# Patient Record
Sex: MALE | Race: BLACK OR AFRICAN AMERICAN | Employment: OTHER | URBAN - METROPOLITAN AREA
[De-identification: names, ages, dates, MRNs, and addresses within clinical notes are randomized per-mention and may not be internally consistent; named-entity substitution may affect disease eponyms.]

---

## 2017-02-28 ENCOUNTER — APPOINTMENT (OUTPATIENT)
Dept: GENERAL RADIOLOGY | Age: 57
End: 2017-02-28
Payer: MEDICAID

## 2017-02-28 ENCOUNTER — HOSPITAL ENCOUNTER (EMERGENCY)
Age: 57
Discharge: HOME | End: 2017-02-28
Attending: EMERGENCY MEDICINE
Payer: MEDICAID

## 2017-02-28 VITALS
SYSTOLIC BLOOD PRESSURE: 174 MMHG | BODY MASS INDEX: 28.63 KG/M2 | RESPIRATION RATE: 18 BRPM | HEART RATE: 91 BPM | WEIGHT: 200 LBS | OXYGEN SATURATION: 98 % | HEIGHT: 70 IN | TEMPERATURE: 97.9 F | DIASTOLIC BLOOD PRESSURE: 81 MMHG

## 2017-02-28 DIAGNOSIS — R00.2 PALPITATIONS: Primary | ICD-10-CM

## 2017-02-28 DIAGNOSIS — I15.9 SECONDARY HYPERTENSION: ICD-10-CM

## 2017-02-28 LAB
ABSOLUTE EOS #: 0.1 K/UL (ref 0–0.4)
ABSOLUTE LYMPH #: 2.2 K/UL (ref 1–4.8)
ABSOLUTE MONO #: 0.5 K/UL (ref 0.1–1.2)
ANION GAP SERPL CALCULATED.3IONS-SCNC: 15 MMOL/L (ref 9–17)
BASOPHILS # BLD: 1 % (ref 0–2)
BASOPHILS ABSOLUTE: 0.1 K/UL (ref 0–0.2)
BUN BLDV-MCNC: 21 MG/DL (ref 6–20)
BUN/CREAT BLD: ABNORMAL (ref 9–20)
CALCIUM SERPL-MCNC: 9 MG/DL (ref 8.6–10.4)
CHLORIDE BLD-SCNC: 97 MMOL/L (ref 98–107)
CO2: 22 MMOL/L (ref 20–31)
CREAT SERPL-MCNC: 1.38 MG/DL (ref 0.7–1.2)
DIFFERENTIAL TYPE: ABNORMAL
EOSINOPHILS RELATIVE PERCENT: 2 % (ref 1–4)
GFR AFRICAN AMERICAN: >60 ML/MIN
GFR NON-AFRICAN AMERICAN: 53 ML/MIN
GFR SERPL CREATININE-BSD FRML MDRD: ABNORMAL ML/MIN/{1.73_M2}
GFR SERPL CREATININE-BSD FRML MDRD: ABNORMAL ML/MIN/{1.73_M2}
GLUCOSE BLD-MCNC: 112 MG/DL (ref 70–99)
HCT VFR BLD CALC: 38.9 % (ref 41–53)
HEMOGLOBIN: 13.1 G/DL (ref 13.5–17.5)
LYMPHOCYTES # BLD: 30 % (ref 24–44)
MCH RBC QN AUTO: 29.5 PG (ref 26–34)
MCHC RBC AUTO-ENTMCNC: 33.7 G/DL (ref 31–37)
MCV RBC AUTO: 87.3 FL (ref 80–100)
MONOCYTES # BLD: 7 % (ref 2–11)
PDW BLD-RTO: 14.6 % (ref 12.5–15.4)
PLATELET # BLD: 305 K/UL (ref 140–450)
PLATELET ESTIMATE: ABNORMAL
PMV BLD AUTO: 7.9 FL (ref 6–12)
POC TROPONIN I: 0 NG/ML (ref 0–0.1)
POC TROPONIN I: 0.01 NG/ML (ref 0–0.1)
POC TROPONIN INTERP: NORMAL
POC TROPONIN INTERP: NORMAL
POTASSIUM SERPL-SCNC: 4.5 MMOL/L (ref 3.7–5.3)
RBC # BLD: 4.46 M/UL (ref 4.5–5.9)
RBC # BLD: ABNORMAL 10*6/UL
SEG NEUTROPHILS: 60 % (ref 36–66)
SEGMENTED NEUTROPHILS ABSOLUTE COUNT: 4.4 K/UL (ref 1.8–7.7)
SODIUM BLD-SCNC: 134 MMOL/L (ref 135–144)
TSH SERPL DL<=0.05 MIU/L-ACNC: 1.72 MIU/L (ref 0.3–5)
WBC # BLD: 7.3 K/UL (ref 3.5–11)
WBC # BLD: ABNORMAL 10*3/UL

## 2017-02-28 PROCEDURE — 2580000003 HC RX 258: Performed by: EMERGENCY MEDICINE

## 2017-02-28 PROCEDURE — 80048 BASIC METABOLIC PNL TOTAL CA: CPT

## 2017-02-28 PROCEDURE — 85025 COMPLETE CBC W/AUTO DIFF WBC: CPT

## 2017-02-28 PROCEDURE — 71020 XR CHEST STANDARD TWO VW: CPT

## 2017-02-28 PROCEDURE — 84484 ASSAY OF TROPONIN QUANT: CPT

## 2017-02-28 PROCEDURE — 93005 ELECTROCARDIOGRAM TRACING: CPT

## 2017-02-28 PROCEDURE — 99285 EMERGENCY DEPT VISIT HI MDM: CPT

## 2017-02-28 PROCEDURE — 84443 ASSAY THYROID STIM HORMONE: CPT

## 2017-02-28 RX ORDER — 0.9 % SODIUM CHLORIDE 0.9 %
1000 INTRAVENOUS SOLUTION INTRAVENOUS ONCE
Status: COMPLETED | OUTPATIENT
Start: 2017-02-28 | End: 2017-02-28

## 2017-02-28 RX ORDER — DILTIAZEM HYDROCHLORIDE 120 MG/1
120 CAPSULE, COATED, EXTENDED RELEASE ORAL DAILY
Qty: 14 CAPSULE | Refills: 0 | Status: SHIPPED | OUTPATIENT
Start: 2017-02-28 | End: 2018-07-19

## 2017-02-28 RX ADMIN — SODIUM CHLORIDE 1000 ML: 9 INJECTION, SOLUTION INTRAVENOUS at 03:12

## 2017-02-28 ASSESSMENT — ENCOUNTER SYMPTOMS
NAUSEA: 0
COUGH: 0
DIARRHEA: 0
ABDOMINAL PAIN: 0
BLOOD IN STOOL: 0
CHEST TIGHTNESS: 0
APNEA: 0
BACK PAIN: 0
VOMITING: 0
SHORTNESS OF BREATH: 0

## 2017-03-02 LAB
EKG ATRIAL RATE: 110 BPM
EKG P AXIS: 50 DEGREES
EKG P-R INTERVAL: 136 MS
EKG Q-T INTERVAL: 338 MS
EKG QRS DURATION: 88 MS
EKG QTC CALCULATION (BAZETT): 457 MS
EKG R AXIS: 23 DEGREES
EKG T AXIS: 61 DEGREES
EKG VENTRICULAR RATE: 110 BPM

## 2017-03-18 ENCOUNTER — HOSPITAL ENCOUNTER (EMERGENCY)
Age: 57
Discharge: HOME | End: 2017-03-18
Attending: EMERGENCY MEDICINE
Payer: MEDICAID

## 2017-03-18 VITALS
OXYGEN SATURATION: 98 % | RESPIRATION RATE: 18 BRPM | SYSTOLIC BLOOD PRESSURE: 156 MMHG | DIASTOLIC BLOOD PRESSURE: 84 MMHG | TEMPERATURE: 98.1 F | HEART RATE: 86 BPM

## 2017-03-18 DIAGNOSIS — M25.461 BILATERAL KNEE SWELLING: Primary | ICD-10-CM

## 2017-03-18 DIAGNOSIS — M25.462 BILATERAL KNEE SWELLING: Primary | ICD-10-CM

## 2017-03-18 PROCEDURE — 6370000000 HC RX 637 (ALT 250 FOR IP): Performed by: EMERGENCY MEDICINE

## 2017-03-18 PROCEDURE — 99283 EMERGENCY DEPT VISIT LOW MDM: CPT

## 2017-03-18 RX ORDER — IBUPROFEN 800 MG/1
800 TABLET ORAL EVERY 8 HOURS PRN
Qty: 30 TABLET | Refills: 0 | Status: SHIPPED | OUTPATIENT
Start: 2017-03-18 | End: 2018-07-19

## 2017-03-18 RX ORDER — IBUPROFEN 800 MG/1
800 TABLET ORAL ONCE
Status: COMPLETED | OUTPATIENT
Start: 2017-03-18 | End: 2017-03-18

## 2017-03-18 RX ADMIN — IBUPROFEN 800 MG: 800 TABLET, FILM COATED ORAL at 11:18

## 2017-03-18 ASSESSMENT — PAIN SCALES - GENERAL
PAINLEVEL_OUTOF10: 8
PAINLEVEL_OUTOF10: 8

## 2017-03-18 ASSESSMENT — PAIN DESCRIPTION - LOCATION: LOCATION: KNEE

## 2017-03-18 ASSESSMENT — ENCOUNTER SYMPTOMS
CHEST TIGHTNESS: 0
SHORTNESS OF BREATH: 0
NAUSEA: 0
VOMITING: 0
DIARRHEA: 0
SORE THROAT: 0
CONSTIPATION: 0
ABDOMINAL PAIN: 0

## 2017-03-18 ASSESSMENT — PAIN DESCRIPTION - DESCRIPTORS: DESCRIPTORS: DISCOMFORT

## 2017-03-18 ASSESSMENT — PAIN DESCRIPTION - ORIENTATION: ORIENTATION: RIGHT;LEFT

## 2017-06-17 ENCOUNTER — HOSPITAL ENCOUNTER (EMERGENCY)
Age: 57
Discharge: HOME | End: 2017-06-17
Attending: EMERGENCY MEDICINE
Payer: MEDICAID

## 2017-06-17 VITALS
BODY MASS INDEX: 28.63 KG/M2 | RESPIRATION RATE: 15 BRPM | WEIGHT: 200 LBS | HEART RATE: 85 BPM | HEIGHT: 70 IN | DIASTOLIC BLOOD PRESSURE: 83 MMHG | TEMPERATURE: 97.7 F | SYSTOLIC BLOOD PRESSURE: 146 MMHG | OXYGEN SATURATION: 98 %

## 2017-06-17 DIAGNOSIS — G25.81 RESTLESS LEG: Primary | ICD-10-CM

## 2017-06-17 PROCEDURE — 99283 EMERGENCY DEPT VISIT LOW MDM: CPT

## 2017-06-17 RX ORDER — DIAZEPAM 5 MG/1
5 TABLET ORAL EVERY 8 HOURS PRN
Qty: 5 TABLET | Refills: 0 | Status: SHIPPED | OUTPATIENT
Start: 2017-06-17 | End: 2017-06-17

## 2017-06-17 RX ORDER — ROPINIROLE 0.25 MG/1
0.25 TABLET, FILM COATED ORAL NIGHTLY
Qty: 90 TABLET | Refills: 3 | Status: ON HOLD | OUTPATIENT
Start: 2017-06-17 | End: 2018-07-20 | Stop reason: ALTCHOICE

## 2017-06-17 ASSESSMENT — PAIN DESCRIPTION - PAIN TYPE: TYPE: ACUTE PAIN

## 2017-06-17 ASSESSMENT — ENCOUNTER SYMPTOMS
RHINORRHEA: 0
ABDOMINAL PAIN: 0
NAUSEA: 0
SHORTNESS OF BREATH: 0
VOMITING: 0
BACK PAIN: 0
TROUBLE SWALLOWING: 0
CONSTIPATION: 0
DIARRHEA: 0

## 2017-06-17 ASSESSMENT — PAIN SCALES - GENERAL: PAINLEVEL_OUTOF10: 10

## 2017-06-17 ASSESSMENT — PAIN DESCRIPTION - LOCATION: LOCATION: LEG

## 2017-06-17 ASSESSMENT — PAIN DESCRIPTION - ORIENTATION: ORIENTATION: LEFT

## 2017-06-17 ASSESSMENT — PAIN DESCRIPTION - FREQUENCY: FREQUENCY: CONTINUOUS

## 2017-07-14 ENCOUNTER — HOSPITAL ENCOUNTER (EMERGENCY)
Age: 57
Discharge: HOME | End: 2017-07-14
Attending: EMERGENCY MEDICINE
Payer: MEDICAID

## 2017-07-14 VITALS
OXYGEN SATURATION: 97 % | TEMPERATURE: 99.1 F | BODY MASS INDEX: 29.62 KG/M2 | DIASTOLIC BLOOD PRESSURE: 87 MMHG | HEIGHT: 69 IN | HEART RATE: 85 BPM | SYSTOLIC BLOOD PRESSURE: 149 MMHG | WEIGHT: 200 LBS | RESPIRATION RATE: 18 BRPM

## 2017-07-14 DIAGNOSIS — G89.29 CHRONIC HIP PAIN, LEFT: Primary | ICD-10-CM

## 2017-07-14 DIAGNOSIS — M25.562 CHRONIC PAIN OF LEFT KNEE: ICD-10-CM

## 2017-07-14 DIAGNOSIS — G89.29 CHRONIC PAIN OF LEFT KNEE: ICD-10-CM

## 2017-07-14 DIAGNOSIS — G25.81 RESTLESS LEG SYNDROME: ICD-10-CM

## 2017-07-14 DIAGNOSIS — M25.552 CHRONIC HIP PAIN, LEFT: Primary | ICD-10-CM

## 2017-07-14 LAB — GLUCOSE BLD-MCNC: 143 MG/DL (ref 75–110)

## 2017-07-14 PROCEDURE — 99284 EMERGENCY DEPT VISIT MOD MDM: CPT

## 2017-07-14 PROCEDURE — 6370000000 HC RX 637 (ALT 250 FOR IP): Performed by: STUDENT IN AN ORGANIZED HEALTH CARE EDUCATION/TRAINING PROGRAM

## 2017-07-14 PROCEDURE — 82947 ASSAY GLUCOSE BLOOD QUANT: CPT

## 2017-07-14 RX ORDER — HYDROCODONE BITARTRATE AND ACETAMINOPHEN 5; 325 MG/1; MG/1
1 TABLET ORAL ONCE
Status: COMPLETED | OUTPATIENT
Start: 2017-07-14 | End: 2017-07-14

## 2017-07-14 RX ADMIN — HYDROCODONE BITARTRATE AND ACETAMINOPHEN 2 TABLET: 5; 325 TABLET ORAL at 07:14

## 2017-07-14 ASSESSMENT — ENCOUNTER SYMPTOMS
VOMITING: 0
BACK PAIN: 0
SHORTNESS OF BREATH: 0
PHOTOPHOBIA: 0
NAUSEA: 0
ABDOMINAL PAIN: 0
COUGH: 0
ABDOMINAL DISTENTION: 0
SORE THROAT: 0
BLOOD IN STOOL: 0
DIARRHEA: 0
CONSTIPATION: 0
CHEST TIGHTNESS: 0

## 2017-07-14 ASSESSMENT — PAIN DESCRIPTION - DESCRIPTORS: DESCRIPTORS: THROBBING

## 2017-07-14 ASSESSMENT — PAIN DESCRIPTION - PAIN TYPE: TYPE: ACUTE PAIN

## 2017-07-14 ASSESSMENT — PAIN DESCRIPTION - ORIENTATION: ORIENTATION: LEFT

## 2017-07-14 ASSESSMENT — PAIN SCALES - GENERAL
PAINLEVEL_OUTOF10: 8
PAINLEVEL_OUTOF10: 8

## 2017-07-14 ASSESSMENT — PAIN DESCRIPTION - LOCATION: LOCATION: LEG

## 2017-09-13 ENCOUNTER — HOSPITAL ENCOUNTER (EMERGENCY)
Age: 57
Discharge: HOME | End: 2017-09-13
Attending: EMERGENCY MEDICINE
Payer: MEDICAID

## 2017-09-13 VITALS
HEART RATE: 90 BPM | WEIGHT: 210 LBS | RESPIRATION RATE: 14 BRPM | OXYGEN SATURATION: 97 % | DIASTOLIC BLOOD PRESSURE: 85 MMHG | TEMPERATURE: 98.1 F | BODY MASS INDEX: 31.01 KG/M2 | SYSTOLIC BLOOD PRESSURE: 153 MMHG

## 2017-09-13 DIAGNOSIS — K85.90 ACUTE PANCREATITIS, UNSPECIFIED COMPLICATION STATUS, UNSPECIFIED PANCREATITIS TYPE: Primary | ICD-10-CM

## 2017-09-13 DIAGNOSIS — M25.561 RIGHT KNEE PAIN, UNSPECIFIED CHRONICITY: ICD-10-CM

## 2017-09-13 LAB
ABSOLUTE EOS #: 0.1 K/UL (ref 0–0.4)
ABSOLUTE LYMPH #: 2 K/UL (ref 1–4.8)
ABSOLUTE MONO #: 0.5 K/UL (ref 0.1–1.2)
ALBUMIN SERPL-MCNC: 4.1 G/DL (ref 3.5–5.2)
ALBUMIN/GLOBULIN RATIO: 1.4 (ref 1–2.5)
ALP BLD-CCNC: 109 U/L (ref 40–129)
ALT SERPL-CCNC: 14 U/L (ref 5–41)
ANION GAP SERPL CALCULATED.3IONS-SCNC: 13 MMOL/L (ref 9–17)
AST SERPL-CCNC: 20 U/L
BASOPHILS # BLD: 1 %
BASOPHILS ABSOLUTE: 0 K/UL (ref 0–0.2)
BILIRUB SERPL-MCNC: 0.53 MG/DL (ref 0.3–1.2)
BUN BLDV-MCNC: 15 MG/DL (ref 6–20)
BUN/CREAT BLD: ABNORMAL (ref 9–20)
CALCIUM SERPL-MCNC: 9 MG/DL (ref 8.6–10.4)
CHLORIDE BLD-SCNC: 103 MMOL/L (ref 98–107)
CO2: 22 MMOL/L (ref 20–31)
CREAT SERPL-MCNC: 1.44 MG/DL (ref 0.7–1.2)
DIFFERENTIAL TYPE: ABNORMAL
EOSINOPHILS RELATIVE PERCENT: 2 %
GFR AFRICAN AMERICAN: >60 ML/MIN
GFR NON-AFRICAN AMERICAN: 51 ML/MIN
GFR SERPL CREATININE-BSD FRML MDRD: ABNORMAL ML/MIN/{1.73_M2}
GFR SERPL CREATININE-BSD FRML MDRD: ABNORMAL ML/MIN/{1.73_M2}
GLUCOSE BLD-MCNC: 151 MG/DL (ref 70–99)
HCT VFR BLD CALC: 35.5 % (ref 41–53)
HEMOGLOBIN: 11.7 G/DL (ref 13.5–17.5)
LIPASE: 207 U/L (ref 13–60)
LYMPHOCYTES # BLD: 31 %
MCH RBC QN AUTO: 29.5 PG (ref 26–34)
MCHC RBC AUTO-ENTMCNC: 32.9 G/DL (ref 31–37)
MCV RBC AUTO: 89.7 FL (ref 80–100)
MONOCYTES # BLD: 7 %
PDW BLD-RTO: 14.7 % (ref 12.5–15.4)
PLATELET # BLD: 281 K/UL (ref 140–450)
PLATELET ESTIMATE: ABNORMAL
PMV BLD AUTO: 7.9 FL (ref 6–12)
POTASSIUM SERPL-SCNC: 3.8 MMOL/L (ref 3.7–5.3)
RBC # BLD: 3.96 M/UL (ref 4.5–5.9)
RBC # BLD: ABNORMAL 10*6/UL
SEG NEUTROPHILS: 59 %
SEGMENTED NEUTROPHILS ABSOLUTE COUNT: 3.9 K/UL (ref 1.8–7.7)
SODIUM BLD-SCNC: 138 MMOL/L (ref 135–144)
TOTAL PROTEIN: 7 G/DL (ref 6.4–8.3)
WBC # BLD: 6.6 K/UL (ref 3.5–11)
WBC # BLD: ABNORMAL 10*3/UL

## 2017-09-13 PROCEDURE — 99284 EMERGENCY DEPT VISIT MOD MDM: CPT

## 2017-09-13 PROCEDURE — 80053 COMPREHEN METABOLIC PANEL: CPT

## 2017-09-13 PROCEDURE — 83690 ASSAY OF LIPASE: CPT

## 2017-09-13 PROCEDURE — 85025 COMPLETE CBC W/AUTO DIFF WBC: CPT

## 2017-09-13 RX ORDER — OXYCODONE HYDROCHLORIDE 5 MG/1
5 TABLET ORAL EVERY 6 HOURS PRN
Qty: 10 TABLET | Refills: 0 | Status: SHIPPED | OUTPATIENT
Start: 2017-09-13 | End: 2017-09-20

## 2017-09-13 RX ORDER — ACETAMINOPHEN 500 MG
1000 TABLET ORAL EVERY 6 HOURS PRN
Qty: 30 TABLET | Refills: 3 | Status: SHIPPED | OUTPATIENT
Start: 2017-09-13 | End: 2018-07-19

## 2017-09-13 RX ORDER — ONDANSETRON 4 MG/1
4 TABLET, ORALLY DISINTEGRATING ORAL EVERY 8 HOURS PRN
Qty: 8 TABLET | Refills: 0 | Status: SHIPPED | OUTPATIENT
Start: 2017-09-13 | End: 2018-07-19

## 2017-09-13 ASSESSMENT — PAIN DESCRIPTION - FREQUENCY: FREQUENCY: INTERMITTENT

## 2017-09-13 ASSESSMENT — PAIN DESCRIPTION - DESCRIPTORS: DESCRIPTORS: SORE

## 2017-09-13 ASSESSMENT — ENCOUNTER SYMPTOMS
DIARRHEA: 0
NAUSEA: 0
VOMITING: 0
CONSTIPATION: 0
SHORTNESS OF BREATH: 0
ABDOMINAL PAIN: 1

## 2017-09-13 ASSESSMENT — PAIN SCALES - GENERAL: PAINLEVEL_OUTOF10: 10

## 2017-09-13 ASSESSMENT — PAIN DESCRIPTION - ORIENTATION: ORIENTATION: RIGHT

## 2017-09-13 ASSESSMENT — PAIN DESCRIPTION - PAIN TYPE: TYPE: CHRONIC PAIN

## 2017-09-13 ASSESSMENT — PAIN DESCRIPTION - LOCATION: LOCATION: ABDOMEN;KNEE

## 2018-07-19 ENCOUNTER — HOSPITAL ENCOUNTER (INPATIENT)
Age: 58
LOS: 2 days | Discharge: HOME | DRG: 446 | End: 2018-07-22
Attending: EMERGENCY MEDICINE | Admitting: EMERGENCY MEDICINE
Payer: MEDICAID

## 2018-07-19 ENCOUNTER — APPOINTMENT (OUTPATIENT)
Dept: CT IMAGING | Age: 58
DRG: 446 | End: 2018-07-19
Payer: MEDICAID

## 2018-07-19 DIAGNOSIS — N20.1 URETEROLITHIASIS: Primary | ICD-10-CM

## 2018-07-19 DIAGNOSIS — N10 ACUTE PYELONEPHRITIS: ICD-10-CM

## 2018-07-19 DIAGNOSIS — R10.9 RIGHT FLANK PAIN: ICD-10-CM

## 2018-07-19 PROBLEM — N20.0 KIDNEY STONE: Status: ACTIVE | Noted: 2018-07-19

## 2018-07-19 LAB
-: ABNORMAL
ABSOLUTE EOS #: 0.13 K/UL (ref 0–0.44)
ABSOLUTE IMMATURE GRANULOCYTE: 0.04 K/UL (ref 0–0.3)
ABSOLUTE LYMPH #: 1.68 K/UL (ref 1.1–3.7)
ABSOLUTE MONO #: 0.87 K/UL (ref 0.1–1.2)
ALBUMIN SERPL-MCNC: 4 G/DL (ref 3.5–5.2)
ALBUMIN/GLOBULIN RATIO: 1.1 (ref 1–2.5)
ALP BLD-CCNC: 122 U/L (ref 40–129)
ALT SERPL-CCNC: 16 U/L (ref 5–41)
AMORPHOUS: ABNORMAL
ANION GAP SERPL CALCULATED.3IONS-SCNC: 13 MMOL/L (ref 9–17)
AST SERPL-CCNC: 16 U/L
BACTERIA: ABNORMAL
BASOPHILS # BLD: 1 % (ref 0–2)
BASOPHILS ABSOLUTE: 0.06 K/UL (ref 0–0.2)
BILIRUB SERPL-MCNC: 0.54 MG/DL (ref 0.3–1.2)
BILIRUBIN DIRECT: 0.12 MG/DL
BILIRUBIN URINE: NEGATIVE
BILIRUBIN, INDIRECT: 0.42 MG/DL (ref 0–1)
BUN BLDV-MCNC: 28 MG/DL (ref 6–20)
BUN/CREAT BLD: ABNORMAL (ref 9–20)
CALCIUM SERPL-MCNC: 9 MG/DL (ref 8.6–10.4)
CASTS UA: ABNORMAL /LPF (ref 0–8)
CHLORIDE BLD-SCNC: 100 MMOL/L (ref 98–107)
CO2: 20 MMOL/L (ref 20–31)
COLOR: YELLOW
CREAT SERPL-MCNC: 1.85 MG/DL (ref 0.7–1.2)
CRYSTALS, UA: ABNORMAL /HPF
DIFFERENTIAL TYPE: ABNORMAL
EOSINOPHILS RELATIVE PERCENT: 1 % (ref 1–4)
EPITHELIAL CELLS UA: ABNORMAL /HPF (ref 0–5)
GFR AFRICAN AMERICAN: 46 ML/MIN
GFR NON-AFRICAN AMERICAN: 38 ML/MIN
GFR SERPL CREATININE-BSD FRML MDRD: ABNORMAL ML/MIN/{1.73_M2}
GFR SERPL CREATININE-BSD FRML MDRD: ABNORMAL ML/MIN/{1.73_M2}
GLOBULIN: NORMAL G/DL (ref 1.5–3.8)
GLUCOSE BLD-MCNC: 199 MG/DL (ref 70–99)
GLUCOSE URINE: NEGATIVE
HCT VFR BLD CALC: 37.9 % (ref 40.7–50.3)
HEMOGLOBIN: 12.1 G/DL (ref 13–17)
IMMATURE GRANULOCYTES: 0 %
KETONES, URINE: NEGATIVE
LEUKOCYTE ESTERASE, URINE: ABNORMAL
LYMPHOCYTES # BLD: 14 % (ref 24–43)
MCH RBC QN AUTO: 29.1 PG (ref 25.2–33.5)
MCHC RBC AUTO-ENTMCNC: 31.9 G/DL (ref 28.4–34.8)
MCV RBC AUTO: 91.1 FL (ref 82.6–102.9)
MONOCYTES # BLD: 7 % (ref 3–12)
MUCUS: ABNORMAL
NITRITE, URINE: NEGATIVE
NRBC AUTOMATED: 0 PER 100 WBC
OTHER OBSERVATIONS UA: ABNORMAL
PDW BLD-RTO: 13.2 % (ref 11.8–14.4)
PH UA: 5 (ref 5–8)
PLATELET # BLD: 241 K/UL (ref 138–453)
PLATELET ESTIMATE: ABNORMAL
PMV BLD AUTO: 10.5 FL (ref 8.1–13.5)
POTASSIUM SERPL-SCNC: 3.6 MMOL/L (ref 3.7–5.3)
PROTEIN UA: ABNORMAL
RBC # BLD: 4.16 M/UL (ref 4.21–5.77)
RBC # BLD: ABNORMAL 10*6/UL
RBC UA: ABNORMAL /HPF (ref 0–4)
RENAL EPITHELIAL, UA: ABNORMAL /HPF
SEG NEUTROPHILS: 77 % (ref 36–65)
SEGMENTED NEUTROPHILS ABSOLUTE COUNT: 9.2 K/UL (ref 1.5–8.1)
SODIUM BLD-SCNC: 133 MMOL/L (ref 135–144)
SPECIFIC GRAVITY UA: 1.02 (ref 1–1.03)
TOTAL PROTEIN: 7.7 G/DL (ref 6.4–8.3)
TRICHOMONAS: ABNORMAL
TURBIDITY: ABNORMAL
URINE HGB: ABNORMAL
UROBILINOGEN, URINE: NORMAL
WBC # BLD: 12 K/UL (ref 3.5–11.3)
WBC # BLD: ABNORMAL 10*3/UL
WBC UA: ABNORMAL /HPF (ref 0–5)
YEAST: ABNORMAL

## 2018-07-19 PROCEDURE — 80076 HEPATIC FUNCTION PANEL: CPT

## 2018-07-19 PROCEDURE — 6370000000 HC RX 637 (ALT 250 FOR IP): Performed by: PHYSICIAN ASSISTANT

## 2018-07-19 PROCEDURE — 6360000002 HC RX W HCPCS: Performed by: PHYSICIAN ASSISTANT

## 2018-07-19 PROCEDURE — 2580000003 HC RX 258: Performed by: PHYSICIAN ASSISTANT

## 2018-07-19 PROCEDURE — 81001 URINALYSIS AUTO W/SCOPE: CPT

## 2018-07-19 PROCEDURE — 74176 CT ABD & PELVIS W/O CONTRAST: CPT

## 2018-07-19 PROCEDURE — 96374 THER/PROPH/DIAG INJ IV PUSH: CPT

## 2018-07-19 PROCEDURE — 2500000003 HC RX 250 WO HCPCS: Performed by: PHYSICIAN ASSISTANT

## 2018-07-19 PROCEDURE — 85025 COMPLETE CBC W/AUTO DIFF WBC: CPT

## 2018-07-19 PROCEDURE — 80048 BASIC METABOLIC PNL TOTAL CA: CPT

## 2018-07-19 PROCEDURE — 99285 EMERGENCY DEPT VISIT HI MDM: CPT

## 2018-07-19 PROCEDURE — G0378 HOSPITAL OBSERVATION PER HR: HCPCS

## 2018-07-19 PROCEDURE — 87086 URINE CULTURE/COLONY COUNT: CPT

## 2018-07-19 RX ORDER — KETOROLAC TROMETHAMINE 15 MG/ML
15 INJECTION, SOLUTION INTRAMUSCULAR; INTRAVENOUS ONCE
Status: COMPLETED | OUTPATIENT
Start: 2018-07-19 | End: 2018-07-19

## 2018-07-19 RX ORDER — TAMSULOSIN HYDROCHLORIDE 0.4 MG/1
0.4 CAPSULE ORAL DAILY
Status: DISCONTINUED | OUTPATIENT
Start: 2018-07-19 | End: 2018-07-20

## 2018-07-19 RX ORDER — 0.9 % SODIUM CHLORIDE 0.9 %
1000 INTRAVENOUS SOLUTION INTRAVENOUS ONCE
Status: COMPLETED | OUTPATIENT
Start: 2018-07-19 | End: 2018-07-19

## 2018-07-19 RX ADMIN — KETOROLAC TROMETHAMINE 15 MG: 15 INJECTION, SOLUTION INTRAMUSCULAR; INTRAVENOUS at 18:30

## 2018-07-19 RX ADMIN — SODIUM CHLORIDE 1000 ML: 9 INJECTION, SOLUTION INTRAVENOUS at 18:31

## 2018-07-19 RX ADMIN — CEFTRIAXONE SODIUM 1 G: 2 INJECTION, POWDER, FOR SOLUTION INTRAMUSCULAR; INTRAVENOUS at 20:39

## 2018-07-19 RX ADMIN — SODIUM CHLORIDE 1000 ML: 9 INJECTION, SOLUTION INTRAVENOUS at 20:39

## 2018-07-19 RX ADMIN — TAMSULOSIN HYDROCHLORIDE 0.4 MG: 0.4 CAPSULE ORAL at 20:39

## 2018-07-19 ASSESSMENT — PAIN SCALES - GENERAL
PAINLEVEL_OUTOF10: 10
PAINLEVEL_OUTOF10: 10

## 2018-07-19 ASSESSMENT — PAIN DESCRIPTION - PAIN TYPE: TYPE: ACUTE PAIN

## 2018-07-19 ASSESSMENT — ENCOUNTER SYMPTOMS
VOMITING: 0
BACK PAIN: 0
SHORTNESS OF BREATH: 0
SORE THROAT: 0
NAUSEA: 1
ABDOMINAL PAIN: 0
CHEST TIGHTNESS: 0
COUGH: 0
ABDOMINAL DISTENTION: 0
DIARRHEA: 0

## 2018-07-19 ASSESSMENT — PAIN DESCRIPTION - LOCATION: LOCATION: FLANK

## 2018-07-19 NOTE — ED PROVIDER NOTES
[facility] ED     Emergency Department     Faculty Attestation    I performed a history and physical examination of the patient and discussed management with the resident. I reviewed the residents note and agree with the documented findings and plan of care. Any areas of disagreement are noted on the chart. I was personally present for the key portions of any procedures. I have documented in the chart those procedures where I was not present during the key portions. I have reviewed the emergency nurses triage note. I agree with the chief complaint, past medical history, past surgical history, allergies, medications, social and family history as documented unless otherwise noted below. For Physician Assistant/ Nurse Practitioner cases/documentation I have personally evaluated this patient and have completed at least one if not all key elements of the E/M (history, physical exam, and MDM). Additional findings are as noted. She here with right-sided flank pain intermittent for the past several days getting worse more severe. Nausea but no vomiting. Concerned that he is had a blood in his urine. No history of stones. Also decreased bowel movements for the last week but did have a bowel movement yesterday. No surgical history still has his appendix and gallbladder. On exam appears comfortable watching nor. Abdomen soft focal right lower tenderness but more lateraland inferior to McBurney's point but no rebound or guarding no pulsatile abdominal mass.  exam negative per resident note testicular tenderness. Does have right CVA tenderness.   Clinically concerned for stone, we'll do a bedside ultrasound, plan for CT given no prior history of stone and age      Critical Care     none    Genie Medina MD, Kimani Llanos  Attending Emergency  Physician             Genie Medina MD  07/19/18 1108

## 2018-07-19 NOTE — ED PROVIDER NOTES
is active and cooperative. Non-toxic appearance. He does not have a sickly appearance. He does not appear ill. Patient appears uncomfortable but nontoxic. HENT:   Head: Normocephalic and atraumatic. Mouth/Throat: Oropharynx is clear and moist.   Eyes: Conjunctivae are normal. Pupils are equal, round, and reactive to light. Neck: Normal range of motion. Neck supple. Cardiovascular: Normal rate, regular rhythm, normal heart sounds and intact distal pulses. No murmur heard. Pulmonary/Chest: Effort normal and breath sounds normal. No respiratory distress. Abdominal: Soft. Bowel sounds are normal. He exhibits no distension. There is no tenderness. There is CVA tenderness. There is no guarding, no tenderness at McBurney's point and negative Beaver's sign. Hernia confirmed negative in the right inguinal area and confirmed negative in the left inguinal area. Right-sided CVA tenderness   Genitourinary: Testes normal and penis normal. Cremasteric reflex is present. Right testis shows no mass, no swelling and no tenderness. Right testis is descended. Cremasteric reflex is not absent on the right side. Left testis shows no mass, no swelling and no tenderness. Left testis is descended. Cremasteric reflex is not absent on the left side. Musculoskeletal: Normal range of motion. Lymphadenopathy:        Right: No inguinal adenopathy present. Left: No inguinal adenopathy present. Neurological: He is alert and oriented to person, place, and time. Coordination normal.   Skin: Skin is warm and dry. He is not diaphoretic. Psychiatric: He has a normal mood and affect. His behavior is normal. Judgment and thought content normal.   Nursing note and vitals reviewed.       DIFFERENTIAL  DIAGNOSIS     PLAN (LABS / IMAGING / EKG):  Orders Placed This Encounter   Procedures    Urine Culture    CT ABDOMEN PELVIS WO CONTRAST    CBC Auto Differential    Basic Metabolic Panel    HEPATIC FUNCTION PANEL    Urinalysis with Microscopic    BASIC METABOLIC PANEL    CBC Auto Differential    Diet NPO Effective Now    Vital signs per unit routine    Notify physician    Notify physician    Up as tolerated    Place intermittent pneumatic compression device    Strain all urine    Full Code    Inpatient consult to Urology    POC Glucose Fingerstick    Saline lock IV    PATIENT STATUS (FROM ED OR OR/PROCEDURAL) Inpatient       MEDICATIONS ORDERED:  Orders Placed This Encounter   Medications    0.9 % sodium chloride bolus    ketorolac (TORADOL) injection 15 mg    cefTRIAXone (ROCEPHIN) 1 g in sterile water 10 mL IV syringe    sodium chloride flush 0.9 % injection 10 mL    sodium chloride flush 0.9 % injection 10 mL    acetaminophen (TYLENOL) tablet 650 mg    0.9 % sodium chloride infusion    oxyCODONE-acetaminophen (PERCOCET) 5-325 MG per tablet 1 tablet    ondansetron (ZOFRAN-ODT) disintegrating tablet 4 mg    ketorolac (TORADOL) injection 15 mg    cefTRIAXone (ROCEPHIN) 1 g in sterile water 10 mL IV syringe    tamsulosin (FLOMAX) capsule 0.4 mg    gabapentin (NEURONTIN) capsule 100 mg    metFORMIN (GLUCOPHAGE) tablet 500 mg    DISCONTD: tamsulosin (FLOMAX) capsule 0.4 mg    0.9 % sodium chloride bolus       DDX: Kidney stone, UTI, pyelonephritis, hepatitis, gallbladder disease, musculoskeletal pain, neoplasm, appendicitis    Initial MDM/Plan: 62 y.o. male who presents with right-sided flank pain and hematuria. Patient discussed with Dr. Clarence Brothers. Patient is alert and oriented, appears uncomfortable but is nontoxic appearing. Patient has a normal testicular exam and no evidence of hernia. Will order CT scan to evaluate for possible kidney stone. Due to patient age and history of hypertension, will do a bedside ultrasound to evaluate for AAA. CBC, BMP, LFTs, UA, CT abdomen and pelvis ordered for evaluation. Toradol ordered for pain.     MDM    DIAGNOSTIC RESULTS / EMERGENCY DEPARTMENT COURSE / MDM admit patient for pain control and infected kidney stone.  [CR]   2009 Discussed patient with urology resident who states that they will consult on the patient for possible procedure tomorrow. [CR]   2125 Patient waiting admission. Patient discussed with Dr. Angela Rendon who will assume care at shift change.  [CR]      ED Course User Index  [CR] Alejandra Vargas DO          PROCEDURES:  Procedures    CONSULTS:  IP CONSULT TO UROLOGY    CRITICAL CARE:  Please see attending note    FINAL IMPRESSION      1. Ureterolithiasis    2. Acute pyelonephritis    3. Right flank pain          DISPOSITION / PLAN     DISPOSITION        PATIENT REFERRED TO:  No follow-up provider specified.     DISCHARGE MEDICATIONS:  Current Discharge Medication List          Alejandra Vargas DO  Emergency Medicine Resident    (Please note that portions of this note were completed with a voice recognition program.  Efforts were made to edit the dictations but occasionally words are mis-transcribed.)       Alejandra Vargas DO  Resident  07/20/18 1048

## 2018-07-20 ENCOUNTER — APPOINTMENT (OUTPATIENT)
Dept: GENERAL RADIOLOGY | Age: 58
DRG: 446 | End: 2018-07-20
Payer: MEDICAID

## 2018-07-20 ENCOUNTER — ANESTHESIA (OUTPATIENT)
Dept: OPERATING ROOM | Age: 58
DRG: 446 | End: 2018-07-20
Payer: MEDICAID

## 2018-07-20 ENCOUNTER — ANESTHESIA EVENT (OUTPATIENT)
Dept: OPERATING ROOM | Age: 58
DRG: 446 | End: 2018-07-20
Payer: MEDICAID

## 2018-07-20 VITALS — SYSTOLIC BLOOD PRESSURE: 121 MMHG | OXYGEN SATURATION: 96 % | DIASTOLIC BLOOD PRESSURE: 72 MMHG

## 2018-07-20 LAB
CULTURE: NORMAL
ESTIMATED AVERAGE GLUCOSE: 186 MG/DL
GLUCOSE BLD-MCNC: 146 MG/DL (ref 75–110)
GLUCOSE BLD-MCNC: 191 MG/DL (ref 75–110)
GLUCOSE BLD-MCNC: 380 MG/DL (ref 75–110)
HBA1C MFR BLD: 8.1 % (ref 4–6)
Lab: NORMAL
SPECIMEN DESCRIPTION: NORMAL
STATUS: NORMAL

## 2018-07-20 PROCEDURE — 6370000000 HC RX 637 (ALT 250 FOR IP): Performed by: EMERGENCY MEDICINE

## 2018-07-20 PROCEDURE — G0378 HOSPITAL OBSERVATION PER HR: HCPCS

## 2018-07-20 PROCEDURE — 74420 UROGRAPHY RTRGR +-KUB: CPT

## 2018-07-20 PROCEDURE — 2500000003 HC RX 250 WO HCPCS: Performed by: NURSE ANESTHETIST, CERTIFIED REGISTERED

## 2018-07-20 PROCEDURE — 2580000003 HC RX 258: Performed by: EMERGENCY MEDICINE

## 2018-07-20 PROCEDURE — 3700000001 HC ADD 15 MINUTES (ANESTHESIA): Performed by: UROLOGY

## 2018-07-20 PROCEDURE — 0TC68ZZ EXTIRPATION OF MATTER FROM RIGHT URETER, VIA NATURAL OR ARTIFICIAL OPENING ENDOSCOPIC: ICD-10-PCS | Performed by: UROLOGY

## 2018-07-20 PROCEDURE — 3600000002 HC SURGERY LEVEL 2 BASE: Performed by: UROLOGY

## 2018-07-20 PROCEDURE — 2580000003 HC RX 258: Performed by: UROLOGY

## 2018-07-20 PROCEDURE — 3700000000 HC ANESTHESIA ATTENDED CARE: Performed by: UROLOGY

## 2018-07-20 PROCEDURE — 7100000041 HC SPAR PHASE II RECOVERY - ADDTL 15 MIN: Performed by: UROLOGY

## 2018-07-20 PROCEDURE — C1769 GUIDE WIRE: HCPCS | Performed by: UROLOGY

## 2018-07-20 PROCEDURE — 2709999900 HC NON-CHARGEABLE SUPPLY: Performed by: UROLOGY

## 2018-07-20 PROCEDURE — 6360000002 HC RX W HCPCS: Performed by: EMERGENCY MEDICINE

## 2018-07-20 PROCEDURE — 96376 TX/PRO/DX INJ SAME DRUG ADON: CPT

## 2018-07-20 PROCEDURE — 6360000002 HC RX W HCPCS: Performed by: NURSE ANESTHETIST, CERTIFIED REGISTERED

## 2018-07-20 PROCEDURE — 7100000040 HC SPAR PHASE II RECOVERY - FIRST 15 MIN: Performed by: UROLOGY

## 2018-07-20 PROCEDURE — 6370000000 HC RX 637 (ALT 250 FOR IP): Performed by: UROLOGY

## 2018-07-20 PROCEDURE — 6370000000 HC RX 637 (ALT 250 FOR IP): Performed by: STUDENT IN AN ORGANIZED HEALTH CARE EDUCATION/TRAINING PROGRAM

## 2018-07-20 PROCEDURE — 6370000000 HC RX 637 (ALT 250 FOR IP): Performed by: PHYSICIAN ASSISTANT

## 2018-07-20 PROCEDURE — 0T788DZ DILATION OF BILATERAL URETERS WITH INTRALUMINAL DEVICE, VIA NATURAL OR ARTIFICIAL OPENING ENDOSCOPIC: ICD-10-PCS | Performed by: UROLOGY

## 2018-07-20 PROCEDURE — 36415 COLL VENOUS BLD VENIPUNCTURE: CPT

## 2018-07-20 PROCEDURE — 83036 HEMOGLOBIN GLYCOSYLATED A1C: CPT

## 2018-07-20 PROCEDURE — 1200000000 HC SEMI PRIVATE

## 2018-07-20 PROCEDURE — C2617 STENT, NON-COR, TEM W/O DEL: HCPCS | Performed by: UROLOGY

## 2018-07-20 PROCEDURE — 82947 ASSAY GLUCOSE BLOOD QUANT: CPT

## 2018-07-20 PROCEDURE — 3600000012 HC SURGERY LEVEL 2 ADDTL 15MIN: Performed by: UROLOGY

## 2018-07-20 PROCEDURE — 6360000004 HC RX CONTRAST MEDICATION: Performed by: UROLOGY

## 2018-07-20 PROCEDURE — BT141ZZ FLUOROSCOPY OF KIDNEYS, URETERS AND BLADDER USING LOW OSMOLAR CONTRAST: ICD-10-PCS | Performed by: UROLOGY

## 2018-07-20 PROCEDURE — 2580000003 HC RX 258: Performed by: NURSE ANESTHETIST, CERTIFIED REGISTERED

## 2018-07-20 DEVICE — UNIVERSA SOFT URETERAL STENT AND POSITIONER WITH HYDROPHILIC COATING AND MONOFILAMENT TETHER
Type: IMPLANTABLE DEVICE | Status: FUNCTIONAL
Brand: UNIVERSA

## 2018-07-20 RX ORDER — KETOROLAC TROMETHAMINE 15 MG/ML
15 INJECTION, SOLUTION INTRAMUSCULAR; INTRAVENOUS EVERY 6 HOURS PRN
Status: DISPENSED | OUTPATIENT
Start: 2018-07-20 | End: 2018-07-21

## 2018-07-20 RX ORDER — SODIUM CHLORIDE 9 MG/ML
INJECTION, SOLUTION INTRAVENOUS CONTINUOUS
Status: DISCONTINUED | OUTPATIENT
Start: 2018-07-20 | End: 2018-07-22 | Stop reason: HOSPADM

## 2018-07-20 RX ORDER — FENTANYL CITRATE 50 UG/ML
INJECTION, SOLUTION INTRAMUSCULAR; INTRAVENOUS PRN
Status: DISCONTINUED | OUTPATIENT
Start: 2018-07-20 | End: 2018-07-20 | Stop reason: SDUPTHER

## 2018-07-20 RX ORDER — SODIUM CHLORIDE 0.9 % (FLUSH) 0.9 %
10 SYRINGE (ML) INJECTION PRN
Status: DISCONTINUED | OUTPATIENT
Start: 2018-07-20 | End: 2018-07-22 | Stop reason: HOSPADM

## 2018-07-20 RX ORDER — DOCUSATE SODIUM 100 MG/1
100 CAPSULE, LIQUID FILLED ORAL 2 TIMES DAILY
Status: DISCONTINUED | OUTPATIENT
Start: 2018-07-20 | End: 2018-07-22 | Stop reason: HOSPADM

## 2018-07-20 RX ORDER — PROPOFOL 10 MG/ML
INJECTION, EMULSION INTRAVENOUS PRN
Status: DISCONTINUED | OUTPATIENT
Start: 2018-07-20 | End: 2018-07-20 | Stop reason: SDUPTHER

## 2018-07-20 RX ORDER — PROPOFOL 10 MG/ML
INJECTION, EMULSION INTRAVENOUS CONTINUOUS PRN
Status: DISCONTINUED | OUTPATIENT
Start: 2018-07-20 | End: 2018-07-20 | Stop reason: SDUPTHER

## 2018-07-20 RX ORDER — SODIUM CHLORIDE 0.9 % (FLUSH) 0.9 %
10 SYRINGE (ML) INJECTION EVERY 12 HOURS SCHEDULED
Status: DISCONTINUED | OUTPATIENT
Start: 2018-07-20 | End: 2018-07-22 | Stop reason: HOSPADM

## 2018-07-20 RX ORDER — LIDOCAINE HYDROCHLORIDE 10 MG/ML
INJECTION, SOLUTION INFILTRATION; PERINEURAL PRN
Status: DISCONTINUED | OUTPATIENT
Start: 2018-07-20 | End: 2018-07-20 | Stop reason: SDUPTHER

## 2018-07-20 RX ORDER — TAMSULOSIN HYDROCHLORIDE 0.4 MG/1
0.4 CAPSULE ORAL DAILY
Status: DISCONTINUED | OUTPATIENT
Start: 2018-07-20 | End: 2018-07-22 | Stop reason: HOSPADM

## 2018-07-20 RX ORDER — DEXTROSE MONOHYDRATE 25 G/50ML
12.5 INJECTION, SOLUTION INTRAVENOUS PRN
Status: DISCONTINUED | OUTPATIENT
Start: 2018-07-20 | End: 2018-07-22 | Stop reason: HOSPADM

## 2018-07-20 RX ORDER — SODIUM CHLORIDE, SODIUM LACTATE, POTASSIUM CHLORIDE, CALCIUM CHLORIDE 600; 310; 30; 20 MG/100ML; MG/100ML; MG/100ML; MG/100ML
INJECTION, SOLUTION INTRAVENOUS CONTINUOUS PRN
Status: DISCONTINUED | OUTPATIENT
Start: 2018-07-20 | End: 2018-07-20 | Stop reason: SDUPTHER

## 2018-07-20 RX ORDER — DEXAMETHASONE SODIUM PHOSPHATE 10 MG/ML
INJECTION INTRAMUSCULAR; INTRAVENOUS PRN
Status: DISCONTINUED | OUTPATIENT
Start: 2018-07-20 | End: 2018-07-20 | Stop reason: SDUPTHER

## 2018-07-20 RX ORDER — GABAPENTIN 100 MG/1
100 CAPSULE ORAL 3 TIMES DAILY
Status: DISCONTINUED | OUTPATIENT
Start: 2018-07-20 | End: 2018-07-22 | Stop reason: HOSPADM

## 2018-07-20 RX ORDER — MAGNESIUM HYDROXIDE 1200 MG/15ML
LIQUID ORAL CONTINUOUS PRN
Status: COMPLETED | OUTPATIENT
Start: 2018-07-20 | End: 2018-07-20

## 2018-07-20 RX ORDER — MIDAZOLAM HYDROCHLORIDE 1 MG/ML
INJECTION INTRAMUSCULAR; INTRAVENOUS PRN
Status: DISCONTINUED | OUTPATIENT
Start: 2018-07-20 | End: 2018-07-20 | Stop reason: SDUPTHER

## 2018-07-20 RX ORDER — DEXTROSE MONOHYDRATE 50 MG/ML
100 INJECTION, SOLUTION INTRAVENOUS PRN
Status: DISCONTINUED | OUTPATIENT
Start: 2018-07-20 | End: 2018-07-22 | Stop reason: HOSPADM

## 2018-07-20 RX ORDER — OXYCODONE HYDROCHLORIDE AND ACETAMINOPHEN 5; 325 MG/1; MG/1
1 TABLET ORAL EVERY 4 HOURS PRN
Status: DISCONTINUED | OUTPATIENT
Start: 2018-07-20 | End: 2018-07-22 | Stop reason: HOSPADM

## 2018-07-20 RX ORDER — CEFAZOLIN SODIUM 1 G/3ML
INJECTION, POWDER, FOR SOLUTION INTRAMUSCULAR; INTRAVENOUS PRN
Status: DISCONTINUED | OUTPATIENT
Start: 2018-07-20 | End: 2018-07-20 | Stop reason: SDUPTHER

## 2018-07-20 RX ORDER — NICOTINE POLACRILEX 4 MG
15 LOZENGE BUCCAL PRN
Status: DISCONTINUED | OUTPATIENT
Start: 2018-07-20 | End: 2018-07-22 | Stop reason: HOSPADM

## 2018-07-20 RX ORDER — ACETAMINOPHEN 325 MG/1
650 TABLET ORAL EVERY 4 HOURS PRN
Status: DISCONTINUED | OUTPATIENT
Start: 2018-07-20 | End: 2018-07-22 | Stop reason: HOSPADM

## 2018-07-20 RX ORDER — ONDANSETRON 4 MG/1
4 TABLET, ORALLY DISINTEGRATING ORAL EVERY 8 HOURS PRN
Status: DISCONTINUED | OUTPATIENT
Start: 2018-07-20 | End: 2018-07-22 | Stop reason: HOSPADM

## 2018-07-20 RX ORDER — ONDANSETRON 2 MG/ML
INJECTION INTRAMUSCULAR; INTRAVENOUS PRN
Status: DISCONTINUED | OUTPATIENT
Start: 2018-07-20 | End: 2018-07-20 | Stop reason: SDUPTHER

## 2018-07-20 RX ADMIN — PROPOFOL 40 MG: 10 INJECTION, EMULSION INTRAVENOUS at 15:19

## 2018-07-20 RX ADMIN — PROPOFOL 80 MCG/KG/MIN: 10 INJECTION, EMULSION INTRAVENOUS at 15:19

## 2018-07-20 RX ADMIN — LIDOCAINE HYDROCHLORIDE 50 MG: 10 INJECTION, SOLUTION INFILTRATION; PERINEURAL at 15:19

## 2018-07-20 RX ADMIN — MIDAZOLAM HYDROCHLORIDE 2 MG: 1 INJECTION, SOLUTION INTRAMUSCULAR; INTRAVENOUS at 15:14

## 2018-07-20 RX ADMIN — SODIUM CHLORIDE, POTASSIUM CHLORIDE, SODIUM LACTATE AND CALCIUM CHLORIDE: 600; 310; 30; 20 INJECTION, SOLUTION INTRAVENOUS at 14:40

## 2018-07-20 RX ADMIN — SODIUM CHLORIDE: 9 INJECTION, SOLUTION INTRAVENOUS at 02:50

## 2018-07-20 RX ADMIN — FENTANYL CITRATE 50 MCG: 50 INJECTION INTRAMUSCULAR; INTRAVENOUS at 15:16

## 2018-07-20 RX ADMIN — OXYCODONE HYDROCHLORIDE AND ACETAMINOPHEN 1 TABLET: 5; 325 TABLET ORAL at 21:10

## 2018-07-20 RX ADMIN — CEFAZOLIN 2000 MG: 1 INJECTION, POWDER, FOR SOLUTION INTRAMUSCULAR; INTRAVENOUS at 15:24

## 2018-07-20 RX ADMIN — ONDANSETRON 4 MG: 2 INJECTION, SOLUTION INTRAMUSCULAR; INTRAVENOUS at 15:37

## 2018-07-20 RX ADMIN — SODIUM CHLORIDE: 9 INJECTION, SOLUTION INTRAVENOUS at 17:54

## 2018-07-20 RX ADMIN — PROPOFOL 40 MG: 10 INJECTION, EMULSION INTRAVENOUS at 15:24

## 2018-07-20 RX ADMIN — SODIUM CHLORIDE, POTASSIUM CHLORIDE, SODIUM LACTATE AND CALCIUM CHLORIDE: 600; 310; 30; 20 INJECTION, SOLUTION INTRAVENOUS at 15:25

## 2018-07-20 RX ADMIN — DEXAMETHASONE SODIUM PHOSPHATE 10 MG: 10 INJECTION INTRAMUSCULAR; INTRAVENOUS at 15:33

## 2018-07-20 RX ADMIN — GABAPENTIN 100 MG: 100 CAPSULE ORAL at 21:10

## 2018-07-20 RX ADMIN — CEFTRIAXONE SODIUM 1 G: 1 INJECTION, POWDER, FOR SOLUTION INTRAMUSCULAR; INTRAVENOUS at 21:10

## 2018-07-20 RX ADMIN — KETOROLAC TROMETHAMINE 15 MG: 15 INJECTION, SOLUTION INTRAMUSCULAR; INTRAVENOUS at 18:44

## 2018-07-20 RX ADMIN — CEFTRIAXONE SODIUM 1 G: 1 INJECTION, POWDER, FOR SOLUTION INTRAMUSCULAR; INTRAVENOUS at 08:48

## 2018-07-20 RX ADMIN — INSULIN LISPRO 2 UNITS: 100 INJECTION, SOLUTION INTRAVENOUS; SUBCUTANEOUS at 22:52

## 2018-07-20 ASSESSMENT — PAIN DESCRIPTION - PAIN TYPE
TYPE: ACUTE PAIN

## 2018-07-20 ASSESSMENT — PULMONARY FUNCTION TESTS
PIF_VALUE: 1
PIF_VALUE: 0
PIF_VALUE: 1
PIF_VALUE: 1
PIF_VALUE: 0
PIF_VALUE: 1
PIF_VALUE: 0
PIF_VALUE: 1
PIF_VALUE: 0
PIF_VALUE: 1
PIF_VALUE: 0
PIF_VALUE: 1

## 2018-07-20 ASSESSMENT — PAIN SCALES - GENERAL
PAINLEVEL_OUTOF10: 7
PAINLEVEL_OUTOF10: 7
PAINLEVEL_OUTOF10: 3
PAINLEVEL_OUTOF10: 3
PAINLEVEL_OUTOF10: 4
PAINLEVEL_OUTOF10: 6
PAINLEVEL_OUTOF10: 3

## 2018-07-20 ASSESSMENT — PAIN - FUNCTIONAL ASSESSMENT: PAIN_FUNCTIONAL_ASSESSMENT: 0-10

## 2018-07-20 ASSESSMENT — PAIN DESCRIPTION - ONSET: ONSET: ON-GOING

## 2018-07-20 ASSESSMENT — PAIN DESCRIPTION - ORIENTATION
ORIENTATION: RIGHT
ORIENTATION: RIGHT
ORIENTATION: RIGHT;LOWER

## 2018-07-20 ASSESSMENT — PAIN DESCRIPTION - DESCRIPTORS
DESCRIPTORS: ACHING
DESCRIPTORS: ACHING

## 2018-07-20 ASSESSMENT — PAIN DESCRIPTION - LOCATION
LOCATION: FLANK
LOCATION: ABDOMEN
LOCATION: FLANK

## 2018-07-20 ASSESSMENT — PAIN DESCRIPTION - FREQUENCY: FREQUENCY: CONTINUOUS

## 2018-07-20 NOTE — H&P
[facility]  CDU / OBSERVATION eNCOUnter  Resident Note     Pt Name: Neisha Cowan  MRN: [id number]  [place] 1960  Date of evaluation: 7/20/18  Patient's PCP is : No primary care provider on file. CHIEF COMPLAINT     No chief complaint on file. HISTORY OF PRESENT ILLNESS    Neisha Cowan is a 62 y.o. male who presents With gradually worsening sharp right-sided flank pain that radiates into his groin for the past 3 days that has been accompanied by nausea and blood in his urine. Patient states that he has never had an episode like this in the past.  He does have a past medical history of chronic kidney disease but states that he does not follow with a nephrologist.  Patient states that he has never had kidney stones before. He denies fever, chills, sweats, abdominal pain, painful urination, defecation complaints, penile discharge, concern for any STDs, testicular pain or swelling. Location/Symptom: Right flank  Timing/Onset: Sudden onset 3 days ago  Provocation: No known provoking factors  Quality: Sharp  Radiation: Into his right groin  Severity: 10 on a 10  Timing/Duration: Waxing and waning    REVIEW OF SYSTEMS       General ROS - No fevers, No malaise   Ophthalmic ROS - No discharge, No changes in vision  ENT ROS -  No sore throat, No rhinorrhea,   Respiratory ROS - no shortness of breath, no cough, no  wheezing  Cardiovascular ROS - No chest pain, no dyspnea on exertion  Gastrointestinal ROS - No abdominal pain, Positive for nausea, negative for vomiting no change in bowel habits, no black or bloody stools, Positive for right flank pain  Genito-Urinary ROS - No dysuria, trouble voiding,Positive for hematuria  Musculoskeletal ROS - No myalgias, No arthalgias  Neurological ROS - No headache, no dizziness/lightheadedness, No focal weakness, no loss of sensation  Dermatological ROS - No lesions, No rash     (PQRS) Advance directives on face sheet per hospital policy.  No hernia is seen. Organs: Liver, spleen, pancreas, gallbladder, and adrenals are unremarkable. Left kidney demonstrates small 3-4 mm nonobstructing parenchymal nephroliths. Right kidney is slightly enlarged with contour haziness and nonobstructing papillary nephroliths inferior pole. Mild to moderate right hydronephrosis is noted secondary to an obstructing calculus at the mid right UPJ junction of 7.9 by 4.8 mm with periureteral edema. GI/Bowel: No free fluid, free air, bowel obstruction or bowel wall thickening is noted. Pelvis: Appendix is visualized and is unremarkable. No abnormal fluid collections, masses, pelvic or inguinal adenopathy is noted. Peritoneum/Retroperitoneum: Aorta is normal in caliber without evidence of aneurysm. No retroperitoneal mass retroperitoneal adenopathy or mesenteric adenopathy is noted. Bones/Soft Tissues: Spondylosis is present. No worrisome lytic or blastic lesions. Estimated biologic radiation dose for this procedure:1092.48 mGy/cm2.     1.  Mild to moderate right hydronephrosis is present secondary to an obstructing calculus at the right UPJ junction of 7.9 x 4.8 mm with. Ureteral edema. No extraluminal fluid collections. 2.  Nonobstructing parenchymal nephroliths. 3.  Other incidental findings as above including pulmonary granulomas. LABS:  I have reviewed and interpreted all available lab results.   Labs Reviewed   CBC WITH AUTO DIFFERENTIAL - Abnormal; Notable for the following:        Result Value    WBC 12.0 (*)     RBC 4.16 (*)     Hemoglobin 12.1 (*)     Hematocrit 37.9 (*)     Seg Neutrophils 77 (*)     Lymphocytes 14 (*)     Segs Absolute 9.20 (*)     All other components within normal limits   BASIC METABOLIC PANEL - Abnormal; Notable for the following:     Glucose 199 (*)     BUN 28 (*)     CREATININE 1.85 (*)     Sodium 133 (*)     Potassium 3.6 (*)     GFR Non- 38 (*)     GFR  46 (*)     All other components within normal

## 2018-07-20 NOTE — ANESTHESIA POSTPROCEDURE EVALUATION
Department of Anesthesiology  Postprocedure Note    Patient: Logan Hawley  MRN: [id number]  YOB: 1960  Date of evaluation: 7/20/2018  Time:  4:32 PM     Procedure Summary     Date:  07/20/18 Room / Location:  [facility] OR [address] [place] [address] OR    Anesthesia Start:  1510 Anesthesia Stop:  5573    Procedure:  CYSTOSCOPY URETERAL STENT INSERTION BILATERAL (Bilateral ) Diagnosis:  (NEPHROLITHIASIS )    Surgeon:  Jyoti Montero MD Responsible Provider:      Anesthesia Type:  MAC ASA Status:  3          Anesthesia Type: MAC    June Phase I:      June Phase II: June Score: 10    Last vitals: Reviewed and per EMR flowsheets.        Anesthesia Post Evaluation    Patient location during evaluation: PACU  Patient participation: complete - patient participated  Level of consciousness: awake and alert  Airway patency: patent  Nausea & Vomiting: no nausea and no vomiting  Complications: no  Cardiovascular status: hemodynamically stable  Respiratory status: room air  Hydration status: euvolemic

## 2018-07-21 LAB
ABSOLUTE EOS #: <0.03 K/UL (ref 0–0.44)
ABSOLUTE IMMATURE GRANULOCYTE: 0.04 K/UL (ref 0–0.3)
ABSOLUTE LYMPH #: 0.86 K/UL (ref 1.1–3.7)
ABSOLUTE MONO #: 0.67 K/UL (ref 0.1–1.2)
ANION GAP SERPL CALCULATED.3IONS-SCNC: 17 MMOL/L (ref 9–17)
BASOPHILS # BLD: 0 % (ref 0–2)
BASOPHILS ABSOLUTE: <0.03 K/UL (ref 0–0.2)
BUN BLDV-MCNC: 29 MG/DL (ref 6–20)
BUN/CREAT BLD: ABNORMAL (ref 9–20)
CALCIUM SERPL-MCNC: 8.7 MG/DL (ref 8.6–10.4)
CHLORIDE BLD-SCNC: 105 MMOL/L (ref 98–107)
CO2: 15 MMOL/L (ref 20–31)
CREAT SERPL-MCNC: 2.02 MG/DL (ref 0.7–1.2)
DIFFERENTIAL TYPE: ABNORMAL
EOSINOPHILS RELATIVE PERCENT: 0 % (ref 1–4)
GFR AFRICAN AMERICAN: 41 ML/MIN
GFR NON-AFRICAN AMERICAN: 34 ML/MIN
GFR SERPL CREATININE-BSD FRML MDRD: ABNORMAL ML/MIN/{1.73_M2}
GFR SERPL CREATININE-BSD FRML MDRD: ABNORMAL ML/MIN/{1.73_M2}
GLUCOSE BLD-MCNC: 209 MG/DL (ref 75–110)
GLUCOSE BLD-MCNC: 243 MG/DL (ref 75–110)
GLUCOSE BLD-MCNC: 261 MG/DL (ref 75–110)
GLUCOSE BLD-MCNC: 368 MG/DL (ref 75–110)
GLUCOSE BLD-MCNC: 378 MG/DL (ref 70–99)
HCT VFR BLD CALC: 35.4 % (ref 40.7–50.3)
HEMOGLOBIN: 11.2 G/DL (ref 13–17)
IMMATURE GRANULOCYTES: 0 %
LYMPHOCYTES # BLD: 9 % (ref 24–43)
MCH RBC QN AUTO: 29.2 PG (ref 25.2–33.5)
MCHC RBC AUTO-ENTMCNC: 31.6 G/DL (ref 28.4–34.8)
MCV RBC AUTO: 92.4 FL (ref 82.6–102.9)
MONOCYTES # BLD: 7 % (ref 3–12)
NRBC AUTOMATED: 0 PER 100 WBC
PDW BLD-RTO: 12.9 % (ref 11.8–14.4)
PLATELET # BLD: 222 K/UL (ref 138–453)
PLATELET ESTIMATE: ABNORMAL
PMV BLD AUTO: 10.6 FL (ref 8.1–13.5)
POTASSIUM SERPL-SCNC: 4.4 MMOL/L (ref 3.7–5.3)
RBC # BLD: 3.83 M/UL (ref 4.21–5.77)
RBC # BLD: ABNORMAL 10*6/UL
SEG NEUTROPHILS: 84 % (ref 36–65)
SEGMENTED NEUTROPHILS ABSOLUTE COUNT: 8.19 K/UL (ref 1.5–8.1)
SODIUM BLD-SCNC: 137 MMOL/L (ref 135–144)
WBC # BLD: 9.8 K/UL (ref 3.5–11.3)
WBC # BLD: ABNORMAL 10*3/UL

## 2018-07-21 PROCEDURE — 2500000003 HC RX 250 WO HCPCS: Performed by: EMERGENCY MEDICINE

## 2018-07-21 PROCEDURE — 6370000000 HC RX 637 (ALT 250 FOR IP): Performed by: EMERGENCY MEDICINE

## 2018-07-21 PROCEDURE — G0378 HOSPITAL OBSERVATION PER HR: HCPCS

## 2018-07-21 PROCEDURE — 82947 ASSAY GLUCOSE BLOOD QUANT: CPT

## 2018-07-21 PROCEDURE — 85025 COMPLETE CBC W/AUTO DIFF WBC: CPT

## 2018-07-21 PROCEDURE — 96376 TX/PRO/DX INJ SAME DRUG ADON: CPT

## 2018-07-21 PROCEDURE — 2500000003 HC RX 250 WO HCPCS: Performed by: STUDENT IN AN ORGANIZED HEALTH CARE EDUCATION/TRAINING PROGRAM

## 2018-07-21 PROCEDURE — 6370000000 HC RX 637 (ALT 250 FOR IP): Performed by: STUDENT IN AN ORGANIZED HEALTH CARE EDUCATION/TRAINING PROGRAM

## 2018-07-21 PROCEDURE — 96375 TX/PRO/DX INJ NEW DRUG ADDON: CPT

## 2018-07-21 PROCEDURE — 80048 BASIC METABOLIC PNL TOTAL CA: CPT

## 2018-07-21 PROCEDURE — 1200000000 HC SEMI PRIVATE

## 2018-07-21 PROCEDURE — 6370000000 HC RX 637 (ALT 250 FOR IP): Performed by: PHYSICIAN ASSISTANT

## 2018-07-21 PROCEDURE — 36415 COLL VENOUS BLD VENIPUNCTURE: CPT

## 2018-07-21 PROCEDURE — 6360000002 HC RX W HCPCS: Performed by: EMERGENCY MEDICINE

## 2018-07-21 RX ORDER — LOSARTAN POTASSIUM 25 MG/1
25 TABLET ORAL DAILY
Status: DISCONTINUED | OUTPATIENT
Start: 2018-07-21 | End: 2018-07-22 | Stop reason: HOSPADM

## 2018-07-21 RX ORDER — METOPROLOL TARTRATE 5 MG/5ML
5 INJECTION INTRAVENOUS EVERY 6 HOURS PRN
Status: DISCONTINUED | OUTPATIENT
Start: 2018-07-21 | End: 2018-07-22 | Stop reason: HOSPADM

## 2018-07-21 RX ORDER — LOSARTAN POTASSIUM 25 MG/1
25 TABLET ORAL DAILY
COMMUNITY
End: 2019-02-21

## 2018-07-21 RX ORDER — POLYETHYLENE GLYCOL 3350 17 G/17G
17 POWDER, FOR SOLUTION ORAL DAILY
Status: DISCONTINUED | OUTPATIENT
Start: 2018-07-21 | End: 2018-07-22 | Stop reason: HOSPADM

## 2018-07-21 RX ADMIN — CEFTRIAXONE SODIUM 1 G: 1 INJECTION, POWDER, FOR SOLUTION INTRAMUSCULAR; INTRAVENOUS at 09:41

## 2018-07-21 RX ADMIN — INSULIN LISPRO 6 UNITS: 100 INJECTION, SOLUTION INTRAVENOUS; SUBCUTANEOUS at 17:15

## 2018-07-21 RX ADMIN — TAMSULOSIN HYDROCHLORIDE 0.4 MG: 0.4 CAPSULE ORAL at 08:46

## 2018-07-21 RX ADMIN — METOPROLOL TARTRATE 5 MG: 5 INJECTION, SOLUTION INTRAVENOUS at 02:14

## 2018-07-21 RX ADMIN — MAGNESIUM HYDROXIDE 30 ML: 400 SUSPENSION ORAL at 14:03

## 2018-07-21 RX ADMIN — METFORMIN HYDROCHLORIDE 500 MG: 500 TABLET ORAL at 08:46

## 2018-07-21 RX ADMIN — OXYCODONE HYDROCHLORIDE AND ACETAMINOPHEN 1 TABLET: 5; 325 TABLET ORAL at 12:16

## 2018-07-21 RX ADMIN — METFORMIN HYDROCHLORIDE 500 MG: 500 TABLET ORAL at 17:15

## 2018-07-21 RX ADMIN — OXYCODONE HYDROCHLORIDE AND ACETAMINOPHEN 1 TABLET: 5; 325 TABLET ORAL at 07:46

## 2018-07-21 RX ADMIN — METOPROLOL TARTRATE 5 MG: 5 INJECTION, SOLUTION INTRAVENOUS at 08:46

## 2018-07-21 RX ADMIN — POLYETHYLENE GLYCOL 3350 17 G: 17 POWDER, FOR SOLUTION ORAL at 14:00

## 2018-07-21 RX ADMIN — METOPROLOL TARTRATE 5 MG: 5 INJECTION, SOLUTION INTRAVENOUS at 17:15

## 2018-07-21 RX ADMIN — INSULIN LISPRO 2 UNITS: 100 INJECTION, SOLUTION INTRAVENOUS; SUBCUTANEOUS at 22:00

## 2018-07-21 RX ADMIN — CEFTRIAXONE SODIUM 1 G: 1 INJECTION, POWDER, FOR SOLUTION INTRAMUSCULAR; INTRAVENOUS at 22:08

## 2018-07-21 RX ADMIN — LOSARTAN POTASSIUM 25 MG: 25 TABLET, FILM COATED ORAL at 14:00

## 2018-07-21 RX ADMIN — DOCUSATE SODIUM 100 MG: 100 CAPSULE ORAL at 02:14

## 2018-07-21 RX ADMIN — INSULIN LISPRO 4 UNITS: 100 INJECTION, SOLUTION INTRAVENOUS; SUBCUTANEOUS at 12:16

## 2018-07-21 RX ADMIN — GABAPENTIN 100 MG: 100 CAPSULE ORAL at 08:46

## 2018-07-21 RX ADMIN — OXYCODONE HYDROCHLORIDE AND ACETAMINOPHEN 1 TABLET: 5; 325 TABLET ORAL at 02:15

## 2018-07-21 RX ADMIN — GABAPENTIN 100 MG: 100 CAPSULE ORAL at 22:09

## 2018-07-21 RX ADMIN — GABAPENTIN 100 MG: 100 CAPSULE ORAL at 15:13

## 2018-07-21 RX ADMIN — OXYCODONE HYDROCHLORIDE AND ACETAMINOPHEN 1 TABLET: 5; 325 TABLET ORAL at 17:15

## 2018-07-21 RX ADMIN — DOCUSATE SODIUM 100 MG: 100 CAPSULE ORAL at 22:09

## 2018-07-21 RX ADMIN — KETOROLAC TROMETHAMINE 15 MG: 15 INJECTION, SOLUTION INTRAMUSCULAR; INTRAVENOUS at 00:50

## 2018-07-21 RX ADMIN — DOCUSATE SODIUM 100 MG: 100 CAPSULE ORAL at 08:46

## 2018-07-21 RX ADMIN — OXYCODONE HYDROCHLORIDE AND ACETAMINOPHEN 1 TABLET: 5; 325 TABLET ORAL at 22:09

## 2018-07-21 RX ADMIN — INSULIN LISPRO 10 UNITS: 100 INJECTION, SOLUTION INTRAVENOUS; SUBCUTANEOUS at 08:52

## 2018-07-21 ASSESSMENT — PAIN SCALES - GENERAL
PAINLEVEL_OUTOF10: 6
PAINLEVEL_OUTOF10: 9
PAINLEVEL_OUTOF10: 5
PAINLEVEL_OUTOF10: 7
PAINLEVEL_OUTOF10: 7
PAINLEVEL_OUTOF10: 10
PAINLEVEL_OUTOF10: 8
PAINLEVEL_OUTOF10: 6
PAINLEVEL_OUTOF10: 8
PAINLEVEL_OUTOF10: 2
PAINLEVEL_OUTOF10: 5
PAINLEVEL_OUTOF10: 0
PAINLEVEL_OUTOF10: 9

## 2018-07-21 ASSESSMENT — PAIN DESCRIPTION - ORIENTATION
ORIENTATION: LEFT
ORIENTATION: LOWER

## 2018-07-21 ASSESSMENT — PAIN DESCRIPTION - DESCRIPTORS
DESCRIPTORS: ACHING;NAGGING
DESCRIPTORS: ACHING;BURNING;CONSTANT;RADIATING
DESCRIPTORS: ACHING

## 2018-07-21 ASSESSMENT — PAIN DESCRIPTION - PAIN TYPE
TYPE: ACUTE PAIN
TYPE: ACUTE PAIN
TYPE: SURGICAL PAIN

## 2018-07-21 ASSESSMENT — PAIN DESCRIPTION - ONSET
ONSET: ON-GOING
ONSET: ON-GOING

## 2018-07-21 ASSESSMENT — PAIN DESCRIPTION - LOCATION
LOCATION: ABDOMEN;FLANK
LOCATION: FLANK;ABDOMEN
LOCATION: ABDOMEN;SCROTUM
LOCATION: GROIN
LOCATION: ABDOMEN;FLANK

## 2018-07-21 ASSESSMENT — PAIN DESCRIPTION - FREQUENCY
FREQUENCY: CONTINUOUS
FREQUENCY: CONTINUOUS

## 2018-07-22 VITALS
BODY MASS INDEX: 32.86 KG/M2 | OXYGEN SATURATION: 100 % | SYSTOLIC BLOOD PRESSURE: 177 MMHG | TEMPERATURE: 97.9 F | RESPIRATION RATE: 18 BRPM | DIASTOLIC BLOOD PRESSURE: 85 MMHG | HEIGHT: 70 IN | WEIGHT: 229.5 LBS | HEART RATE: 77 BPM

## 2018-07-22 LAB
ANION GAP SERPL CALCULATED.3IONS-SCNC: 12 MMOL/L (ref 9–17)
ANION GAP SERPL CALCULATED.3IONS-SCNC: 12 MMOL/L (ref 9–17)
BUN BLDV-MCNC: 20 MG/DL (ref 6–20)
BUN BLDV-MCNC: 24 MG/DL (ref 6–20)
BUN/CREAT BLD: ABNORMAL (ref 9–20)
BUN/CREAT BLD: ABNORMAL (ref 9–20)
CALCIUM SERPL-MCNC: 8.6 MG/DL (ref 8.6–10.4)
CALCIUM SERPL-MCNC: 8.7 MG/DL (ref 8.6–10.4)
CHLORIDE BLD-SCNC: 106 MMOL/L (ref 98–107)
CHLORIDE BLD-SCNC: 108 MMOL/L (ref 98–107)
CO2: 19 MMOL/L (ref 20–31)
CO2: 20 MMOL/L (ref 20–31)
CREAT SERPL-MCNC: 1.38 MG/DL (ref 0.7–1.2)
CREAT SERPL-MCNC: 1.5 MG/DL (ref 0.7–1.2)
GFR AFRICAN AMERICAN: 58 ML/MIN
GFR AFRICAN AMERICAN: >60 ML/MIN
GFR NON-AFRICAN AMERICAN: 48 ML/MIN
GFR NON-AFRICAN AMERICAN: 53 ML/MIN
GFR SERPL CREATININE-BSD FRML MDRD: ABNORMAL ML/MIN/{1.73_M2}
GLUCOSE BLD-MCNC: 138 MG/DL (ref 70–99)
GLUCOSE BLD-MCNC: 204 MG/DL (ref 70–99)
GLUCOSE BLD-MCNC: 207 MG/DL (ref 75–110)
GLUCOSE BLD-MCNC: 213 MG/DL (ref 75–110)
POTASSIUM SERPL-SCNC: 4.1 MMOL/L (ref 3.7–5.3)
POTASSIUM SERPL-SCNC: 4.4 MMOL/L (ref 3.7–5.3)
SODIUM BLD-SCNC: 137 MMOL/L (ref 135–144)
SODIUM BLD-SCNC: 140 MMOL/L (ref 135–144)

## 2018-07-22 PROCEDURE — 6360000002 HC RX W HCPCS: Performed by: EMERGENCY MEDICINE

## 2018-07-22 PROCEDURE — 36415 COLL VENOUS BLD VENIPUNCTURE: CPT

## 2018-07-22 PROCEDURE — 6370000000 HC RX 637 (ALT 250 FOR IP): Performed by: PHYSICIAN ASSISTANT

## 2018-07-22 PROCEDURE — 82947 ASSAY GLUCOSE BLOOD QUANT: CPT

## 2018-07-22 PROCEDURE — 2500000003 HC RX 250 WO HCPCS: Performed by: EMERGENCY MEDICINE

## 2018-07-22 PROCEDURE — 2580000003 HC RX 258: Performed by: EMERGENCY MEDICINE

## 2018-07-22 PROCEDURE — 96376 TX/PRO/DX INJ SAME DRUG ADON: CPT

## 2018-07-22 PROCEDURE — G0378 HOSPITAL OBSERVATION PER HR: HCPCS

## 2018-07-22 PROCEDURE — 80048 BASIC METABOLIC PNL TOTAL CA: CPT

## 2018-07-22 PROCEDURE — 6370000000 HC RX 637 (ALT 250 FOR IP): Performed by: STUDENT IN AN ORGANIZED HEALTH CARE EDUCATION/TRAINING PROGRAM

## 2018-07-22 PROCEDURE — 2500000003 HC RX 250 WO HCPCS: Performed by: STUDENT IN AN ORGANIZED HEALTH CARE EDUCATION/TRAINING PROGRAM

## 2018-07-22 PROCEDURE — 6370000000 HC RX 637 (ALT 250 FOR IP): Performed by: EMERGENCY MEDICINE

## 2018-07-22 RX ORDER — TAMSULOSIN HYDROCHLORIDE 0.4 MG/1
0.4 CAPSULE ORAL DAILY
Qty: 30 CAPSULE | Refills: 0 | Status: SHIPPED | OUTPATIENT
Start: 2018-07-22 | End: 2019-02-21

## 2018-07-22 RX ORDER — OXYCODONE HYDROCHLORIDE AND ACETAMINOPHEN 5; 325 MG/1; MG/1
1-2 TABLET ORAL EVERY 4 HOURS PRN
Qty: 20 TABLET | Refills: 0 | Status: SHIPPED | OUTPATIENT
Start: 2018-07-22 | End: 2018-07-29

## 2018-07-22 RX ORDER — OXYBUTYNIN CHLORIDE 10 MG/1
10 TABLET, EXTENDED RELEASE ORAL DAILY
Qty: 30 TABLET | Refills: 0 | Status: SHIPPED | OUTPATIENT
Start: 2018-07-22 | End: 2019-09-25 | Stop reason: SDUPTHER

## 2018-07-22 RX ADMIN — DOCUSATE SODIUM 100 MG: 100 CAPSULE ORAL at 08:43

## 2018-07-22 RX ADMIN — CEFTRIAXONE SODIUM 1 G: 1 INJECTION, POWDER, FOR SOLUTION INTRAMUSCULAR; INTRAVENOUS at 08:44

## 2018-07-22 RX ADMIN — INSULIN LISPRO 6 UNITS: 100 INJECTION, SOLUTION INTRAVENOUS; SUBCUTANEOUS at 08:44

## 2018-07-22 RX ADMIN — TAMSULOSIN HYDROCHLORIDE 0.4 MG: 0.4 CAPSULE ORAL at 08:43

## 2018-07-22 RX ADMIN — METOPROLOL TARTRATE 5 MG: 5 INJECTION, SOLUTION INTRAVENOUS at 12:32

## 2018-07-22 RX ADMIN — GABAPENTIN 100 MG: 100 CAPSULE ORAL at 15:52

## 2018-07-22 RX ADMIN — METOPROLOL TARTRATE 5 MG: 5 INJECTION, SOLUTION INTRAVENOUS at 06:10

## 2018-07-22 RX ADMIN — METFORMIN HYDROCHLORIDE 500 MG: 500 TABLET ORAL at 08:43

## 2018-07-22 RX ADMIN — POLYETHYLENE GLYCOL 3350 17 G: 17 POWDER, FOR SOLUTION ORAL at 08:44

## 2018-07-22 RX ADMIN — LOSARTAN POTASSIUM 25 MG: 25 TABLET, FILM COATED ORAL at 08:43

## 2018-07-22 RX ADMIN — INSULIN LISPRO 6 UNITS: 100 INJECTION, SOLUTION INTRAVENOUS; SUBCUTANEOUS at 12:32

## 2018-07-22 RX ADMIN — OXYCODONE HYDROCHLORIDE AND ACETAMINOPHEN 1 TABLET: 5; 325 TABLET ORAL at 06:10

## 2018-07-22 RX ADMIN — GABAPENTIN 100 MG: 100 CAPSULE ORAL at 08:43

## 2018-07-22 RX ADMIN — SODIUM CHLORIDE: 9 INJECTION, SOLUTION INTRAVENOUS at 06:02

## 2018-07-22 ASSESSMENT — PAIN SCALES - GENERAL
PAINLEVEL_OUTOF10: 7
PAINLEVEL_OUTOF10: 7
PAINLEVEL_OUTOF10: 0
PAINLEVEL_OUTOF10: 3

## 2018-07-22 ASSESSMENT — PAIN DESCRIPTION - PAIN TYPE: TYPE: ACUTE PAIN

## 2018-07-22 ASSESSMENT — PAIN DESCRIPTION - ORIENTATION: ORIENTATION: RIGHT

## 2018-07-22 ASSESSMENT — PAIN DESCRIPTION - LOCATION: LOCATION: GROIN

## 2018-07-22 NOTE — PROGRESS NOTES
[facility]no pre op ekg needed per dr Sherman Forman
Pt's creatinine 1.38 at 1400, Dr Kristal Webb notified, ok to discharge pt, have pt follow-up with urology outpatient.  Electronically signed by Nguyễn Castillo RN on 7/22/2018 at 3:17 PM
Emergency  Physician
have been performed by me. I agree with the assessment, plan, and orders as documented by the resident. (GC Modifier). To OR later today for cystoscopy and right ureteral stent placement. Consent obtained. Patient marked. Patient agreeable to plan.     Dr. Shannan Rodrigez MD
presents with sudden onset of right-sided flank pain accompanied by nausea and hematuria 3 days ago.     Acute onset of right-sided flank pain, likely secondary to nephrolithiasis, urology is involved with care of patient, pain being controlled with by mouth Tylenol, IV Toradol, by mouth Percocet, nausea being controlled with ODT Zofran  -UA revealed 3+ protein, small leukocytes, large hemoglobin  -Patient has mild leukocytosis with a white blood cell count of 12  -Abdominal CT revealed moderate right hydronephrosis with obstructing calculus at the UPJ measuring 7.9 x 4.8 mm with ureter edema  -Patient currently on ceftriaxone IV  -Urology consulted who took patient to the OR yesterday and removed the obstructing stone in place since bilaterally. Urology states that they are okay with patient being discharged home upon improvement of his creatinine     JEFFREY on CKD likely secondary to nephrolithiasis, we'll continue to trend creatinine and continue IV fluids  -Patient's creatinine did increase today. Will continue IV fluids and encourage patient to drink by mouth fluids. Will recheck tomorrow     Chronic normocytic anemia, likely secondary to chronic kidney disease, patient currently at baseline and there is no concern for acute hemorrhage    Chronic type 2 diabetes, non-insulin dependent, well-controlled  -Patient was switched to medium dose sliding scale for better glucose control    · Continue home medications and pain control  · Monitor vitals, labs, and imaging  · DISPO: pending consults and clinical improvement    --  [facility]  Emergency Medicine Resident Physician     This dictation was generated by voice recognition computer software. Although all attempts are made to edit the dictation for accuracy, there may be errors in the transcription that are not intended.
tenderness bilaterally  No CVA tenderness bilaterally          Impression:      62year old male with bilateral ureteral stones POD 1 s/p bilateral ureteral stent placement and left ureteral stone extraction. Plan:     - Flomax   - Follow up in 1-2 weeks with Dr. Iglesia Marroquin for bilateral ureteroscopy  - UCx no growth  - Encourage ambulation      Lisandro Borja  Urology Resident, PGY3  8:25 AM 7/21/2018      I have discussed the care of this patient including pertinent history and exam findings, with the resident. I have seen and examined the patient and the key elements of all parts of the encounter have been performed by me. I agree with the assessment, plan and orders as documented by the resident.     Elder Terrell M.D

## 2018-07-22 NOTE — DISCHARGE SUMMARY
CDU Discharge Summary        Patient:  Sol Tyson  YOB: 1960    MRN: [id number]   Acct: [de-identified]    Primary Care Physician: No primary care provider on file. Admit date:  7/19/2018  5:51 PM  Discharge date: 7/22/2018  4:05 PM    Discharge Diagnoses:     Acute onset of right-sided flank pain, likely secondary to nephrolithiasis, urology is involved with care of patient, pain controlled with by mouth Tylenol, IV Toradol, by mouth Percocet, nausea controlled with ODT Zofran    JEFFREY on CKD likely secondary to nephrolithiasis, Improved    Chronic normocytic anemia, likely secondary to chronic kidney disease, patient currently at baseline and there is no concern for acute hemorrhage    Chronic type 2 diabetes, non-insulin dependent, well-controlled    Acute onset of constipation, likely secondary to narcotic pain control, patient given by mouth Colace, by mouth milk of magnesia, and by mouth MiraLAX    Follow-up:  Call today/tomorrow for a follow up appointment with No primary care provider on file. , urologist or return to the Emergency Room with worsening symptoms    Stressed to patient the importance of following up with primary care doctor for further workup/management of symptoms. Pt verbalizes understanding and agrees with plan.     Discharge Medications:  Changes to medications: Patient prescribed Flomax, oxybutynin, Percocet by urology        [facility]   Home Medication Instructions [facility]:[id number]    Printed on:07/22/18 1741   Medication Information                      gabapentin (NEURONTIN) 100 MG capsule  Take 1 capsule by mouth 3 times daily             losartan (COZAAR) 25 MG tablet  Take 25 mg by mouth daily             metFORMIN (GLUCOPHAGE) 500 MG tablet  Take 1 tablet by mouth 2 times daily (with meals)             oxybutynin (DITROPAN-XL) 10 MG extended release tablet  Take 1 tablet by mouth daily             oxyCODONE-acetaminophen (PERCOCET) 5-325 MG per tablet  Take 144 mmol/L    Potassium 3.6 (L) 3.7 - 5.3 mmol/L    Chloride 100 98 - 107 mmol/L    CO2 20 20 - 31 mmol/L    Anion Gap 13 9 - 17 mmol/L    GFR Non-African American 38 (L) >60 mL/min    GFR  46 (L) >60 mL/min    GFR Comment          GFR Staging NOT REPORTED    HEPATIC FUNCTION PANEL   Result Value Ref Range    Alb 4.0 3.5 - 5.2 g/dL    Alkaline Phosphatase 122 40 - 129 U/L    ALT 16 5 - 41 U/L    AST 16 <40 U/L    Total Bilirubin 0.54 0.3 - 1.2 mg/dL    Bilirubin, Direct 0.12 <0.31 mg/dL    Bilirubin, Indirect 0.42 0.00 - 1.00 mg/dL    Total Protein 7.7 6.4 - 8.3 g/dL    Globulin NOT REPORTED 1.5 - 3.8 g/dL    Albumin/Globulin Ratio 1.1 1.0 - 2.5   Urinalysis with Microscopic   Result Value Ref Range    Color, UA YELLOW YEL    Turbidity UA CLOUDY (A) CLEAR    Glucose, Ur NEGATIVE NEG    Bilirubin Urine NEGATIVE NEG    Ketones, Urine NEGATIVE NEG    Specific Gravity, UA 1.019 1.005 - 1.030    Urine Hgb LARGE (A) NEG    pH, UA 5.0 5.0 - 8.0    Protein, UA 3+ (A) NEG    Urobilinogen, Urine Normal NORM    Nitrite, Urine NEGATIVE NEG    Leukocyte Esterase, Urine SMALL (A) NEG    -          WBC, UA 10 TO 20 0 - 5 /HPF    RBC, UA 10 TO 20 0 - 4 /HPF    Casts UA 2 TO 5 HYALINE 0 - 8 /LPF    Crystals UA NOT REPORTED NONE /HPF    Epithelial Cells UA 0 TO 2 0 - 5 /HPF    Renal Epithelial, Urine NOT REPORTED 0 /HPF    Bacteria, UA NOT REPORTED NONE    Mucus, UA NOT REPORTED NONE    Trichomonas, UA NOT REPORTED NONE    Amorphous, UA NOT REPORTED NONE    Other Observations UA NOT REPORTED NREQ    Yeast, UA NOT REPORTED NONE   HEMOGLOBIN A1C   Result Value Ref Range    Hemoglobin A1C 8.1 (H) 4.0 - 6.0 %    Estimated Avg Glucose 186 mg/dL   BASIC METABOLIC PANEL   Result Value Ref Range    Glucose 378 (H) 70 - 99 mg/dL    BUN 29 (H) 6 - 20 mg/dL    CREATININE 2.02 (H) 0.70 - 1.20 mg/dL    Bun/Cre Ratio NOT REPORTED 9 - 20    Calcium 8.7 8.6 - 10.4 mg/dL    Sodium 137 135 - 144 mmol/L    Potassium 4.4 3.7 - 5.3 mmol/L Chloride 105 98 - 107 mmol/L    CO2 15 (L) 20 - 31 mmol/L    Anion Gap 17 9 - 17 mmol/L    GFR Non-African American 34 (L) >60 mL/min    GFR  41 (L) >60 mL/min    GFR Comment          GFR Staging NOT REPORTED    CBC WITH AUTO DIFFERENTIAL   Result Value Ref Range    WBC 9.8 3.5 - 11.3 k/uL    RBC 3.83 (L) 4.21 - 5.77 m/uL    Hemoglobin 11.2 (L) 13.0 - 17.0 g/dL    Hematocrit 35.4 (L) 40.7 - 50.3 %    MCV 92.4 82.6 - 102.9 fL    MCH 29.2 25.2 - 33.5 pg    MCHC 31.6 28.4 - 34.8 g/dL    RDW 12.9 11.8 - 14.4 %    Platelets 477 982 - 386 k/uL    MPV 10.6 8.1 - 13.5 fL    NRBC Automated 0.0 0.0 per 100 WBC    Differential Type NOT REPORTED     Seg Neutrophils 84 (H) 36 - 65 %    Lymphocytes 9 (L) 24 - 43 %    Monocytes 7 3 - 12 %    Eosinophils % 0 (L) 1 - 4 %    Basophils 0 0 - 2 %    Immature Granulocytes 0 0 %    Segs Absolute 8.19 (H) 1.50 - 8.10 k/uL    Absolute Lymph # 0.86 (L) 1.10 - 3.70 k/uL    Absolute Mono # 0.67 0.10 - 1.20 k/uL    Absolute Eos # <0.03 0.00 - 0.44 k/uL    Basophils # <0.03 0.00 - 0.20 k/uL    Absolute Immature Granulocyte 0.04 0.00 - 0.30 k/uL    WBC Morphology NOT REPORTED     RBC Morphology NOT REPORTED     Platelet Estimate NOT REPORTED    BASIC METABOLIC PANEL   Result Value Ref Range    Glucose 204 (H) 70 - 99 mg/dL    BUN 24 (H) 6 - 20 mg/dL    CREATININE 1.50 (H) 0.70 - 1.20 mg/dL    Bun/Cre Ratio NOT REPORTED 9 - 20    Calcium 8.6 8.6 - 10.4 mg/dL    Sodium 140 135 - 144 mmol/L    Potassium 4.4 3.7 - 5.3 mmol/L    Chloride 108 (H) 98 - 107 mmol/L    CO2 20 20 - 31 mmol/L    Anion Gap 12 9 - 17 mmol/L    GFR Non-African American 48 (L) >60 mL/min    GFR  58 (L) >60 mL/min    GFR Comment          GFR Staging NOT REPORTED    BASIC METABOLIC PANEL   Result Value Ref Range    Glucose 138 (H) 70 - 99 mg/dL    BUN 20 6 - 20 mg/dL    CREATININE 1.38 (H) 0.70 - 1.20 mg/dL    Bun/Cre Ratio NOT REPORTED 9 - 20    Calcium 8.7 8.6 - 10.4 mg/dL    Sodium 137 135 -

## 2018-07-27 ENCOUNTER — TELEPHONE (OUTPATIENT)
Dept: UROLOGY | Age: 58
End: 2018-07-27

## 2018-07-30 ENCOUNTER — OFFICE VISIT (OUTPATIENT)
Dept: FAMILY MEDICINE CLINIC | Age: 58
End: 2018-07-30
Payer: MEDICAID

## 2018-07-30 VITALS
BODY MASS INDEX: 31.64 KG/M2 | HEIGHT: 70 IN | DIASTOLIC BLOOD PRESSURE: 81 MMHG | WEIGHT: 221 LBS | HEART RATE: 79 BPM | SYSTOLIC BLOOD PRESSURE: 152 MMHG

## 2018-07-30 DIAGNOSIS — E11.43 TYPE 2 DIABETES MELLITUS WITH DIABETIC AUTONOMIC NEUROPATHY, WITHOUT LONG-TERM CURRENT USE OF INSULIN (HCC): Primary | ICD-10-CM

## 2018-07-30 DIAGNOSIS — N20.0 KIDNEY STONE: ICD-10-CM

## 2018-07-30 DIAGNOSIS — Z11.59 ENCOUNTER FOR HEPATITIS C SCREENING TEST FOR LOW RISK PATIENT: ICD-10-CM

## 2018-07-30 DIAGNOSIS — Z11.4 ENCOUNTER FOR SCREENING FOR HIV: ICD-10-CM

## 2018-07-30 PROCEDURE — 99203 OFFICE O/P NEW LOW 30 MIN: CPT | Performed by: HOSPITALIST

## 2018-07-30 PROCEDURE — 3017F COLORECTAL CA SCREEN DOC REV: CPT | Performed by: HOSPITALIST

## 2018-07-30 PROCEDURE — 2022F DILAT RTA XM EVC RTNOPTHY: CPT | Performed by: HOSPITALIST

## 2018-07-30 PROCEDURE — 1036F TOBACCO NON-USER: CPT | Performed by: HOSPITALIST

## 2018-07-30 PROCEDURE — G8428 CUR MEDS NOT DOCUMENT: HCPCS | Performed by: HOSPITALIST

## 2018-07-30 PROCEDURE — 1111F DSCHRG MED/CURRENT MED MERGE: CPT | Performed by: HOSPITALIST

## 2018-07-30 PROCEDURE — G8417 CALC BMI ABV UP PARAM F/U: HCPCS | Performed by: HOSPITALIST

## 2018-07-30 PROCEDURE — 3045F PR MOST RECENT HEMOGLOBIN A1C LEVEL 7.0-9.0%: CPT | Performed by: HOSPITALIST

## 2018-07-30 PROCEDURE — 99213 OFFICE O/P EST LOW 20 MIN: CPT | Performed by: HOSPITALIST

## 2018-07-30 RX ORDER — ACETAMINOPHEN 500 MG
1000 TABLET ORAL EVERY 6 HOURS PRN
Qty: 30 TABLET | Refills: 0 | Status: SHIPPED | OUTPATIENT
Start: 2018-07-30 | End: 2019-09-25

## 2018-07-30 ASSESSMENT — ENCOUNTER SYMPTOMS
RHINORRHEA: 0
BACK PAIN: 0
WHEEZING: 0
SHORTNESS OF BREATH: 0
COUGH: 0
SORE THROAT: 0

## 2018-07-30 NOTE — PROGRESS NOTES
Attending Physician Statement  I have discussed the care of Brennan Meneses, including pertinent history and exam findings,  with the resident. I have reviewed the key elements of all parts of the encounter with the resident. I agree with the assessment, plan and orders as documented by the resident.   (GE Modifier)

## 2018-07-30 NOTE — PATIENT INSTRUCTIONS
Thank you for letting us take care of you today. We hope all your questions were addressed. If a question was overlooked or something else comes to mind after you return home, please contact a member of your Care Team listed below. Please make sure you have a routine office visit set up to follow-up on [address]. Your Care Team at [place] [address] is Team #2  Shaggy Sanchez DO (Faculty)  Nguyễn Parham MD (Resident)  Carolina Elizabeth MD (Resident)  Stanford Calvert MD (Resident)  Onesimo Luciano MD (Resident)  Mili Smith MD (Resident)  Mando Clubs, LPN  [facility] Sue Hubertlivan, [address] ([address] [facility])  Harmeet Rivas RN, ([address] [facility])  Hernesto Villalobos, Ph.D., (Behavioral Services)  Mckenna Hanley, [address] [place] [address] (Clinical Pharmacist)     Office phone number: [phone]    If you need to get in right away due to illness, please be advised we have \"Same Day\" appointments available Monday-Friday. Please call us at [phone] option #3 to schedule your \"Same Day\" appointment. Patient Education        Kidney Stone: Care Instructions  Your Care Instructions    Kidney stones are formed when salts, minerals, and other substances normally found in the urine clump together. They can be as small as grains of sand or, rarely, as large as golf balls. While the stone is traveling through the ureter, which is the tube that carries urine from the kidney to the bladder, you will probably feel pain. The pain may be mild or very severe. You may also have some blood in your urine. As soon as the stone reaches the bladder, any intense pain should go away. If a stone is too large to pass on its own, you may need a medical procedure to help you pass the stone. The doctor has checked you carefully, but problems can develop later. If you notice any problems or new symptoms, get medical treatment right away. Follow-up care is a key part of your treatment and safety.  Be sure to make and go to all appointments, and call your doctor if you are having problems. It's also a good idea to know your test results and keep a list of the medicines you take. How can you care for yourself at home? · Drink plenty of fluids, enough so that your urine is light yellow or clear like water. If you have kidney, heart, or liver disease and have to limit fluids, talk with your doctor before you increase the amount of fluids you drink. · Take pain medicines exactly as directed. Call your doctor if you think you are having a problem with your medicine. ¨ If the doctor gave you a prescription medicine for pain, take it as prescribed. ¨ If you are not taking a prescription pain medicine, ask your doctor if you can take an over-the-counter medicine. Read and follow all instructions on the label. · Your doctor may ask you to strain your urine so that you can collect your kidney stone when it passes. You can use a kitchen strainer or a tea strainer to catch the stone. Store it in a plastic bag until you see your doctor again. Preventing future kidney stones  Some changes in your diet may help prevent kidney stones. Depending on the cause of your stones, your doctor may recommend that you:  · Drink plenty of fluids, enough so that your urine is light yellow or clear like water. If you have kidney, heart, or liver disease and have to limit fluids, talk with your doctor before you increase the amount of fluids you drink. · Limit coffee, tea, and alcohol. Also avoid grapefruit juice. · Do not take more than the recommended daily dose of vitamins C and D.  · Avoid antacids such as Gaviscon, Maalox, Mylanta, or Tums. · Limit the amount of salt (sodium) in your diet. · Eat a balanced diet that is not too high in protein. · Limit foods that are high in a substance called oxalate, which can cause kidney stones. These foods include dark green vegetables, rhubarb, chocolate, wheat bran, nuts, cranberries, and beans.   When should you call for help? Call your doctor now or seek immediate medical care if:    · You cannot keep down fluids.     · Your pain gets worse.     · You have a fever or chills.     · You have new or worse pain in your back just below your rib cage (the flank area).     · You have new or more blood in your urine.    Watch closely for changes in your health, and be sure to contact your doctor if:    · You do not get better as expected. Where can you learn more? Go to https://iSTAR.Happy Days. org and sign in to your Healint account. Enter E402 in the Petenko box to learn more about \"Kidney Stone: Care Instructions. \"     If you do not have an account, please click on the \"Sign Up Now\" link. Current as of: May 12, 2017  Content Version: 11.6  © 5199-7208 [employer]. Care instructions adapted under license by [facility] ([facility]). If you have questions about a medical condition or this instruction, always ask your healthcare professional. [address] any warranty or liability for your use of this information.

## 2018-07-30 NOTE — PROGRESS NOTES
WBC 9.8 07/21/2018    HGB 11.2 (L) 07/21/2018    HCT 35.4 (L) 07/21/2018     07/21/2018    CHOL 231 (H) 09/25/2013    TRIG 70 09/25/2013    HDL 83 09/25/2013    ALT 16 07/19/2018    AST 16 07/19/2018     07/22/2018    K 4.1 07/22/2018     07/22/2018    CREATININE 1.38 (H) 07/22/2018    BUN 20 07/22/2018    CO2 19 (L) 07/22/2018    TSH 1.72 02/28/2017    INR 0.9 05/12/2016    LABA1C 8.1 (H) 07/20/2018     Lab Results   Component Value Date    CALCIUM 8.7 07/22/2018     Lab Results   Component Value Date    LDLCHOLESTEROL 134 (H) 09/25/2013       Assessment:     1. Type 2 diabetes mellitus with diabetic autonomic neuropathy, without long-term current use of insulin ([facility] [address]    2. Kidney stone    3. Encounter for screening for HIV    4. Encounter for hepatitis C screening test for low risk patient        Plan:    1. Type 2 diabetes mellitus with diabetic autonomic neuropathy, without long-term current use of insulin (HCC)  Her latest A1c of 8.1  Continue current management  Diabetic workup ordered including lipid panel and urine microalbumin  Continue to monitor  - Lipid Panel; Future  -  DIABETES FOOT EXAM  - Microalbumin, Ur    2. Kidney stone  Patient is supposed to follow up with urology  Instructed to drink plenty of fluids  Continue to monitor  - Basic Metabolic Panel; Future  - acetaminophen (APAP EXTRA STRENGTH) 500 MG tablet; Take 2 tablets by mouth every 6 hours as needed for Pain  Dispense: 30 tablet; Refill: 0    3. Encounter for screening for HIV  - HIV Screen; Future    4. Encounter for hepatitis C screening test for low risk patient  - Hepatitis C Antibody;  Future    Marques received counseling on the following healthy behaviors: nutrition, exercise and medication adherence    Patient given educational materials : see patient instruction   Juan Hassan received counseling on the following healthy behaviors: nutrition, exercise and medication adherence  Reviewed prior labs and health maintenance  Continue current medications, diet and exercise. Discussed use, benefit, and side effects of prescribed medications. Barriers to medication compliance addressed. Patient given educational materials - see patient instructions  Was a self-tracking handout given in paper form or via Octmamit? No    Requested Prescriptions     Signed Prescriptions Disp Refills    acetaminophen (APAP EXTRA STRENGTH) 500 MG tablet 30 tablet 0     Sig: Take 2 tablets by mouth every 6 hours as needed for Pain       All patient questions answered. Patient voiced understanding. Quality Measures    Body mass index is 31.71 kg/m². Elevated. Weight control planned discussed Healthy diet and regular exercise. BP: (!) 152/81 Blood pressure is normal. Treatment plan consists of No treatment change needed. Lab Results   Component Value Date    LDLCHOLESTEROL 134 (H) 09/25/2013    (goal LDL reduction with dx if diabetes is 50% LDL reduction)      No flowsheet data found. Interpretation of Total Score Depression Severity: 1-4 = Minimal depression, 5-9 = Mild depression, 10-14 = Moderate depression, 15-19 = Moderately severe depression, 20-27 = Severe depression  Discussed use, benefit, and side effects of prescribed medications. Barriers to medication compliance addressed. All patient questions answered. Pt voiced understanding. There are no discontinued medications. Return in about 4 weeks (around 8/27/2018) for T2DM.

## 2018-08-02 ENCOUNTER — ANESTHESIA EVENT (OUTPATIENT)
Dept: OPERATING ROOM | Age: 58
End: 2018-08-02
Payer: MEDICAID

## 2018-08-03 ENCOUNTER — APPOINTMENT (OUTPATIENT)
Dept: GENERAL RADIOLOGY | Age: 58
End: 2018-08-03
Attending: UROLOGY
Payer: MEDICAID

## 2018-08-03 ENCOUNTER — ANESTHESIA (OUTPATIENT)
Dept: OPERATING ROOM | Age: 58
End: 2018-08-03
Payer: MEDICAID

## 2018-08-03 ENCOUNTER — HOSPITAL ENCOUNTER (OUTPATIENT)
Age: 58
Setting detail: OUTPATIENT SURGERY
Discharge: HOME | End: 2018-08-03
Attending: UROLOGY | Admitting: UROLOGY
Payer: MEDICAID

## 2018-08-03 VITALS
TEMPERATURE: 97.2 F | HEIGHT: 70 IN | BODY MASS INDEX: 31.35 KG/M2 | WEIGHT: 219 LBS | SYSTOLIC BLOOD PRESSURE: 142 MMHG | DIASTOLIC BLOOD PRESSURE: 89 MMHG | RESPIRATION RATE: 20 BRPM | OXYGEN SATURATION: 98 % | HEART RATE: 89 BPM

## 2018-08-03 VITALS — OXYGEN SATURATION: 99 % | TEMPERATURE: 94.7 F | DIASTOLIC BLOOD PRESSURE: 46 MMHG | SYSTOLIC BLOOD PRESSURE: 86 MMHG

## 2018-08-03 DIAGNOSIS — N20.0 KIDNEY STONE: Primary | ICD-10-CM

## 2018-08-03 LAB
GLUCOSE BLD-MCNC: 140 MG/DL (ref 75–110)
GLUCOSE BLD-MCNC: 165 MG/DL (ref 75–110)

## 2018-08-03 PROCEDURE — 2580000003 HC RX 258: Performed by: ANESTHESIOLOGY

## 2018-08-03 PROCEDURE — 7100000011 HC PHASE II RECOVERY - ADDTL 15 MIN: Performed by: UROLOGY

## 2018-08-03 PROCEDURE — 87086 URINE CULTURE/COLONY COUNT: CPT

## 2018-08-03 PROCEDURE — 7100000000 HC PACU RECOVERY - FIRST 15 MIN: Performed by: UROLOGY

## 2018-08-03 PROCEDURE — 6360000002 HC RX W HCPCS: Performed by: NURSE ANESTHETIST, CERTIFIED REGISTERED

## 2018-08-03 PROCEDURE — 3700000001 HC ADD 15 MINUTES (ANESTHESIA): Performed by: UROLOGY

## 2018-08-03 PROCEDURE — 6360000002 HC RX W HCPCS: Performed by: ANESTHESIOLOGY

## 2018-08-03 PROCEDURE — 2500000003 HC RX 250 WO HCPCS: Performed by: NURSE ANESTHETIST, CERTIFIED REGISTERED

## 2018-08-03 PROCEDURE — C2617 STENT, NON-COR, TEM W/O DEL: HCPCS | Performed by: UROLOGY

## 2018-08-03 PROCEDURE — C1758 CATHETER, URETERAL: HCPCS | Performed by: UROLOGY

## 2018-08-03 PROCEDURE — 3600000014 HC SURGERY LEVEL 4 ADDTL 15MIN: Performed by: UROLOGY

## 2018-08-03 PROCEDURE — 7100000001 HC PACU RECOVERY - ADDTL 15 MIN: Performed by: UROLOGY

## 2018-08-03 PROCEDURE — 3600000004 HC SURGERY LEVEL 4 BASE: Performed by: UROLOGY

## 2018-08-03 PROCEDURE — 7100000010 HC PHASE II RECOVERY - FIRST 15 MIN: Performed by: UROLOGY

## 2018-08-03 PROCEDURE — 82947 ASSAY GLUCOSE BLOOD QUANT: CPT

## 2018-08-03 PROCEDURE — 74018 RADEX ABDOMEN 1 VIEW: CPT

## 2018-08-03 PROCEDURE — 6370000000 HC RX 637 (ALT 250 FOR IP): Performed by: ANESTHESIOLOGY

## 2018-08-03 PROCEDURE — 6360000002 HC RX W HCPCS: Performed by: UROLOGY

## 2018-08-03 PROCEDURE — 6360000002 HC RX W HCPCS

## 2018-08-03 PROCEDURE — 3700000000 HC ANESTHESIA ATTENDED CARE: Performed by: UROLOGY

## 2018-08-03 PROCEDURE — C1769 GUIDE WIRE: HCPCS | Performed by: UROLOGY

## 2018-08-03 PROCEDURE — 2709999900 HC NON-CHARGEABLE SUPPLY: Performed by: UROLOGY

## 2018-08-03 DEVICE — UNIVERSA SOFT URETERAL STENT AND POSITIONER WITH HYDROPHILIC COATING AND MONOFILAMENT TETHER
Type: IMPLANTABLE DEVICE | Site: URETER | Status: FUNCTIONAL
Brand: UNIVERSA

## 2018-08-03 RX ORDER — MIDAZOLAM HYDROCHLORIDE 1 MG/ML
INJECTION INTRAMUSCULAR; INTRAVENOUS PRN
Status: DISCONTINUED | OUTPATIENT
Start: 2018-08-03 | End: 2018-08-03 | Stop reason: SDUPTHER

## 2018-08-03 RX ORDER — OXYCODONE HYDROCHLORIDE AND ACETAMINOPHEN 5; 325 MG/1; MG/1
1 TABLET ORAL EVERY 6 HOURS PRN
Qty: 30 TABLET | Refills: 0 | Status: SHIPPED | OUTPATIENT
Start: 2018-08-03 | End: 2018-08-08

## 2018-08-03 RX ORDER — OXYBUTYNIN CHLORIDE 5 MG/1
5 TABLET ORAL 3 TIMES DAILY PRN
Qty: 30 TABLET | Refills: 1 | Status: ON HOLD | OUTPATIENT
Start: 2018-08-03 | End: 2019-12-21 | Stop reason: HOSPADM

## 2018-08-03 RX ORDER — OXYCODONE HYDROCHLORIDE AND ACETAMINOPHEN 5; 325 MG/1; MG/1
1 TABLET ORAL
Status: COMPLETED | OUTPATIENT
Start: 2018-08-03 | End: 2018-08-03

## 2018-08-03 RX ORDER — FENTANYL CITRATE 50 UG/ML
50 INJECTION, SOLUTION INTRAMUSCULAR; INTRAVENOUS EVERY 5 MIN PRN
Status: DISCONTINUED | OUTPATIENT
Start: 2018-08-03 | End: 2018-08-03 | Stop reason: HOSPADM

## 2018-08-03 RX ORDER — ALBUTEROL SULFATE 2.5 MG/3ML
2.5 SOLUTION RESPIRATORY (INHALATION)
Status: COMPLETED | OUTPATIENT
Start: 2018-08-03 | End: 2018-08-03

## 2018-08-03 RX ORDER — DOCUSATE SODIUM 100 MG/1
100 CAPSULE, LIQUID FILLED ORAL 2 TIMES DAILY PRN
Qty: 30 CAPSULE | Refills: 1 | Status: ON HOLD | OUTPATIENT
Start: 2018-08-03 | End: 2020-01-14 | Stop reason: SDUPTHER

## 2018-08-03 RX ORDER — SODIUM CHLORIDE 0.9 % (FLUSH) 0.9 %
10 SYRINGE (ML) INJECTION EVERY 12 HOURS SCHEDULED
Status: CANCELLED | OUTPATIENT
Start: 2018-08-03

## 2018-08-03 RX ORDER — ONDANSETRON 2 MG/ML
4 INJECTION INTRAMUSCULAR; INTRAVENOUS ONCE
Status: CANCELLED | OUTPATIENT
Start: 2018-08-03 | End: 2018-08-03

## 2018-08-03 RX ORDER — FENTANYL CITRATE 50 UG/ML
25 INJECTION, SOLUTION INTRAMUSCULAR; INTRAVENOUS ONCE
Status: CANCELLED | OUTPATIENT
Start: 2018-08-03 | End: 2018-08-03

## 2018-08-03 RX ORDER — SODIUM CHLORIDE 0.9 % (FLUSH) 0.9 %
10 SYRINGE (ML) INJECTION EVERY 12 HOURS SCHEDULED
Status: DISCONTINUED | OUTPATIENT
Start: 2018-08-03 | End: 2018-08-03 | Stop reason: HOSPADM

## 2018-08-03 RX ORDER — ALBUTEROL SULFATE 2.5 MG/3ML
SOLUTION RESPIRATORY (INHALATION)
Status: COMPLETED
Start: 2018-08-03 | End: 2018-08-03

## 2018-08-03 RX ORDER — OXYCODONE HYDROCHLORIDE AND ACETAMINOPHEN 5; 325 MG/1; MG/1
1 TABLET ORAL EVERY 4 HOURS PRN
Status: ON HOLD | COMMUNITY
End: 2020-01-14 | Stop reason: ALTCHOICE

## 2018-08-03 RX ORDER — FENTANYL CITRATE 50 UG/ML
INJECTION, SOLUTION INTRAMUSCULAR; INTRAVENOUS PRN
Status: DISCONTINUED | OUTPATIENT
Start: 2018-08-03 | End: 2018-08-03 | Stop reason: SDUPTHER

## 2018-08-03 RX ORDER — LIDOCAINE HYDROCHLORIDE 10 MG/ML
INJECTION, SOLUTION EPIDURAL; INFILTRATION; INTRACAUDAL; PERINEURAL PRN
Status: DISCONTINUED | OUTPATIENT
Start: 2018-08-03 | End: 2018-08-03 | Stop reason: SDUPTHER

## 2018-08-03 RX ORDER — SODIUM CHLORIDE 0.9 % (FLUSH) 0.9 %
10 SYRINGE (ML) INJECTION PRN
Status: CANCELLED | OUTPATIENT
Start: 2018-08-03

## 2018-08-03 RX ORDER — LIDOCAINE HYDROCHLORIDE 10 MG/ML
1 INJECTION, SOLUTION EPIDURAL; INFILTRATION; INTRACAUDAL; PERINEURAL
Status: DISCONTINUED | OUTPATIENT
Start: 2018-08-03 | End: 2018-08-03 | Stop reason: HOSPADM

## 2018-08-03 RX ORDER — POTASSIUM CITRATE 10 MEQ/1
20 TABLET, EXTENDED RELEASE ORAL
Qty: 180 TABLET | Refills: 11 | Status: ON HOLD | OUTPATIENT
Start: 2018-08-03 | End: 2019-12-21 | Stop reason: HOSPADM

## 2018-08-03 RX ORDER — SODIUM CHLORIDE 0.9 % (FLUSH) 0.9 %
10 SYRINGE (ML) INJECTION PRN
Status: DISCONTINUED | OUTPATIENT
Start: 2018-08-03 | End: 2018-08-03 | Stop reason: HOSPADM

## 2018-08-03 RX ORDER — FENTANYL CITRATE 50 UG/ML
25 INJECTION, SOLUTION INTRAMUSCULAR; INTRAVENOUS EVERY 5 MIN PRN
Status: DISCONTINUED | OUTPATIENT
Start: 2018-08-03 | End: 2018-08-03 | Stop reason: HOSPADM

## 2018-08-03 RX ORDER — TAMSULOSIN HYDROCHLORIDE 0.4 MG/1
0.4 CAPSULE ORAL DAILY
Qty: 7 CAPSULE | Refills: 1 | Status: SHIPPED | OUTPATIENT
Start: 2018-08-03 | End: 2019-02-21

## 2018-08-03 RX ORDER — DEXAMETHASONE SODIUM PHOSPHATE 10 MG/ML
INJECTION INTRAMUSCULAR; INTRAVENOUS PRN
Status: DISCONTINUED | OUTPATIENT
Start: 2018-08-03 | End: 2018-08-03 | Stop reason: SDUPTHER

## 2018-08-03 RX ORDER — PROPOFOL 10 MG/ML
INJECTION, EMULSION INTRAVENOUS PRN
Status: DISCONTINUED | OUTPATIENT
Start: 2018-08-03 | End: 2018-08-03 | Stop reason: SDUPTHER

## 2018-08-03 RX ORDER — MIDAZOLAM HYDROCHLORIDE 1 MG/ML
2 INJECTION INTRAMUSCULAR; INTRAVENOUS
Status: CANCELLED | OUTPATIENT
Start: 2018-08-03 | End: 2018-08-03

## 2018-08-03 RX ORDER — SODIUM CHLORIDE, SODIUM LACTATE, POTASSIUM CHLORIDE, CALCIUM CHLORIDE 600; 310; 30; 20 MG/100ML; MG/100ML; MG/100ML; MG/100ML
INJECTION, SOLUTION INTRAVENOUS CONTINUOUS
Status: DISCONTINUED | OUTPATIENT
Start: 2018-08-03 | End: 2018-08-03 | Stop reason: HOSPADM

## 2018-08-03 RX ORDER — CIPROFLOXACIN 500 MG/1
500 TABLET, FILM COATED ORAL 2 TIMES DAILY
Qty: 6 TABLET | Refills: 0 | Status: SHIPPED | OUTPATIENT
Start: 2018-08-03 | End: 2018-08-06

## 2018-08-03 RX ADMIN — SODIUM CHLORIDE, POTASSIUM CHLORIDE, SODIUM LACTATE AND CALCIUM CHLORIDE: 600; 310; 30; 20 INJECTION, SOLUTION INTRAVENOUS at 14:39

## 2018-08-03 RX ADMIN — FENTANYL CITRATE 50 MCG: 50 INJECTION, SOLUTION INTRAMUSCULAR; INTRAVENOUS at 15:08

## 2018-08-03 RX ADMIN — LIDOCAINE HYDROCHLORIDE 50 MG: 10 INJECTION, SOLUTION EPIDURAL; INFILTRATION; INTRACAUDAL at 13:46

## 2018-08-03 RX ADMIN — DEXAMETHASONE SODIUM PHOSPHATE 10 MG: 10 INJECTION INTRAMUSCULAR; INTRAVENOUS at 13:53

## 2018-08-03 RX ADMIN — PHENYLEPHRINE HYDROCHLORIDE 100 MCG: 10 INJECTION INTRAVENOUS at 14:24

## 2018-08-03 RX ADMIN — FENTANYL CITRATE 50 MCG: 50 INJECTION INTRAMUSCULAR; INTRAVENOUS at 14:04

## 2018-08-03 RX ADMIN — ALBUTEROL SULFATE 2.5 MG: 2.5 SOLUTION RESPIRATORY (INHALATION) at 14:50

## 2018-08-03 RX ADMIN — PHENYLEPHRINE HYDROCHLORIDE 100 MCG: 10 INJECTION INTRAVENOUS at 14:00

## 2018-08-03 RX ADMIN — MIDAZOLAM HYDROCHLORIDE 2 MG: 1 INJECTION, SOLUTION INTRAMUSCULAR; INTRAVENOUS at 13:42

## 2018-08-03 RX ADMIN — Medication 2 G: at 13:59

## 2018-08-03 RX ADMIN — OXYCODONE HYDROCHLORIDE AND ACETAMINOPHEN 1 TABLET: 5; 325 TABLET ORAL at 15:44

## 2018-08-03 RX ADMIN — PROPOFOL 50 MG: 10 INJECTION, EMULSION INTRAVENOUS at 14:04

## 2018-08-03 RX ADMIN — PHENYLEPHRINE HYDROCHLORIDE 100 MCG: 10 INJECTION INTRAVENOUS at 14:38

## 2018-08-03 RX ADMIN — PHENYLEPHRINE HYDROCHLORIDE 100 MCG: 10 INJECTION INTRAVENOUS at 14:27

## 2018-08-03 RX ADMIN — FENTANYL CITRATE 50 MCG: 50 INJECTION INTRAMUSCULAR; INTRAVENOUS at 13:42

## 2018-08-03 RX ADMIN — PHENYLEPHRINE HYDROCHLORIDE 100 MCG: 10 INJECTION INTRAVENOUS at 14:18

## 2018-08-03 RX ADMIN — SODIUM CHLORIDE, POTASSIUM CHLORIDE, SODIUM LACTATE AND CALCIUM CHLORIDE: 600; 310; 30; 20 INJECTION, SOLUTION INTRAVENOUS at 12:41

## 2018-08-03 RX ADMIN — PROPOFOL 150 MG: 10 INJECTION, EMULSION INTRAVENOUS at 13:46

## 2018-08-03 RX ADMIN — PHENYLEPHRINE HYDROCHLORIDE 100 MCG: 10 INJECTION INTRAVENOUS at 13:59

## 2018-08-03 RX ADMIN — PHENYLEPHRINE HYDROCHLORIDE 100 MCG: 10 INJECTION INTRAVENOUS at 13:56

## 2018-08-03 RX ADMIN — SODIUM CHLORIDE, POTASSIUM CHLORIDE, SODIUM LACTATE AND CALCIUM CHLORIDE: 600; 310; 30; 20 INJECTION, SOLUTION INTRAVENOUS at 13:39

## 2018-08-03 ASSESSMENT — PULMONARY FUNCTION TESTS
PIF_VALUE: 0
PIF_VALUE: 20
PIF_VALUE: 11
PIF_VALUE: 3
PIF_VALUE: 9
PIF_VALUE: 11
PIF_VALUE: 12
PIF_VALUE: 10
PIF_VALUE: 13
PIF_VALUE: 1
PIF_VALUE: 1
PIF_VALUE: 2
PIF_VALUE: 3
PIF_VALUE: 14
PIF_VALUE: 2
PIF_VALUE: 7
PIF_VALUE: 4
PIF_VALUE: 2
PIF_VALUE: 1
PIF_VALUE: 2
PIF_VALUE: 11
PIF_VALUE: 11
PIF_VALUE: 2
PIF_VALUE: 5
PIF_VALUE: 1
PIF_VALUE: 2
PIF_VALUE: 4
PIF_VALUE: 2
PIF_VALUE: 2
PIF_VALUE: 14
PIF_VALUE: 11
PIF_VALUE: 1
PIF_VALUE: 2
PIF_VALUE: 10
PIF_VALUE: 11
PIF_VALUE: 2
PIF_VALUE: 2
PIF_VALUE: 17
PIF_VALUE: 3
PIF_VALUE: 2
PIF_VALUE: 2
PIF_VALUE: 3
PIF_VALUE: 10
PIF_VALUE: 5
PIF_VALUE: 2
PIF_VALUE: 0
PIF_VALUE: 3
PIF_VALUE: 2
PIF_VALUE: 3
PIF_VALUE: 4
PIF_VALUE: 2
PIF_VALUE: 3
PIF_VALUE: 14
PIF_VALUE: 2
PIF_VALUE: 2
PIF_VALUE: 4
PIF_VALUE: 2
PIF_VALUE: 1
PIF_VALUE: 2
PIF_VALUE: 11
PIF_VALUE: 2
PIF_VALUE: 1
PIF_VALUE: 2

## 2018-08-03 ASSESSMENT — PAIN SCALES - GENERAL
PAINLEVEL_OUTOF10: 5
PAINLEVEL_OUTOF10: 5
PAINLEVEL_OUTOF10: 0
PAINLEVEL_OUTOF10: 0
PAINLEVEL_OUTOF10: 7
PAINLEVEL_OUTOF10: 5

## 2018-08-03 ASSESSMENT — PAIN - FUNCTIONAL ASSESSMENT: PAIN_FUNCTIONAL_ASSESSMENT: 0-10

## 2018-08-03 ASSESSMENT — PAIN SCALES - WONG BAKER: WONGBAKER_NUMERICALRESPONSE: 0

## 2018-08-03 NOTE — OP NOTE
Dr. Elvira Knapp MD      [address] [facility]. [place]  18    Patient:  Rashad Basurto  MRN: [id number]  YOB: 1960    Surgeon: Dr. Elvira Knapp MD  Assistant: None    Pre-op Diagnosis: Bilateral ureteral calculi  Post-op Diagnosis: Right ureteral calculus. Procedure:   Cystoscopy with Bilateral Ureteroscopy, Holmium Laser Lithotripsy, and Right Ureteral Stent Exchange, Left Stent Removal.    Anesthesia: General  Complications: None  OR Blood Loss: Minimal  Fluids: Cystalloids  Implants: Right [id number]  Specimens: Urine for culture    Indication:  The patient is a 68-year-old male who presented to the emergency department with flank pain. CT of the abdomen and pelvis at that time demonstrated a proximal 8 mm right ureteral calculus with hydronephrosis. Patient was scheduled for cystoscopy of right ureteral stent placement. Upon entering the bladder a left distal ureteral calculus could be seen  from the ureteral orifice. It was grasped and removed due to the stone and a stent was placed in left side at that time also. The stone was not visible on CT scan. Of course, as he had an obstructing right proximal ureteral he underwent a right stent placement also at that time. He presents today for definitive stone treatment. Narrative of the Procedure:    After informed consent was obtained in the preoperative area, the patient was taken back to the operating room and transferred to the operating table in supine position. EPC cuffs were placed. The machine was turned on. Anesthesia was induced and antibiotics were given. The patient was placed in modified dorsal lithotomy position and sterilely prepped and draped in a standard fashion. A timeout occurred. Two patient identifiers were used. We entered the urethra with a 22 Western Nydia scope. We started on the patient's left side. The stent was seen emanating from the ureteral orifice.  It was grasped

## 2018-08-03 NOTE — H&P
Godfrey Cobian, [address] [place] [address], Beatriz Keen, & Artem  Urology H&P      Patient:  Ruthy Sutton  MRN: [id number]  YOB: 1960    CHIEF COMPLAINT:  Bilateral ureteral stones    HISTORY OF PRESENT ILLNESS:   The patient is a 62 y.o. male who presents For bilateral ureteroscopy with laser lithotripsy. Negin Butler denies nausea/vomiting, fevers/chills, chest pain/shortness of breath. Patient's old records, notes and chart reviewed and summarized above.     Past Medical History:    Past Medical History:   Diagnosis Date    ADHD (attention deficit hyperactivity disorder)     Depression     DM2 (diabetes mellitus, type 2) ([facility] [address] 10/15/2013    Erectile dysfunction     Hyperlipidemia     Hypertension     Kidney stone 7/19/2018    Low back pain of thoracolumbar region with sciatica     Neuropathy ([facility] [address]        Past Surgical History:    Past Surgical History:   Procedure Laterality Date    CYSTOSCOPY  07/20/2018    bilat stent replacement    [place] CYSTOSCOPY,INSERT URETERAL STENT Bilateral 7/20/2018    CYSTOSCOPY URETERAL STENT INSERTION BILATERAL performed by Hira Diaz MD at [facility]      as a child       Medications:      Current Facility-Administered Medications:     lactated ringers infusion, , Intravenous, Continuous, Yamila Vásquez MD, Last Rate: 125 mL/hr at 08/03/18 1241    sodium chloride flush 0.9 % injection 10 mL, 10 mL, Intravenous, 2 times per day, Yamila Vásquez MD    sodium chloride flush 0.9 % injection 10 mL, 10 mL, Intravenous, PRN, Yamila Vásquez MD    lidocaine PF 1 % injection 1 mL, 1 mL, Intradermal, Once PRN, Yamila Vásquez MD    fentaNYL (SUBLIMAZE) injection 25 mcg, 25 mcg, Intravenous, Q5 Min PRN, Jaylin Mak MD    fentaNYL (SUBLIMAZE) injection 50 mcg, 50 mcg, Intravenous, Q5 Min PRN, Jaylin Mak MD    Allergies:  No Known Allergies    Social History:   Social History     Social History    Marital status: Single     Spouse name: N/A   

## 2018-08-04 LAB
CULTURE: NO GROWTH
Lab: NORMAL
SPECIMEN DESCRIPTION: NORMAL
STATUS: NORMAL

## 2018-08-15 ENCOUNTER — HOSPITAL ENCOUNTER (EMERGENCY)
Age: 58
Discharge: HOME | End: 2018-08-15
Attending: EMERGENCY MEDICINE
Payer: MEDICAID

## 2018-08-15 ENCOUNTER — APPOINTMENT (OUTPATIENT)
Dept: GENERAL RADIOLOGY | Age: 58
End: 2018-08-15
Payer: MEDICAID

## 2018-08-15 VITALS
DIASTOLIC BLOOD PRESSURE: 78 MMHG | HEART RATE: 98 BPM | TEMPERATURE: 98.3 F | OXYGEN SATURATION: 98 % | SYSTOLIC BLOOD PRESSURE: 147 MMHG | RESPIRATION RATE: 18 BRPM

## 2018-08-15 DIAGNOSIS — G89.29 CHRONIC PAIN OF RIGHT KNEE: Primary | ICD-10-CM

## 2018-08-15 DIAGNOSIS — J40 BRONCHITIS: ICD-10-CM

## 2018-08-15 DIAGNOSIS — M25.561 CHRONIC PAIN OF RIGHT KNEE: Primary | ICD-10-CM

## 2018-08-15 LAB
-: NORMAL
AMORPHOUS: NORMAL
BACTERIA: NORMAL
BILIRUBIN URINE: NEGATIVE
CASTS UA: NORMAL /LPF (ref 0–2)
COLOR: YELLOW
COMMENT UA: ABNORMAL
CRYSTALS, UA: NORMAL /HPF
EPITHELIAL CELLS UA: NORMAL /HPF (ref 0–5)
GLUCOSE URINE: NEGATIVE
KETONES, URINE: NEGATIVE
LEUKOCYTE ESTERASE, URINE: NEGATIVE
MUCUS: NORMAL
NITRITE, URINE: NEGATIVE
OTHER OBSERVATIONS UA: NORMAL
PH UA: 5 (ref 5–8)
PROTEIN UA: ABNORMAL
RBC UA: NORMAL /HPF (ref 0–2)
RENAL EPITHELIAL, UA: NORMAL /HPF
SPECIFIC GRAVITY UA: 1.02 (ref 1–1.03)
TRICHOMONAS: NORMAL
TURBIDITY: ABNORMAL
URINE HGB: ABNORMAL
UROBILINOGEN, URINE: NORMAL
WBC UA: NORMAL /HPF (ref 0–5)
YEAST: NORMAL

## 2018-08-15 PROCEDURE — 74022 RADEX COMPL AQT ABD SERIES: CPT

## 2018-08-15 PROCEDURE — 99284 EMERGENCY DEPT VISIT MOD MDM: CPT

## 2018-08-15 PROCEDURE — 73562 X-RAY EXAM OF KNEE 3: CPT

## 2018-08-15 PROCEDURE — 6370000000 HC RX 637 (ALT 250 FOR IP): Performed by: EMERGENCY MEDICINE

## 2018-08-15 PROCEDURE — 81001 URINALYSIS AUTO W/SCOPE: CPT

## 2018-08-15 RX ORDER — AZITHROMYCIN 250 MG/1
500 TABLET, FILM COATED ORAL ONCE
Status: COMPLETED | OUTPATIENT
Start: 2018-08-15 | End: 2018-08-15

## 2018-08-15 RX ORDER — PREDNISONE 50 MG/1
50 TABLET ORAL DAILY
Qty: 4 TABLET | Refills: 0 | Status: ON HOLD | OUTPATIENT
Start: 2018-08-15 | End: 2019-12-21 | Stop reason: HOSPADM

## 2018-08-15 RX ORDER — BENZONATATE 100 MG/1
100 CAPSULE ORAL 3 TIMES DAILY PRN
Qty: 30 CAPSULE | Refills: 0 | Status: SHIPPED | OUTPATIENT
Start: 2018-08-15 | End: 2018-08-22

## 2018-08-15 RX ORDER — ALBUTEROL SULFATE 90 UG/1
2 AEROSOL, METERED RESPIRATORY (INHALATION) EVERY 6 HOURS PRN
Status: DISCONTINUED | OUTPATIENT
Start: 2018-08-15 | End: 2018-08-15 | Stop reason: HOSPADM

## 2018-08-15 RX ORDER — AZITHROMYCIN 250 MG/1
250 TABLET, FILM COATED ORAL DAILY
Qty: 4 TABLET | Refills: 0 | Status: SHIPPED | OUTPATIENT
Start: 2018-08-15 | End: 2018-08-19

## 2018-08-15 RX ORDER — ACETAMINOPHEN 325 MG/1
650 TABLET ORAL ONCE
Status: COMPLETED | OUTPATIENT
Start: 2018-08-15 | End: 2018-08-15

## 2018-08-15 RX ORDER — PREDNISONE 20 MG/1
60 TABLET ORAL ONCE
Status: COMPLETED | OUTPATIENT
Start: 2018-08-15 | End: 2018-08-15

## 2018-08-15 RX ORDER — BENZONATATE 100 MG/1
100 CAPSULE ORAL ONCE
Status: COMPLETED | OUTPATIENT
Start: 2018-08-15 | End: 2018-08-15

## 2018-08-15 RX ORDER — ACETAMINOPHEN 325 MG/1
650 TABLET ORAL EVERY 6 HOURS PRN
Qty: 120 TABLET | Refills: 0 | Status: SHIPPED | OUTPATIENT
Start: 2018-08-15 | End: 2019-09-25 | Stop reason: SDUPTHER

## 2018-08-15 RX ADMIN — AZITHROMYCIN 500 MG: 250 TABLET, FILM COATED ORAL at 18:11

## 2018-08-15 RX ADMIN — PREDNISONE 60 MG: 20 TABLET ORAL at 16:06

## 2018-08-15 RX ADMIN — BENZONATATE 100 MG: 100 CAPSULE ORAL at 16:06

## 2018-08-15 RX ADMIN — ACETAMINOPHEN 650 MG: 325 TABLET ORAL at 16:06

## 2018-08-15 ASSESSMENT — ENCOUNTER SYMPTOMS
BACK PAIN: 1
VOMITING: 0
ABDOMINAL PAIN: 0
NAUSEA: 0
SHORTNESS OF BREATH: 0
BLOOD IN STOOL: 0
DIARRHEA: 0
SORE THROAT: 0
COUGH: 1

## 2018-08-15 ASSESSMENT — PAIN SCALES - GENERAL
PAINLEVEL_OUTOF10: 6
PAINLEVEL_OUTOF10: 6

## 2018-08-15 NOTE — ED NOTES
Pt resting on cot respirations even and unlabored, pt denies needs at this time, will continue to monitor     Koby Smith RN  08/15/18 1086

## 2018-08-15 NOTE — ED PROVIDER NOTES
are equal, round, and reactive to light. Conjunctivae and EOM are normal.   Neck: Normal range of motion. Neck supple. No JVD present. Cardiovascular: Normal rate, regular rhythm, normal heart sounds and intact distal pulses. Pulmonary/Chest: Effort normal. He has no wheezes. He has no rales. Rhonchorous breath sounds   Abdominal: Soft. Bowel sounds are normal. He exhibits no distension. There is tenderness (mild right-sided upper abdominal pain, negative Beaver's, no pain at McBurney's point). There is no rebound and no guarding. Lymphadenopathy:     He has no cervical adenopathy. Neurological: He is alert and oriented to person, place, and time. No cranial nerve deficit. Skin: Skin is warm and dry. No rash noted. He is not diaphoretic. Psychiatric: Thought content normal.   Nursing note and vitals reviewed.       DIFFERENTIAL  DIAGNOSIS     PLAN (LABS / IMAGING / EKG):  Orders Placed This Encounter   Procedures    XR Acute Abd Series Chest 1 VW    XR KNEE RIGHT (3 VIEWS)    Urinalysis Reflex to Culture    Microscopic Urinalysis    Christi Gusman MD, Orthoped[facility]       MEDICATIONS ORDERED:  Orders Placed This Encounter   Medications    albuterol sulfate  (90 Base) MCG/ACT inhaler 2 puff    predniSONE (DELTASONE) tablet 60 mg    benzonatate (TESSALON) capsule 100 mg    acetaminophen (TYLENOL) tablet 650 mg    azithromycin (ZITHROMAX) tablet 500 mg    benzonatate (TESSALON PERLES) 100 MG capsule     Sig: Take 1 capsule by mouth 3 times daily as needed for Cough     Dispense:  30 capsule     Refill:  0    acetaminophen (TYLENOL) 325 MG tablet     Sig: Take 2 tablets by mouth every 6 hours as needed for Pain     Dispense:  120 tablet     Refill:  0    predniSONE (DELTASONE) 50 MG tablet     Sig: Take 1 tablet by mouth daily     Dispense:  4 tablet     Refill:  0    azithromycin (ZITHROMAX) 250 MG tablet     Sig: Take 1 tablet by mouth daily for 4 days mouth daily       Nakia Bueno MD  Emergency Medicine Resident    (Please note that portions of this note were completed with a voice recognition program.  Efforts were made to edit the dictations but occasionally words are mis-transcribed. )        Nakia Bueno MD  Resident  08/15/18 7001

## 2018-08-28 DIAGNOSIS — M25.561 ACUTE PAIN OF RIGHT KNEE: Primary | ICD-10-CM

## 2018-10-19 ENCOUNTER — TELEPHONE (OUTPATIENT)
Dept: FAMILY MEDICINE CLINIC | Age: 58
End: 2018-10-19

## 2018-10-19 NOTE — TELEPHONE ENCOUNTER
Reach out to pt for no show appointment on 10-18-18 with Dr Darius Zelaya vm left to call the office to reschedule appt.

## 2019-02-21 ENCOUNTER — HOSPITAL ENCOUNTER (EMERGENCY)
Age: 59
Discharge: HOME | End: 2019-02-21
Attending: EMERGENCY MEDICINE
Payer: MEDICAID

## 2019-02-21 ENCOUNTER — APPOINTMENT (OUTPATIENT)
Dept: GENERAL RADIOLOGY | Age: 59
End: 2019-02-21
Payer: MEDICAID

## 2019-02-21 ENCOUNTER — APPOINTMENT (OUTPATIENT)
Dept: CT IMAGING | Age: 59
End: 2019-02-21
Payer: MEDICAID

## 2019-02-21 VITALS
BODY MASS INDEX: 32.21 KG/M2 | DIASTOLIC BLOOD PRESSURE: 94 MMHG | HEIGHT: 70 IN | RESPIRATION RATE: 18 BRPM | TEMPERATURE: 97.9 F | WEIGHT: 225 LBS | SYSTOLIC BLOOD PRESSURE: 186 MMHG | OXYGEN SATURATION: 97 % | HEART RATE: 98 BPM

## 2019-02-21 DIAGNOSIS — N20.0 KIDNEY STONE: ICD-10-CM

## 2019-02-21 DIAGNOSIS — N20.0 NEPHROLITHIASIS: ICD-10-CM
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
## 2019-02-21 DIAGNOSIS — N20.1 URETEROLITHIASIS: ICD-10-CM

## 2019-02-21 LAB
ABSOLUTE EOS #: 0.13 K/UL (ref 0–0.44)
ABSOLUTE IMMATURE GRANULOCYTE: 0.03 K/UL (ref 0–0.3)
ABSOLUTE LYMPH #: 3.22 K/UL (ref 1.1–3.7)
ABSOLUTE MONO #: 0.93 K/UL (ref 0.1–1.2)
ALBUMIN SERPL-MCNC: 3.9 G/DL (ref 3.5–5.2)
ALBUMIN/GLOBULIN RATIO: 1.1 (ref 1–2.5)
ALP BLD-CCNC: 131 U/L (ref 40–129)
ALT SERPL-CCNC: 21 U/L (ref 5–41)
ANION GAP SERPL CALCULATED.3IONS-SCNC: 15 MMOL/L (ref 9–17)
AST SERPL-CCNC: 19 U/L
BASOPHILS # BLD: 1 % (ref 0–2)
BASOPHILS ABSOLUTE: 0.06 K/UL (ref 0–0.2)
BILIRUB SERPL-MCNC: 0.44 MG/DL (ref 0.3–1.2)
BUN BLDV-MCNC: 17 MG/DL (ref 6–20)
BUN/CREAT BLD: ABNORMAL (ref 9–20)
CALCIUM SERPL-MCNC: 9.3 MG/DL (ref 8.6–10.4)
CHLORIDE BLD-SCNC: 97 MMOL/L (ref 98–107)
CHP ED QC CHECK: YES
CO2: 21 MMOL/L (ref 20–31)
CREAT SERPL-MCNC: 1.61 MG/DL (ref 0.7–1.2)
DIFFERENTIAL TYPE: ABNORMAL
EOSINOPHILS RELATIVE PERCENT: 1 % (ref 1–4)
GFR AFRICAN AMERICAN: 54 ML/MIN
GFR NON-AFRICAN AMERICAN: 44 ML/MIN
GFR SERPL CREATININE-BSD FRML MDRD: ABNORMAL ML/MIN/{1.73_M2}
GFR SERPL CREATININE-BSD FRML MDRD: ABNORMAL ML/MIN/{1.73_M2}
GLUCOSE BLD-MCNC: 235 MG/DL
GLUCOSE BLD-MCNC: 235 MG/DL (ref 75–110)
GLUCOSE BLD-MCNC: 281 MG/DL (ref 70–99)
HCT VFR BLD CALC: 39.1 % (ref 40.7–50.3)
HEMOGLOBIN: 13.2 G/DL (ref 13–17)
IMMATURE GRANULOCYTES: 0 %
LYMPHOCYTES # BLD: 33 % (ref 24–43)
MCH RBC QN AUTO: 29.4 PG (ref 25.2–33.5)
MCHC RBC AUTO-ENTMCNC: 33.8 G/DL (ref 28.4–34.8)
MCV RBC AUTO: 87.1 FL (ref 82.6–102.9)
MONOCYTES # BLD: 9 % (ref 3–12)
NRBC AUTOMATED: 0 PER 100 WBC
PDW BLD-RTO: 12.4 % (ref 11.8–14.4)
PLATELET # BLD: 257 K/UL (ref 138–453)
PLATELET ESTIMATE: ABNORMAL
PMV BLD AUTO: 10.4 FL (ref 8.1–13.5)
POTASSIUM SERPL-SCNC: 3.7 MMOL/L (ref 3.7–5.3)
RBC # BLD: 4.49 M/UL (ref 4.21–5.77)
RBC # BLD: ABNORMAL 10*6/UL
SEG NEUTROPHILS: 56 % (ref 36–65)
SEGMENTED NEUTROPHILS ABSOLUTE COUNT: 5.51 K/UL (ref 1.5–8.1)
SODIUM BLD-SCNC: 133 MMOL/L (ref 135–144)
TOTAL PROTEIN: 7.3 G/DL (ref 6.4–8.3)
WBC # BLD: 9.9 K/UL (ref 3.5–11.3)
WBC # BLD: ABNORMAL 10*3/UL

## 2019-02-21 PROCEDURE — 85025 COMPLETE CBC W/AUTO DIFF WBC: CPT

## 2019-02-21 PROCEDURE — 93005 ELECTROCARDIOGRAM TRACING: CPT

## 2019-02-21 PROCEDURE — 71045 X-RAY EXAM CHEST 1 VIEW: CPT

## 2019-02-21 PROCEDURE — 74176 CT ABD & PELVIS W/O CONTRAST: CPT

## 2019-02-21 PROCEDURE — 73502 X-RAY EXAM HIP UNI 2-3 VIEWS: CPT

## 2019-02-21 PROCEDURE — 82947 ASSAY GLUCOSE BLOOD QUANT: CPT

## 2019-02-21 PROCEDURE — 99284 EMERGENCY DEPT VISIT MOD MDM: CPT

## 2019-02-21 PROCEDURE — 80053 COMPREHEN METABOLIC PANEL: CPT

## 2019-02-21 PROCEDURE — 6370000000 HC RX 637 (ALT 250 FOR IP): Performed by: EMERGENCY MEDICINE

## 2019-02-21 RX ORDER — LOSARTAN POTASSIUM 25 MG/1
25 TABLET ORAL DAILY
Qty: 10 TABLET | Refills: 0 | Status: SHIPPED | OUTPATIENT
Start: 2019-02-21 | End: 2019-08-23

## 2019-02-21 RX ORDER — GABAPENTIN 100 MG/1
100 CAPSULE ORAL 3 TIMES DAILY
Qty: 21 CAPSULE | Refills: 0 | Status: SHIPPED | OUTPATIENT
Start: 2019-02-21 | End: 2019-08-23 | Stop reason: SDUPTHER

## 2019-02-21 RX ORDER — LOSARTAN POTASSIUM 25 MG/1
25 TABLET ORAL ONCE
Status: COMPLETED | OUTPATIENT
Start: 2019-02-21 | End: 2019-02-21

## 2019-02-21 RX ORDER — TAMSULOSIN HYDROCHLORIDE 0.4 MG/1
0.4 CAPSULE ORAL DAILY
Qty: 7 CAPSULE | Refills: 1 | Status: ON HOLD | OUTPATIENT
Start: 2019-02-21 | End: 2020-01-14 | Stop reason: HOSPADM

## 2019-02-21 RX ORDER — GABAPENTIN 100 MG/1
100 CAPSULE ORAL ONCE
Status: COMPLETED | OUTPATIENT
Start: 2019-02-21 | End: 2019-02-21

## 2019-02-21 RX ADMIN — GABAPENTIN 100 MG: 100 CAPSULE ORAL at 06:02

## 2019-02-21 RX ADMIN — LOSARTAN POTASSIUM 25 MG: 25 TABLET, FILM COATED ORAL at 06:02

## 2019-02-21 ASSESSMENT — ENCOUNTER SYMPTOMS
NAUSEA: 0
BACK PAIN: 1
DIARRHEA: 0
VOMITING: 0
SHORTNESS OF BREATH: 0
CHEST TIGHTNESS: 0
COUGH: 0
ABDOMINAL PAIN: 0

## 2019-02-21 ASSESSMENT — PAIN SCALES - GENERAL: PAINLEVEL_OUTOF10: 8

## 2019-02-22 LAB
EKG ATRIAL RATE: 101 BPM
EKG P AXIS: 47 DEGREES
EKG P-R INTERVAL: 160 MS
EKG Q-T INTERVAL: 358 MS
EKG QRS DURATION: 92 MS
EKG QTC CALCULATION (BAZETT): 464 MS
EKG R AXIS: 33 DEGREES
EKG T AXIS: 36 DEGREES
EKG VENTRICULAR RATE: 101 BPM

## 2019-02-26 ENCOUNTER — HOSPITAL ENCOUNTER (EMERGENCY)
Age: 59
Discharge: HOME | End: 2019-02-26
Attending: EMERGENCY MEDICINE
Payer: MEDICAID

## 2019-02-26 ENCOUNTER — APPOINTMENT (OUTPATIENT)
Dept: ULTRASOUND IMAGING | Age: 59
End: 2019-02-26
Payer: MEDICAID

## 2019-02-26 VITALS
RESPIRATION RATE: 17 BRPM | SYSTOLIC BLOOD PRESSURE: 181 MMHG | HEIGHT: 70 IN | TEMPERATURE: 97.3 F | DIASTOLIC BLOOD PRESSURE: 104 MMHG | BODY MASS INDEX: 32.21 KG/M2 | HEART RATE: 98 BPM | OXYGEN SATURATION: 99 % | WEIGHT: 225 LBS

## 2019-02-26 DIAGNOSIS — N45.2 ORCHITIS: Primary | ICD-10-CM

## 2019-02-26 LAB
-: NORMAL
AMORPHOUS: NORMAL
ANION GAP SERPL CALCULATED.3IONS-SCNC: 12 MMOL/L (ref 9–17)
BACTERIA: NORMAL
BILIRUBIN URINE: NEGATIVE
BUN BLDV-MCNC: 29 MG/DL (ref 6–20)
BUN/CREAT BLD: ABNORMAL (ref 9–20)
CALCIUM SERPL-MCNC: 9.1 MG/DL (ref 8.6–10.4)
CASTS UA: NORMAL /LPF (ref 0–2)
CHLORIDE BLD-SCNC: 105 MMOL/L (ref 98–107)
CO2: 21 MMOL/L (ref 20–31)
COLOR: YELLOW
COMMENT UA: ABNORMAL
CREAT SERPL-MCNC: 1.86 MG/DL (ref 0.7–1.2)
CRYSTALS, UA: NORMAL /HPF
EPITHELIAL CELLS UA: NORMAL /HPF (ref 0–5)
GFR AFRICAN AMERICAN: 45 ML/MIN
GFR NON-AFRICAN AMERICAN: 38 ML/MIN
GFR SERPL CREATININE-BSD FRML MDRD: ABNORMAL ML/MIN/{1.73_M2}
GFR SERPL CREATININE-BSD FRML MDRD: ABNORMAL ML/MIN/{1.73_M2}
GLUCOSE BLD-MCNC: 281 MG/DL (ref 75–110)
GLUCOSE BLD-MCNC: 328 MG/DL (ref 70–99)
GLUCOSE URINE: ABNORMAL
HCT VFR BLD CALC: 37.4 % (ref 40.7–50.3)
HEMOGLOBIN: 12.4 G/DL (ref 13–17)
KETONES, URINE: NEGATIVE
LEUKOCYTE ESTERASE, URINE: NEGATIVE
MCH RBC QN AUTO: 29.5 PG (ref 25.2–33.5)
MCHC RBC AUTO-ENTMCNC: 33.2 G/DL (ref 28.4–34.8)
MCV RBC AUTO: 89 FL (ref 82.6–102.9)
MUCUS: NORMAL
NITRITE, URINE: NEGATIVE
NRBC AUTOMATED: 0 PER 100 WBC
OTHER OBSERVATIONS UA: NORMAL
PDW BLD-RTO: 12.5 % (ref 11.8–14.4)
PH UA: 5.5 (ref 5–8)
PLATELET # BLD: 249 K/UL (ref 138–453)
PMV BLD AUTO: 10.3 FL (ref 8.1–13.5)
POTASSIUM SERPL-SCNC: 4.5 MMOL/L (ref 3.7–5.3)
PROTEIN UA: ABNORMAL
RBC # BLD: 4.2 M/UL (ref 4.21–5.77)
RBC UA: NORMAL /HPF (ref 0–2)
RENAL EPITHELIAL, UA: NORMAL /HPF
SODIUM BLD-SCNC: 138 MMOL/L (ref 135–144)
SPECIFIC GRAVITY UA: 1.01 (ref 1–1.03)
TRICHOMONAS: NORMAL
TURBIDITY: CLEAR
URINE HGB: ABNORMAL
UROBILINOGEN, URINE: NORMAL
WBC # BLD: 8 K/UL (ref 3.5–11.3)
WBC UA: NORMAL /HPF (ref 0–5)
YEAST: NORMAL

## 2019-02-26 PROCEDURE — 81001 URINALYSIS AUTO W/SCOPE: CPT

## 2019-02-26 PROCEDURE — 2580000003 HC RX 258: Performed by: STUDENT IN AN ORGANIZED HEALTH CARE EDUCATION/TRAINING PROGRAM

## 2019-02-26 PROCEDURE — 76870 US EXAM SCROTUM: CPT

## 2019-02-26 PROCEDURE — 99284 EMERGENCY DEPT VISIT MOD MDM: CPT

## 2019-02-26 PROCEDURE — 80048 BASIC METABOLIC PNL TOTAL CA: CPT

## 2019-02-26 PROCEDURE — 85027 COMPLETE CBC AUTOMATED: CPT

## 2019-02-26 PROCEDURE — 82947 ASSAY GLUCOSE BLOOD QUANT: CPT

## 2019-02-26 RX ORDER — LEVOFLOXACIN 500 MG/1
500 TABLET, FILM COATED ORAL DAILY
Qty: 10 TABLET | Refills: 0 | Status: SHIPPED | OUTPATIENT
Start: 2019-02-26 | End: 2019-03-08

## 2019-02-26 RX ORDER — 0.9 % SODIUM CHLORIDE 0.9 %
500 INTRAVENOUS SOLUTION INTRAVENOUS ONCE
Status: COMPLETED | OUTPATIENT
Start: 2019-02-26 | End: 2019-02-26

## 2019-02-26 RX ADMIN — SODIUM CHLORIDE 500 ML: 0.9 INJECTION, SOLUTION INTRAVENOUS at 14:05

## 2019-02-26 ASSESSMENT — PAIN DESCRIPTION - DESCRIPTORS: DESCRIPTORS: ACHING;SORE

## 2019-02-26 ASSESSMENT — PAIN DESCRIPTION - ONSET: ONSET: SUDDEN

## 2019-02-26 ASSESSMENT — PAIN DESCRIPTION - FREQUENCY: FREQUENCY: CONTINUOUS

## 2019-02-26 ASSESSMENT — PAIN SCALES - GENERAL: PAINLEVEL_OUTOF10: 10

## 2019-02-26 ASSESSMENT — PAIN DESCRIPTION - LOCATION: LOCATION: GROIN

## 2019-02-26 ASSESSMENT — PAIN DESCRIPTION - ORIENTATION: ORIENTATION: LEFT

## 2019-02-26 ASSESSMENT — PAIN DESCRIPTION - PAIN TYPE: TYPE: ACUTE PAIN

## 2019-08-06 ENCOUNTER — HOSPITAL ENCOUNTER (OUTPATIENT)
Age: 59
Setting detail: OBSERVATION
Discharge: HOME | End: 2019-08-07
Attending: EMERGENCY MEDICINE | Admitting: EMERGENCY MEDICINE
Payer: MEDICAID

## 2019-08-06 ENCOUNTER — APPOINTMENT (OUTPATIENT)
Dept: GENERAL RADIOLOGY | Age: 59
End: 2019-08-06
Payer: MEDICAID

## 2019-08-06 DIAGNOSIS — R10.13 EPIGASTRIC PAIN: ICD-10-CM

## 2019-08-06 DIAGNOSIS — R07.9 CHEST PAIN, UNSPECIFIED TYPE: Primary | ICD-10-CM

## 2019-08-06 DIAGNOSIS — R19.5 OCCULT BLOOD POSITIVE STOOL: ICD-10-CM

## 2019-08-06 LAB
ABSOLUTE EOS #: 0.17 K/UL (ref 0–0.44)
ABSOLUTE IMMATURE GRANULOCYTE: <0.03 K/UL (ref 0–0.3)
ABSOLUTE LYMPH #: 2.33 K/UL (ref 1.1–3.7)
ABSOLUTE MONO #: 0.66 K/UL (ref 0.1–1.2)
ANION GAP SERPL CALCULATED.3IONS-SCNC: 13 MMOL/L (ref 9–17)
BASOPHILS # BLD: 1 % (ref 0–2)
BASOPHILS ABSOLUTE: 0.05 K/UL (ref 0–0.2)
BUN BLDV-MCNC: 28 MG/DL (ref 6–20)
BUN/CREAT BLD: ABNORMAL (ref 9–20)
CALCIUM SERPL-MCNC: 9.8 MG/DL (ref 8.6–10.4)
CHLORIDE BLD-SCNC: 100 MMOL/L (ref 98–107)
CO2: 22 MMOL/L (ref 20–31)
CREAT SERPL-MCNC: 1.67 MG/DL (ref 0.7–1.2)
DIFFERENTIAL TYPE: ABNORMAL
EOSINOPHILS RELATIVE PERCENT: 2 % (ref 1–4)
ETHANOL PERCENT: 0.01 %
ETHANOL: 11 MG/DL
GFR AFRICAN AMERICAN: 51 ML/MIN
GFR NON-AFRICAN AMERICAN: 42 ML/MIN
GFR SERPL CREATININE-BSD FRML MDRD: ABNORMAL ML/MIN/{1.73_M2}
GFR SERPL CREATININE-BSD FRML MDRD: ABNORMAL ML/MIN/{1.73_M2}
GLUCOSE BLD-MCNC: 266 MG/DL (ref 70–99)
HCT VFR BLD CALC: 34.2 % (ref 40.7–50.3)
HEMOGLOBIN: 11.4 G/DL (ref 13–17)
IMMATURE GRANULOCYTES: 0 %
LYMPHOCYTES # BLD: 31 % (ref 24–43)
MCH RBC QN AUTO: 30.8 PG (ref 25.2–33.5)
MCHC RBC AUTO-ENTMCNC: 33.3 G/DL (ref 28.4–34.8)
MCV RBC AUTO: 92.4 FL (ref 82.6–102.9)
MONOCYTES # BLD: 9 % (ref 3–12)
NRBC AUTOMATED: 0 PER 100 WBC
PDW BLD-RTO: 12.9 % (ref 11.8–14.4)
PLATELET # BLD: 253 K/UL (ref 138–453)
PLATELET ESTIMATE: ABNORMAL
PMV BLD AUTO: 10.2 FL (ref 8.1–13.5)
POTASSIUM SERPL-SCNC: 3.6 MMOL/L (ref 3.7–5.3)
RBC # BLD: 3.7 M/UL (ref 4.21–5.77)
RBC # BLD: ABNORMAL 10*6/UL
SEG NEUTROPHILS: 57 % (ref 36–65)
SEGMENTED NEUTROPHILS ABSOLUTE COUNT: 4.2 K/UL (ref 1.5–8.1)
SODIUM BLD-SCNC: 135 MMOL/L (ref 135–144)
TROPONIN INTERP: NORMAL
TROPONIN T: NORMAL NG/ML
TROPONIN, HIGH SENSITIVITY: 18 NG/L (ref 0–22)
WBC # BLD: 7.4 K/UL (ref 3.5–11.3)
WBC # BLD: ABNORMAL 10*3/UL

## 2019-08-06 PROCEDURE — 6360000002 HC RX W HCPCS: Performed by: STUDENT IN AN ORGANIZED HEALTH CARE EDUCATION/TRAINING PROGRAM

## 2019-08-06 PROCEDURE — 2580000003 HC RX 258: Performed by: STUDENT IN AN ORGANIZED HEALTH CARE EDUCATION/TRAINING PROGRAM

## 2019-08-06 PROCEDURE — 93005 ELECTROCARDIOGRAM TRACING: CPT | Performed by: STUDENT IN AN ORGANIZED HEALTH CARE EDUCATION/TRAINING PROGRAM

## 2019-08-06 PROCEDURE — 99285 EMERGENCY DEPT VISIT HI MDM: CPT

## 2019-08-06 PROCEDURE — 71046 X-RAY EXAM CHEST 2 VIEWS: CPT

## 2019-08-06 PROCEDURE — G0480 DRUG TEST DEF 1-7 CLASSES: HCPCS

## 2019-08-06 PROCEDURE — 6370000000 HC RX 637 (ALT 250 FOR IP): Performed by: STUDENT IN AN ORGANIZED HEALTH CARE EDUCATION/TRAINING PROGRAM

## 2019-08-06 PROCEDURE — 85025 COMPLETE CBC W/AUTO DIFF WBC: CPT

## 2019-08-06 PROCEDURE — 96365 THER/PROPH/DIAG IV INF INIT: CPT

## 2019-08-06 PROCEDURE — 83690 ASSAY OF LIPASE: CPT

## 2019-08-06 PROCEDURE — 80076 HEPATIC FUNCTION PANEL: CPT

## 2019-08-06 PROCEDURE — 2500000003 HC RX 250 WO HCPCS: Performed by: STUDENT IN AN ORGANIZED HEALTH CARE EDUCATION/TRAINING PROGRAM

## 2019-08-06 PROCEDURE — 84484 ASSAY OF TROPONIN QUANT: CPT

## 2019-08-06 PROCEDURE — 80048 BASIC METABOLIC PNL TOTAL CA: CPT

## 2019-08-06 RX ORDER — ACETAMINOPHEN 325 MG/1
650 TABLET ORAL ONCE
Status: COMPLETED | OUTPATIENT
Start: 2019-08-06 | End: 2019-08-06

## 2019-08-06 RX ORDER — FAMOTIDINE 20 MG/1
20 TABLET, FILM COATED ORAL ONCE
Status: COMPLETED | OUTPATIENT
Start: 2019-08-06 | End: 2019-08-06

## 2019-08-06 RX ORDER — DICYCLOMINE HYDROCHLORIDE 10 MG/1
10 CAPSULE ORAL ONCE
Status: COMPLETED | OUTPATIENT
Start: 2019-08-06 | End: 2019-08-06

## 2019-08-06 RX ADMIN — THIAMINE HYDROCHLORIDE: 100 INJECTION, SOLUTION INTRAMUSCULAR; INTRAVENOUS at 23:52

## 2019-08-06 RX ADMIN — DICYCLOMINE HYDROCHLORIDE 10 MG: 10 CAPSULE ORAL at 22:34

## 2019-08-06 RX ADMIN — ACETAMINOPHEN 650 MG: 325 TABLET ORAL at 22:34

## 2019-08-06 RX ADMIN — FAMOTIDINE 20 MG: 20 TABLET, FILM COATED ORAL at 22:34

## 2019-08-06 ASSESSMENT — PAIN SCALES - GENERAL: PAINLEVEL_OUTOF10: 10

## 2019-08-07 ENCOUNTER — APPOINTMENT (OUTPATIENT)
Dept: CT IMAGING | Age: 59
End: 2019-08-07
Payer: MEDICAID

## 2019-08-07 VITALS
SYSTOLIC BLOOD PRESSURE: 142 MMHG | DIASTOLIC BLOOD PRESSURE: 88 MMHG | WEIGHT: 200 LBS | OXYGEN SATURATION: 99 % | BODY MASS INDEX: 28.63 KG/M2 | RESPIRATION RATE: 18 BRPM | HEART RATE: 93 BPM | HEIGHT: 70 IN | TEMPERATURE: 98.2 F

## 2019-08-07 PROBLEM — R07.9 CHEST PAIN: Status: ACTIVE | Noted: 2019-08-07

## 2019-08-07 LAB
ALBUMIN SERPL-MCNC: 3.6 G/DL (ref 3.5–5.2)
ALBUMIN/GLOBULIN RATIO: 1.1 (ref 1–2.5)
ALP BLD-CCNC: 107 U/L (ref 40–129)
ALT SERPL-CCNC: 17 U/L (ref 5–41)
AST SERPL-CCNC: 17 U/L
BILIRUB SERPL-MCNC: 0.34 MG/DL (ref 0.3–1.2)
BILIRUBIN DIRECT: 0.1 MG/DL
BILIRUBIN, INDIRECT: 0.24 MG/DL (ref 0–1)
D-DIMER QUANTITATIVE: 0.64 MG/L FEU
EKG ATRIAL RATE: 116 BPM
EKG P AXIS: 50 DEGREES
EKG P-R INTERVAL: 134 MS
EKG Q-T INTERVAL: 338 MS
EKG QRS DURATION: 88 MS
EKG QTC CALCULATION (BAZETT): 469 MS
EKG R AXIS: 34 DEGREES
EKG T AXIS: 67 DEGREES
EKG VENTRICULAR RATE: 116 BPM
GLOBULIN: NORMAL G/DL (ref 1.5–3.8)
LIPASE: 42 U/L (ref 13–60)
TOTAL PROTEIN: 6.9 G/DL (ref 6.4–8.3)
TROPONIN INTERP: NORMAL
TROPONIN T: NORMAL NG/ML
TROPONIN, HIGH SENSITIVITY: 18 NG/L (ref 0–22)

## 2019-08-07 PROCEDURE — 71260 CT THORAX DX C+: CPT

## 2019-08-07 PROCEDURE — 93005 ELECTROCARDIOGRAM TRACING: CPT | Performed by: EMERGENCY MEDICINE

## 2019-08-07 PROCEDURE — 84484 ASSAY OF TROPONIN QUANT: CPT

## 2019-08-07 PROCEDURE — 6360000004 HC RX CONTRAST MEDICATION: Performed by: STUDENT IN AN ORGANIZED HEALTH CARE EDUCATION/TRAINING PROGRAM

## 2019-08-07 PROCEDURE — G0378 HOSPITAL OBSERVATION PER HR: HCPCS

## 2019-08-07 PROCEDURE — 85379 FIBRIN DEGRADATION QUANT: CPT

## 2019-08-07 PROCEDURE — 74177 CT ABD & PELVIS W/CONTRAST: CPT

## 2019-08-07 PROCEDURE — 6370000000 HC RX 637 (ALT 250 FOR IP): Performed by: STUDENT IN AN ORGANIZED HEALTH CARE EDUCATION/TRAINING PROGRAM

## 2019-08-07 PROCEDURE — 93010 ELECTROCARDIOGRAM REPORT: CPT | Performed by: INTERNAL MEDICINE

## 2019-08-07 RX ORDER — DOCUSATE SODIUM 100 MG/1
100 CAPSULE, LIQUID FILLED ORAL 2 TIMES DAILY PRN
Status: DISCONTINUED | OUTPATIENT
Start: 2019-08-07 | End: 2019-08-07 | Stop reason: HOSPADM

## 2019-08-07 RX ORDER — OXYBUTYNIN CHLORIDE 10 MG/1
10 TABLET, EXTENDED RELEASE ORAL DAILY
Status: DISCONTINUED | OUTPATIENT
Start: 2019-08-07 | End: 2019-08-07 | Stop reason: HOSPADM

## 2019-08-07 RX ORDER — POTASSIUM CITRATE 10 MEQ/1
20 TABLET, EXTENDED RELEASE ORAL
Status: DISCONTINUED | OUTPATIENT
Start: 2019-08-07 | End: 2019-08-07 | Stop reason: HOSPADM

## 2019-08-07 RX ORDER — TAMSULOSIN HYDROCHLORIDE 0.4 MG/1
0.4 CAPSULE ORAL DAILY
Status: DISCONTINUED | OUTPATIENT
Start: 2019-08-07 | End: 2019-08-07 | Stop reason: HOSPADM

## 2019-08-07 RX ORDER — GABAPENTIN 100 MG/1
100 CAPSULE ORAL 3 TIMES DAILY
Status: DISCONTINUED | OUTPATIENT
Start: 2019-08-07 | End: 2019-08-07 | Stop reason: HOSPADM

## 2019-08-07 RX ORDER — LOSARTAN POTASSIUM 25 MG/1
25 TABLET ORAL DAILY
Status: DISCONTINUED | OUTPATIENT
Start: 2019-08-07 | End: 2019-08-07 | Stop reason: HOSPADM

## 2019-08-07 RX ORDER — ACETAMINOPHEN 325 MG/1
650 TABLET ORAL EVERY 6 HOURS PRN
Status: DISCONTINUED | OUTPATIENT
Start: 2019-08-07 | End: 2019-08-07 | Stop reason: HOSPADM

## 2019-08-07 RX ORDER — OXYBUTYNIN CHLORIDE 5 MG/1
5 TABLET ORAL 3 TIMES DAILY PRN
Status: DISCONTINUED | OUTPATIENT
Start: 2019-08-07 | End: 2019-08-07 | Stop reason: HOSPADM

## 2019-08-07 RX ORDER — OXYCODONE HYDROCHLORIDE AND ACETAMINOPHEN 5; 325 MG/1; MG/1
1 TABLET ORAL EVERY 4 HOURS PRN
Status: DISCONTINUED | OUTPATIENT
Start: 2019-08-07 | End: 2019-08-07 | Stop reason: HOSPADM

## 2019-08-07 RX ADMIN — OXYBUTYNIN CHLORIDE 10 MG: 10 TABLET, EXTENDED RELEASE ORAL at 13:10

## 2019-08-07 RX ADMIN — POTASSIUM CITRATE 20 MEQ: 10 TABLET ORAL at 13:11

## 2019-08-07 RX ADMIN — POTASSIUM CITRATE 20 MEQ: 10 TABLET ORAL at 18:08

## 2019-08-07 RX ADMIN — TAMSULOSIN HYDROCHLORIDE 0.4 MG: 0.4 CAPSULE ORAL at 13:10

## 2019-08-07 RX ADMIN — ACETAMINOPHEN 650 MG: 325 TABLET ORAL at 18:18

## 2019-08-07 RX ADMIN — GABAPENTIN 100 MG: 100 CAPSULE ORAL at 13:11

## 2019-08-07 RX ADMIN — IOHEXOL 75 ML: 350 INJECTION, SOLUTION INTRAVENOUS at 03:47

## 2019-08-07 RX ADMIN — OXYCODONE HYDROCHLORIDE AND ACETAMINOPHEN 1 TABLET: 5; 325 TABLET ORAL at 15:15

## 2019-08-07 RX ADMIN — LOSARTAN POTASSIUM 25 MG: 25 TABLET, FILM COATED ORAL at 13:11

## 2019-08-07 ASSESSMENT — ENCOUNTER SYMPTOMS
FACIAL SWELLING: 0
BACK PAIN: 0
ABDOMINAL PAIN: 1
COUGH: 0
ABDOMINAL DISTENTION: 0
CHEST TIGHTNESS: 1
WHEEZING: 0
SHORTNESS OF BREATH: 0
ANAL BLEEDING: 1

## 2019-08-07 ASSESSMENT — PAIN DESCRIPTION - LOCATION
LOCATION: ABDOMEN
LOCATION: CHEST
LOCATION: CHEST

## 2019-08-07 ASSESSMENT — PAIN DESCRIPTION - PAIN TYPE
TYPE: ACUTE PAIN

## 2019-08-07 ASSESSMENT — PAIN DESCRIPTION - ORIENTATION
ORIENTATION: LEFT
ORIENTATION: LEFT
ORIENTATION: MID

## 2019-08-07 ASSESSMENT — PAIN DESCRIPTION - DESCRIPTORS: DESCRIPTORS: ACHING

## 2019-08-07 ASSESSMENT — PAIN DESCRIPTION - FREQUENCY
FREQUENCY: CONTINUOUS

## 2019-08-07 ASSESSMENT — PAIN DESCRIPTION - ONSET: ONSET: OTHER (COMMENT)

## 2019-08-07 ASSESSMENT — PAIN SCALES - GENERAL
PAINLEVEL_OUTOF10: 3
PAINLEVEL_OUTOF10: 7
PAINLEVEL_OUTOF10: 7
PAINLEVEL_OUTOF10: 10
PAINLEVEL_OUTOF10: 7
PAINLEVEL_OUTOF10: 10
PAINLEVEL_OUTOF10: 4

## 2019-08-07 NOTE — ED PROVIDER NOTES
[facility]  Emergency Department Encounter  EmergencyMedicine Resident     Pt Name:Marques Disla  MRN: [id number]  [place] 1960  Date of evaluation: 8/7/19  PCP:  Lesley Spann MD    CHIEF COMPLAINT       Chief Complaint   Patient presents with    Rectal Pain     pt states \" I feel like my butthole can't suck in any air\"     Heartburn    Abdominal Pain       HISTORY OF PRESENT ILLNESS  (Location/Symptom, Timing/Onset, Context/Setting, Quality, Duration, Modifying Factors, Severity.)      Selena Curry is a 61 y.o. male who presents with epigastric abdominal pain, nausea without vomiting. Patient also complains of a strange sensation in his rectum. Denies bloody bowel movements. Patient has had few episodes of diarrhea. Patient notes no pain with bowel movements. Distal right also complains of substernal chest burning over a couple days. No cardiac history but does have a history of diabetes type 2 as well as hyperlipidemia and hypertension. Drinks alcohol regularly. PAST MEDICAL / SURGICAL / SOCIAL / FAMILY HISTORY      has a past medical history of ADHD (attention deficit hyperactivity disorder), Depression, DM2 (diabetes mellitus, type 2) ([facility] [address] Erectile dysfunction, Hyperlipidemia, Hypertension, Kidney stone, Low back pain of thoracolumbar region with sciatica, and Neuropathy. has a past surgical history that includes Tonsillectomy; Cystoscopy (07/20/2018); pr cystoscopy,insert ureteral stent (Bilateral, 7/20/2018); Cystocopy (08/03/2018); and pr cysto/uretero/pyeloscopy, calculus tx (Bilateral, 8/3/2018).     Social History     Socioeconomic History    Marital status: Single     Spouse name: Not on file    Number of children: Not on file    Years of education: Not on file    Highest education level: Not on file   Occupational History    Not on file   Social Needs    Financial resource strain: Not on file    Food insecurity:     Worry: Not on file     Inability: Not oxyCODONE-acetaminophen (PERCOCET) 5-325 MG per tablet 1 tablet    DISCONTD: oxybutynin (DITROPAN) tablet 5 mg    DISCONTD: docusate sodium (COLACE) capsule 100 mg    DISCONTD: potassium citrate (UROCIT-K) extended release tablet 20 mEq    DISCONTD: acetaminophen (TYLENOL) tablet 650 mg    DISCONTD: gabapentin (NEURONTIN) capsule 100 mg    DISCONTD: losartan (COZAAR) tablet 25 mg    DISCONTD: metFORMIN (GLUCOPHAGE) tablet 500 mg    DISCONTD: tamsulosin (FLOMAX) capsule 0.4 mg       DIAGNOSTIC RESULTS / EMERGENCY DEPARTMENT COURSE / MDM     LABS:  Results for orders placed or performed during the hospital encounter of 08/06/19   CBC Auto Differential   Result Value Ref Range    WBC 7.4 3.5 - 11.3 k/uL    RBC 3.70 (L) 4.21 - 5.77 m/uL    Hemoglobin 11.4 (L) 13.0 - 17.0 g/dL    Hematocrit 34.2 (L) 40.7 - 50.3 %    MCV 92.4 82.6 - 102.9 fL    MCH 30.8 25.2 - 33.5 pg    MCHC 33.3 28.4 - 34.8 g/dL    RDW 12.9 11.8 - 14.4 %    Platelets 317 745 - 324 k/uL    MPV 10.2 8.1 - 13.5 fL    NRBC Automated 0.0 0.0 per 100 WBC    Differential Type NOT REPORTED     Seg Neutrophils 57 36 - 65 %    Lymphocytes 31 24 - 43 %    Monocytes 9 3 - 12 %    Eosinophils % 2 1 - 4 %    Basophils 1 0 - 2 %    Immature Granulocytes 0 0 %    Segs Absolute 4.20 1.50 - 8.10 k/uL    Absolute Lymph # 2.33 1.10 - 3.70 k/uL    Absolute Mono # 0.66 0.10 - 1.20 k/uL    Absolute Eos # 0.17 0.00 - 0.44 k/uL    Basophils # 0.05 0.00 - 0.20 k/uL    Absolute Immature Granulocyte <0.03 0.00 - 0.30 k/uL    WBC Morphology NOT REPORTED     RBC Morphology NOT REPORTED     Platelet Estimate NOT REPORTED    Basic Metabolic Panel   Result Value Ref Range    Glucose 266 (H) 70 - 99 mg/dL    BUN 28 (H) 6 - 20 mg/dL    CREATININE 1.67 (H) 0.70 - 1.20 mg/dL    Bun/Cre Ratio NOT REPORTED 9 - 20    Calcium 9.8 8.6 - 10.4 mg/dL    Sodium 135 135 - 144 mmol/L    Potassium 3.6 (L) 3.7 - 5.3 mmol/L    Chloride 100 98 - 107 mmol/L    CO2 22 20 - 31 mmol/L    Anion Gap 13 disease. Troponins and EKG. Suspect pancreatitis versus gastritis. Chest pain is atypical, heart score is 4. Patient does have internal hemorrhoid on rectal exam.    RADIOLOGY:  CT CHEST PULMONARY EMBOLISM W CONTRAST   Final Result   No pulmonary emboli. No acute findings. Nonobstructing renal calculi. CT ABDOMEN PELVIS W IV CONTRAST Additional Contrast? None   Final Result   No pulmonary emboli. No acute findings. Nonobstructing renal calculi. XR CHEST STANDARD (2 VW)   Final Result   No acute process. EKG  None    All EKG's are interpreted by the Emergency Department Physician who either signs or Co-signs this chart in the absence of a cardiologist.    EMERGENCY DEPARTMENT COURSE:  D-dimer positive, continues to complain of abdominal pain will obtain CT scan of the chest for pulmonary embolism and abdomen    CT scans negative for acute abnormalities. No pulmonary embolism. Patient ended to observation for cardiology evaluation. PROCEDURES:  None    CONSULTS:  IP CONSULT TO CARDIOLOGY    CRITICAL CARE:  None    FINAL IMPRESSION      1. Chest pain, unspecified type    2. Epigastric pain    3. Occult blood positive stool          DISPOSITION / PLAN     DISPOSITION Admitted 08/07/2019 06:04:04 AM      PATIENT REFERRED TO:  Adele Oquendo MD  [address]  [phone]    Schedule an appointment as soon as possible for a visit      Jessica Henson MD  [address] [facility] [address] [place] [address]  [phone]    Schedule an appointment as soon as possible for a visit  Need outpatient appt.  -  positive occult blood       DISCHARGE MEDICATIONS:  Discharge Medication List as of 8/7/2019  5:20 PM          Casey Laureano MD  Emergency Medicine Resident    (Please note that portions of thisnote were completed with a voice recognition program.  Efforts were made to edit the dictations but occasionally words are mis-transcribed.)

## 2019-08-07 NOTE — PROGRESS NOTES
Patient instructed on importance of follow up with GI physician. Given copy of outpatient follow order for follow up with [facility].

## 2019-08-07 NOTE — ED PROVIDER NOTES
or soft tissue abnormality. Abdomen/Pelvis: Organs: The visualized portions of the liver, gallbladder, spleen, pancreas and adrenal glands demonstrate no acute abnormality. No biliary ductal dilatation. The kidneys appear normal in size and demonstrate symmetric enhancement. No focal renal mass. No hydronephrosis. No perinephric stranding. Nonobstructing renal calculi again noted. GI/Bowel: No bowel dilatation to suggest obstruction. No evidence of acute appendicitis. Pelvis: The urinary bladder appears unremarkable. The pelvic organs demonstrate no acute abnormality. Peritoneum/Retroperitoneum: The abdominal aorta is normal in caliber. No gross lymphadenopathy. No fluid collection. No free air. Bones/Soft Tissues: No acute findings. No pulmonary emboli. No acute findings. Nonobstructing renal calculi. Ct Chest Pulmonary Embolism W Contrast    Result Date: 8/7/2019  EXAMINATION: CT OF THE ABDOMEN AND PELVIS WITH CONTRAST; CTA OF THE CHEST 8/7/2019 3:45 am TECHNIQUE: CT of the abdomen and pelvis was performed with the administration of intravenous contrast. Multiplanar reformatted images are provided for review. Dose modulation, iterative reconstruction, and/or weight based adjustment of the mA/kV was utilized to reduce the radiation dose to as low as reasonably achievable.; CTA of the chest was performed after the administration of intravenous contrast.  Multiplanar reformatted images are provided for review. MIP images are provided for review. Dose modulation, iterative reconstruction, and/or weight based adjustment of the mA/kV was utilized to reduce the radiation dose to as low as reasonably achievable.  COMPARISON: 02/21/2019, 05/12/2016 HISTORY: ORDERING SYSTEM PROVIDED HISTORY: abdominal pain TECHNOLOGIST PROVIDED HISTORY: Reason for Exam: d dimer 0.64 Acuity: Unknown Type of Exam: Unknown; ORDERING SYSTEM PROVIDED HISTORY: CP, tachycardia TECHNOLOGIST PROVIDED HISTORY: Reason for Exam: chest

## 2019-08-07 NOTE — CONSULTS
Medication Sig Start Date End Date Taking? Authorizing Provider   acetaminophen (APAP EXTRA STRENGTH) 500 MG tablet Take 2 tablets by mouth every 6 hours as needed for Pain 7/30/18  Yes Renetta Solano MD   gabapentin (NEURONTIN) 100 MG capsule Take 1 capsule by mouth 3 times daily for 7 days. . 2/21/19 8/7/19  eKvin Jara,    losartan (COZAAR) 25 MG tablet Take 1 tablet by mouth daily 2/21/19   Kevin Jara, DO   metFORMIN (GLUCOPHAGE) 500 MG tablet Take 1 tablet by mouth 2 times daily (with meals) 2/21/19   Kevin Jara, DO   tamsulosin (FLOMAX) 0.4 MG capsule Take 1 capsule by mouth daily 2/21/19   Kevin Jara, DO   acetaminophen (TYLENOL) 325 MG tablet Take 2 tablets by mouth every 6 hours as needed for Pain 8/15/18   Autumn Clark MD   predniSONE (DELTASONE) 50 MG tablet Take 1 tablet by mouth daily 8/15/18   Autumn Clark MD   oxyCODONE-acetaminophen (PERCOCET) 5-325 MG per tablet Take 1 tablet by mouth every 4 hours as needed for Pain. Nicloe Rebollar Historical Provider, MD   oxybutynin (DITROPAN) 5 MG tablet Take 1 tablet by mouth 3 times daily as needed (bladder spasms) 8/3/18   Elisa Moreno MD   docusate sodium (COLACE) 100 MG capsule Take 1 capsule by mouth 2 times daily as needed for Constipation 8/3/18   Elisa Moreno MD   potassium citrate (UROCIT-K) 10 MEQ (1080 MG) extended release tablet Take 2 tablets by mouth 3 times daily (with meals) 8/3/18   Elisa Moreno MD   oxybutynin (DITROPAN-XL) 10 MG extended release tablet Take 1 tablet by mouth daily 7/22/18 8/7/19  Radha Niño MD       Allergies:  Patient has no known allergies. Social History:   reports that he has quit smoking. His smoking use included cigarettes. He smoked 0.50 packs per day. He has never used smokeless tobacco. He reports that he drinks about 12.0 standard drinks of alcohol per week. He reports that he does not use drugs.      Family History: family history includes Diabetes in his mother; High Blood dry  Neurological:  · Not done       DATA:    Diagnostics:      EKG: Sinus rhythm with no acute ischemic changes. ECHO: 6/19/2018  Mild concentric hypertrophy. The estimated left ventricular ejection   fraction is 55%. · Grade I (impaired relaxation) left ventricular diastolic dysfunction  · The right ventricle is mildly dilated  · Left atrium is mild to moderately dilated  · Moderate mitral annular calcification  · Trace mitral regurgitation  · Trace tricuspid valve regurgitation    CARDIAC CATH; 6/19/18    · 1. Nonobstructive coronary artery disease. · 2. Left ventriculography not performed due to elevated creatinine. · 3. Reduction in left ventricular systolic function as noted on   radionuclear perfusion study    Labs:     CBC:   Recent Labs     08/06/19 2256   WBC 7.4   HGB 11.4*   HCT 34.2*        BMP:   Recent Labs     08/06/19 2256      K 3.6*   CO2 22   BUN 28*   CREATININE 1.67*   LABGLOM 42*   GLUCOSE 266*     BNP: No results for input(s): BNP in the last 72 hours. PT/INR: No results for input(s): PROTIME, INR in the last 72 hours. APTT:No results for input(s): APTT in the last 72 hours. CARDIAC ENZYMES:No results for input(s): CKTOTAL, CKMB, CKMBINDEX, TROPONINI in the last 72 hours. FASTING LIPID PANEL:  Lab Results   Component Value Date    HDL 83 09/25/2013    TRIG 70 09/25/2013     LIVER PROFILE:  Recent Labs     08/06/19 2256   AST 17   ALT 17   LABALBU 3.6         IMPRESSION:    · Hypertensive emergency. Resolved upon starting of home medications. · Type 2 diabetes mellitus. · History of arthritis. RECOMMENDATIONS:  1. Mild Chest pain reproducible upon palpation. Likely musculoskeletal with history of arthritis. No need for further cardiac work-up at this time. Enzymes negative. 2. Follow-up with outpatient primary. Discussed with patient and nursing.     Rowan Luke MD  [id number]         8/7/2019, 10:29 AM    Attending Physician Statement  I have discussed the care of Ping Jacinto, including pertinent history and exam findings,  with the resident. I have seen and examined the patient and the key elements of all parts of the encounter have been performed by me. I agree with the assessment, plan and orders as documented by the resident. ATYPICAL CHEST PAIN, NO ACUTE ECG CHANGES. HTN IS ELEVATED DUE TO PAIN AND NONCOMPLIANCE WITH MEDICATIONS. WILL RESUME ANTIHTN. NO INDICATION FOR ISCHEMIA W/U.   Zain Joshua MD

## 2019-08-07 NOTE — ED PROVIDER NOTES
none  Conduction: normal  ST Segments: normal  T Waves: normal  Q Waves: none    Clinical Impression: non-specific EKG    Moises Alfred      Plan: CBC, BMP, EKG, chest x-ray, CT abdomen pelvis      Valdemar Finch M.D,  Attending Emergency  Physician           Moises Alfred MD  08/07/19 2518

## 2019-08-07 NOTE — PROGRESS NOTES
Pt reports having been off of most of his medications for at least a month due to missing multiple Dr appts.   Also reports that he may need assistance getting his prescriptions due to cost.     Electronically signed by Soumya Kim RN on 8/7/2019 at 8:27 AM

## 2019-08-07 NOTE — H&P
[facility]  CDU / OBSERVATION ENCOUNTER  RESIDENT NOTE     Pt Name: Tammie Manzanares  MRN: [id number]  [place] 1960  Date of evaluation: 8/7/19  Patient's PCP is :  Kimberly Bejarano MD    [address] [place] [address]       Chief Complaint   Patient presents with    Rectal Pain     pt states \" I feel like my butthole can't suck in any air\"     Heartburn    Abdominal Pain         HISTORY OF PRESENT ILLNESS    Tammie Manzanares is a 61 y.o. male who presents 2-month history of generalized abdominal discomfort that is described as cramping sensation in the lower abdominal quadrants associated with nausea. Patient states the pain got worse yesterday while he was sitting down. Along with left-sided chest pressure. No shortness of breath,  Denies recent diarrhea but states that he feels like something is \"going in and out of his rectum\" and that he had 2 dark tarry stools earlier today. Of note recent cardiac catheterization on June 2018 showed nonobstructive coronary artery disease and echo revealing an ejection fraction of 55% with grade 1 diastolic dysfunction. Upon exam in the observation unit, belly is soft, nonrigid. Nausea has resolved. Patient is eating and drinking without difficulty or vomiting. CT abdomen and pelvis and CTA chest done in the emergency department was negative. Location/Symptom: Generalized abd pain with nausea, and substernal chest pain  Timing/Onset: That he is onset over the last 2 months  Provocation: None  Quality: Belly ache, and chest pressure  Radiation: None  Severity: Moderate  Timing/Duration: Continuous  Modifying Factors: None    REVIEW OF SYSTEMS     Review of Systems   Constitutional: Negative for activity change, appetite change, chills, fatigue and fever. HENT: Negative for congestion, dental problem and facial swelling. Respiratory: Positive for chest tightness. Negative for cough, shortness of breath and wheezing.     Cardiovascular: Negative for reasonably achievable.; CTA of the chest was performed after the administration of intravenous contrast.  Multiplanar reformatted images are provided for review. MIP images are provided for review. Dose modulation, iterative reconstruction, and/or weight based adjustment of the mA/kV was utilized to reduce the radiation dose to as low as reasonably achievable. COMPARISON: 02/21/2019, 05/12/2016 HISTORY: ORDERING SYSTEM PROVIDED HISTORY: abdominal pain TECHNOLOGIST PROVIDED HISTORY: Reason for Exam: d dimer 0.64 Acuity: Unknown Type of Exam: Unknown; ORDERING SYSTEM PROVIDED HISTORY: CP, tachycardia TECHNOLOGIST PROVIDED HISTORY: Reason for Exam: chest pain Acuity: Unknown Type of Exam: Unknown Additional signs and symptoms: tachycardia FINDINGS: Chest: Pulmonary Arteries: Pulmonary arteries are adequately opacified for evaluation. No evidence of intraluminal filling defect to suggest pulmonary embolism. Main pulmonary artery is normal in caliber. Mediastinum: No evidence of mediastinal lymphadenopathy. The heart and pericardium demonstrate no acute abnormality. There is no acute abnormality of the thoracic aorta. Lungs/pleura: The lungs are without acute process apart from mild dependent atelectatic changes. .  No focal consolidation or pulmonary edema. No evidence of pleural effusion or pneumothorax. Soft Tissues/Bones: No acute bone or soft tissue abnormality. Abdomen/Pelvis: Organs: The visualized portions of the liver, gallbladder, spleen, pancreas and adrenal glands demonstrate no acute abnormality. No biliary ductal dilatation. The kidneys appear normal in size and demonstrate symmetric enhancement. No focal renal mass. No hydronephrosis. No perinephric stranding. Nonobstructing renal calculi again noted. GI/Bowel: No bowel dilatation to suggest obstruction. No evidence of acute appendicitis. Pelvis: The urinary bladder appears unremarkable. The pelvic organs demonstrate no acute abnormality. Peritoneum/Retroperitoneum: The abdominal aorta is normal in caliber. No gross lymphadenopathy. No fluid collection. No free air. Bones/Soft Tissues: No acute findings. No pulmonary emboli. No acute findings. Nonobstructing renal calculi. LABS:  I have reviewed and interpreted all available lab results. Labs Reviewed   CBC WITH AUTO DIFFERENTIAL - Abnormal; Notable for the following components:       Result Value    RBC 3.70 (*)     Hemoglobin 11.4 (*)     Hematocrit 34.2 (*)     All other components within normal limits   BASIC METABOLIC PANEL - Abnormal; Notable for the following components:    Glucose 266 (*)     BUN 28 (*)     CREATININE 1.67 (*)     Potassium 3.6 (*)     GFR Non- 42 (*)     GFR  51 (*)     All other components within normal limits   ETHANOL - Abnormal; Notable for the following components:    Ethanol 11 (*)     Ethanol percent 0.011 (*)     All other components within normal limits   TROPONIN   TROPONIN   LIPASE   HEPATIC FUNCTION PANEL   D-DIMER, QUANTITATIVE       SCREENING TOOLS:    CDU CHOCO / Krys Burk is a 61 y.o. male who presents with    1) acute generalized abdominal pain due to unknown etiology  -CTA abdomen and pelvis, and CTA chest negative for acute pathology  -Hemoccult-positive. No acute drop in hemoglobin  Po bentyl po tylenol po pepcid in ed  -Follow-up outpatient with gastroenterology. Appointment to be arranged prior to departure    2) acute substernal chest pain due to unknown etiology  -Cardiology has signed off. Patient safe to discharge home.       · Continue home medications  · Monitor vitals, labs, and imaging  · DISPO: pending consults and clinical improvement    CONSULTS:    IP CONSULT TO CARDIOLOGY    PROCEDURES:  Not indicated       PATIENT REFERRED TO:    Hira Luo MD  [address] [place] [address]  [address]   Observation Resident     This dictation was

## 2019-08-07 NOTE — H&P
tablet  Commonly known as:  DELTASONE  Take 1 tablet by mouth daily     tamsulosin 0.4 MG capsule  Commonly known as:  FLOMAX  Take 1 capsule by mouth daily         * This list has 4 medication(s) that are the same as other medications prescribed for you. Read the directions carefully, and ask your doctor or other care provider to review them with you.                 Diet:  DIET GENERAL;, advance as tolerated     Activity:  As tolerated    Consultants: IP CONSULT TO CARDIOLOGY    Procedures:  Not indicated     Diagnostic Test:   Results for orders placed or performed during the hospital encounter of 08/06/19   CBC Auto Differential   Result Value Ref Range    WBC 7.4 3.5 - 11.3 k/uL    RBC 3.70 (L) 4.21 - 5.77 m/uL    Hemoglobin 11.4 (L) 13.0 - 17.0 g/dL    Hematocrit 34.2 (L) 40.7 - 50.3 %    MCV 92.4 82.6 - 102.9 fL    MCH 30.8 25.2 - 33.5 pg    MCHC 33.3 28.4 - 34.8 g/dL    RDW 12.9 11.8 - 14.4 %    Platelets 090 020 - 847 k/uL    MPV 10.2 8.1 - 13.5 fL    NRBC Automated 0.0 0.0 per 100 WBC    Differential Type NOT REPORTED     Seg Neutrophils 57 36 - 65 %    Lymphocytes 31 24 - 43 %    Monocytes 9 3 - 12 %    Eosinophils % 2 1 - 4 %    Basophils 1 0 - 2 %    Immature Granulocytes 0 0 %    Segs Absolute 4.20 1.50 - 8.10 k/uL    Absolute Lymph # 2.33 1.10 - 3.70 k/uL    Absolute Mono # 0.66 0.10 - 1.20 k/uL    Absolute Eos # 0.17 0.00 - 0.44 k/uL    Basophils # 0.05 0.00 - 0.20 k/uL    Absolute Immature Granulocyte <0.03 0.00 - 0.30 k/uL    WBC Morphology NOT REPORTED     RBC Morphology NOT REPORTED     Platelet Estimate NOT REPORTED    Basic Metabolic Panel   Result Value Ref Range    Glucose 266 (H) 70 - 99 mg/dL    BUN 28 (H) 6 - 20 mg/dL    CREATININE 1.67 (H) 0.70 - 1.20 mg/dL    Bun/Cre Ratio NOT REPORTED 9 - 20    Calcium 9.8 8.6 - 10.4 mg/dL    Sodium 135 135 - 144 mmol/L    Potassium 3.6 (L) 3.7 - 5.3 mmol/L    Chloride 100 98 - 107 mmol/L    CO2 22 20 - 31 mmol/L    Anion Gap 13 9 - 17 mmol/L    GFR Non- 42 (L) >60 mL/min    GFR  51 (L) >60 mL/min    GFR Comment          GFR Staging NOT REPORTED    Troponin   Result Value Ref Range    Troponin, High Sensitivity 18 0 - 22 ng/L    Troponin T NOT REPORTED <0.03 ng/mL    Troponin Interp NOT REPORTED    Troponin   Result Value Ref Range    Troponin, High Sensitivity 18 0 - 22 ng/L    Troponin T NOT REPORTED <0.03 ng/mL    Troponin Interp NOT REPORTED    LIPASE   Result Value Ref Range    Lipase 42 13 - 60 U/L   Hepatic Function Panel   Result Value Ref Range    Alb 3.6 3.5 - 5.2 g/dL    Alkaline Phosphatase 107 40 - 129 U/L    ALT 17 5 - 41 U/L    AST 17 <40 U/L    Total Bilirubin 0.34 0.3 - 1.2 mg/dL    Bilirubin, Direct 0.10 <0.31 mg/dL    Bilirubin, Indirect 0.24 0.00 - 1.00 mg/dL    Total Protein 6.9 6.4 - 8.3 g/dL    Globulin NOT REPORTED 1.5 - 3.8 g/dL    Albumin/Globulin Ratio 1.1 1.0 - 2.5   ETHANOL   Result Value Ref Range    Ethanol 11 (H) <10 mg/dL    Ethanol percent 0.011 (H) <0.010 %   D-Dimer, Quantitative   Result Value Ref Range    D-Dimer, Quant 0.64 mg/L FEU   EKG 12 Lead   Result Value Ref Range    Ventricular Rate 116 BPM    Atrial Rate 116 BPM    P-R Interval 134 ms    QRS Duration 88 ms    Q-T Interval 338 ms    QTc Calculation (Bazett) 469 ms    P Axis 50 degrees    R Axis 34 degrees    T Axis 67 degrees   EKG 12 Lead   Result Value Ref Range    Ventricular Rate 94 BPM    Atrial Rate 94 BPM    P-R Interval 160 ms    QRS Duration 88 ms    Q-T Interval 374 ms    QTc Calculation (Bazett) 467 ms    P Axis 51 degrees    R Axis 15 degrees    T Axis 71 degrees     Xr Chest Standard (2 Vw)    Result Date: 8/6/2019  EXAMINATION: TWO XRAY VIEWS OF THE CHEST 8/6/2019 11:00 pm COMPARISON: February 21, 2019 chest x-ray HISTORY: ORDERING SYSTEM PROVIDED HISTORY: CP TECHNOLOGIST PROVIDED HISTORY: CP Reason for Exam: chest-epigastric pain FINDINGS: The lungs are without acute focal process.   There is no effusion or TECHNOLOGIST PROVIDED HISTORY: Reason for Exam: chest pain Acuity: Unknown Type of Exam: Unknown Additional signs and symptoms: tachycardia FINDINGS: Chest: Pulmonary Arteries: Pulmonary arteries are adequately opacified for evaluation. No evidence of intraluminal filling defect to suggest pulmonary embolism. Main pulmonary artery is normal in caliber. Mediastinum: No evidence of mediastinal lymphadenopathy. The heart and pericardium demonstrate no acute abnormality. There is no acute abnormality of the thoracic aorta. Lungs/pleura: The lungs are without acute process apart from mild dependent atelectatic changes. .  No focal consolidation or pulmonary edema. No evidence of pleural effusion or pneumothorax. Soft Tissues/Bones: No acute bone or soft tissue abnormality. Abdomen/Pelvis: Organs: The visualized portions of the liver, gallbladder, spleen, pancreas and adrenal glands demonstrate no acute abnormality. No biliary ductal dilatation. The kidneys appear normal in size and demonstrate symmetric enhancement. No focal renal mass. No hydronephrosis. No perinephric stranding. Nonobstructing renal calculi again noted. GI/Bowel: No bowel dilatation to suggest obstruction. No evidence of acute appendicitis. Pelvis: The urinary bladder appears unremarkable. The pelvic organs demonstrate no acute abnormality. Peritoneum/Retroperitoneum: The abdominal aorta is normal in caliber. No gross lymphadenopathy. No fluid collection. No free air. Bones/Soft Tissues: No acute findings. No pulmonary emboli. No acute findings. Nonobstructing renal calculi. Physical Exam:    General appearance - NAD, AOx 3   Lungs -CTAB, no R/R/R  Heart - RRR, no M/R/G  Abdomen - Soft, NT/ND  Neurological:  MAEx4, No focal motor deficit, sensory loss  Extremities - Cap refil <2 sec in all ext., no edema  Skin -warm, dry      Hospital Course:  Clinical course has improved, labs and imaging reviewed.      Jamin Reynoso

## 2019-08-08 ENCOUNTER — TELEPHONE (OUTPATIENT)
Dept: FAMILY MEDICINE CLINIC | Age: 59
End: 2019-08-08

## 2019-08-08 LAB
EKG ATRIAL RATE: 94 BPM
EKG P AXIS: 51 DEGREES
EKG P-R INTERVAL: 160 MS
EKG Q-T INTERVAL: 374 MS
EKG QRS DURATION: 88 MS
EKG QTC CALCULATION (BAZETT): 467 MS
EKG R AXIS: 15 DEGREES
EKG T AXIS: 71 DEGREES
EKG VENTRICULAR RATE: 94 BPM

## 2019-08-08 PROCEDURE — 93010 ELECTROCARDIOGRAM REPORT: CPT | Performed by: INTERNAL MEDICINE

## 2019-08-08 ASSESSMENT — ENCOUNTER SYMPTOMS
CONSTIPATION: 0
RHINORRHEA: 0
NAUSEA: 1
EYE ITCHING: 0
SHORTNESS OF BREATH: 0
ABDOMINAL PAIN: 1
EYE REDNESS: 0
BACK PAIN: 0
DIARRHEA: 1
VOMITING: 0
COUGH: 0

## 2019-08-08 NOTE — DISCHARGE SUMMARY
CDU Discharge Summary          Patient:  Derek Munoz  YOB: 1960     MRN: [id number]              Acct: [de-identified]                 Primary Care Physician: Hira Luo MD     Admit date:  8/6/2019  9:53 PM  Discharge date: 8/7/19     Discharge Diagnoses:      1) acute generalized abdominal pain due to unknown etiology  Resolved. Ct abd and pelvis w/ contrast and cta chest neg. tx inpt w/ tylenol po, bentyl po, pepcid po. Heme positive stool. Outpatient follow up with GI in one week     2) acute substernal chest pain due to unknown etiology  -resolved. tx as above. Cardiology consulted, recommend no acute intervention or testing. Cleared for DC        Follow-up:  Call today/tomorrow for a follow up appointment with Hira Luo MD , or return to the Emergency Room with worsening symptoms     Stressed to patient the importance of following up with primary care doctor for further workup/management of symptoms. Pt verbalizes understanding and agrees with plan.     Discharge Medication Changes:         Medication List       CONTINUE taking these medications    * acetaminophen 500 MG tablet  Commonly known as:  TYLENOL  Take 2 tablets by mouth every 6 hours as needed for Pain      * acetaminophen 325 MG tablet  Commonly known as:  TYLENOL  Take 2 tablets by mouth every 6 hours as needed for Pain      docusate sodium 100 MG capsule  Commonly known as:  COLACE  Take 1 capsule by mouth 2 times daily as needed for Constipation      gabapentin 100 MG capsule  Commonly known as:  NEURONTIN  Take 1 capsule by mouth 3 times daily for 7 days. .      losartan 25 MG tablet  Commonly known as:  COZAAR  Take 1 tablet by mouth daily      metFORMIN 500 MG tablet  Commonly known as:  GLUCOPHAGE  Take 1 tablet by mouth 2 times daily (with meals)      * oxybutynin 10 MG extended release tablet  Commonly known as:  DITROPAN-XL  Take 1 tablet by mouth daily      * oxybutynin 5 MG tablet  Commonly known as: embolism. Main pulmonary artery is normal in caliber. Mediastinum: No evidence of mediastinal lymphadenopathy. The heart and pericardium demonstrate no acute abnormality. There is no acute abnormality of the thoracic aorta. Lungs/pleura: The lungs are without acute process apart from mild dependent atelectatic changes. .  No focal consolidation or pulmonary edema. No evidence of pleural effusion or pneumothorax. Soft Tissues/Bones: No acute bone or soft tissue abnormality. Abdomen/Pelvis: Organs: The visualized portions of the liver, gallbladder, spleen, pancreas and adrenal glands demonstrate no acute abnormality. No biliary ductal dilatation. The kidneys appear normal in size and demonstrate symmetric enhancement. No focal renal mass. No hydronephrosis. No perinephric stranding. Nonobstructing renal calculi again noted. GI/Bowel: No bowel dilatation to suggest obstruction. No evidence of acute appendicitis. Pelvis: The urinary bladder appears unremarkable. The pelvic organs demonstrate no acute abnormality. Peritoneum/Retroperitoneum: The abdominal aorta is normal in caliber. No gross lymphadenopathy. No fluid collection. No free air. Bones/Soft Tissues: No acute findings.      No pulmonary emboli. No acute findings. Nonobstructing renal calculi.      Ct Chest Pulmonary Embolism W Contrast     Result Date: 8/7/2019  EXAMINATION: CT OF THE ABDOMEN AND PELVIS WITH CONTRAST; CTA OF THE CHEST 8/7/2019 3:45 am TECHNIQUE: CT of the abdomen and pelvis was performed with the administration of intravenous contrast. Multiplanar reformatted images are provided for review. Dose modulation, iterative reconstruction, and/or weight based adjustment of the mA/kV was utilized to reduce the radiation dose to as low as reasonably achievable.; CTA of the chest was performed after the administration of intravenous contrast.  Multiplanar reformatted images are provided for review. MIP images are provided for review.  Dose

## 2019-08-09 ENCOUNTER — TELEPHONE (OUTPATIENT)
Dept: GASTROENTEROLOGY | Age: 59
End: 2019-08-09

## 2019-08-23 ENCOUNTER — HOSPITAL ENCOUNTER (OUTPATIENT)
Age: 59
Discharge: HOME | End: 2019-08-23
Payer: MEDICAID

## 2019-08-23 ENCOUNTER — OFFICE VISIT (OUTPATIENT)
Dept: FAMILY MEDICINE CLINIC | Age: 59
End: 2019-08-23
Payer: MEDICAID

## 2019-08-23 VITALS
HEART RATE: 90 BPM | DIASTOLIC BLOOD PRESSURE: 74 MMHG | SYSTOLIC BLOOD PRESSURE: 136 MMHG | TEMPERATURE: 98.3 F | BODY MASS INDEX: 28.58 KG/M2 | WEIGHT: 199.2 LBS

## 2019-08-23 DIAGNOSIS — Z00.00 HEALTHCARE MAINTENANCE: ICD-10-CM

## 2019-08-23 DIAGNOSIS — Z12.11 COLON CANCER SCREENING: ICD-10-CM

## 2019-08-23 DIAGNOSIS — Z13.220 SCREENING FOR HYPERLIPIDEMIA: ICD-10-CM

## 2019-08-23 DIAGNOSIS — K21.9 GASTROESOPHAGEAL REFLUX DISEASE, ESOPHAGITIS PRESENCE NOT SPECIFIED: Primary | ICD-10-CM

## 2019-08-23 DIAGNOSIS — Z23 NEED FOR PROPHYLACTIC VACCINATION AGAINST DIPHTHERIA-TETANUS-PERTUSSIS (DTP): ICD-10-CM

## 2019-08-23 DIAGNOSIS — E11.43 TYPE 2 DIABETES MELLITUS WITH DIABETIC AUTONOMIC NEUROPATHY, WITHOUT LONG-TERM CURRENT USE OF INSULIN (HCC): ICD-10-CM

## 2019-08-23 LAB
CHOLESTEROL/HDL RATIO: 1.9
CHOLESTEROL: 158 MG/DL
CREATININE URINE: 110 MG/DL (ref 39–259)
HBA1C MFR BLD: 8.5 %
HDLC SERPL-MCNC: 83 MG/DL
HEPATITIS C ANTIBODY: NONREACTIVE
HIV AG/AB: NONREACTIVE
LDL CHOLESTEROL: 46 MG/DL (ref 0–130)
MICROALBUMIN/CREAT 24H UR: 1004 MG/L
MICROALBUMIN/CREAT UR-RTO: 913 MCG/MG CREAT
TRIGL SERPL-MCNC: 143 MG/DL
VLDLC SERPL CALC-MCNC: NORMAL MG/DL (ref 1–30)

## 2019-08-23 PROCEDURE — 1111F DSCHRG MED/CURRENT MED MERGE: CPT | Performed by: STUDENT IN AN ORGANIZED HEALTH CARE EDUCATION/TRAINING PROGRAM

## 2019-08-23 PROCEDURE — 83036 HEMOGLOBIN GLYCOSYLATED A1C: CPT | Performed by: STUDENT IN AN ORGANIZED HEALTH CARE EDUCATION/TRAINING PROGRAM

## 2019-08-23 PROCEDURE — 99215 OFFICE O/P EST HI 40 MIN: CPT | Performed by: STUDENT IN AN ORGANIZED HEALTH CARE EDUCATION/TRAINING PROGRAM

## 2019-08-23 PROCEDURE — 82043 UR ALBUMIN QUANTITATIVE: CPT

## 2019-08-23 PROCEDURE — 90715 TDAP VACCINE 7 YRS/> IM: CPT | Performed by: FAMILY MEDICINE

## 2019-08-23 PROCEDURE — 99211 OFF/OP EST MAY X REQ PHY/QHP: CPT | Performed by: STUDENT IN AN ORGANIZED HEALTH CARE EDUCATION/TRAINING PROGRAM

## 2019-08-23 PROCEDURE — 82570 ASSAY OF URINE CREATININE: CPT

## 2019-08-23 PROCEDURE — 36415 COLL VENOUS BLD VENIPUNCTURE: CPT

## 2019-08-23 PROCEDURE — 80061 LIPID PANEL: CPT

## 2019-08-23 PROCEDURE — 87389 HIV-1 AG W/HIV-1&-2 AB AG IA: CPT

## 2019-08-23 PROCEDURE — 86803 HEPATITIS C AB TEST: CPT

## 2019-08-23 PROCEDURE — G0009 ADMIN PNEUMOCOCCAL VACCINE: HCPCS | Performed by: FAMILY MEDICINE

## 2019-08-23 RX ORDER — DOCUSATE SODIUM, SENNOSIDES 50; 8.6 MG/1; MG/1
TABLET ORAL
COMMUNITY
Start: 2019-08-12 | End: 2019-09-25 | Stop reason: SDUPTHER

## 2019-08-23 RX ORDER — GABAPENTIN 100 MG/1
100 CAPSULE ORAL 3 TIMES DAILY
Qty: 90 CAPSULE | Refills: 1 | Status: SHIPPED | OUTPATIENT
Start: 2019-08-23 | End: 2019-11-07 | Stop reason: SDUPTHER

## 2019-08-23 RX ORDER — OMEPRAZOLE 20 MG/1
20 CAPSULE, DELAYED RELEASE ORAL
Qty: 180 CAPSULE | Refills: 1 | Status: ON HOLD | OUTPATIENT
Start: 2019-08-23 | End: 2020-01-14 | Stop reason: SDUPTHER

## 2019-08-23 RX ORDER — ASPIRIN 81 MG/1
81 TABLET, CHEWABLE ORAL
COMMUNITY
Start: 2019-08-16 | End: 2020-01-14 | Stop reason: HOSPADM

## 2019-08-23 RX ORDER — HYDROCHLOROTHIAZIDE 25 MG/1
25 TABLET ORAL
Status: ON HOLD | COMMUNITY
Start: 2019-08-16 | End: 2020-01-14 | Stop reason: SDUPTHER

## 2019-08-23 RX ORDER — BISACODYL 10 MG
10 SUPPOSITORY, RECTAL RECTAL
COMMUNITY
Start: 2019-08-12 | End: 2019-09-11

## 2019-08-23 RX ORDER — ATORVASTATIN CALCIUM 20 MG/1
20 TABLET, FILM COATED ORAL
Status: ON HOLD | COMMUNITY
Start: 2019-08-16 | End: 2019-12-21 | Stop reason: HOSPADM

## 2019-08-23 RX ORDER — AMLODIPINE BESYLATE 10 MG/1
10 TABLET ORAL
Status: ON HOLD | COMMUNITY
Start: 2019-08-17 | End: 2019-12-21 | Stop reason: HOSPADM

## 2019-08-23 RX ORDER — TIZANIDINE 4 MG/1
4 TABLET ORAL
COMMUNITY
Start: 2019-08-20 | End: 2019-09-19

## 2019-08-23 ASSESSMENT — PATIENT HEALTH QUESTIONNAIRE - PHQ9
SUM OF ALL RESPONSES TO PHQ9 QUESTIONS 1 & 2: 0
1. LITTLE INTEREST OR PLEASURE IN DOING THINGS: 0
2. FEELING DOWN, DEPRESSED OR HOPELESS: 0
SUM OF ALL RESPONSES TO PHQ QUESTIONS 1-9: 0
SUM OF ALL RESPONSES TO PHQ QUESTIONS 1-9: 0

## 2019-08-23 ASSESSMENT — ENCOUNTER SYMPTOMS
SHORTNESS OF BREATH: 0
RHINORRHEA: 0
ABDOMINAL PAIN: 1
COUGH: 0
SINUS PRESSURE: 0
VOMITING: 0
WHEEZING: 0
CONSTIPATION: 0
DIARRHEA: 0
COLOR CHANGE: 0
NAUSEA: 0
SINUS PAIN: 0
CHEST TIGHTNESS: 0

## 2019-08-23 NOTE — PATIENT INSTRUCTIONS
Visit Information    Have you changed or started any medications since your last visit including any over-the-counter medicines, vitamins, or herbal medicines? no   Have you stopped taking any of your medications? Is so, why? -  no  Are you having any side effects from any of your medications? - no    Have you seen any other physician or provider since your last visit?  no   Have you had any other diagnostic tests since your last visit?  no   Have you been seen in the emergency room and/or had an admission in a hospital since we last saw you?  no   Have you had your routine dental cleaning in the past 6 months?  no     Do you have an active MyChart account? If no, what is the barrier? No:     Patient Care Team:  Singh Ambrocio MD as PCP - General (Family Medicine)    Medical History Review  Past Medical, Family, and Social History reviewed and does not contribute to the patient presenting condition    Health Maintenance   Topic Date Due    Hepatitis C screen  1960    Pneumococcal 0-64 years Vaccine (1 of 1 - PPSV23) 07/18/1966    Diabetic retinal exam  07/18/1970    HIV screen  07/18/1975    Diabetic microalbuminuria test  07/18/1978    Hepatitis B Vaccine (1 of 3 - Risk 3-dose series) 07/18/1979    DTaP/Tdap/Td vaccine (1 - Tdap) 07/18/1979    Shingles Vaccine (1 of 2) 07/18/2010    Colon cancer screen colonoscopy  07/18/2010    Lipid screen  09/25/2014    A1C test (Diabetic or Prediabetic)  07/20/2019    Diabetic foot exam  07/30/2019    Flu vaccine (1) 09/01/2019    Potassium monitoring  08/06/2020    Creatinine monitoring  08/06/2020         Thank you for letting us take care of you today. We hope all your questions were addressed. If a question was overlooked or something else comes to mind after you return home, please contact a member of your Care Team listed below. Please make sure you have a routine office visit set up to follow-up on [address].      Your Care Team at

## 2019-08-23 NOTE — PROGRESS NOTES
Post-Discharge Transitional Care Management Services or Hospital Follow Up      Elena Gorman   YOB: 1960    Date of Office Visit:  8/23/2019  Date of Hospital Admission: 8/6/19  Date of Hospital Discharge: 8/7/19  Readmission Risk Score(high >=14%.  Medium >=10%):Readmission Risk Score: 0      Care management risk score Rising risk (score 2-5) and Complex Care (Scores >=6): 9     Non face to face  following discharge, date last encounter closed (first attempt may have been earlier): *No documented post hospital discharge outreach found in the last 14 days *No documented post hospital discharge outreach found in the last 14 days    Call initiated 2 business days of discharge: *No response recorded in the last 14 days     Patient Active Problem List   Diagnosis    DM2 (diabetes mellitus, type 2) ([facility] [address]    Sciatica of right side    Atypical chest pain    Low back pain of thoracolumbar region with sciatica    Kidney stone    Chest pain    Healthcare maintenance    Need for prophylactic vaccination against diphtheria-tetanus-pertussis (DTP)    Gastroesophageal reflux disease       No Known Allergies    Medications listed as ordered at the time of discharge from hospital   [facility] Medication Instructions [id number]    Printed on:08/23/19 1108   Medication Information                      acetaminophen (APAP EXTRA STRENGTH) 500 MG tablet  Take 2 tablets by mouth every 6 hours as needed for Pain             acetaminophen (TYLENOL) 325 MG tablet  Take 2 tablets by mouth every 6 hours as needed for Pain             amLODIPine (NORVASC) 10 MG tablet  Take 10 mg by mouth             aspirin 81 MG chewable tablet  Take 81 mg by mouth             atorvastatin (LIPITOR) 20 MG tablet  Take 20 mg by mouth             bisacodyl (DULCOLAX) 10 MG suppository  Place 10 mg rectally             docusate sodium (COLACE) 100 MG capsule  Take 1 capsule by mouth 2 times daily as needed for meals) 60 tablet 3    omeprazole (PRILOSEC) 20 MG delayed release capsule Take 1 capsule by mouth 2 times daily (before meals) 180 capsule 1    tamsulosin (FLOMAX) 0.4 MG capsule Take 1 capsule by mouth daily 7 capsule 1    acetaminophen (TYLENOL) 325 MG tablet Take 2 tablets by mouth every 6 hours as needed for Pain 120 tablet 0    oxybutynin (DITROPAN) 5 MG tablet Take 1 tablet by mouth 3 times daily as needed (bladder spasms) 30 tablet 1    potassium citrate (UROCIT-K) 10 MEQ (1080 MG) extended release tablet Take 2 tablets by mouth 3 times daily (with meals) 180 tablet 11    acetaminophen (APAP EXTRA STRENGTH) 500 MG tablet Take 2 tablets by mouth every 6 hours as needed for Pain 30 tablet 0        Medications patient taking as of now reconciled against medications ordered at time of hospital discharge: Yes    Chief Complaint   Patient presents with    Follow-Up from Hospital     patient states he is feeling better        HPI    Inpatient course: Discharge summary reviewed- see chart. Interval history/Current status: Patient admitted for generalized abdominal pain. CT abdomen is negative, patient was discharged with outpatient GI follow-up. Patient continues to complain of heartburn and bloating. Denies any dark stool, nausea/vomiting/diarrhea. Patient has past medical history of diabetes mellitus type 2, taking metformin. Hemoglobin A1c today is 8.5. Denies any polyuria, polyphagia, fatigue. Review of Systems   Constitutional: Negative for appetite change, chills, fatigue and fever. HENT: Negative for ear discharge, ear pain, rhinorrhea, sinus pressure and sinus pain. Respiratory: Negative for cough, chest tightness, shortness of breath and wheezing. Cardiovascular: Negative for chest pain and palpitations. Gastrointestinal: Positive for abdominal pain. Negative for constipation, diarrhea, nausea and vomiting. Genitourinary: Negative for dysuria and flank pain.

## 2019-09-10 ENCOUNTER — TELEPHONE (OUTPATIENT)
Dept: FAMILY MEDICINE CLINIC | Age: 59
End: 2019-09-10

## 2019-09-22 PROBLEM — Z00.00 HEALTHCARE MAINTENANCE: Status: RESOLVED | Noted: 2019-08-23 | Resolved: 2019-09-22

## 2019-09-25 ENCOUNTER — OFFICE VISIT (OUTPATIENT)
Dept: FAMILY MEDICINE CLINIC | Age: 59
End: 2019-09-25
Payer: MEDICAID

## 2019-09-25 VITALS
DIASTOLIC BLOOD PRESSURE: 68 MMHG | WEIGHT: 205.8 LBS | HEIGHT: 70 IN | HEART RATE: 97 BPM | BODY MASS INDEX: 29.46 KG/M2 | TEMPERATURE: 98.7 F | SYSTOLIC BLOOD PRESSURE: 121 MMHG

## 2019-09-25 DIAGNOSIS — K21.9 GASTROESOPHAGEAL REFLUX DISEASE, ESOPHAGITIS PRESENCE NOT SPECIFIED: Primary | ICD-10-CM

## 2019-09-25 DIAGNOSIS — G89.29 CHRONIC BILATERAL LOW BACK PAIN, WITH SCIATICA PRESENCE UNSPECIFIED: ICD-10-CM

## 2019-09-25 DIAGNOSIS — R19.5 POSITIVE COLORECTAL CANCER SCREENING USING COLOGUARD TEST: ICD-10-CM

## 2019-09-25 DIAGNOSIS — Z23 NEED FOR PROPHYLACTIC VACCINATION AND INOCULATION AGAINST VARICELLA: ICD-10-CM

## 2019-09-25 DIAGNOSIS — N20.1 URETEROLITHIASIS: ICD-10-CM

## 2019-09-25 DIAGNOSIS — M54.5 CHRONIC BILATERAL LOW BACK PAIN, WITH SCIATICA PRESENCE UNSPECIFIED: ICD-10-CM

## 2019-09-25 PROCEDURE — G8419 CALC BMI OUT NRM PARAM NOF/U: HCPCS | Performed by: STUDENT IN AN ORGANIZED HEALTH CARE EDUCATION/TRAINING PROGRAM

## 2019-09-25 PROCEDURE — 99213 OFFICE O/P EST LOW 20 MIN: CPT | Performed by: STUDENT IN AN ORGANIZED HEALTH CARE EDUCATION/TRAINING PROGRAM

## 2019-09-25 PROCEDURE — G8427 DOCREV CUR MEDS BY ELIG CLIN: HCPCS | Performed by: STUDENT IN AN ORGANIZED HEALTH CARE EDUCATION/TRAINING PROGRAM

## 2019-09-25 PROCEDURE — 3017F COLORECTAL CA SCREEN DOC REV: CPT | Performed by: STUDENT IN AN ORGANIZED HEALTH CARE EDUCATION/TRAINING PROGRAM

## 2019-09-25 PROCEDURE — 99211 OFF/OP EST MAY X REQ PHY/QHP: CPT | Performed by: FAMILY MEDICINE

## 2019-09-25 PROCEDURE — 1036F TOBACCO NON-USER: CPT | Performed by: STUDENT IN AN ORGANIZED HEALTH CARE EDUCATION/TRAINING PROGRAM

## 2019-09-25 RX ORDER — DOCUSATE SODIUM, SENNOSIDES 50; 8.6 MG/1; MG/1
1 TABLET ORAL DAILY
Qty: 30 TABLET | Refills: 0 | Status: ON HOLD | OUTPATIENT
Start: 2019-09-25 | End: 2020-01-14 | Stop reason: SDUPTHER

## 2019-09-25 RX ORDER — ASPIRIN 81 MG/1
81 TABLET, CHEWABLE ORAL DAILY
Status: ON HOLD | COMMUNITY
End: 2020-01-14 | Stop reason: SDUPTHER

## 2019-09-25 RX ORDER — ACETAMINOPHEN 325 MG/1
650 TABLET ORAL EVERY 6 HOURS PRN
Qty: 120 TABLET | Refills: 0 | Status: ON HOLD | OUTPATIENT
Start: 2019-09-25 | End: 2020-01-14 | Stop reason: SDUPTHER

## 2019-09-25 RX ORDER — OXYBUTYNIN CHLORIDE 10 MG/1
10 TABLET, EXTENDED RELEASE ORAL DAILY
Qty: 30 TABLET | Refills: 0 | Status: ON HOLD | OUTPATIENT
Start: 2019-09-25 | End: 2019-12-21 | Stop reason: SDUPTHER

## 2019-09-25 RX ORDER — TIZANIDINE 4 MG/1
4 TABLET ORAL EVERY 6 HOURS PRN
COMMUNITY
End: 2019-09-25 | Stop reason: SDUPTHER

## 2019-09-25 RX ORDER — TIZANIDINE 4 MG/1
4 TABLET ORAL EVERY 6 HOURS PRN
Qty: 30 TABLET | Refills: 0 | Status: ON HOLD | OUTPATIENT
Start: 2019-09-25 | End: 2019-12-21 | Stop reason: HOSPADM

## 2019-09-25 ASSESSMENT — ENCOUNTER SYMPTOMS
CHEST TIGHTNESS: 0
SHORTNESS OF BREATH: 0
BLOOD IN STOOL: 1
DIARRHEA: 0
CONSTIPATION: 0
VOMITING: 0
SINUS PAIN: 0
SINUS PRESSURE: 0
ABDOMINAL PAIN: 1
COUGH: 0
COLOR CHANGE: 0
WHEEZING: 0
RHINORRHEA: 0
NAUSEA: 0

## 2019-09-25 NOTE — PROGRESS NOTES
Subjective:   Elena Gorman is a 61 y.o. male with  has a past medical history of ADHD (attention deficit hyperactivity disorder), Depression, DM2 (diabetes mellitus, type 2) ([address] Erectile dysfunction, Gastroesophageal reflux disease, Hyperlipidemia, Hypertension, Kidney stone, Low back pain of thoracolumbar region with sciatica, and Neuropathy. HPI    49-year-old male presenting after having a positive Cologuard test  No family history of colon cancer  Patient reports noticing dark stool in the past  Denies any unintentional weight loss, fatigue, night sweats    Review of Systems   Constitutional: Negative for appetite change, chills, fatigue and fever. HENT: Negative for ear discharge, ear pain, rhinorrhea, sinus pressure and sinus pain. Respiratory: Negative for cough, chest tightness, shortness of breath and wheezing. Cardiovascular: Negative for chest pain and palpitations. Gastrointestinal: Positive for abdominal pain and blood in stool. Negative for constipation, diarrhea, nausea and vomiting. Genitourinary: Negative for dysuria and flank pain. Musculoskeletal: Negative for joint swelling, neck pain and neck stiffness. Skin: Negative for color change and wound. Neurological: Negative for dizziness, light-headedness, numbness and headaches. Objective:    /68 (Site: Left Upper Arm, Position: Sitting, Cuff Size: Medium Adult) Comment: machine  Pulse 97   Temp 98.7 °F (37.1 °C) (Temporal)   Ht 5' 10\" (1.778 m)   Wt 205 lb 12.8 oz (93.4 kg)   BMI 29.53 kg/m²    BP Readings from Last 3 Encounters:   09/25/19 121/68   08/23/19 136/74   08/07/19 (!) 142/88     Physical Exam   Constitutional: He is oriented to person, place, and time. He appears well-developed and well-nourished. HENT:   Head: Normocephalic and atraumatic. Eyes: Pupils are equal, round, and reactive to light. EOM are normal.   Neck: Normal range of motion. Neck supple.    Cardiovascular: Normal rate, tiZANidine (ZANAFLEX) 4 MG tablet; Take 1 tablet by mouth every 6 hours as needed (back pain)  Dispense: 30 tablet; Refill: 0    4. Need for prophylactic vaccination and inoculation against varicella  - zoster recombinant adjuvanted vaccine [facility]) 50 MCG/0.5ML SUSR injection; Inject 0.5 mLs into the muscle once for 1 dose 50 MCG IM then repeat 2-6 months. Dispense: 1 each; Refill: 1    5. Ureterolithiasis    - oxybutynin (DITROPAN-XL) 10 MG extended release tablet; Take 1 tablet by mouth daily  Dispense: 30 tablet; Refill: 0      Marques received counseling on the following healthy behaviors: nutrition, exercise and medication adherence  Reviewed prior labs and health maintenance  Continue current medications, diet and exercise. Discussed use, benefit, and side effects of prescribed medications. Barriers to medication compliance addressed. Patient given educational materials - see patient instructions  Was a self-tracking handout given in paper form or via [employer]? Yes    Requested Prescriptions     Signed Prescriptions Disp Refills    zoster recombinant adjuvanted vaccine (SHINGRIX) 50 MCG/0.5ML SUSR injection 1 each 1     Sig: Inject 0.5 mLs into the muscle once for 1 dose 50 MCG IM then repeat 2-6 months.  acetaminophen (TYLENOL) 325 MG tablet 120 tablet 0     Sig: Take 2 tablets by mouth every 6 hours as needed for Pain    oxybutynin (DITROPAN-XL) 10 MG extended release tablet 30 tablet 0     Sig: Take 1 tablet by mouth daily    SENNA-PLUS 8.6-50 MG per tablet 30 tablet 0     Sig: Take 1 tablet by mouth daily    tiZANidine (ZANAFLEX) 4 MG tablet 30 tablet 0     Sig: Take 1 tablet by mouth every 6 hours as needed (back pain)       All patient questions answered. Patient voiced understanding. Quality Measures    Body mass index is 29.53 kg/m². Elevated. Weight control planned discussed Healthy diet and regular exercise.     BP: 121/68(machine) Blood pressure is normal. Treatment plan consists of

## 2019-09-25 NOTE — PROGRESS NOTES
Visit Information    Have you changed or started any medications since your last visit including any over-the-counter medicines, vitamins, or herbal medicines? no   Have you stopped taking any of your medications? Is so, why? -  no  Are you having any side effects from any of your medications? - no    Have you seen any other physician or provider since your last visit?  no   Have you had any other diagnostic tests since your last visit?  no   Have you been seen in the emergency room and/or had an admission in a hospital since we last saw you?  yes - [facility]   Have you had your routine dental cleaning in the past 6 months?  no     Do you have an active MyChart account? If no, what is the barrier?   No:     Patient Care Team:  Austin Ovalle MD as PCP - General (Family Medicine)    Medical History Review  Past Medical, Family, and Social History reviewed and does not contribute to the patient presenting condition    Health Maintenance   Topic Date Due    Diabetic retinal exam  1970    Hepatitis B Vaccine (1 of 3 - Risk 3-dose series) 1979    Shingles Vaccine (1 of 2) 2010    Flu vaccine (1) 2019    Potassium monitoring  2020    Creatinine monitoring  2020    Diabetic foot exam  2020    A1C test (Diabetic or Prediabetic)  2020    Diabetic microalbuminuria test  2020    Lipid screen  2020    Colon cancer screen fecal DNA test (Cologuard)  2022    DTaP/Tdap/Td vaccine (2 - Td) 2029    Pneumococcal 0-64 years Vaccine  Completed    Hepatitis C screen  Completed    HIV screen  Completed

## 2019-11-06 ENCOUNTER — OFFICE VISIT (OUTPATIENT)
Dept: SURGERY | Age: 59
End: 2019-11-06
Payer: MEDICAID

## 2019-11-06 VITALS
WEIGHT: 214 LBS | HEART RATE: 72 BPM | SYSTOLIC BLOOD PRESSURE: 138 MMHG | DIASTOLIC BLOOD PRESSURE: 66 MMHG | BODY MASS INDEX: 30.71 KG/M2

## 2019-11-06 DIAGNOSIS — R19.5 DARK STOOLS: Primary | ICD-10-CM

## 2019-11-06 DIAGNOSIS — E11.43 TYPE 2 DIABETES MELLITUS WITH DIABETIC AUTONOMIC NEUROPATHY, WITHOUT LONG-TERM CURRENT USE OF INSULIN (HCC): ICD-10-CM

## 2019-11-06 PROCEDURE — G8427 DOCREV CUR MEDS BY ELIG CLIN: HCPCS | Performed by: SURGERY

## 2019-11-06 PROCEDURE — 99202 OFFICE O/P NEW SF 15 MIN: CPT | Performed by: SURGERY

## 2019-11-06 PROCEDURE — G8419 CALC BMI OUT NRM PARAM NOF/U: HCPCS | Performed by: SURGERY

## 2019-11-06 PROCEDURE — 1036F TOBACCO NON-USER: CPT | Performed by: SURGERY

## 2019-11-06 PROCEDURE — G8484 FLU IMMUNIZE NO ADMIN: HCPCS | Performed by: SURGERY

## 2019-11-06 PROCEDURE — 3017F COLORECTAL CA SCREEN DOC REV: CPT | Performed by: SURGERY

## 2019-11-06 RX ORDER — POLYETHYLENE GLYCOL 3350 17 G/17G
258 POWDER, FOR SOLUTION ORAL ONCE
Qty: 1 EACH | Refills: 0 | Status: SHIPPED | OUTPATIENT
Start: 2019-11-06 | End: 2019-11-06

## 2019-11-07 RX ORDER — GABAPENTIN 100 MG/1
100 CAPSULE ORAL 3 TIMES DAILY
Qty: 90 CAPSULE | Refills: 1 | Status: ON HOLD | OUTPATIENT
Start: 2019-11-07 | End: 2019-12-21 | Stop reason: HOSPADM

## 2019-12-19 ENCOUNTER — APPOINTMENT (OUTPATIENT)
Dept: GENERAL RADIOLOGY | Age: 59
End: 2019-12-19
Payer: MEDICAID

## 2019-12-19 ENCOUNTER — HOSPITAL ENCOUNTER (OUTPATIENT)
Age: 59
Setting detail: OBSERVATION
Discharge: HOME | End: 2019-12-21
Attending: EMERGENCY MEDICINE | Admitting: EMERGENCY MEDICINE
Payer: MEDICAID

## 2019-12-19 DIAGNOSIS — N17.9 AKI (ACUTE KIDNEY INJURY) (HCC): ICD-10-CM

## 2019-12-19 DIAGNOSIS — R73.9 HYPERGLYCEMIA: ICD-10-CM

## 2019-12-19 DIAGNOSIS — R07.9 CHEST PAIN, UNSPECIFIED TYPE: Primary | ICD-10-CM

## 2019-12-19 DIAGNOSIS — N20.1 URETEROLITHIASIS: ICD-10-CM

## 2019-12-19 LAB
ABSOLUTE EOS #: 0.25 K/UL (ref 0–0.44)
ABSOLUTE IMMATURE GRANULOCYTE: <0.03 K/UL (ref 0–0.3)
ABSOLUTE LYMPH #: 3.12 K/UL (ref 1.1–3.7)
ABSOLUTE MONO #: 0.7 K/UL (ref 0.1–1.2)
ALBUMIN SERPL-MCNC: 3.6 G/DL (ref 3.5–5.2)
ALBUMIN/GLOBULIN RATIO: 1.2 (ref 1–2.5)
ALP BLD-CCNC: 91 U/L (ref 40–129)
ALT SERPL-CCNC: 23 U/L (ref 5–41)
ANION GAP SERPL CALCULATED.3IONS-SCNC: 14 MMOL/L (ref 9–17)
AST SERPL-CCNC: 26 U/L
BASOPHILS # BLD: 1 % (ref 0–2)
BASOPHILS ABSOLUTE: 0.06 K/UL (ref 0–0.2)
BILIRUB SERPL-MCNC: 0.29 MG/DL (ref 0.3–1.2)
BNP INTERPRETATION: NORMAL
BUN BLDV-MCNC: 29 MG/DL (ref 6–20)
BUN/CREAT BLD: ABNORMAL (ref 9–20)
CALCIUM SERPL-MCNC: 8.2 MG/DL (ref 8.6–10.4)
CHLORIDE BLD-SCNC: 104 MMOL/L (ref 98–107)
CO2: 21 MMOL/L (ref 20–31)
CREAT SERPL-MCNC: 2.17 MG/DL (ref 0.7–1.2)
DIFFERENTIAL TYPE: ABNORMAL
EOSINOPHILS RELATIVE PERCENT: 3 % (ref 1–4)
GFR AFRICAN AMERICAN: 38 ML/MIN
GFR NON-AFRICAN AMERICAN: 31 ML/MIN
GFR SERPL CREATININE-BSD FRML MDRD: ABNORMAL ML/MIN/{1.73_M2}
GFR SERPL CREATININE-BSD FRML MDRD: ABNORMAL ML/MIN/{1.73_M2}
GLUCOSE BLD-MCNC: 229 MG/DL (ref 70–99)
HCT VFR BLD CALC: 35.1 % (ref 40.7–50.3)
HEMOGLOBIN: 11.2 G/DL (ref 13–17)
IMMATURE GRANULOCYTES: 0 %
LYMPHOCYTES # BLD: 42 % (ref 24–43)
MCH RBC QN AUTO: 28.9 PG (ref 25.2–33.5)
MCHC RBC AUTO-ENTMCNC: 31.9 G/DL (ref 28.4–34.8)
MCV RBC AUTO: 90.7 FL (ref 82.6–102.9)
MONOCYTES # BLD: 10 % (ref 3–12)
NRBC AUTOMATED: 0 PER 100 WBC
PDW BLD-RTO: 13.6 % (ref 11.8–14.4)
PLATELET # BLD: 228 K/UL (ref 138–453)
PLATELET ESTIMATE: ABNORMAL
PMV BLD AUTO: 10.6 FL (ref 8.1–13.5)
POTASSIUM SERPL-SCNC: 5 MMOL/L (ref 3.7–5.3)
PRO-BNP: 213 PG/ML
RBC # BLD: 3.87 M/UL (ref 4.21–5.77)
RBC # BLD: ABNORMAL 10*6/UL
SEG NEUTROPHILS: 44 % (ref 36–65)
SEGMENTED NEUTROPHILS ABSOLUTE COUNT: 3.23 K/UL (ref 1.5–8.1)
SODIUM BLD-SCNC: 139 MMOL/L (ref 135–144)
TOTAL PROTEIN: 6.7 G/DL (ref 6.4–8.3)
TROPONIN INTERP: NORMAL
TROPONIN INTERP: NORMAL
TROPONIN T: NORMAL NG/ML
TROPONIN T: NORMAL NG/ML
TROPONIN, HIGH SENSITIVITY: 18 NG/L (ref 0–22)
TROPONIN, HIGH SENSITIVITY: 18 NG/L (ref 0–22)
WBC # BLD: 7.4 K/UL (ref 3.5–11.3)
WBC # BLD: ABNORMAL 10*3/UL

## 2019-12-19 PROCEDURE — 93005 ELECTROCARDIOGRAM TRACING: CPT | Performed by: STUDENT IN AN ORGANIZED HEALTH CARE EDUCATION/TRAINING PROGRAM

## 2019-12-19 PROCEDURE — 83880 ASSAY OF NATRIURETIC PEPTIDE: CPT

## 2019-12-19 PROCEDURE — 99285 EMERGENCY DEPT VISIT HI MDM: CPT

## 2019-12-19 PROCEDURE — 80053 COMPREHEN METABOLIC PANEL: CPT

## 2019-12-19 PROCEDURE — 2580000003 HC RX 258: Performed by: STUDENT IN AN ORGANIZED HEALTH CARE EDUCATION/TRAINING PROGRAM

## 2019-12-19 PROCEDURE — 85025 COMPLETE CBC W/AUTO DIFF WBC: CPT

## 2019-12-19 PROCEDURE — G0378 HOSPITAL OBSERVATION PER HR: HCPCS

## 2019-12-19 PROCEDURE — 6370000000 HC RX 637 (ALT 250 FOR IP): Performed by: STUDENT IN AN ORGANIZED HEALTH CARE EDUCATION/TRAINING PROGRAM

## 2019-12-19 PROCEDURE — 71045 X-RAY EXAM CHEST 1 VIEW: CPT

## 2019-12-19 PROCEDURE — 84484 ASSAY OF TROPONIN QUANT: CPT

## 2019-12-19 RX ORDER — SODIUM CHLORIDE 0.9 % (FLUSH) 0.9 %
10 SYRINGE (ML) INJECTION EVERY 12 HOURS SCHEDULED
Status: DISCONTINUED | OUTPATIENT
Start: 2019-12-20 | End: 2019-12-21 | Stop reason: HOSPADM

## 2019-12-19 RX ORDER — SENNA AND DOCUSATE SODIUM 50; 8.6 MG/1; MG/1
1 TABLET, FILM COATED ORAL DAILY
Status: DISCONTINUED | OUTPATIENT
Start: 2019-12-20 | End: 2019-12-21 | Stop reason: HOSPADM

## 2019-12-19 RX ORDER — AMLODIPINE BESYLATE 10 MG/1
10 TABLET ORAL DAILY
Status: DISCONTINUED | OUTPATIENT
Start: 2019-12-20 | End: 2019-12-21 | Stop reason: HOSPADM

## 2019-12-19 RX ORDER — POTASSIUM CITRATE 10 MEQ/1
20 TABLET, EXTENDED RELEASE ORAL
Status: DISCONTINUED | OUTPATIENT
Start: 2019-12-20 | End: 2019-12-20

## 2019-12-19 RX ORDER — GABAPENTIN 100 MG/1
100 CAPSULE ORAL 3 TIMES DAILY
Status: DISCONTINUED | OUTPATIENT
Start: 2019-12-20 | End: 2019-12-21 | Stop reason: HOSPADM

## 2019-12-19 RX ORDER — HYDROCHLOROTHIAZIDE 25 MG/1
25 TABLET ORAL DAILY
Status: DISCONTINUED | OUTPATIENT
Start: 2019-12-20 | End: 2019-12-20

## 2019-12-19 RX ORDER — 0.9 % SODIUM CHLORIDE 0.9 %
1000 INTRAVENOUS SOLUTION INTRAVENOUS ONCE
Status: COMPLETED | OUTPATIENT
Start: 2019-12-19 | End: 2019-12-19

## 2019-12-19 RX ORDER — TIZANIDINE 4 MG/1
4 TABLET ORAL EVERY 6 HOURS PRN
Status: DISCONTINUED | OUTPATIENT
Start: 2019-12-19 | End: 2019-12-21 | Stop reason: HOSPADM

## 2019-12-19 RX ORDER — OXYBUTYNIN CHLORIDE 5 MG/1
5 TABLET ORAL 3 TIMES DAILY PRN
Status: DISCONTINUED | OUTPATIENT
Start: 2019-12-19 | End: 2019-12-21 | Stop reason: HOSPADM

## 2019-12-19 RX ORDER — ATORVASTATIN CALCIUM 20 MG/1
20 TABLET, FILM COATED ORAL DAILY
Status: DISCONTINUED | OUTPATIENT
Start: 2019-12-20 | End: 2019-12-21 | Stop reason: HOSPADM

## 2019-12-19 RX ORDER — ASPIRIN 81 MG/1
81 TABLET, CHEWABLE ORAL DAILY
Status: DISCONTINUED | OUTPATIENT
Start: 2019-12-20 | End: 2019-12-21 | Stop reason: HOSPADM

## 2019-12-19 RX ORDER — TAMSULOSIN HYDROCHLORIDE 0.4 MG/1
0.4 CAPSULE ORAL DAILY
Status: DISCONTINUED | OUTPATIENT
Start: 2019-12-20 | End: 2019-12-21 | Stop reason: HOSPADM

## 2019-12-19 RX ORDER — PANTOPRAZOLE SODIUM 40 MG/1
40 TABLET, DELAYED RELEASE ORAL
Status: DISCONTINUED | OUTPATIENT
Start: 2019-12-20 | End: 2019-12-21 | Stop reason: HOSPADM

## 2019-12-19 RX ORDER — DOCUSATE SODIUM 100 MG/1
100 CAPSULE, LIQUID FILLED ORAL 2 TIMES DAILY PRN
Status: DISCONTINUED | OUTPATIENT
Start: 2019-12-19 | End: 2019-12-21 | Stop reason: HOSPADM

## 2019-12-19 RX ORDER — ACETAMINOPHEN 500 MG
1000 TABLET ORAL ONCE
Status: COMPLETED | OUTPATIENT
Start: 2019-12-19 | End: 2019-12-19

## 2019-12-19 RX ORDER — OXYBUTYNIN CHLORIDE 10 MG/1
10 TABLET, EXTENDED RELEASE ORAL DAILY
Status: DISCONTINUED | OUTPATIENT
Start: 2019-12-20 | End: 2019-12-21 | Stop reason: HOSPADM

## 2019-12-19 RX ORDER — SODIUM CHLORIDE 0.9 % (FLUSH) 0.9 %
10 SYRINGE (ML) INJECTION PRN
Status: DISCONTINUED | OUTPATIENT
Start: 2019-12-19 | End: 2019-12-21 | Stop reason: HOSPADM

## 2019-12-19 RX ORDER — ACETAMINOPHEN 325 MG/1
650 TABLET ORAL EVERY 4 HOURS PRN
Status: DISCONTINUED | OUTPATIENT
Start: 2019-12-19 | End: 2019-12-21 | Stop reason: HOSPADM

## 2019-12-19 RX ADMIN — ACETAMINOPHEN 1000 MG: 500 TABLET ORAL at 17:59

## 2019-12-19 RX ADMIN — SODIUM CHLORIDE 1000 ML: 9 INJECTION, SOLUTION INTRAVENOUS at 17:53

## 2019-12-19 ASSESSMENT — PAIN - FUNCTIONAL ASSESSMENT: PAIN_FUNCTIONAL_ASSESSMENT: PREVENTS OR INTERFERES SOME ACTIVE ACTIVITIES AND ADLS

## 2019-12-19 ASSESSMENT — ENCOUNTER SYMPTOMS
VOMITING: 0
ABDOMINAL PAIN: 0
SHORTNESS OF BREATH: 0
COUGH: 1
EYE PAIN: 0
NAUSEA: 0
BACK PAIN: 0
SORE THROAT: 0

## 2019-12-19 ASSESSMENT — PAIN DESCRIPTION - DESCRIPTORS: DESCRIPTORS: SHARP

## 2019-12-19 ASSESSMENT — PAIN SCALES - GENERAL
PAINLEVEL_OUTOF10: 5
PAINLEVEL_OUTOF10: 5

## 2019-12-19 ASSESSMENT — PAIN DESCRIPTION - ONSET: ONSET: ON-GOING

## 2019-12-19 ASSESSMENT — PAIN DESCRIPTION - LOCATION: LOCATION: CHEST

## 2019-12-19 ASSESSMENT — PAIN DESCRIPTION - FREQUENCY: FREQUENCY: INTERMITTENT

## 2019-12-19 ASSESSMENT — PAIN DESCRIPTION - PROGRESSION: CLINICAL_PROGRESSION: GRADUALLY WORSENING

## 2019-12-20 LAB
ANION GAP SERPL CALCULATED.3IONS-SCNC: 12 MMOL/L (ref 9–17)
BUN BLDV-MCNC: 22 MG/DL (ref 6–20)
BUN/CREAT BLD: ABNORMAL (ref 9–20)
CALCIUM IONIZED: 1.18 MMOL/L (ref 1.13–1.33)
CALCIUM SERPL-MCNC: 8.4 MG/DL (ref 8.6–10.4)
CHLORIDE BLD-SCNC: 107 MMOL/L (ref 98–107)
CO2: 21 MMOL/L (ref 20–31)
CREAT SERPL-MCNC: 1.59 MG/DL (ref 0.7–1.2)
GFR AFRICAN AMERICAN: 54 ML/MIN
GFR NON-AFRICAN AMERICAN: 45 ML/MIN
GFR SERPL CREATININE-BSD FRML MDRD: ABNORMAL ML/MIN/{1.73_M2}
GFR SERPL CREATININE-BSD FRML MDRD: ABNORMAL ML/MIN/{1.73_M2}
GLUCOSE BLD-MCNC: 184 MG/DL (ref 75–110)
GLUCOSE BLD-MCNC: 186 MG/DL (ref 75–110)
GLUCOSE BLD-MCNC: 258 MG/DL (ref 75–110)
GLUCOSE BLD-MCNC: 303 MG/DL (ref 75–110)
GLUCOSE BLD-MCNC: 319 MG/DL (ref 70–99)
HCT VFR BLD CALC: 36.1 % (ref 40.7–50.3)
HEMOGLOBIN: 11.2 G/DL (ref 13–17)
LV EF: 54 %
LVEF MODALITY: NORMAL
MCH RBC QN AUTO: 28.4 PG (ref 25.2–33.5)
MCHC RBC AUTO-ENTMCNC: 31 G/DL (ref 28.4–34.8)
MCV RBC AUTO: 91.6 FL (ref 82.6–102.9)
NRBC AUTOMATED: 0 PER 100 WBC
PDW BLD-RTO: 13.5 % (ref 11.8–14.4)
PLATELET # BLD: 205 K/UL (ref 138–453)
PMV BLD AUTO: 10.3 FL (ref 8.1–13.5)
POTASSIUM SERPL-SCNC: 4.5 MMOL/L (ref 3.7–5.3)
RBC # BLD: 3.94 M/UL (ref 4.21–5.77)
SODIUM BLD-SCNC: 140 MMOL/L (ref 135–144)
TROPONIN INTERP: NORMAL
TROPONIN T: NORMAL NG/ML
TROPONIN, HIGH SENSITIVITY: 15 NG/L (ref 0–22)
WBC # BLD: 4.8 K/UL (ref 3.5–11.3)

## 2019-12-20 PROCEDURE — G0378 HOSPITAL OBSERVATION PER HR: HCPCS

## 2019-12-20 PROCEDURE — G0008 ADMIN INFLUENZA VIRUS VAC: HCPCS | Performed by: EMERGENCY MEDICINE

## 2019-12-20 PROCEDURE — 6370000000 HC RX 637 (ALT 250 FOR IP): Performed by: STUDENT IN AN ORGANIZED HEALTH CARE EDUCATION/TRAINING PROGRAM

## 2019-12-20 PROCEDURE — 83036 HEMOGLOBIN GLYCOSYLATED A1C: CPT

## 2019-12-20 PROCEDURE — 84484 ASSAY OF TROPONIN QUANT: CPT

## 2019-12-20 PROCEDURE — 93306 TTE W/DOPPLER COMPLETE: CPT

## 2019-12-20 PROCEDURE — 90686 IIV4 VACC NO PRSV 0.5 ML IM: CPT | Performed by: EMERGENCY MEDICINE

## 2019-12-20 PROCEDURE — 2580000003 HC RX 258: Performed by: STUDENT IN AN ORGANIZED HEALTH CARE EDUCATION/TRAINING PROGRAM

## 2019-12-20 PROCEDURE — 85027 COMPLETE CBC AUTOMATED: CPT

## 2019-12-20 PROCEDURE — 6360000002 HC RX W HCPCS: Performed by: EMERGENCY MEDICINE

## 2019-12-20 PROCEDURE — 82947 ASSAY GLUCOSE BLOOD QUANT: CPT

## 2019-12-20 PROCEDURE — 80048 BASIC METABOLIC PNL TOTAL CA: CPT

## 2019-12-20 PROCEDURE — 82330 ASSAY OF CALCIUM: CPT

## 2019-12-20 PROCEDURE — 36415 COLL VENOUS BLD VENIPUNCTURE: CPT

## 2019-12-20 PROCEDURE — 93005 ELECTROCARDIOGRAM TRACING: CPT | Performed by: STUDENT IN AN ORGANIZED HEALTH CARE EDUCATION/TRAINING PROGRAM

## 2019-12-20 PROCEDURE — 6370000000 HC RX 637 (ALT 250 FOR IP): Performed by: INTERNAL MEDICINE

## 2019-12-20 RX ORDER — CARVEDILOL 6.25 MG/1
6.25 TABLET ORAL 2 TIMES DAILY WITH MEALS
Status: DISCONTINUED | OUTPATIENT
Start: 2019-12-20 | End: 2019-12-21 | Stop reason: HOSPADM

## 2019-12-20 RX ORDER — NICOTINE POLACRILEX 4 MG
15 LOZENGE BUCCAL PRN
Status: DISCONTINUED | OUTPATIENT
Start: 2019-12-20 | End: 2019-12-21 | Stop reason: HOSPADM

## 2019-12-20 RX ORDER — ISOSORBIDE MONONITRATE 30 MG/1
30 TABLET, EXTENDED RELEASE ORAL DAILY
Status: DISCONTINUED | OUTPATIENT
Start: 2019-12-20 | End: 2019-12-21 | Stop reason: HOSPADM

## 2019-12-20 RX ORDER — DEXTROSE MONOHYDRATE 50 MG/ML
100 INJECTION, SOLUTION INTRAVENOUS PRN
Status: DISCONTINUED | OUTPATIENT
Start: 2019-12-20 | End: 2019-12-21 | Stop reason: HOSPADM

## 2019-12-20 RX ORDER — DEXTROSE MONOHYDRATE 25 G/50ML
12.5 INJECTION, SOLUTION INTRAVENOUS PRN
Status: DISCONTINUED | OUTPATIENT
Start: 2019-12-20 | End: 2019-12-21 | Stop reason: HOSPADM

## 2019-12-20 RX ADMIN — ISOSORBIDE MONONITRATE 30 MG: 30 TABLET ORAL at 14:58

## 2019-12-20 RX ADMIN — INSULIN LISPRO 3 UNITS: 100 INJECTION, SOLUTION INTRAVENOUS; SUBCUTANEOUS at 13:20

## 2019-12-20 RX ADMIN — ACETAMINOPHEN 650 MG: 325 TABLET ORAL at 17:19

## 2019-12-20 RX ADMIN — PANTOPRAZOLE SODIUM 40 MG: 40 TABLET, DELAYED RELEASE ORAL at 09:17

## 2019-12-20 RX ADMIN — SENNOSIDES AND DOCUSATE SODIUM 1 TABLET: 8.6; 5 TABLET ORAL at 09:16

## 2019-12-20 RX ADMIN — TIZANIDINE 4 MG: 4 TABLET ORAL at 20:09

## 2019-12-20 RX ADMIN — Medication 10 ML: at 09:17

## 2019-12-20 RX ADMIN — ASPIRIN 81 MG: 81 TABLET, CHEWABLE ORAL at 09:17

## 2019-12-20 RX ADMIN — GABAPENTIN 100 MG: 100 CAPSULE ORAL at 09:16

## 2019-12-20 RX ADMIN — GABAPENTIN 100 MG: 100 CAPSULE ORAL at 14:58

## 2019-12-20 RX ADMIN — TAMSULOSIN HYDROCHLORIDE 0.4 MG: 0.4 CAPSULE ORAL at 09:17

## 2019-12-20 RX ADMIN — CARVEDILOL 6.25 MG: 6.25 TABLET, FILM COATED ORAL at 17:19

## 2019-12-20 RX ADMIN — INSULIN LISPRO 3 UNITS: 100 INJECTION, SOLUTION INTRAVENOUS; SUBCUTANEOUS at 20:11

## 2019-12-20 RX ADMIN — OXYBUTYNIN CHLORIDE 5 MG: 5 TABLET ORAL at 09:16

## 2019-12-20 RX ADMIN — ATORVASTATIN CALCIUM 20 MG: 20 TABLET, FILM COATED ORAL at 09:17

## 2019-12-20 RX ADMIN — Medication 10 ML: at 20:07

## 2019-12-20 RX ADMIN — GABAPENTIN 100 MG: 100 CAPSULE ORAL at 00:23

## 2019-12-20 RX ADMIN — INFLUENZA A VIRUS A/BRISBANE/02/2018 IVR-190 (H1N1) ANTIGEN (PROPIOLACTONE INACTIVATED), INFLUENZA A VIRUS A/KANSAS/14/2017 X-327 (H3N2) ANTIGEN (PROPIOLACTONE INACTIVATED), INFLUENZA B VIRUS B/MARYLAND/15/2016 ANTIGEN (PROPIOLACTONE INACTIVATED), INFLUENZA B VIRUS B/PHUKET/3073/2013 BVR-1B ANTIGEN (PROPIOLACTONE INACTIVATED) 0.5 ML: 15; 15; 15; 15 INJECTION, SUSPENSION INTRAMUSCULAR at 09:17

## 2019-12-20 RX ADMIN — AMLODIPINE BESYLATE 10 MG: 10 TABLET ORAL at 09:17

## 2019-12-20 RX ADMIN — GABAPENTIN 100 MG: 100 CAPSULE ORAL at 20:09

## 2019-12-20 ASSESSMENT — PAIN SCALES - GENERAL
PAINLEVEL_OUTOF10: 0
PAINLEVEL_OUTOF10: 3
PAINLEVEL_OUTOF10: 0

## 2019-12-21 VITALS
WEIGHT: 220 LBS | OXYGEN SATURATION: 95 % | HEART RATE: 80 BPM | RESPIRATION RATE: 18 BRPM | DIASTOLIC BLOOD PRESSURE: 77 MMHG | HEIGHT: 70 IN | TEMPERATURE: 97.7 F | BODY MASS INDEX: 31.5 KG/M2 | SYSTOLIC BLOOD PRESSURE: 139 MMHG

## 2019-12-21 LAB
EKG ATRIAL RATE: 65 BPM
EKG ATRIAL RATE: 92 BPM
EKG P AXIS: 49 DEGREES
EKG P AXIS: 52 DEGREES
EKG P-R INTERVAL: 136 MS
EKG P-R INTERVAL: 142 MS
EKG Q-T INTERVAL: 386 MS
EKG Q-T INTERVAL: 392 MS
EKG QRS DURATION: 84 MS
EKG QRS DURATION: 90 MS
EKG QTC CALCULATION (BAZETT): 407 MS
EKG QTC CALCULATION (BAZETT): 477 MS
EKG R AXIS: 22 DEGREES
EKG R AXIS: 28 DEGREES
EKG T AXIS: 51 DEGREES
EKG T AXIS: 61 DEGREES
EKG VENTRICULAR RATE: 65 BPM
EKG VENTRICULAR RATE: 92 BPM
GLUCOSE BLD-MCNC: 136 MG/DL (ref 75–110)
GLUCOSE BLD-MCNC: 238 MG/DL (ref 75–110)

## 2019-12-21 PROCEDURE — 82947 ASSAY GLUCOSE BLOOD QUANT: CPT

## 2019-12-21 PROCEDURE — 6370000000 HC RX 637 (ALT 250 FOR IP): Performed by: NURSE PRACTITIONER

## 2019-12-21 PROCEDURE — G0378 HOSPITAL OBSERVATION PER HR: HCPCS

## 2019-12-21 PROCEDURE — 6370000000 HC RX 637 (ALT 250 FOR IP): Performed by: INTERNAL MEDICINE

## 2019-12-21 PROCEDURE — 6370000000 HC RX 637 (ALT 250 FOR IP): Performed by: STUDENT IN AN ORGANIZED HEALTH CARE EDUCATION/TRAINING PROGRAM

## 2019-12-21 PROCEDURE — 93010 ELECTROCARDIOGRAM REPORT: CPT | Performed by: INTERNAL MEDICINE

## 2019-12-21 RX ORDER — RANOLAZINE 500 MG/1
500 TABLET, EXTENDED RELEASE ORAL 2 TIMES DAILY
Qty: 60 TABLET | Refills: 3 | Status: ON HOLD | OUTPATIENT
Start: 2019-12-21 | End: 2020-01-14 | Stop reason: SDUPTHER

## 2019-12-21 RX ORDER — OXYBUTYNIN CHLORIDE 10 MG/1
10 TABLET, EXTENDED RELEASE ORAL DAILY
Qty: 30 TABLET | Refills: 0 | Status: ON HOLD | OUTPATIENT
Start: 2019-12-21 | End: 2020-01-14 | Stop reason: SDUPTHER

## 2019-12-21 RX ORDER — RANOLAZINE 500 MG/1
500 TABLET, EXTENDED RELEASE ORAL 2 TIMES DAILY
Status: DISCONTINUED | OUTPATIENT
Start: 2019-12-21 | End: 2019-12-21 | Stop reason: HOSPADM

## 2019-12-21 RX ORDER — AMLODIPINE BESYLATE 10 MG/1
10 TABLET ORAL DAILY
Qty: 30 TABLET | Refills: 3 | Status: ON HOLD | OUTPATIENT
Start: 2019-12-22 | End: 2020-01-14 | Stop reason: SDUPTHER

## 2019-12-21 RX ORDER — CARVEDILOL 6.25 MG/1
6.25 TABLET ORAL 2 TIMES DAILY WITH MEALS
Qty: 60 TABLET | Refills: 3 | Status: ON HOLD | OUTPATIENT
Start: 2019-12-21 | End: 2020-01-14 | Stop reason: SDUPTHER

## 2019-12-21 RX ORDER — ISOSORBIDE MONONITRATE 30 MG/1
30 TABLET, EXTENDED RELEASE ORAL DAILY
Qty: 30 TABLET | Refills: 3 | Status: ON HOLD | OUTPATIENT
Start: 2019-12-22 | End: 2020-01-14 | Stop reason: SDUPTHER

## 2019-12-21 RX ORDER — IBUPROFEN 800 MG/1
800 TABLET ORAL ONCE
Status: DISCONTINUED | OUTPATIENT
Start: 2019-12-21 | End: 2019-12-21 | Stop reason: HOSPADM

## 2019-12-21 RX ORDER — ATORVASTATIN CALCIUM 20 MG/1
20 TABLET, FILM COATED ORAL DAILY
Qty: 30 TABLET | Refills: 3 | Status: ON HOLD | OUTPATIENT
Start: 2019-12-22 | End: 2020-01-14 | Stop reason: SDUPTHER

## 2019-12-21 RX ADMIN — INSULIN LISPRO 2 UNITS: 100 INJECTION, SOLUTION INTRAVENOUS; SUBCUTANEOUS at 08:42

## 2019-12-21 RX ADMIN — PANTOPRAZOLE SODIUM 40 MG: 40 TABLET, DELAYED RELEASE ORAL at 05:22

## 2019-12-21 RX ADMIN — ASPIRIN 81 MG: 81 TABLET, CHEWABLE ORAL at 08:42

## 2019-12-21 RX ADMIN — ATORVASTATIN CALCIUM 20 MG: 20 TABLET, FILM COATED ORAL at 08:42

## 2019-12-21 RX ADMIN — ISOSORBIDE MONONITRATE 30 MG: 30 TABLET ORAL at 08:41

## 2019-12-21 RX ADMIN — RANOLAZINE 500 MG: 500 TABLET, FILM COATED, EXTENDED RELEASE ORAL at 10:57

## 2019-12-21 RX ADMIN — CARVEDILOL 6.25 MG: 6.25 TABLET, FILM COATED ORAL at 10:57

## 2019-12-21 RX ADMIN — SENNOSIDES AND DOCUSATE SODIUM 1 TABLET: 8.6; 5 TABLET ORAL at 08:41

## 2019-12-21 RX ADMIN — AMLODIPINE BESYLATE 10 MG: 10 TABLET ORAL at 08:41

## 2019-12-21 RX ADMIN — ACETAMINOPHEN 650 MG: 325 TABLET ORAL at 05:16

## 2019-12-21 RX ADMIN — OXYBUTYNIN CHLORIDE 10 MG: 10 TABLET, FILM COATED, EXTENDED RELEASE ORAL at 08:45

## 2019-12-21 RX ADMIN — TAMSULOSIN HYDROCHLORIDE 0.4 MG: 0.4 CAPSULE ORAL at 08:41

## 2019-12-21 RX ADMIN — GABAPENTIN 100 MG: 100 CAPSULE ORAL at 08:42

## 2019-12-21 ASSESSMENT — PAIN SCALES - GENERAL
PAINLEVEL_OUTOF10: 10
PAINLEVEL_OUTOF10: 0

## 2019-12-22 LAB
ESTIMATED AVERAGE GLUCOSE: 209 MG/DL
HBA1C MFR BLD: 8.9 % (ref 4–6)

## 2019-12-23 ENCOUNTER — TELEPHONE (OUTPATIENT)
Dept: FAMILY MEDICINE CLINIC | Age: 59
End: 2019-12-23

## 2019-12-24 ENCOUNTER — TELEPHONE (OUTPATIENT)
Dept: FAMILY MEDICINE CLINIC | Age: 59
End: 2019-12-24

## 2020-01-14 ENCOUNTER — APPOINTMENT (OUTPATIENT)
Dept: CT IMAGING | Age: 60
End: 2020-01-14
Payer: MEDICAID

## 2020-01-14 ENCOUNTER — HOSPITAL ENCOUNTER (OUTPATIENT)
Age: 60
Setting detail: OBSERVATION
Discharge: HOME | End: 2020-01-14
Attending: EMERGENCY MEDICINE | Admitting: FAMILY MEDICINE
Payer: MEDICAID

## 2020-01-14 VITALS
WEIGHT: 215 LBS | RESPIRATION RATE: 18 BRPM | SYSTOLIC BLOOD PRESSURE: 148 MMHG | TEMPERATURE: 98.6 F | DIASTOLIC BLOOD PRESSURE: 93 MMHG | OXYGEN SATURATION: 98 % | BODY MASS INDEX: 30.78 KG/M2 | HEIGHT: 70 IN | HEART RATE: 86 BPM

## 2020-01-14 PROBLEM — R00.0 TACHYCARDIA: Status: ACTIVE | Noted: 2020-01-14

## 2020-01-14 LAB
ABSOLUTE EOS #: 0.18 K/UL (ref 0–0.44)
ABSOLUTE IMMATURE GRANULOCYTE: <0.03 K/UL (ref 0–0.3)
ABSOLUTE LYMPH #: 2.75 K/UL (ref 1.1–3.7)
ABSOLUTE MONO #: 0.57 K/UL (ref 0.1–1.2)
ACETAMINOPHEN LEVEL: <5 UG/ML (ref 10–30)
AMPHETAMINE SCREEN URINE: NEGATIVE
ANION GAP SERPL CALCULATED.3IONS-SCNC: 16 MMOL/L (ref 9–17)
BARBITURATE SCREEN URINE: NEGATIVE
BASOPHILS # BLD: 1 % (ref 0–2)
BASOPHILS ABSOLUTE: 0.06 K/UL (ref 0–0.2)
BENZODIAZEPINE SCREEN, URINE: NEGATIVE
BNP INTERPRETATION: ABNORMAL
BUN BLDV-MCNC: 28 MG/DL (ref 6–20)
BUN/CREAT BLD: ABNORMAL (ref 9–20)
BUPRENORPHINE URINE: ABNORMAL
CALCIUM IONIZED: 1.12 MMOL/L (ref 1.13–1.33)
CALCIUM SERPL-MCNC: 9.3 MG/DL (ref 8.6–10.4)
CANNABINOID SCREEN URINE: NEGATIVE
CHLORIDE BLD-SCNC: 102 MMOL/L (ref 98–107)
CHP ED QC CHECK: NORMAL
CO2: 18 MMOL/L (ref 20–31)
COCAINE METABOLITE, URINE: POSITIVE
CREAT SERPL-MCNC: 1.97 MG/DL (ref 0.7–1.2)
DIFFERENTIAL TYPE: ABNORMAL
EKG ATRIAL RATE: 138 BPM
EKG P AXIS: -25 DEGREES
EKG P-R INTERVAL: 152 MS
EKG Q-T INTERVAL: 312 MS
EKG QRS DURATION: 84 MS
EKG QTC CALCULATION (BAZETT): 472 MS
EKG R AXIS: 20 DEGREES
EKG T AXIS: 64 DEGREES
EKG VENTRICULAR RATE: 138 BPM
EOSINOPHILS RELATIVE PERCENT: 3 % (ref 1–4)
ETHANOL PERCENT: <0.01 %
ETHANOL: <10 MG/DL
GFR AFRICAN AMERICAN: 42 ML/MIN
GFR NON-AFRICAN AMERICAN: 35 ML/MIN
GFR SERPL CREATININE-BSD FRML MDRD: ABNORMAL ML/MIN/{1.73_M2}
GFR SERPL CREATININE-BSD FRML MDRD: ABNORMAL ML/MIN/{1.73_M2}
GLUCOSE BLD-MCNC: 156 MG/DL
GLUCOSE BLD-MCNC: 156 MG/DL (ref 75–110)
GLUCOSE BLD-MCNC: 175 MG/DL (ref 70–99)
HCT VFR BLD CALC: 37.2 % (ref 40.7–50.3)
HEMOGLOBIN: 12.3 G/DL (ref 13–17)
IMMATURE GRANULOCYTES: 0 %
LYMPHOCYTES # BLD: 38 % (ref 24–43)
MAGNESIUM: 1.9 MG/DL (ref 1.6–2.6)
MCH RBC QN AUTO: 29.2 PG (ref 25.2–33.5)
MCHC RBC AUTO-ENTMCNC: 33.1 G/DL (ref 28.4–34.8)
MCV RBC AUTO: 88.4 FL (ref 82.6–102.9)
MDMA URINE: ABNORMAL
METHADONE SCREEN, URINE: NEGATIVE
METHAMPHETAMINE, URINE: ABNORMAL
MONOCYTES # BLD: 8 % (ref 3–12)
NRBC AUTOMATED: 0 PER 100 WBC
OPIATES, URINE: NEGATIVE
OXYCODONE SCREEN URINE: NEGATIVE
PDW BLD-RTO: 13.6 % (ref 11.8–14.4)
PHENCYCLIDINE, URINE: NEGATIVE
PLATELET # BLD: 334 K/UL (ref 138–453)
PLATELET ESTIMATE: ABNORMAL
PMV BLD AUTO: 10.6 FL (ref 8.1–13.5)
POTASSIUM SERPL-SCNC: 4.3 MMOL/L (ref 3.7–5.3)
PRO-BNP: 417 PG/ML
PROPOXYPHENE, URINE: ABNORMAL
RBC # BLD: 4.21 M/UL (ref 4.21–5.77)
RBC # BLD: ABNORMAL 10*6/UL
SALICYLATE LEVEL: <1 MG/DL (ref 3–10)
SEG NEUTROPHILS: 50 % (ref 36–65)
SEGMENTED NEUTROPHILS ABSOLUTE COUNT: 3.68 K/UL (ref 1.5–8.1)
SODIUM BLD-SCNC: 136 MMOL/L (ref 135–144)
TEST INFORMATION: ABNORMAL
TOXIC TRICYCLIC SC,BLOOD: NEGATIVE
TRICYCLIC ANTIDEPRESSANTS, UR: ABNORMAL
TROPONIN INTERP: NORMAL
TROPONIN INTERP: NORMAL
TROPONIN T: NORMAL NG/ML
TROPONIN T: NORMAL NG/ML
TROPONIN, HIGH SENSITIVITY: 19 NG/L (ref 0–22)
TROPONIN, HIGH SENSITIVITY: 20 NG/L (ref 0–22)
TSH SERPL DL<=0.05 MIU/L-ACNC: 1.08 MIU/L (ref 0.3–5)
WBC # BLD: 7.3 K/UL (ref 3.5–11.3)
WBC # BLD: ABNORMAL 10*3/UL

## 2020-01-14 PROCEDURE — 80307 DRUG TEST PRSMV CHEM ANLYZR: CPT

## 2020-01-14 PROCEDURE — 83735 ASSAY OF MAGNESIUM: CPT

## 2020-01-14 PROCEDURE — 6370000000 HC RX 637 (ALT 250 FOR IP): Performed by: INTERNAL MEDICINE

## 2020-01-14 PROCEDURE — G0378 HOSPITAL OBSERVATION PER HR: HCPCS

## 2020-01-14 PROCEDURE — 99285 EMERGENCY DEPT VISIT HI MDM: CPT

## 2020-01-14 PROCEDURE — 6370000000 HC RX 637 (ALT 250 FOR IP): Performed by: STUDENT IN AN ORGANIZED HEALTH CARE EDUCATION/TRAINING PROGRAM

## 2020-01-14 PROCEDURE — 93005 ELECTROCARDIOGRAM TRACING: CPT | Performed by: STUDENT IN AN ORGANIZED HEALTH CARE EDUCATION/TRAINING PROGRAM

## 2020-01-14 PROCEDURE — 84484 ASSAY OF TROPONIN QUANT: CPT

## 2020-01-14 PROCEDURE — 83880 ASSAY OF NATRIURETIC PEPTIDE: CPT

## 2020-01-14 PROCEDURE — 93010 ELECTROCARDIOGRAM REPORT: CPT | Performed by: INTERNAL MEDICINE

## 2020-01-14 PROCEDURE — 82330 ASSAY OF CALCIUM: CPT

## 2020-01-14 PROCEDURE — 96374 THER/PROPH/DIAG INJ IV PUSH: CPT

## 2020-01-14 PROCEDURE — 2580000003 HC RX 258: Performed by: STUDENT IN AN ORGANIZED HEALTH CARE EDUCATION/TRAINING PROGRAM

## 2020-01-14 PROCEDURE — 85025 COMPLETE CBC W/AUTO DIFF WBC: CPT

## 2020-01-14 PROCEDURE — G0480 DRUG TEST DEF 1-7 CLASSES: HCPCS

## 2020-01-14 PROCEDURE — 82947 ASSAY GLUCOSE BLOOD QUANT: CPT

## 2020-01-14 PROCEDURE — 6360000004 HC RX CONTRAST MEDICATION: Performed by: STUDENT IN AN ORGANIZED HEALTH CARE EDUCATION/TRAINING PROGRAM

## 2020-01-14 PROCEDURE — 2500000003 HC RX 250 WO HCPCS: Performed by: STUDENT IN AN ORGANIZED HEALTH CARE EDUCATION/TRAINING PROGRAM

## 2020-01-14 PROCEDURE — 84443 ASSAY THYROID STIM HORMONE: CPT

## 2020-01-14 PROCEDURE — 71260 CT THORAX DX C+: CPT

## 2020-01-14 PROCEDURE — 99220 PR INITIAL OBSERVATION CARE/DAY 70 MINUTES: CPT | Performed by: FAMILY MEDICINE

## 2020-01-14 PROCEDURE — 80048 BASIC METABOLIC PNL TOTAL CA: CPT

## 2020-01-14 RX ORDER — RANOLAZINE 500 MG/1
500 TABLET, EXTENDED RELEASE ORAL 2 TIMES DAILY
Status: DISCONTINUED | OUTPATIENT
Start: 2020-01-14 | End: 2020-01-14 | Stop reason: HOSPADM

## 2020-01-14 RX ORDER — ISOSORBIDE MONONITRATE 30 MG/1
30 TABLET, EXTENDED RELEASE ORAL DAILY
Status: DISCONTINUED | OUTPATIENT
Start: 2020-01-14 | End: 2020-01-14 | Stop reason: HOSPADM

## 2020-01-14 RX ORDER — 0.9 % SODIUM CHLORIDE 0.9 %
1000 INTRAVENOUS SOLUTION INTRAVENOUS ONCE
Status: COMPLETED | OUTPATIENT
Start: 2020-01-14 | End: 2020-01-14

## 2020-01-14 RX ORDER — TAMSULOSIN HYDROCHLORIDE 0.4 MG/1
0.4 CAPSULE ORAL DAILY
Status: DISCONTINUED | OUTPATIENT
Start: 2020-01-14 | End: 2020-01-14 | Stop reason: HOSPADM

## 2020-01-14 RX ORDER — ONDANSETRON 2 MG/ML
4 INJECTION INTRAMUSCULAR; INTRAVENOUS EVERY 6 HOURS PRN
Status: DISCONTINUED | OUTPATIENT
Start: 2020-01-14 | End: 2020-01-14 | Stop reason: HOSPADM

## 2020-01-14 RX ORDER — DOCUSATE SODIUM, SENNOSIDES 50; 8.6 MG/1; MG/1
1 TABLET ORAL DAILY
Qty: 30 TABLET | Refills: 0 | Status: SHIPPED | OUTPATIENT
Start: 2020-01-14 | End: 2022-03-24

## 2020-01-14 RX ORDER — RANOLAZINE 500 MG/1
500 TABLET, EXTENDED RELEASE ORAL 2 TIMES DAILY
Qty: 60 TABLET | Refills: 0 | Status: SHIPPED | OUTPATIENT
Start: 2020-01-14 | End: 2020-12-30 | Stop reason: SDUPTHER

## 2020-01-14 RX ORDER — TAMSULOSIN HYDROCHLORIDE 0.4 MG/1
0.4 CAPSULE ORAL DAILY
Qty: 7 CAPSULE | Refills: 0 | Status: CANCELLED | OUTPATIENT
Start: 2020-01-14 | End: 2020-02-13

## 2020-01-14 RX ORDER — OXYBUTYNIN CHLORIDE 10 MG/1
10 TABLET, EXTENDED RELEASE ORAL DAILY
Qty: 30 TABLET | Refills: 0 | Status: ON HOLD | OUTPATIENT
Start: 2020-01-14 | End: 2021-06-29 | Stop reason: HOSPADM

## 2020-01-14 RX ORDER — SODIUM CHLORIDE 0.9 % (FLUSH) 0.9 %
10 SYRINGE (ML) INJECTION EVERY 12 HOURS SCHEDULED
Status: DISCONTINUED | OUTPATIENT
Start: 2020-01-14 | End: 2020-01-14 | Stop reason: HOSPADM

## 2020-01-14 RX ORDER — ACETAMINOPHEN 325 MG/1
650 TABLET ORAL EVERY 6 HOURS PRN
Status: DISCONTINUED | OUTPATIENT
Start: 2020-01-14 | End: 2020-01-14 | Stop reason: HOSPADM

## 2020-01-14 RX ORDER — ASPIRIN 81 MG/1
81 TABLET, CHEWABLE ORAL DAILY
Qty: 30 TABLET | Refills: 0 | Status: SHIPPED | OUTPATIENT
Start: 2020-01-14 | End: 2022-03-24 | Stop reason: SDUPTHER

## 2020-01-14 RX ORDER — DOCUSATE SODIUM 100 MG/1
100 CAPSULE, LIQUID FILLED ORAL 2 TIMES DAILY PRN
Status: DISCONTINUED | OUTPATIENT
Start: 2020-01-14 | End: 2020-01-14 | Stop reason: HOSPADM

## 2020-01-14 RX ORDER — AMLODIPINE BESYLATE 10 MG/1
10 TABLET ORAL DAILY
Status: DISCONTINUED | OUTPATIENT
Start: 2020-01-14 | End: 2020-01-14 | Stop reason: HOSPADM

## 2020-01-14 RX ORDER — CARVEDILOL 6.25 MG/1
6.25 TABLET ORAL 2 TIMES DAILY WITH MEALS
Status: DISCONTINUED | OUTPATIENT
Start: 2020-01-14 | End: 2020-01-14 | Stop reason: HOSPADM

## 2020-01-14 RX ORDER — DOCUSATE SODIUM 100 MG/1
100 CAPSULE, LIQUID FILLED ORAL 2 TIMES DAILY PRN
Qty: 30 CAPSULE | Refills: 0 | Status: SHIPPED | OUTPATIENT
Start: 2020-01-14 | End: 2022-03-24

## 2020-01-14 RX ORDER — HYDROCHLOROTHIAZIDE 25 MG/1
25 TABLET ORAL DAILY
Qty: 30 TABLET | Refills: 0 | Status: ON HOLD | OUTPATIENT
Start: 2020-01-14 | End: 2021-06-29 | Stop reason: HOSPADM

## 2020-01-14 RX ORDER — GLUCOSAMINE HCL/CHONDROITIN SU 500-400 MG
CAPSULE ORAL
Qty: 100 STRIP | Refills: 3 | Status: SHIPPED | OUTPATIENT
Start: 2020-01-14 | End: 2021-08-10 | Stop reason: SDUPTHER

## 2020-01-14 RX ORDER — SODIUM CHLORIDE 0.9 % (FLUSH) 0.9 %
10 SYRINGE (ML) INJECTION PRN
Status: DISCONTINUED | OUTPATIENT
Start: 2020-01-14 | End: 2020-01-14 | Stop reason: HOSPADM

## 2020-01-14 RX ORDER — METOPROLOL TARTRATE 5 MG/5ML
5 INJECTION INTRAVENOUS ONCE
Status: COMPLETED | OUTPATIENT
Start: 2020-01-14 | End: 2020-01-14

## 2020-01-14 RX ORDER — AMLODIPINE BESYLATE 10 MG/1
10 TABLET ORAL ONCE
Status: COMPLETED | OUTPATIENT
Start: 2020-01-14 | End: 2020-01-14

## 2020-01-14 RX ORDER — AMLODIPINE BESYLATE 10 MG/1
10 TABLET ORAL DAILY
Qty: 30 TABLET | Refills: 0 | Status: SHIPPED | OUTPATIENT
Start: 2020-01-14 | End: 2021-05-25

## 2020-01-14 RX ORDER — LANCETS 30 GAUGE
1 EACH MISCELLANEOUS 2 TIMES DAILY
Qty: 100 EACH | Refills: 5 | Status: SHIPPED | OUTPATIENT
Start: 2020-01-14 | End: 2021-08-10 | Stop reason: SDUPTHER

## 2020-01-14 RX ORDER — CARVEDILOL 6.25 MG/1
6.25 TABLET ORAL 2 TIMES DAILY WITH MEALS
Qty: 60 TABLET | Refills: 0 | Status: ON HOLD | OUTPATIENT
Start: 2020-01-14 | End: 2020-08-28 | Stop reason: HOSPADM

## 2020-01-14 RX ORDER — ACETAMINOPHEN 325 MG/1
650 TABLET ORAL EVERY 6 HOURS PRN
Qty: 120 TABLET | Refills: 0 | Status: SHIPPED | OUTPATIENT
Start: 2020-01-14 | End: 2020-11-27 | Stop reason: SDUPTHER

## 2020-01-14 RX ORDER — PANTOPRAZOLE SODIUM 40 MG/1
40 TABLET, DELAYED RELEASE ORAL
Status: DISCONTINUED | OUTPATIENT
Start: 2020-01-14 | End: 2020-01-14 | Stop reason: HOSPADM

## 2020-01-14 RX ORDER — ATORVASTATIN CALCIUM 20 MG/1
20 TABLET, FILM COATED ORAL DAILY
Qty: 30 TABLET | Refills: 0 | Status: SHIPPED | OUTPATIENT
Start: 2020-01-14 | End: 2021-11-18 | Stop reason: SDUPTHER

## 2020-01-14 RX ORDER — METOPROLOL TARTRATE 5 MG/5ML
5 INJECTION INTRAVENOUS EVERY 4 HOURS PRN
Status: DISCONTINUED | OUTPATIENT
Start: 2020-01-14 | End: 2020-01-14

## 2020-01-14 RX ORDER — ASPIRIN 81 MG/1
81 TABLET, CHEWABLE ORAL DAILY
Status: DISCONTINUED | OUTPATIENT
Start: 2020-01-14 | End: 2020-01-14 | Stop reason: HOSPADM

## 2020-01-14 RX ORDER — ISOSORBIDE MONONITRATE 30 MG/1
30 TABLET, EXTENDED RELEASE ORAL DAILY
Qty: 30 TABLET | Refills: 0 | Status: SHIPPED | OUTPATIENT
Start: 2020-01-14 | End: 2020-12-30 | Stop reason: SDUPTHER

## 2020-01-14 RX ORDER — ATORVASTATIN CALCIUM 20 MG/1
20 TABLET, FILM COATED ORAL DAILY
Status: DISCONTINUED | OUTPATIENT
Start: 2020-01-14 | End: 2020-01-14 | Stop reason: HOSPADM

## 2020-01-14 RX ORDER — GLYBURIDE 5 MG/1
5 TABLET ORAL
Qty: 30 TABLET | Refills: 0 | Status: SHIPPED | OUTPATIENT
Start: 2020-01-14 | End: 2020-02-26 | Stop reason: SDUPTHER

## 2020-01-14 RX ORDER — OXYBUTYNIN CHLORIDE 10 MG/1
10 TABLET, EXTENDED RELEASE ORAL DAILY
Status: DISCONTINUED | OUTPATIENT
Start: 2020-01-14 | End: 2020-01-14 | Stop reason: HOSPADM

## 2020-01-14 RX ORDER — HYDROCHLOROTHIAZIDE 25 MG/1
25 TABLET ORAL DAILY
Status: DISCONTINUED | OUTPATIENT
Start: 2020-01-14 | End: 2020-01-14 | Stop reason: HOSPADM

## 2020-01-14 RX ORDER — OMEPRAZOLE 20 MG/1
20 CAPSULE, DELAYED RELEASE ORAL
Qty: 60 CAPSULE | Refills: 0 | Status: ON HOLD | OUTPATIENT
Start: 2020-01-14 | End: 2020-12-07 | Stop reason: HOSPADM

## 2020-01-14 RX ADMIN — TAMSULOSIN HYDROCHLORIDE 0.4 MG: 0.4 CAPSULE ORAL at 11:27

## 2020-01-14 RX ADMIN — METOPROLOL TARTRATE 5 MG: 5 INJECTION, SOLUTION INTRAVENOUS at 05:37

## 2020-01-14 RX ADMIN — SODIUM CHLORIDE 1000 ML: 9 INJECTION, SOLUTION INTRAVENOUS at 04:25

## 2020-01-14 RX ADMIN — OXYBUTYNIN CHLORIDE 10 MG: 10 TABLET, EXTENDED RELEASE ORAL at 11:27

## 2020-01-14 RX ADMIN — IOHEXOL 75 ML: 350 INJECTION, SOLUTION INTRAVENOUS at 05:15

## 2020-01-14 RX ADMIN — AMLODIPINE BESYLATE 10 MG: 10 TABLET ORAL at 04:55

## 2020-01-14 RX ADMIN — RANOLAZINE 500 MG: 500 TABLET, FILM COATED, EXTENDED RELEASE ORAL at 11:27

## 2020-01-14 RX ADMIN — PANTOPRAZOLE SODIUM 40 MG: 40 TABLET, DELAYED RELEASE ORAL at 11:27

## 2020-01-14 RX ADMIN — CARVEDILOL 6.25 MG: 6.25 TABLET, FILM COATED ORAL at 11:28

## 2020-01-14 RX ADMIN — HYDROCHLOROTHIAZIDE 25 MG: 25 TABLET ORAL at 11:27

## 2020-01-14 RX ADMIN — AMLODIPINE BESYLATE 10 MG: 10 TABLET ORAL at 11:27

## 2020-01-14 RX ADMIN — SODIUM CHLORIDE 1000 ML: 9 INJECTION, SOLUTION INTRAVENOUS at 05:44

## 2020-01-14 RX ADMIN — ASPIRIN 81 MG: 81 TABLET, CHEWABLE ORAL at 11:27

## 2020-01-14 RX ADMIN — ISOSORBIDE MONONITRATE 30 MG: 30 TABLET ORAL at 11:28

## 2020-01-14 ASSESSMENT — ENCOUNTER SYMPTOMS
DIARRHEA: 0
ABDOMINAL PAIN: 0
NAUSEA: 0
VOMITING: 0
SHORTNESS OF BREATH: 0
CONSTIPATION: 0
SORE THROAT: 0
PHOTOPHOBIA: 0
COUGH: 0
RHINORRHEA: 0
BACK PAIN: 0

## 2020-01-14 ASSESSMENT — PAIN SCALES - GENERAL: PAINLEVEL_OUTOF10: 0

## 2020-01-14 NOTE — ED PROVIDER NOTES
[facility]  ED  Emergency Department Encounter  EmergencyMedicine Resident     Pt Name:Marques Coy  MRN: [id number]  [place] 1960  Date of evaluation: 1/14/20  PCP:  Cesia Kam MD    [address] [place] [address]       Chief Complaint   Patient presents with    Shaking       HISTORY OF PRESENT ILLNESS  (Location/Symptom, Timing/Onset, Context/Setting, Quality, Duration, Modifying Factors, Severity.)      Boomerchrista Ro is a 61 y.o. male who presents with \"shaking \". Patient believes is may be related to his apathy. Denies chest pain shortness of breath abdominal pain nausea, vomiting, recent URIs, fevers or chills. Denies significant cardiac history. Patient denies history of cocaine use. Does state that he took Tylenol fives prior to coming in. No history of thyroid issues. No history of DVT or PE no recent travel no lower extremity pain  Or swelling. PAST MEDICAL / SURGICAL / SOCIAL / FAMILY HISTORY      has a past medical history of ADHD (attention deficit hyperactivity disorder), Depression, DM2 (diabetes mellitus, type 2) ([facility] [address] Erectile dysfunction, Gastroesophageal reflux disease, Hyperlipidemia, Hypertension, Kidney stone, Low back pain of thoracolumbar region with sciatica, and Neuropathy. has a past surgical history that includes Tonsillectomy; Cystoscopy (07/20/2018); pr cystoscopy,insert ureteral stent (Bilateral, 7/20/2018); Cystocopy (08/03/2018); and pr cysto/uretero/pyeloscopy, calculus tx (Bilateral, 8/3/2018).     Social History     Socioeconomic History    Marital status: Single     Spouse name: Not on file    Number of children: Not on file    Years of education: Not on file    Highest education level: Not on file   Occupational History    Not on file   Social Needs    Financial resource strain: Not on file    Food insecurity:     Worry: Not on file     Inability: Not on file    Transportation needs:     Medical: Not on file     Non-medical: Not on file Tobacco Use    Smoking status: Former Smoker     Packs/day: 0.50     Years: 30.00     Pack years: 15.00     Types: Cigarettes     Last attempt to quit: 2018     Years since quittin.1    Smokeless tobacco: Never Used   Substance and Sexual Activity    Alcohol use: Yes     Alcohol/week: 12.0 standard drinks     Types: 12 Cans of beer per week     Comment: drinks \"a few\" beers a day    Drug use: No     Types: Marijuana     Comment:  last use one month    Sexual activity: Never     Partners: Female   Lifestyle    Physical activity:     Days per week: Not on file     Minutes per session: Not on file    Stress: Not on file   Relationships    Social connections:     Talks on phone: Not on file     Gets together: Not on file     Attends [employer] service: Not on file     Active member of club or organization: Not on file     Attends meetings of clubs or organizations: Not on file     Relationship status: Not on file    Intimate partner violence:     Fear of current or ex partner: Not on file     Emotionally abused: Not on file     Physically abused: Not on file     Forced sexual activity: Not on file   Other Topics Concern    Not on file   Social History Narrative    Not on file       Family History   Problem Relation Age of Onset    Diabetes Mother     High Blood Pressure Father        Allergies:  Patient has no known allergies. Home Medications:  Prior to Admission medications    Medication Sig Start Date End Date Taking?  Authorizing Provider   isosorbide mononitrate (IMDUR) 30 MG extended release tablet Take 1 tablet by mouth daily 19   Doll , DO   atorvastatin (LIPITOR) 20 MG tablet Take 1 tablet by mouth daily 19   Doll , DO   carvedilol (COREG) 6.25 MG tablet Take 1 tablet by mouth 2 times daily (with meals) 19   Doll , DO   amLODIPine (NORVASC) 10 MG tablet Take 1 tablet by mouth daily 19   Doll , DO   oxybutynin (DITROPAN-XL) 10 MG extended release tablet Take 1 tablet by mouth daily 12/21/19 1/20/20  Chana Ying DO   ranolazine (RANEXA) 500 MG extended release tablet Take 1 tablet by mouth 2 times daily 12/21/19   Chana Ying DO   aspirin 81 MG chewable tablet Take 81 mg by mouth daily    Historical Provider, MD   acetaminophen (TYLENOL) 325 MG tablet Take 2 tablets by mouth every 6 hours as needed for Pain 9/25/19   Joy Alcala MD   SENNA-PLUS 8.6-50 MG per tablet Take 1 tablet by mouth daily 9/25/19   Joy Alcala MD   hydrochlorothiazide (HYDRODIURIL) 25 MG tablet Take 25 mg by mouth 8/16/19 8/10/20  Historical Provider, MD   metFORMIN (GLUCOPHAGE) 500 MG tablet Take 1 tablet by mouth 2 times daily (with meals) 8/23/19   Joy Alcala MD   omeprazole (PRILOSEC) 20 MG delayed release capsule Take 1 capsule by mouth 2 times daily (before meals) 8/23/19   Joy Alcala MD   tamsulosin [facility]) 0.4 MG capsule Take 1 capsule by mouth daily 2/21/19   [facility],    oxyCODONE-acetaminophen (PERCOCET) 5-325 MG per tablet Take 1 tablet by mouth every 4 hours as needed for Pain. Joseph Phillips Historical Provider, MD   docusate sodium (COLACE) 100 MG capsule Take 1 capsule by mouth 2 times daily as needed for Constipation 8/3/18   Dasia Crawford MD       REVIEW OF SYSTEMS    (2-9 systems for level 4, 10 or more for level 5)      Review of Systems   Constitutional: Negative for chills, fatigue and fever. HENT: Negative for congestion, rhinorrhea and sore throat. Eyes: Negative for photophobia and visual disturbance. Respiratory: Negative for cough and shortness of breath. Cardiovascular: Positive for palpitations. Negative for chest pain. Gastrointestinal: Negative for abdominal pain, constipation, diarrhea, nausea and vomiting. Genitourinary: Negative for decreased urine volume. Musculoskeletal: Negative for back pain, neck pain and neck stiffness. Skin: Negative for rash.    Neurological: Negative for weakness, numbness and RESULTS / EMERGENCY DEPARTMENT COURSE / MDM     LABS:  Results for orders placed or performed during the hospital encounter of 01/14/20   CBC WITH AUTO DIFFERENTIAL   Result Value Ref Range    WBC 7.3 3.5 - 11.3 k/uL    RBC 4.21 4.21 - 5.77 m/uL    Hemoglobin 12.3 (L) 13.0 - 17.0 g/dL    Hematocrit 37.2 (L) 40.7 - 50.3 %    MCV 88.4 82.6 - 102.9 fL    MCH 29.2 25.2 - 33.5 pg    MCHC 33.1 28.4 - 34.8 g/dL    RDW 13.6 11.8 - 14.4 %    Platelets 533 430 - 075 k/uL    MPV 10.6 8.1 - 13.5 fL    NRBC Automated 0.0 0.0 per 100 WBC    Differential Type NOT REPORTED     Seg Neutrophils 50 36 - 65 %    Lymphocytes 38 24 - 43 %    Monocytes 8 3 - 12 %    Eosinophils % 3 1 - 4 %    Basophils 1 0 - 2 %    Immature Granulocytes 0 0 %    Segs Absolute 3.68 1.50 - 8.10 k/uL    Absolute Lymph # 2.75 1.10 - 3.70 k/uL    Absolute Mono # 0.57 0.10 - 1.20 k/uL    Absolute Eos # 0.18 0.00 - 0.44 k/uL    Basophils Absolute 0.06 0.00 - 0.20 k/uL    Absolute Immature Granulocyte <0.03 0.00 - 0.30 k/uL    WBC Morphology NOT REPORTED     RBC Morphology NOT REPORTED     Platelet Estimate NOT REPORTED    BASIC METABOLIC PANEL   Result Value Ref Range    Glucose 175 (H) 70 - 99 mg/dL    BUN 28 (H) 6 - 20 mg/dL    CREATININE 1.97 (H) 0.70 - 1.20 mg/dL    Bun/Cre Ratio NOT REPORTED 9 - 20    Calcium 9.3 8.6 - 10.4 mg/dL    Sodium 136 135 - 144 mmol/L    Potassium 4.3 3.7 - 5.3 mmol/L    Chloride 102 98 - 107 mmol/L    CO2 18 (L) 20 - 31 mmol/L    Anion Gap 16 9 - 17 mmol/L    GFR Non-African American 35 (L) >60 mL/min    GFR  42 (L) >60 mL/min    GFR Comment          GFR Staging NOT REPORTED    Brain Natriuretic Peptide   Result Value Ref Range    Pro- (H) <300 pg/mL    BNP Interpretation Pro-BNP Reference Range:    MAGNESIUM   Result Value Ref Range    Magnesium 1.9 1.6 - 2.6 mg/dL   Calcium, Ionized   Result Value Ref Range    Calcium, Ion 1.12 (L) 1.13 - 1.33 mmol/L   TSH with Reflex   Result Value Ref Range    TSH 1.08 admit due to persistent tachycardia. PROCEDURES:  None    CONSULTS:  None    CRITICAL CARE:  None    FINAL IMPRESSION      1. Sinus tachycardia          DISPOSITION / PLAN     DISPOSITION  admit      PATIENT REFERRED TO:  No follow-up provider specified.     DISCHARGE MEDICATIONS:  New Prescriptions    No medications on file       Alec Fulton DO  Emergency Medicine Resident    (Please note that portions of thisnote were completed with a voice recognition program.  Efforts were made to edit the dictations but occasionally words are mis-transcribed.)        Alec Fulton DO  01/14/20 6933

## 2020-01-14 NOTE — ED TRIAGE NOTES
Pt presents to ED with c/o uncontrollable shaking that started at 1700. Pt states he is a diabetic and has not checked his sugar in days, upon assessment BS was tested and was 153. Pt states he has been having shakes and generalized body aches so he has been taking Jesus Alberto aspirin and motrin. Pt also states he has had 3 bud ice and dinks on a daily basis. Upon arrival pt is hypertensive and states he is suppose to take meds for it but has not taken it in two days. Pt denies N/V/D and denies any type of illicit drugs. Pt placed on monitor and EKG performed.

## 2020-01-14 NOTE — CONSULTS
[facility]    Consult                        Today's Date: 1/14/2020  Patient Name: Marlene Hassan  Date of admission: 1/14/2020  3:24 AM  Patient's age: 61 y.o., 1960  Admission Dx: Tachycardia [R00.0]    Reason for Consult:  Severe hypertension/Palpitations    Requesting Physician: Donnie Simental MD    CHIEF COMPLAINT:  Severe hypertension, palpitations. History Obtained From:  patient    HISTORY OF PRESENT ILLNESS:      The patient is a 61 y.o.  male who is admitted to the hospital for Severe hypertension. The patient presented with SOB, palpitations. Has not been taking his medications and was using cocaine. No chest pain, no dizziness or syncope. Feels better now and symptoms resolved. Past Medical History:   has a past medical history of ADHD (attention deficit hyperactivity disorder), Depression, DM2 (diabetes mellitus, type 2) ([address] Erectile dysfunction, Gastroesophageal reflux disease, Hyperlipidemia, Hypertension, Kidney stone, Low back pain of thoracolumbar region with sciatica, and Neuropathy. Past Surgical History:   has a past surgical history that includes Tonsillectomy; Cystoscopy (07/20/2018); pr cystoscopy,insert ureteral stent (Bilateral, 7/20/2018); Cystocopy (08/03/2018); and pr cysto/uretero/pyeloscopy, calculus tx (Bilateral, 8/3/2018). Home Medications:    Prior to Admission medications    Medication Sig Start Date End Date Taking?  Authorizing Provider   isosorbide mononitrate (IMDUR) 30 MG extended release tablet Take 1 tablet by mouth daily 12/22/19  Yes Eldon Sexton, DO   atorvastatin (LIPITOR) 20 MG tablet Take 1 tablet by mouth daily 12/22/19  Yes Eldon Sexton, DO   carvedilol (COREG) 6.25 MG tablet Take 1 tablet by mouth 2 times daily (with meals) 12/21/19  Yes Eldon Sexton, DO   amLODIPine (NORVASC) 10 MG tablet Take 1 tablet by mouth daily 12/22/19  Yes Eldon Arreola, DO   oxybutynin (DITROPAN-XL) 10 MG extended throat. · Cardiovascular: No chest pain, Yes dyspnea on exertion, No palpitations or No loss of consciousness. No cough, hemoptysis, No pleuritic pain, or phlebitis. · Respiratory: No cough or wheezing, no sputum production. No hematemesis. · Gastrointestinal: No abdominal pain, appetite loss, blood in stools. No change in bowel or bladder habits. · Genitourinary: No dysuria, trouble voiding, or hematuria. · Musculoskeletal:  No gait disturbance, No weakness or joint complaints. · Integumentary: No rash or pruritis. · Neurological: No headache, diplopia, change in muscle strength, numbness or tingling. No change in gait, balance, coordination, mood, affect, memory, mentation, behavior. · Psychiatric: No anxiety, or depression. · Endocrine: No temperature intolerance. No excessive thirst, fluid intake, or urination. No tremor. · Hematologic/Lymphatic: No abnormal bruising or bleeding, blood clots or swollen lymph nodes. · Allergic/Immunologic: No nasal congestion or hives. PHYSICAL EXAM:      BP (!) 148/93   Pulse 86   Temp 98.6 °F (37 °C) (Oral)   Resp 18   Ht 5' 10\" (1.778 m)   Wt 215 lb (97.5 kg)   SpO2 98%   BMI 30.85 kg/m²    Constitutional and General Appearance: alert, cooperative, no distress and appears stated age  HEENT: PERRL, no cervical lymphadenopathy. No masses palpable. Normal oral mucosa  Respiratory:  · Normal excursion and expansion without use of accessory muscles  · Resp Auscultation: Good respiratory effort. No for increased work of breathing.  On auscultation: clear to auscultation bilaterally  Cardiovascular:  · The apical impulse is not displaced  · Heart tones are crisp and normal. regular S1 and S2.  · Jugular venous pulsation Normal  · The carotid upstroke is normal in amplitude and contour without delay or bruit  · Peripheral pulses are symmetrical and full  Abdomen:  · No masses or tenderness  · Bowel sounds present  Extremities:  ·  No Cyanosis or Clubbing  · Lower extremity edema: No  · Skin: Warm and dry  Neurological:  · Alert and oriented. · Moves all extremities well  · No abnormalities of mood, affect, memory, mentation, or behavior are noted    DATA:    Diagnostics:    EKG: Sinus tachycardia. Nonspecific ST changes. Previous Testing:      ECHO 12/20/19: EF 54%, mild DD/MR.      STRESS 8/16/19: No ischemia. EF 44%.      CATH 6/19/18: Nonobstructive CAD.        Labs:     CBC:   Recent Labs     01/14/20  0423   WBC 7.3   HGB 12.3*   HCT 37.2*        BMP:   Recent Labs     01/14/20  0329 01/14/20  0423   NA  --  136   K  --  4.3   CO2  --  18*   BUN  --  28*   CREATININE  --  1.97*   LABGLOM  --  35*   GLUCOSE 156 175*     BNP: No results for input(s): BNP in the last 72 hours. PT/INR: No results for input(s): PROTIME, INR in the last 72 hours. APTT:No results for input(s): APTT in the last 72 hours. CARDIAC ENZYMES:No results for input(s): CKTOTAL, CKMB, CKMBINDEX, TROPONINI in the last 72 hours. FASTING LIPID PANEL:  Lab Results   Component Value Date    HDL 83 08/23/2019    TRIG 143 08/23/2019     LIVER PROFILE:No results for input(s): AST, ALT, LABALBU in the last 72 hours. IMPRESSION/RECOMMENDATIONS:  1. Severe hypertension and sinus/Atrial tachycardia. Resolved and back to normal. Most likely due to medication noncompliance and cocaine use. Encouraged medication compliance and stop drugs. Previous work up with stress test, cath and echo showed mild CAD and preserved EF. Nom further cardiac work up needed. Discussed with patient and Nurse.     Maggie Rodas MD  [facility] Cardiology Consult           [phone]

## 2020-01-14 NOTE — CARE COORDINATION
OP pharmacy called to inform writer that a Prior [place] [facility] is needed for patient's Januvia. Key for cover my meds is [id number]. PA is complete and Dr Fei Doss is notified.
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
## 2020-01-14 NOTE — CARE COORDINATION
Consult received this date for cocaine use  Reviewed chart. Met with patient this date was alert and oriented  Pt states he was at a party with friends and used cocaine. Pt states he does not use cocaine often. He admits to smoking marijuana occasionally No prior rehab. When asked about his alcohol use, pt stated he did not want to talk anymore. Pt was receptive to drug treatment resources and list of programs given. Insurance is [employer].

## 2020-01-14 NOTE — H&P
Collection Time: 01/14/20  5:42 AM   Result Value Ref Range    Troponin, High Sensitivity 19 0 - 22 ng/L    Troponin T NOT REPORTED <0.03 ng/mL    Troponin Interp NOT REPORTED          Imaging/Diagonstics:  Xr Chest Portable    Result Date: 12/19/2019  EXAMINATION: ONE XRAY VIEW OF THE CHEST 12/19/2019 6:04 pm COMPARISON: 08/06/2019 HISTORY: ORDERING SYSTEM PROVIDED HISTORY: cp TECHNOLOGIST PROVIDED HISTORY: cp FINDINGS: The lungs are without acute focal process. There is no effusion or pneumothorax. The cardiomediastinal silhouette is stable. The osseous structures are stable. No acute process. Ct Chest Pulmonary Embolism W Contrast    Result Date: 1/14/2020  EXAMINATION: CTA OF THE CHEST 1/14/2020 5:05 am TECHNIQUE: CTA of the chest was performed after the administration of intravenous contrast.  Multiplanar reformatted images are provided for review. MIP images are provided for review. Dose modulation, iterative reconstruction, and/or weight based adjustment of the mA/kV was utilized to reduce the radiation dose to as low as reasonably achievable. COMPARISON: CT chest 08/07/2019 HISTORY: ORDERING SYSTEM PROVIDED HISTORY: Tachycardia TECHNOLOGIST PROVIDED HISTORY: Tachycardia Reason for Exam: tachycardic Acuity: Acute Type of Exam: Initial FINDINGS: Pulmonary Arteries: Pulmonary arteries are adequately opacified for evaluation. No evidence of intraluminal filling defect to suggest pulmonary embolism. Main pulmonary artery is dilated, suggesting pulmonary hypertension. Mediastinum: Normal heart size. No pericardial effusion. Atherosclerotic calcification of the thoracic aorta and coronary arteries. No enlarged mediastinal or hilar lymph nodes. Lungs/pleura: Central airways are clear. Mild bilateral lower lobe dependent atelectatic changes. Scattered, bilateral calcified granulomata. No focal consolidation. No pleural effusion or pneumothorax.  Upper Abdomen: No acute abnormality in the visualized upper abdomen. Soft Tissues/Bones: No acute osseous abnormality. Bilateral gynecomastia. No evidence of pulmonary embolism or acute pulmonary abnormality. ASSESSMENT:       Active Problems:    Tachycardia  Resolved Problems:    * No resolved hospital problems. *      PLAN:     Patient status: Admit the patient as Inpatient in Med/Surge    Persistent tachycardia and hypertensive urgency, r/o cardiac, possibly due to recent cocaine use vs. Medication nonadherence   - High sens trops - 20 - 19   - CTPE negative   - UDS positive for cocaine   - Cardio consult   - Last ECHO 12/19/2019   - Norvasc, HCTZ, Imdur,    - Tele   - CBC, BMP    - IPCs until seen by cardio   - NPO until seen by cardio    DM2   - Metformin held   - POC glucose, ISS      DVT prophylaxis: IPCs place B/L  GI prophylaxis: Protonix 40mg qd    Consultations:   Consults: IP CONSULT TO FAMILY MEDICINE  IP CONSULT TO CARDIOLOGY    Nursing:  Vital signs per unit routine  Continuous Pulse Oximetry  NPO until seen by Cardio  Daily weights  Fall precautions  Incentive spirometry  Intake and output   Place intermittent pneumatic compression device  Telemetry monitoring     The severity of this patient's signs and symptoms (specify persistent tachycardia) indicate the need for an inpatient admission. Above plan discussed with the patient and family in room, who agreed to the above plan     Plan will be discussed with the attending, Dr. Shane Fournier.     Kalie Dean MD  Family Medicine Resident  1/14/2020 7:12 AM

## 2020-01-14 NOTE — ED PROVIDER NOTES
[facility] ED  Emergency Department  Emergency Medicine Resident Sign-out     Care of Deandre Edwards was assumed from Dr. Nick Workman and is being seen for Shaking  . The patient's initial evaluation and plan have been discussed with the prior provider who initially evaluated the patient.      EMERGENCY DEPARTMENT COURSE / MEDICAL DECISION MAKING:       MEDICATIONS GIVEN:  Orders Placed This Encounter   Medications    0.9 % sodium chloride bolus    amLODIPine (NORVASC) tablet 10 mg    iohexol (OMNIPAQUE 350) solution 75 mL    metoprolol (LOPRESSOR) injection 5 mg    0.9 % sodium chloride bolus       LABS / RADIOLOGY:     Labs Reviewed   CBC WITH AUTO DIFFERENTIAL - Abnormal; Notable for the following components:       Result Value    Hemoglobin 12.3 (*)     Hematocrit 37.2 (*)     All other components within normal limits   BASIC METABOLIC PANEL - Abnormal; Notable for the following components:    Glucose 175 (*)     BUN 28 (*)     CREATININE 1.97 (*)     CO2 18 (*)     GFR Non- 35 (*)     GFR  42 (*)     All other components within normal limits   BRAIN NATRIURETIC PEPTIDE - Abnormal; Notable for the following components:    Pro- (*)     All other components within normal limits   CALCIUM, IONIZED - Abnormal; Notable for the following components:    Calcium, Ion 1.12 (*)     All other components within normal limits   URINE DRUG SCREEN - Abnormal; Notable for the following components:    Cocaine Metabolite, Urine POSITIVE (*)     All other components within normal limits   TOX SCR, BLD, ED - Abnormal; Notable for the following components:    Salicylate Lvl <1 (*)     Acetaminophen Level <5 (*)     All other components within normal limits   POC GLUCOSE FINGERSTICK - Abnormal; Notable for the following components:    POC Glucose 156 (*)     All other components within normal limits   POCT GLUCOSE - Normal   MAGNESIUM   TSH WITH REFLEX   TROPONIN

## 2020-01-14 NOTE — PROGRESS NOTES
Occupational Therapy Not Seen Note    DATE: 2020  Name: Lizzie Pacheco  : 1960  MRN: [id number]    Patient not available for Occupational Therapy due to:    Pt independent with functional mobility and functional tasks. Pt reported no concerns with ability to complete daily tasks.  Pt with no OT acute care needs at this time, will defer OT eval.    Next Scheduled Treatment: NA     Electronically signed by CATHY Harrison on 2020 at 11:30 AM

## 2020-01-15 ENCOUNTER — TELEPHONE (OUTPATIENT)
Dept: FAMILY MEDICINE CLINIC | Age: 60
End: 2020-01-15

## 2020-01-16 ENCOUNTER — TELEPHONE (OUTPATIENT)
Dept: FAMILY MEDICINE CLINIC | Age: 60
End: 2020-01-16

## 2020-01-16 NOTE — TELEPHONE ENCOUNTER
[facility] Transitions Initial Follow Up Call    Call within 2 business days of discharge: Yes     Patient: Freya Ramirez Patient : 1960 MRN: [id number]    [unfilled]    RARS: Readmission Risk Score: 13       Spoke with:  Second call to pt no answer no vm     Discharge department/facility:  [address] [place] [address] services provided:  Scheduled appointment with PCP- --20 @ 215pm    Follow Up  Future Appointments   Date Time Provider Armin Rocha   2020  2:15 PM MD Heena Campa 157, LPN

## 2020-02-26 RX ORDER — GLYBURIDE 5 MG/1
5 TABLET ORAL
Qty: 30 TABLET | Refills: 0 | Status: SHIPPED | OUTPATIENT
Start: 2020-02-26 | End: 2020-12-14

## 2020-08-25 ENCOUNTER — APPOINTMENT (OUTPATIENT)
Dept: GENERAL RADIOLOGY | Age: 60
End: 2020-08-25
Payer: MEDICAID

## 2020-08-25 ENCOUNTER — HOSPITAL ENCOUNTER (OUTPATIENT)
Age: 60
Setting detail: OBSERVATION
LOS: 2 days | Discharge: HOME | End: 2020-08-28
Attending: EMERGENCY MEDICINE | Admitting: FAMILY MEDICINE
Payer: MEDICAID

## 2020-08-25 LAB
ABSOLUTE EOS #: 0.16 K/UL (ref 0–0.44)
ABSOLUTE IMMATURE GRANULOCYTE: <0.03 K/UL (ref 0–0.3)
ABSOLUTE LYMPH #: 1.8 K/UL (ref 1.1–3.7)
ABSOLUTE MONO #: 0.67 K/UL (ref 0.1–1.2)
ALBUMIN SERPL-MCNC: 4.2 G/DL (ref 3.5–5.2)
ALBUMIN/GLOBULIN RATIO: 1.1 (ref 1–2.5)
ALP BLD-CCNC: 108 U/L (ref 40–129)
ALT SERPL-CCNC: 19 U/L (ref 5–41)
ANION GAP SERPL CALCULATED.3IONS-SCNC: 15 MMOL/L (ref 9–17)
AST SERPL-CCNC: 21 U/L
BASOPHILS # BLD: 1 % (ref 0–2)
BASOPHILS ABSOLUTE: 0.05 K/UL (ref 0–0.2)
BILIRUB SERPL-MCNC: 0.81 MG/DL (ref 0.3–1.2)
BUN BLDV-MCNC: 32 MG/DL (ref 8–23)
BUN/CREAT BLD: ABNORMAL (ref 9–20)
CALCIUM SERPL-MCNC: 9.8 MG/DL (ref 8.6–10.4)
CHLORIDE BLD-SCNC: 100 MMOL/L (ref 98–107)
CO2: 20 MMOL/L (ref 20–31)
CREAT SERPL-MCNC: 2.54 MG/DL (ref 0.7–1.2)
D-DIMER QUANTITATIVE: 1.06 MG/L FEU
DIFFERENTIAL TYPE: ABNORMAL
EOSINOPHILS RELATIVE PERCENT: 2 % (ref 1–4)
GFR AFRICAN AMERICAN: 32 ML/MIN
GFR NON-AFRICAN AMERICAN: 26 ML/MIN
GFR SERPL CREATININE-BSD FRML MDRD: ABNORMAL ML/MIN/{1.73_M2}
GFR SERPL CREATININE-BSD FRML MDRD: ABNORMAL ML/MIN/{1.73_M2}
GLUCOSE BLD-MCNC: 195 MG/DL (ref 70–99)
HCT VFR BLD CALC: 39.1 % (ref 40.7–50.3)
HEMOGLOBIN: 12.6 G/DL (ref 13–17)
IMMATURE GRANULOCYTES: 0 %
LIPASE: 36 U/L (ref 13–60)
LYMPHOCYTES # BLD: 25 % (ref 24–43)
MCH RBC QN AUTO: 29.9 PG (ref 25.2–33.5)
MCHC RBC AUTO-ENTMCNC: 32.2 G/DL (ref 28.4–34.8)
MCV RBC AUTO: 92.7 FL (ref 82.6–102.9)
MONOCYTES # BLD: 9 % (ref 3–12)
NRBC AUTOMATED: 0 PER 100 WBC
PDW BLD-RTO: 13.6 % (ref 11.8–14.4)
PLATELET # BLD: 308 K/UL (ref 138–453)
PLATELET ESTIMATE: ABNORMAL
PMV BLD AUTO: 9.9 FL (ref 8.1–13.5)
POTASSIUM SERPL-SCNC: 4.3 MMOL/L (ref 3.7–5.3)
RBC # BLD: 4.22 M/UL (ref 4.21–5.77)
RBC # BLD: ABNORMAL 10*6/UL
SEG NEUTROPHILS: 63 % (ref 36–65)
SEGMENTED NEUTROPHILS ABSOLUTE COUNT: 4.5 K/UL (ref 1.5–8.1)
SODIUM BLD-SCNC: 135 MMOL/L (ref 135–144)
TOTAL PROTEIN: 8 G/DL (ref 6.4–8.3)
TROPONIN INTERP: ABNORMAL
TROPONIN INTERP: NORMAL
TROPONIN T: ABNORMAL NG/ML
TROPONIN T: NORMAL NG/ML
TROPONIN, HIGH SENSITIVITY: 22 NG/L (ref 0–22)
TROPONIN, HIGH SENSITIVITY: 29 NG/L (ref 0–22)
WBC # BLD: 7.2 K/UL (ref 3.5–11.3)
WBC # BLD: ABNORMAL 10*3/UL

## 2020-08-25 PROCEDURE — 80053 COMPREHEN METABOLIC PANEL: CPT

## 2020-08-25 PROCEDURE — 83690 ASSAY OF LIPASE: CPT

## 2020-08-25 PROCEDURE — 99285 EMERGENCY DEPT VISIT HI MDM: CPT

## 2020-08-25 PROCEDURE — 96374 THER/PROPH/DIAG INJ IV PUSH: CPT

## 2020-08-25 PROCEDURE — 85025 COMPLETE CBC W/AUTO DIFF WBC: CPT

## 2020-08-25 PROCEDURE — 84484 ASSAY OF TROPONIN QUANT: CPT

## 2020-08-25 PROCEDURE — 6370000000 HC RX 637 (ALT 250 FOR IP): Performed by: STUDENT IN AN ORGANIZED HEALTH CARE EDUCATION/TRAINING PROGRAM

## 2020-08-25 PROCEDURE — 85379 FIBRIN DEGRADATION QUANT: CPT

## 2020-08-25 PROCEDURE — 71046 X-RAY EXAM CHEST 2 VIEWS: CPT

## 2020-08-25 PROCEDURE — 2060000000 HC ICU INTERMEDIATE R&B

## 2020-08-25 PROCEDURE — 93005 ELECTROCARDIOGRAM TRACING: CPT | Performed by: STUDENT IN AN ORGANIZED HEALTH CARE EDUCATION/TRAINING PROGRAM

## 2020-08-25 PROCEDURE — 6360000002 HC RX W HCPCS: Performed by: STUDENT IN AN ORGANIZED HEALTH CARE EDUCATION/TRAINING PROGRAM

## 2020-08-25 RX ORDER — ASPIRIN 81 MG/1
324 TABLET, CHEWABLE ORAL ONCE
Status: COMPLETED | OUTPATIENT
Start: 2020-08-25 | End: 2020-08-25

## 2020-08-25 RX ORDER — MORPHINE SULFATE 4 MG/ML
4 INJECTION, SOLUTION INTRAMUSCULAR; INTRAVENOUS ONCE
Status: COMPLETED | OUTPATIENT
Start: 2020-08-25 | End: 2020-08-25

## 2020-08-25 RX ADMIN — MORPHINE SULFATE 4 MG: 4 INJECTION INTRAVENOUS at 22:07

## 2020-08-25 RX ADMIN — ASPIRIN 324 MG: 81 TABLET, CHEWABLE ORAL at 18:30

## 2020-08-25 ASSESSMENT — ENCOUNTER SYMPTOMS
COUGH: 1
BACK PAIN: 1
VOMITING: 1
ABDOMINAL PAIN: 1
SORE THROAT: 0
RHINORRHEA: 0
EYE REDNESS: 0
EYE ITCHING: 0
BLOOD IN STOOL: 0
SHORTNESS OF BREATH: 1
NAUSEA: 1
CONSTIPATION: 0

## 2020-08-25 ASSESSMENT — PAIN SCALES - GENERAL: PAINLEVEL_OUTOF10: 10

## 2020-08-25 ASSESSMENT — HEART SCORE: ECG: 1

## 2020-08-25 NOTE — ED NOTES
Pt resting in bed. NAD noted. Pt respirations are even and unlabored, pt is oriented X 4, speaking in complete sentences, bed is in the lowest position, call light is within reach. Will continue to monitor.        Arlice Goldmann, RN  08/25/20 2548

## 2020-08-25 NOTE — ED PROVIDER NOTES
[facility]     Emergency Department     Faculty Attestation    I performed a history and physical examination of the patient and discussed management with the resident. I reviewed the resident´s note and agree with the documented findings and plan of care. Any areas of disagreement are noted on the chart. I was personally present for the key portions of any procedures. I have documented in the chart those procedures where I was not present during the key portions. I have reviewed the emergency nurses triage note. I agree with the chief complaint, past medical history, past surgical history, allergies, medications, social and family history as documented unless otherwise noted below. For Physician Assistant/ Nurse Practitioner cases/documentation I have personally evaluated this patient and have completed at least one if not all key elements of the E/M (history, physical exam, and MDM). Additional findings are as noted. Chest clear,  Heart exam normal , no pain or swelling on examination of the lower extremities , equal pulses both wrists , trachea midline. Abdomen is nontender without pulsatile mass or bruit. Chest pain does not radiate to the back , and the pain is not pleuritic. Patient appears comfortable in no distress, skin is warm and dry.        EKG Interpretation    Interpreted by emergency department physician    Rhythm: normal sinus   Rate: normal/91  Axis: normal /18  Ectopy: none  Conduction: normal  ST Segments: no acute change  T Waves: no acute change  Q Waves: none  Poor R wave progression which is new since an EKG done January 14, 2020    Clinical Impression: Abnormal EKG with new poor R wave progression    MAGALI Epstein MD FACEP  Attending Physician            Daly Mayo MD  08/25/20 5841

## 2020-08-25 NOTE — ED PROVIDER NOTES
[facility] ED  Emergency Department Encounter  Emergency Medicine Resident     Pt Name: Leonor Sommer  MRN: [id number]  [place] 1960  Date ofevaluation: 8/25/20  PCP:  Merlene Buck MD    [address] [place] [address]       Chief Complaint   Patient presents with    Chest Pain     HISTORY OF PRESENT ILLNESS  (Location/Symptom, Timing/Onset, Context/Setting, Quality, Duration, Modifying Factors, Severity, Associated signs/symptoms)     Leonor Sommer is a 61 y.o. male who presents with acute onset of chest pain and low back pain. Patient reports approximate 1 week ago he had pain in the right CeraLyte of his back that he got after he was helping his friend mow the lawn. He states his pain is since radiated into his stomach and now is into his chest.  He currently rates his pain 8/10. Also reports some mild shortness of breath has had since May of this year also and steroids nodes of nonbloody emesis 2 days ago. Denies any fevers, chills, leg swelling, bladder incontinence/retention or bowel incontinence. No history of IV drug use or saddle anesthesia. No history of instrumentation to his spine. PAST MEDICAL / SURGICAL / SOCIAL / FAMILY HISTORY      has a past medical history of ADHD (attention deficit hyperactivity disorder), Depression, DM2 (diabetes mellitus, type 2) ([address] Erectile dysfunction, Gastroesophageal reflux disease, Hyperlipidemia, Hypertension, Kidney stone, Low back pain of thoracolumbar region with sciatica, and Neuropathy. has a past surgical history that includes Tonsillectomy; Cystoscopy (07/20/2018); pr cystoscopy,insert ureteral stent (Bilateral, 7/20/2018); Cystocopy (08/03/2018); and pr cysto/uretero/pyeloscopy, calculus tx (Bilateral, 8/3/2018).     Social History     Socioeconomic History    Marital status: Single     Spouse name: Not on file    Number of children: Not on file    Years of education: Not on file    Highest education level: Not on file   Occupational History    Not on file   Social Needs    Financial resource strain: Not on file    Food insecurity     Worry: Not on file     Inability: Not on file    Transportation needs     Medical: Not on file     Non-medical: Not on file   Tobacco Use    Smoking status: Former Smoker     Packs/day: 0.50     Years: 30.00     Pack years: 15.00     Types: Cigarettes     Last attempt to quit: 2018     Years since quittin.8    Smokeless tobacco: Never Used   Substance and Sexual Activity    Alcohol use: Yes     Alcohol/week: 12.0 standard drinks     Types: 12 Cans of beer per week     Comment: drinks \"a few\" beers a day    Drug use: No     Types: Marijuana     Comment:  last use one month    Sexual activity: Never     Partners: Female   Lifestyle    Physical activity     Days per week: Not on file     Minutes per session: Not on file    Stress: Not on file   Relationships    Social connections     Talks on phone: Not on file     Gets together: Not on file     Attends Spiritism service: Not on file     Active member of club or organization: Not on file     Attends meetings of clubs or organizations: Not on file     Relationship status: Not on file    Intimate partner violence     Fear of current or ex partner: Not on file     Emotionally abused: Not on file     Physically abused: Not on file     Forced sexual activity: Not on file   Other Topics Concern    Not on file   Social History Narrative    Not on file       Family History   Problem Relation Age of Onset    Diabetes Mother     High Blood Pressure Father        Allergies:  Patient has no known allergies. Home Medications:  Prior to Admission medications    Medication Sig Start Date End Date Taking?  Authorizing Provider   glyBURIDE (DIABETA) 5 MG tablet Take 1 tablet by mouth daily (with breakfast) 20   Cesia Ríos MD   SITagliptin (JANUVIA) 50 MG tablet Take 1 tablet by mouth daily 20   Serenity Onofre MD   metFORMIN (GLUCOPHAGE) 500 MG tablet Take 1 tablet by mouth 2 times daily (with meals) 1/14/20   Fly Faulkner MD   SENNA-PLUS 8.6-50 MG per tablet Take 1 tablet by mouth daily 1/14/20   Fly Faulkner MD   amLODIPine (NORVASC) 10 MG tablet Take 1 tablet by mouth daily 1/14/20   Fly Faulkner MD   aspirin 81 MG chewable tablet Take 1 tablet by mouth daily 1/14/20   Fly Faulkner MD   atorvastatin (LIPITOR) 20 MG tablet Take 1 tablet by mouth daily 1/14/20   Fly Faulkner MD   carvedilol (COREG) 6.25 MG tablet Take 1 tablet by mouth 2 times daily (with meals) 1/14/20   Fly Faulkner MD   acetaminophen (TYLENOL) 325 MG tablet Take 2 tablets by mouth every 6 hours as needed for Pain 1/14/20   Fly Faulkner MD   docusate sodium (COLACE) 100 MG capsule Take 1 capsule by mouth 2 times daily as needed for Constipation 1/14/20   Fly Faulkner MD   hydrochlorothiazide (HYDRODIURIL) 25 MG tablet Take 1 tablet by mouth daily 1/14/20 1/8/21  Fly Faulkner MD   isosorbide mononitrate (IMDUR) 30 MG extended release tablet Take 1 tablet by mouth daily 1/14/20   Fly Faulkner MD   omeprazole (PRILOSEC) 20 MG delayed release capsule Take 1 capsule by mouth 2 times daily (before meals) 1/14/20   Fly Faulkner MD   oxybutynin (DITROPAN-XL) 10 MG extended release tablet Take 1 tablet by mouth daily 1/14/20 2/13/20  Fly Faulkner MD   ranolazine (RANEXA) 500 MG extended release tablet Take 1 tablet by mouth 2 times daily 1/14/20   Fly Faulkner MD   blood glucose monitor kit and supplies Test 2 times a day & as needed for symptoms of irregular blood glucose. 1/14/20   Fly Faulkner MD   Lancets MISC 1 each by Does not apply route 2 times daily 1/14/20   Fly Faulkner MD   blood glucose monitor strips Test 2 times a day & as needed for symptoms of irregular blood glucose.  1/14/20   Fly Faulkner MD       REVIEW OF SYSTEMS    (2-9 systems for level 4, 10 or more for level 5)      Review of Systems   Constitutional: Negative for chills and fever. HENT: Negative for rhinorrhea and sore throat. Eyes: Negative for redness and itching. Respiratory: Positive for cough and shortness of breath. Cardiovascular: Positive for chest pain. Negative for leg swelling. Gastrointestinal: Positive for abdominal pain, nausea and vomiting. Negative for blood in stool and constipation. Genitourinary: Negative for dysuria, frequency and hematuria. Musculoskeletal: Positive for back pain. Skin: Negative for rash and wound. Allergic/Immunologic: Negative for environmental allergies and food allergies. Neurological: Negative for numbness and headaches. PHYSICAL EXAM   (up to 7 for level 4, 8 or more for level 5)      INITIAL VITALS:   /71   Pulse 85   Temp 98.7 °F (37.1 °C) (Oral)   Resp 17   Ht 5' 10\" (1.778 m)   Wt 230 lb (104.3 kg)   SpO2 98%   BMI 33.00 kg/m²     Physical Exam  Vitals signs and nursing note reviewed. Constitutional:       General: He is not in acute distress. Appearance: Normal appearance. He is obese. He is not ill-appearing, toxic-appearing or diaphoretic. HENT:      Head: Normocephalic and atraumatic. Eyes:      General: No scleral icterus. Conjunctiva/sclera: Conjunctivae normal.   Neck:      Musculoskeletal: Neck supple. Cardiovascular:      Rate and Rhythm: Normal rate and regular rhythm. Pulses:           Radial pulses are 2+ on the right side and 2+ on the left side. Dorsalis pedis pulses are 2+ on the right side and 2+ on the left side. Pulmonary:      Effort: Pulmonary effort is normal. No respiratory distress. Breath sounds: Normal breath sounds. No stridor. No wheezing, rhonchi or rales. Abdominal:      General: There is no distension. Palpations: Abdomen is soft. There is no mass. Tenderness: There is no abdominal tenderness. There is no right CVA tenderness, left CVA tenderness, guarding or rebound.    Musculoskeletal:      Right lower leg: No edema. Left lower leg: No edema. Skin:     General: Skin is warm and dry. Findings: No rash (over exposed skin). Neurological:      General: No focal deficit present. Mental Status: He is alert and oriented to person, place, and time.       Comments: Full strength and sensation in BLE   Psychiatric:         Mood and Affect: Mood normal.         Behavior: Behavior normal.         DIAGNOSTICS     PLAN (LABS / IMAGING / EKG):  Orders Placed This Encounter   Procedures    XR CHEST (2 VW)    XR ABDOMEN (KUB) (SINGLE AP VIEW)    CBC WITH AUTO DIFFERENTIAL    COMPREHENSIVE METABOLIC PANEL    LIPASE    Troponin    D-Dimer, Quantitative    Urinalysis with microscopic    Urine Drug Screen    Comprehensive Metabolic Panel w/ Reflex to MG    CBC auto differential    CREATININE, RANDOM URINE    SODIUM, URINE, RANDOM    HEMOGLOBIN A1C    Nursing Communication    Inpatient consult to Family Practice    Consult to Cardiology    OT eval and treat    PT evaluation and treat    POCT Glucose    POCT glucose    POC Glucose Fingerstick    EKG 12 Lead    PATIENT STATUS (FROM ED OR OR/PROCEDURAL) Inpatient       MEDICATIONS ORDERED:  Orders Placed This Encounter   Medications    aspirin chewable tablet 324 mg    morphine injection 4 mg    amLODIPine (NORVASC) tablet 10 mg    atorvastatin (LIPITOR) tablet 20 mg    carvedilol (COREG) tablet 6.25 mg    hydroCHLOROthiazide (HYDRODIURIL) tablet 25 mg    isosorbide mononitrate (IMDUR) extended release tablet 30 mg    ranolazine (RANEXA) extended release tablet 500 mg    OR Linked Order Group     acetaminophen (TYLENOL) tablet 650 mg     acetaminophen (TYLENOL) suppository 650 mg    OR Linked Order Group     promethazine (PHENERGAN) tablet 12.5 mg     ondansetron (ZOFRAN) injection 4 mg    0.9 % sodium chloride infusion    potassium chloride 10 mEq/100 mL IVPB (Peripheral Line)    OR Linked Order Group     potassium chloride (KLOR-CON M) extended release tablet 40 mEq     potassium bicarb-citric acid (EFFER-K) effervescent tablet 40 mEq     potassium chloride 10 mEq/100 mL IVPB (Peripheral Line)    magnesium sulfate 1 g in dextrose 5% 100 mL IVPB    polyethylene glycol (GLYCOLAX) packet 17 g    senna (SENOKOT) tablet 8.6 mg    famotidine (PEPCID) tablet 20 mg    heparin (porcine) injection 5,000 Units    OR Linked Order Group     LORazepam (ATIVAN) tablet 1 mg     LORazepam (ATIVAN) injection 1 mg     LORazepam (ATIVAN) tablet 2 mg     LORazepam (ATIVAN) injection 2 mg     LORazepam (ATIVAN) tablet 3 mg     LORazepam (ATIVAN) injection 3 mg     LORazepam (ATIVAN) tablet 4 mg     LORazepam (ATIVAN) injection 4 mg    glucose (GLUTOSE) 40 % oral gel 15 g    dextrose 50 % IV solution    glucagon (rDNA) injection 1 mg    dextrose 5 % solution    insulin lispro (HUMALOG) injection vial 0-12 Units    insulin lispro (HUMALOG) injection vial 0-6 Units    gabapentin (NEURONTIN) capsule 100 mg    cyclobenzaprine (FLEXERIL) tablet 10 mg       History: 1  EC  Patient Age: 1  *Risk factors for Atherosclerotic disease: Diabetes Mellitus; Hypertension; Hypercholesterolemia  Risk Factors: 2  Troponin: 0  Heart Score Total: 5        DIAGNOSTIC RESULTS / EMERGENCYDEPARTMENT COURSE / MDM     LABS:  Results for orders placed or performed during the hospital encounter of 20   CBC WITH AUTO DIFFERENTIAL   Result Value Ref Range    WBC 7.2 3.5 - 11.3 k/uL    RBC 4.22 4.21 - 5.77 m/uL    Hemoglobin 12.6 (L) 13.0 - 17.0 g/dL    Hematocrit 39.1 (L) 40.7 - 50.3 %    MCV 92.7 82.6 - 102.9 fL    MCH 29.9 25.2 - 33.5 pg    MCHC 32.2 28.4 - 34.8 g/dL    RDW 13.6 11.8 - 14.4 %    Platelets 517 969 - 955 k/uL    MPV 9.9 8.1 - 13.5 fL    NRBC Automated 0.0 0.0 per 100 WBC    Differential Type NOT REPORTED     Seg Neutrophils 63 36 - 65 %    Lymphocytes 25 24 - 43 %    Monocytes 9 3 - 12 %    Eosinophils % 2 1 - 4 %    Basophils 1 No acute airspace disease identified. EKG  Rhythm: normal sinus   Rate: normal  Axis: normal  Ectopy: none  Conduction: normal  ST Segments: no acute change  T Waves: new TWI in aVL  Q Waves: none    Clinical Impression: When compared to EKG dated 1/14/2020, new TWI in aVL. Otherwise, no acute changes and non-specific EKG    Normal Interval Reference:  P-wave <110 ms  -200 ms  QRS <100 ms  QT <420 ms  QTc 330-470 ms    All EKG's are interpreted by the Emergency Department Physician who either signs or Co-signsthis chart in the absence of a cardiologist.    EMERGENCY DEPARTMENT COURSE:         Patient evaluated by attending physician and myself. Patient presenting with acute onset of chest pain initially began in his back, rated arthroscopic and now is in his chest.  On exam he is well-appearing no acute distress. His vitals unremarkable. Heart regular rate and rhythm, lungs clear to auscultation bilaterally. Abdomen soft and nontender. He has no significant midline spinous process tenderness to palpation. He has full strength and sensation of bilateral lower extremities. Radial and DP pulses are 2+ and equal bilaterally. No significant difference between right and left arm blood pressure per nursing staff. Differential diagnosis includes ACS, pneumonia, pneumothorax, aortic dissection, pericarditis, skeletal chest pain, pancreatitis, and others. Plan is for cardiac work-up, belly labs, CT scan of chest and pelvis-year-old dissection. Work-up is essentially markable exception of patient does have an JEFFREY. I am hesitant to perform a CT scan with contrast given his JEFFREY and my suspicion for a resection is not high enough to jeopardize his kidneys at this time. Discussed with family medicine who will admit the patient for further work-up and management of possible dissection.   He may need a JEWELS study or VQ scan but this will be left to family medicine. PROCEDURES:  none    CONSULTS:  IP CONSULT TO FAMILY MEDICINE  IP CONSULT TO CARDIOLOGY  IP CONSULT TO SOCIAL WORK    FINAL IMPRESSION      1. Chest pain, unspecified type    2.  Abdominal pain, unspecified abdominal location          DISPOSITION / PLAN     DISPOSITION        PATIENT REFERRED TO:  Mauro Chopra MD  [address] [place] [address]  [phone]            DISCHARGE MEDICATIONS:  New Prescriptions    No medications on file       Maryana Edwards DO  Emergency Medicine Resident  [facility]    (Please note that portions of this note were completed with a voice recognition program.  Efforts were made to edit thedictations but occasionally words are mis-transcribed.)       Maryana Edwards DO  Resident  08/26/20 [id number]

## 2020-08-25 NOTE — ED TRIAGE NOTES
Pt co mid sternal chest pain, 8 of 10, that radiates to the right side. Pain started while helping a friend cut the grass. Pt took tums about 15 minutes ago and reports that they helped. Pt respirations are even and unlabored, pt is oriented X 4, speaking in complete sentences, bed is in the lowest position, call light is within reach. Will continue to monitor.

## 2020-08-26 ENCOUNTER — APPOINTMENT (OUTPATIENT)
Dept: GENERAL RADIOLOGY | Age: 60
End: 2020-08-26
Payer: MEDICAID

## 2020-08-26 ENCOUNTER — APPOINTMENT (OUTPATIENT)
Dept: NUCLEAR MEDICINE | Age: 60
End: 2020-08-26
Payer: MEDICAID

## 2020-08-26 LAB
ABSOLUTE EOS #: 0.16 K/UL (ref 0–0.44)
ABSOLUTE IMMATURE GRANULOCYTE: <0.03 K/UL (ref 0–0.3)
ABSOLUTE LYMPH #: 2.22 K/UL (ref 1.1–3.7)
ABSOLUTE MONO #: 0.6 K/UL (ref 0.1–1.2)
ALBUMIN SERPL-MCNC: 3.5 G/DL (ref 3.5–5.2)
ALBUMIN/GLOBULIN RATIO: 1.1 (ref 1–2.5)
ALP BLD-CCNC: 87 U/L (ref 40–129)
ALT SERPL-CCNC: 15 U/L (ref 5–41)
ANION GAP SERPL CALCULATED.3IONS-SCNC: 12 MMOL/L (ref 9–17)
AST SERPL-CCNC: 20 U/L
BASOPHILS # BLD: 1 % (ref 0–2)
BASOPHILS ABSOLUTE: 0.04 K/UL (ref 0–0.2)
BILIRUB SERPL-MCNC: 0.51 MG/DL (ref 0.3–1.2)
BUN BLDV-MCNC: 26 MG/DL (ref 8–23)
BUN/CREAT BLD: ABNORMAL (ref 9–20)
CALCIUM SERPL-MCNC: 8.9 MG/DL (ref 8.6–10.4)
CHLORIDE BLD-SCNC: 106 MMOL/L (ref 98–107)
CO2: 19 MMOL/L (ref 20–31)
CREAT SERPL-MCNC: 2.47 MG/DL (ref 0.7–1.2)
DIFFERENTIAL TYPE: ABNORMAL
EKG ATRIAL RATE: 91 BPM
EKG P AXIS: 54 DEGREES
EKG P-R INTERVAL: 148 MS
EKG Q-T INTERVAL: 360 MS
EKG QRS DURATION: 80 MS
EKG QTC CALCULATION (BAZETT): 442 MS
EKG R AXIS: 18 DEGREES
EKG T AXIS: 62 DEGREES
EKG VENTRICULAR RATE: 91 BPM
EOSINOPHILS RELATIVE PERCENT: 3 % (ref 1–4)
ESTIMATED AVERAGE GLUCOSE: 166 MG/DL
GFR AFRICAN AMERICAN: 33 ML/MIN
GFR NON-AFRICAN AMERICAN: 27 ML/MIN
GFR SERPL CREATININE-BSD FRML MDRD: ABNORMAL ML/MIN/{1.73_M2}
GFR SERPL CREATININE-BSD FRML MDRD: ABNORMAL ML/MIN/{1.73_M2}
GLUCOSE BLD-MCNC: 129 MG/DL (ref 70–99)
GLUCOSE BLD-MCNC: 133 MG/DL
GLUCOSE BLD-MCNC: 133 MG/DL (ref 75–110)
GLUCOSE BLD-MCNC: 133 MG/DL (ref 75–110)
GLUCOSE BLD-MCNC: 143 MG/DL (ref 75–110)
GLUCOSE BLD-MCNC: 223 MG/DL (ref 75–110)
GLUCOSE BLD-MCNC: 245 MG/DL (ref 75–110)
HBA1C MFR BLD: 7.4 % (ref 4–6)
HCT VFR BLD CALC: 35 % (ref 40.7–50.3)
HEMOGLOBIN: 11 G/DL (ref 13–17)
IMMATURE GRANULOCYTES: 0 %
LYMPHOCYTES # BLD: 39 % (ref 24–43)
MCH RBC QN AUTO: 29.8 PG (ref 25.2–33.5)
MCHC RBC AUTO-ENTMCNC: 31.4 G/DL (ref 28.4–34.8)
MCV RBC AUTO: 94.9 FL (ref 82.6–102.9)
MONOCYTES # BLD: 11 % (ref 3–12)
NRBC AUTOMATED: 0 PER 100 WBC
PDW BLD-RTO: 13.8 % (ref 11.8–14.4)
PLATELET # BLD: 269 K/UL (ref 138–453)
PLATELET ESTIMATE: ABNORMAL
PMV BLD AUTO: 9.8 FL (ref 8.1–13.5)
POTASSIUM SERPL-SCNC: 4.3 MMOL/L (ref 3.7–5.3)
RBC # BLD: 3.69 M/UL (ref 4.21–5.77)
RBC # BLD: ABNORMAL 10*6/UL
SEG NEUTROPHILS: 46 % (ref 36–65)
SEGMENTED NEUTROPHILS ABSOLUTE COUNT: 2.69 K/UL (ref 1.5–8.1)
SODIUM BLD-SCNC: 137 MMOL/L (ref 135–144)
TOTAL PROTEIN: 6.6 G/DL (ref 6.4–8.3)
WBC # BLD: 5.7 K/UL (ref 3.5–11.3)
WBC # BLD: ABNORMAL 10*3/UL

## 2020-08-26 PROCEDURE — 6370000000 HC RX 637 (ALT 250 FOR IP): Performed by: STUDENT IN AN ORGANIZED HEALTH CARE EDUCATION/TRAINING PROGRAM

## 2020-08-26 PROCEDURE — 99222 1ST HOSP IP/OBS MODERATE 55: CPT | Performed by: FAMILY MEDICINE

## 2020-08-26 PROCEDURE — 6360000002 HC RX W HCPCS: Performed by: STUDENT IN AN ORGANIZED HEALTH CARE EDUCATION/TRAINING PROGRAM

## 2020-08-26 PROCEDURE — 93010 ELECTROCARDIOGRAM REPORT: CPT | Performed by: INTERNAL MEDICINE

## 2020-08-26 PROCEDURE — 2580000003 HC RX 258: Performed by: STUDENT IN AN ORGANIZED HEALTH CARE EDUCATION/TRAINING PROGRAM

## 2020-08-26 PROCEDURE — 82947 ASSAY GLUCOSE BLOOD QUANT: CPT

## 2020-08-26 PROCEDURE — 93005 ELECTROCARDIOGRAM TRACING: CPT | Performed by: FAMILY MEDICINE

## 2020-08-26 PROCEDURE — 80053 COMPREHEN METABOLIC PANEL: CPT

## 2020-08-26 PROCEDURE — 97162 PT EVAL MOD COMPLEX 30 MIN: CPT

## 2020-08-26 PROCEDURE — 2060000000 HC ICU INTERMEDIATE R&B

## 2020-08-26 PROCEDURE — 85025 COMPLETE CBC W/AUTO DIFF WBC: CPT

## 2020-08-26 PROCEDURE — 96372 THER/PROPH/DIAG INJ SC/IM: CPT

## 2020-08-26 PROCEDURE — 74018 RADEX ABDOMEN 1 VIEW: CPT

## 2020-08-26 PROCEDURE — 97530 THERAPEUTIC ACTIVITIES: CPT

## 2020-08-26 PROCEDURE — 83036 HEMOGLOBIN GLYCOSYLATED A1C: CPT

## 2020-08-26 PROCEDURE — 96376 TX/PRO/DX INJ SAME DRUG ADON: CPT

## 2020-08-26 RX ORDER — GABAPENTIN 100 MG/1
150 CAPSULE ORAL 3 TIMES DAILY
COMMUNITY
End: 2020-09-14

## 2020-08-26 RX ORDER — CARVEDILOL 12.5 MG/1
6.25 TABLET ORAL 2 TIMES DAILY WITH MEALS
Status: DISCONTINUED | OUTPATIENT
Start: 2020-08-26 | End: 2020-08-28

## 2020-08-26 RX ORDER — HEPARIN SODIUM 5000 [USP'U]/ML
5000 INJECTION, SOLUTION INTRAVENOUS; SUBCUTANEOUS EVERY 8 HOURS SCHEDULED
Status: DISCONTINUED | OUTPATIENT
Start: 2020-08-26 | End: 2020-08-28 | Stop reason: HOSPADM

## 2020-08-26 RX ORDER — NICOTINE POLACRILEX 4 MG
15 LOZENGE BUCCAL PRN
Status: DISCONTINUED | OUTPATIENT
Start: 2020-08-26 | End: 2020-08-28 | Stop reason: HOSPADM

## 2020-08-26 RX ORDER — POTASSIUM CHLORIDE 20 MEQ/1
40 TABLET, EXTENDED RELEASE ORAL PRN
Status: DISCONTINUED | OUTPATIENT
Start: 2020-08-26 | End: 2020-08-28 | Stop reason: HOSPADM

## 2020-08-26 RX ORDER — POTASSIUM CHLORIDE 7.45 MG/ML
10 INJECTION INTRAVENOUS PRN
Status: DISCONTINUED | OUTPATIENT
Start: 2020-08-26 | End: 2020-08-28 | Stop reason: HOSPADM

## 2020-08-26 RX ORDER — SODIUM CHLORIDE 0.9 % (FLUSH) 0.9 %
10 SYRINGE (ML) INJECTION PRN
Status: DISCONTINUED | OUTPATIENT
Start: 2020-08-26 | End: 2020-08-28 | Stop reason: HOSPADM

## 2020-08-26 RX ORDER — CYCLOBENZAPRINE HCL 10 MG
10 TABLET ORAL NIGHTLY
Status: COMPLETED | OUTPATIENT
Start: 2020-08-26 | End: 2020-08-26

## 2020-08-26 RX ORDER — RANOLAZINE 500 MG/1
500 TABLET, EXTENDED RELEASE ORAL 2 TIMES DAILY
Status: DISCONTINUED | OUTPATIENT
Start: 2020-08-26 | End: 2020-08-28 | Stop reason: HOSPADM

## 2020-08-26 RX ORDER — ASPIRIN 81 MG/1
81 TABLET, CHEWABLE ORAL DAILY
Status: DISCONTINUED | OUTPATIENT
Start: 2020-08-27 | End: 2020-08-28 | Stop reason: HOSPADM

## 2020-08-26 RX ORDER — POLYETHYLENE GLYCOL 3350 17 G/17G
17 POWDER, FOR SOLUTION ORAL 2 TIMES DAILY
Status: DISCONTINUED | OUTPATIENT
Start: 2020-08-26 | End: 2020-08-28 | Stop reason: HOSPADM

## 2020-08-26 RX ORDER — ONDANSETRON 2 MG/ML
4 INJECTION INTRAMUSCULAR; INTRAVENOUS EVERY 6 HOURS PRN
Status: DISCONTINUED | OUTPATIENT
Start: 2020-08-26 | End: 2020-08-28 | Stop reason: HOSPADM

## 2020-08-26 RX ORDER — LORAZEPAM 2 MG/ML
2 INJECTION INTRAMUSCULAR
Status: DISCONTINUED | OUTPATIENT
Start: 2020-08-26 | End: 2020-08-28 | Stop reason: HOSPADM

## 2020-08-26 RX ORDER — LORAZEPAM 2 MG/ML
4 INJECTION INTRAMUSCULAR
Status: DISCONTINUED | OUTPATIENT
Start: 2020-08-26 | End: 2020-08-28 | Stop reason: HOSPADM

## 2020-08-26 RX ORDER — SODIUM CHLORIDE 0.9 % (FLUSH) 0.9 %
10 SYRINGE (ML) INJECTION EVERY 12 HOURS SCHEDULED
Status: DISCONTINUED | OUTPATIENT
Start: 2020-08-26 | End: 2020-08-28 | Stop reason: HOSPADM

## 2020-08-26 RX ORDER — LORAZEPAM 2 MG/ML
3 INJECTION INTRAMUSCULAR
Status: DISCONTINUED | OUTPATIENT
Start: 2020-08-26 | End: 2020-08-28 | Stop reason: HOSPADM

## 2020-08-26 RX ORDER — SENNA PLUS 8.6 MG/1
1 TABLET ORAL NIGHTLY
Status: DISCONTINUED | OUTPATIENT
Start: 2020-08-26 | End: 2020-08-28 | Stop reason: HOSPADM

## 2020-08-26 RX ORDER — FAMOTIDINE 20 MG/1
20 TABLET, FILM COATED ORAL DAILY
Status: DISCONTINUED | OUTPATIENT
Start: 2020-08-26 | End: 2020-08-28 | Stop reason: HOSPADM

## 2020-08-26 RX ORDER — HYDROCHLOROTHIAZIDE 25 MG/1
25 TABLET ORAL DAILY
Status: DISCONTINUED | OUTPATIENT
Start: 2020-08-26 | End: 2020-08-28 | Stop reason: HOSPADM

## 2020-08-26 RX ORDER — MAGNESIUM SULFATE 1 G/100ML
2 INJECTION INTRAVENOUS PRN
Status: DISCONTINUED | OUTPATIENT
Start: 2020-08-26 | End: 2020-08-28 | Stop reason: HOSPADM

## 2020-08-26 RX ORDER — NITROGLYCERIN 0.4 MG/1
0.4 TABLET SUBLINGUAL EVERY 5 MIN PRN
Status: DISCONTINUED | OUTPATIENT
Start: 2020-08-26 | End: 2020-08-28 | Stop reason: HOSPADM

## 2020-08-26 RX ORDER — LORAZEPAM 2 MG/1
4 TABLET ORAL
Status: DISCONTINUED | OUTPATIENT
Start: 2020-08-26 | End: 2020-08-28 | Stop reason: HOSPADM

## 2020-08-26 RX ORDER — MORPHINE SULFATE 2 MG/ML
2 INJECTION, SOLUTION INTRAMUSCULAR; INTRAVENOUS ONCE
Status: COMPLETED | OUTPATIENT
Start: 2020-08-26 | End: 2020-08-26

## 2020-08-26 RX ORDER — AMLODIPINE BESYLATE 10 MG/1
10 TABLET ORAL DAILY
Status: DISCONTINUED | OUTPATIENT
Start: 2020-08-26 | End: 2020-08-28 | Stop reason: HOSPADM

## 2020-08-26 RX ORDER — DEXTROSE MONOHYDRATE 25 G/50ML
12.5 INJECTION, SOLUTION INTRAVENOUS PRN
Status: DISCONTINUED | OUTPATIENT
Start: 2020-08-26 | End: 2020-08-28 | Stop reason: HOSPADM

## 2020-08-26 RX ORDER — LORAZEPAM 2 MG/ML
1 INJECTION INTRAMUSCULAR
Status: DISCONTINUED | OUTPATIENT
Start: 2020-08-26 | End: 2020-08-28 | Stop reason: HOSPADM

## 2020-08-26 RX ORDER — PROMETHAZINE HYDROCHLORIDE 25 MG/1
12.5 TABLET ORAL EVERY 6 HOURS PRN
Status: DISCONTINUED | OUTPATIENT
Start: 2020-08-26 | End: 2020-08-28 | Stop reason: HOSPADM

## 2020-08-26 RX ORDER — ACETAMINOPHEN 325 MG/1
650 TABLET ORAL EVERY 6 HOURS PRN
Status: DISCONTINUED | OUTPATIENT
Start: 2020-08-26 | End: 2020-08-28 | Stop reason: HOSPADM

## 2020-08-26 RX ORDER — ACETAMINOPHEN 650 MG/1
650 SUPPOSITORY RECTAL EVERY 6 HOURS PRN
Status: DISCONTINUED | OUTPATIENT
Start: 2020-08-26 | End: 2020-08-28 | Stop reason: HOSPADM

## 2020-08-26 RX ORDER — LORAZEPAM 0.5 MG/1
1 TABLET ORAL
Status: DISCONTINUED | OUTPATIENT
Start: 2020-08-26 | End: 2020-08-28 | Stop reason: HOSPADM

## 2020-08-26 RX ORDER — SODIUM CHLORIDE 9 MG/ML
INJECTION, SOLUTION INTRAVENOUS CONTINUOUS
Status: DISCONTINUED | OUTPATIENT
Start: 2020-08-26 | End: 2020-08-28 | Stop reason: HOSPADM

## 2020-08-26 RX ORDER — LORAZEPAM 0.5 MG/1
2 TABLET ORAL
Status: DISCONTINUED | OUTPATIENT
Start: 2020-08-26 | End: 2020-08-28 | Stop reason: HOSPADM

## 2020-08-26 RX ORDER — GABAPENTIN 100 MG/1
100 CAPSULE ORAL 3 TIMES DAILY
Status: DISCONTINUED | OUTPATIENT
Start: 2020-08-26 | End: 2020-08-28 | Stop reason: HOSPADM

## 2020-08-26 RX ORDER — DEXTROSE MONOHYDRATE 50 MG/ML
100 INJECTION, SOLUTION INTRAVENOUS PRN
Status: DISCONTINUED | OUTPATIENT
Start: 2020-08-26 | End: 2020-08-28 | Stop reason: HOSPADM

## 2020-08-26 RX ORDER — ATORVASTATIN CALCIUM 20 MG/1
20 TABLET, FILM COATED ORAL DAILY
Status: DISCONTINUED | OUTPATIENT
Start: 2020-08-26 | End: 2020-08-28 | Stop reason: HOSPADM

## 2020-08-26 RX ORDER — ISOSORBIDE MONONITRATE 30 MG/1
30 TABLET, EXTENDED RELEASE ORAL DAILY
Status: DISCONTINUED | OUTPATIENT
Start: 2020-08-26 | End: 2020-08-28 | Stop reason: HOSPADM

## 2020-08-26 RX ADMIN — FAMOTIDINE 20 MG: 20 TABLET, FILM COATED ORAL at 07:51

## 2020-08-26 RX ADMIN — MORPHINE SULFATE 2 MG: 2 INJECTION, SOLUTION INTRAMUSCULAR; INTRAVENOUS at 22:54

## 2020-08-26 RX ADMIN — POLYETHYLENE GLYCOL 3350 17 G: 17 POWDER, FOR SOLUTION ORAL at 20:27

## 2020-08-26 RX ADMIN — CARVEDILOL 6.25 MG: 12.5 TABLET, FILM COATED ORAL at 17:02

## 2020-08-26 RX ADMIN — GABAPENTIN 100 MG: 100 CAPSULE ORAL at 01:26

## 2020-08-26 RX ADMIN — RANOLAZINE 500 MG: 500 TABLET, FILM COATED, EXTENDED RELEASE ORAL at 07:57

## 2020-08-26 RX ADMIN — INSULIN LISPRO 2 UNITS: 100 INJECTION, SOLUTION INTRAVENOUS; SUBCUTANEOUS at 20:28

## 2020-08-26 RX ADMIN — CYCLOBENZAPRINE 10 MG: 10 TABLET, FILM COATED ORAL at 01:26

## 2020-08-26 RX ADMIN — HEPARIN SODIUM 5000 UNITS: 5000 INJECTION INTRAVENOUS; SUBCUTANEOUS at 13:49

## 2020-08-26 RX ADMIN — SODIUM CHLORIDE: 9 INJECTION, SOLUTION INTRAVENOUS at 08:24

## 2020-08-26 RX ADMIN — CARVEDILOL 6.25 MG: 12.5 TABLET, FILM COATED ORAL at 07:51

## 2020-08-26 RX ADMIN — POLYETHYLENE GLYCOL 3350 17 G: 17 POWDER, FOR SOLUTION ORAL at 01:28

## 2020-08-26 RX ADMIN — HEPARIN SODIUM 5000 UNITS: 5000 INJECTION INTRAVENOUS; SUBCUTANEOUS at 06:24

## 2020-08-26 RX ADMIN — INSULIN LISPRO 4 UNITS: 100 INJECTION, SOLUTION INTRAVENOUS; SUBCUTANEOUS at 11:47

## 2020-08-26 RX ADMIN — ATORVASTATIN CALCIUM 20 MG: 20 TABLET, FILM COATED ORAL at 07:55

## 2020-08-26 RX ADMIN — HEPARIN SODIUM 5000 UNITS: 5000 INJECTION INTRAVENOUS; SUBCUTANEOUS at 22:54

## 2020-08-26 RX ADMIN — ASPIRIN 81 MG: 81 TABLET, CHEWABLE ORAL at 22:55

## 2020-08-26 RX ADMIN — SENNOSIDES 8.6 MG: 8.6 TABLET, FILM COATED ORAL at 20:27

## 2020-08-26 RX ADMIN — GABAPENTIN 100 MG: 100 CAPSULE ORAL at 20:28

## 2020-08-26 RX ADMIN — GABAPENTIN 100 MG: 100 CAPSULE ORAL at 13:49

## 2020-08-26 RX ADMIN — HYDROCHLOROTHIAZIDE 25 MG: 25 TABLET ORAL at 07:52

## 2020-08-26 RX ADMIN — POLYETHYLENE GLYCOL 3350 17 G: 17 POWDER, FOR SOLUTION ORAL at 07:57

## 2020-08-26 RX ADMIN — RANOLAZINE 500 MG: 500 TABLET, FILM COATED, EXTENDED RELEASE ORAL at 20:27

## 2020-08-26 RX ADMIN — SODIUM CHLORIDE: 9 INJECTION, SOLUTION INTRAVENOUS at 22:59

## 2020-08-26 RX ADMIN — SENNOSIDES 8.6 MG: 8.6 TABLET, FILM COATED ORAL at 01:25

## 2020-08-26 RX ADMIN — INSULIN LISPRO 1 UNITS: 100 INJECTION, SOLUTION INTRAVENOUS; SUBCUTANEOUS at 01:31

## 2020-08-26 RX ADMIN — AMLODIPINE BESYLATE 10 MG: 10 TABLET ORAL at 07:50

## 2020-08-26 RX ADMIN — SODIUM CHLORIDE: 9 INJECTION, SOLUTION INTRAVENOUS at 01:32

## 2020-08-26 RX ADMIN — ISOSORBIDE MONONITRATE 30 MG: 30 TABLET ORAL at 07:57

## 2020-08-26 RX ADMIN — RANOLAZINE 500 MG: 500 TABLET, FILM COATED, EXTENDED RELEASE ORAL at 01:25

## 2020-08-26 ASSESSMENT — PAIN SCALES - GENERAL
PAINLEVEL_OUTOF10: 10
PAINLEVEL_OUTOF10: 0

## 2020-08-26 ASSESSMENT — ENCOUNTER SYMPTOMS
DIARRHEA: 0
CHEST TIGHTNESS: 0
BACK PAIN: 1
NAUSEA: 0
WHEEZING: 0
CONSTIPATION: 0
SHORTNESS OF BREATH: 0
VOMITING: 0
ABDOMINAL PAIN: 0
COUGH: 0

## 2020-08-26 ASSESSMENT — PAIN DESCRIPTION - DESCRIPTORS: DESCRIPTORS: HEADACHE

## 2020-08-26 ASSESSMENT — PAIN DESCRIPTION - PAIN TYPE: TYPE: ACUTE PAIN

## 2020-08-26 ASSESSMENT — PAIN SCALES - WONG BAKER
WONGBAKER_NUMERICALRESPONSE: 2
WONGBAKER_NUMERICALRESPONSE: 2

## 2020-08-26 NOTE — PLAN OF CARE
Problem: Falls - Risk of:  Goal: Will remain free from falls  Description: Will remain free from falls  Outcome: Ongoing   Pt remains free of falls at this time. Bed locked in lowest position, siderails x2, call light in reach. Non-skid footwear applied. Pt ambulates in room with steady gait. Bed alarm on. Encouraged pt to call for assistance as needed for safety. Falling star posted outside of room. Will continue to monitor.   Electronically signed by Corona Best RN on 8/26/2020 at 3:20 PM

## 2020-08-26 NOTE — ED NOTES
PT resting with eyes closed.  NAD noted call gin in [facility], NAOMIE  08/26/20 1784

## 2020-08-26 NOTE — PROGRESS NOTES
[facility]  Progress Note    Date:   8/26/2020  Patient name:  Tavo Simeon  Date of admission:  8/25/2020  6:01 PM  MRN:   [id number]  YOB: 1960    SUBJECTIVE/Last 24 hours update:     Day 2 Hospitalization:   Patient was seen and examined by bedside. No acute events overnight. Patient states chest pain has improved, complains of left lower back pain that radiates to around the left side to the stomach. Denies fever/chills, nausea/vomiting, shortness of breath, dysuria. REVIEW OF SYSTEMS:      Review of Systems   Constitutional: Negative for activity change, appetite change, chills, diaphoresis and fever. HENT: Negative for congestion. Respiratory: Negative for cough, chest tightness, shortness of breath and wheezing. Cardiovascular: Positive for chest pain. Negative for palpitations and leg swelling. Gastrointestinal: Negative for abdominal pain, constipation, diarrhea, nausea and vomiting. Genitourinary: Negative for difficulty urinating, dysuria and hematuria. Musculoskeletal: Positive for back pain. Negative for joint swelling. Skin: Negative for rash. Neurological: Negative for dizziness, weakness and headaches. Psychiatric/Behavioral: Negative for agitation and behavioral problems. PAST MEDICAL HISTORY:      has a past medical history of ADHD (attention deficit hyperactivity disorder), Depression, DM2 (diabetes mellitus, type 2) ([address] Erectile dysfunction, Gastroesophageal reflux disease, Hyperlipidemia, Hypertension, Kidney stone, Low back pain of thoracolumbar region with sciatica, and Neuropathy. PAST SURGICAL HISTORY:      has a past surgical history that includes Tonsillectomy; Cystoscopy (07/20/2018); pr cystoscopy,insert ureteral stent (Bilateral, 7/20/2018); Cystocopy (08/03/2018); and pr cysto/uretero/pyeloscopy, calculus tx (Bilateral, 8/3/2018).      SOCIALHISTORY:      reports that he quit smoking about 21 months ago. His smoking use included cigarettes. He has a 15.00 pack-year smoking history. He has never used smokeless tobacco. He reports current alcohol use of about 12.0 standard drinks of alcohol per week. He reports that he does not use drugs. FAMILY HISTORY:      family history includes Diabetes in his mother; High Blood Pressure in his father. HOME MEDICATIONS:      Prior to Admission medications    Medication Sig Start Date End Date Taking?  Authorizing Provider   glyBURIDE (DIABETA) 5 MG tablet Take 1 tablet by mouth daily (with breakfast) 2/26/20   Alvarez Rowe MD   SITagliptin (JANUVIA) 50 MG tablet Take 1 tablet by mouth daily 1/14/20   Magdaleno Gray MD   metFORMIN (GLUCOPHAGE) 500 MG tablet Take 1 tablet by mouth 2 times daily (with meals) 1/14/20   Magdaleno Gray MD   SENNA-PLUS 8.6-50 MG per tablet Take 1 tablet by mouth daily 1/14/20   Magdaleno Gray MD   amLODIPine (NORVASC) 10 MG tablet Take 1 tablet by mouth daily 1/14/20   Magdaleno Gray MD   aspirin 81 MG chewable tablet Take 1 tablet by mouth daily 1/14/20   Magdaleno Gray MD   atorvastatin (LIPITOR) 20 MG tablet Take 1 tablet by mouth daily 1/14/20   Magdaleno Gray MD   carvedilol (COREG) 6.25 MG tablet Take 1 tablet by mouth 2 times daily (with meals) 1/14/20   Magdaleno Gray MD   acetaminophen (TYLENOL) 325 MG tablet Take 2 tablets by mouth every 6 hours as needed for Pain 1/14/20   Magdaleno Gray MD   docusate sodium (COLACE) 100 MG capsule Take 1 capsule by mouth 2 times daily as needed for Constipation 1/14/20   Magdaleno Gray MD   hydrochlorothiazide (HYDRODIURIL) 25 MG tablet Take 1 tablet by mouth daily 1/14/20 1/8/21  Magdaleno Gray MD   isosorbide mononitrate (IMDUR) 30 MG extended release tablet Take 1 tablet by mouth daily 1/14/20   Magdaleno Gray MD   omeprazole (PRILOSEC) 20 MG delayed release capsule Take 1 capsule by mouth 2 times daily (before meals) 1/14/20   Magdaleno Gray MD   oxybutynin (DITROPAN-XL) 10 MG extended release tablet Take 1 tablet by mouth daily 1/14/20 2/13/20  Anil Chavez MD   ranolazine (RANEXA) 500 MG extended release tablet Take 1 tablet by mouth 2 times daily 1/14/20   Anil Chavez MD   blood glucose monitor kit and supplies Test 2 times a day & as needed for symptoms of irregular blood glucose. 1/14/20   Anil Chavez MD   Lancets MISC 1 each by Does not apply route 2 times daily 1/14/20   Anil Chavez MD   blood glucose monitor strips Test 2 times a day & as needed for symptoms of irregular blood glucose. 1/14/20   Anil Chavez MD       ALLERGIES:     Patient has no known allergies. OBJECTIVE:       Vitals:    08/26/20 0752 08/26/20 0816 08/26/20 0831 08/26/20 0847   BP: (!) 138/91 132/74 126/72 128/73   Pulse: 72 77 74 79   Resp: 14 15 14 15   Temp:       TempSrc:       SpO2: 98% 99% 96% 98%   Weight:       Height:           No intake or output data in the 24 hours ending 08/26/20 1045    PHYSICAL EXAM:    Physical Exam  Vitals signs reviewed. Constitutional:       Appearance: He is obese. HENT:      Head: Normocephalic. Mouth/Throat:      Mouth: Mucous membranes are moist.   Cardiovascular:      Rate and Rhythm: Normal rate and regular rhythm. Pulses: Normal pulses. Heart sounds: Normal heart sounds. No murmur. No gallop. Pulmonary:      Effort: Pulmonary effort is normal.      Breath sounds: Normal breath sounds. No rales. Abdominal:      General: Bowel sounds are normal.      Palpations: Abdomen is soft. Tenderness: There is no abdominal tenderness. There is no guarding. Musculoskeletal:         General: Tenderness present. Right lower leg: No edema. Left lower leg: No edema. Comments: Left sided flank tenderness   Skin:     General: Skin is warm and dry. Neurological:      Mental Status: He is alert and oriented to person, place, and time.          ASSESSMENT:      Principal Problem: Atypical chest pain  Active Problems:    DM2 (diabetes mellitus, type 2) ([facility])    Essential hypertension    Stage 3 chronic kidney disease ([facility]  Resolved Problems:    * No resolved hospital problems. *      PLAN:     Atypical chest pain, likely 2/2 to non-cardiac, r/o potential cardiac causes  -In the ER, EKG showed NSR, possible left atrial enlargement, septal infarct age undetermined  -Troponin trending down; 29, 22  -ECHO (12/2019): EF 99%, mild diastolic dysfunction  -Lexiscan stress test at [address] (8/2019): No ischemia, LVEF 44%  -KUB: Suggests constipation.  No abnormal calcification in kidneys/ureters  -Continue Imdur and Ranexa  -UDS pending  -Cardiology onboard: Order cardiac stress test and echo.     JEFFREY  -Creatinine 2.54, 2.47 (base 1.6)  -IV fluid hydration  -Urine sodium and creatinine pending, calculate FeNa  -Avoid metformin, NSAIDs, nephrotoxic drugs, IV contrast or dyes     Diabetes Mellitus Type 2  -In the ER: Glucose 195  -HgbA1c (12/2019): 8.6  -HgbA1c (8/2020): 7.4  -Med ISS  -POCT   -Hypoglycemia protocol     Alcohol abuse  -Patient drinks two 24 ounce cans of beer daily, CAGE 3/4  -CIWA protocol    Hypertension  -Continue Norvasc, carvedilol, hydrochlorothiazide,      GERD  -Famotidine 20 mg p.o. twice daily        Diet: Cardiac, NPO after midnight  DVT prophylaxis: heparin (porcine) injection 5,000 TID  GI prophylaxis: Famotidine 20 mg BID     Plan will be discussed with the attending, DR Bull Xavier MD  Family Medicine Resident  8/26/2020 10:45 AM

## 2020-08-26 NOTE — ED PROVIDER NOTES
Faculty Sign-Out Attestation  Handoff taken on the following patient from prior Attending Physician: Bharath Fontenot    I was available and discussed any additional care issues that arose and coordinated the management plans with the resident(s) caring for the patient during my duty period. Any areas of disagreement with residents documentation of care or procedures are noted on the chart. I was personally present for the key portions of any/all procedures during my duty period. I have documented in the chart those procedures where I was not present during the gale portions.     Dr Bharath Fontenot sign out, cp, hi cr, admitted    Shade Leung DO  Attending Physician     Shade Leung,   08/25/20 8206

## 2020-08-26 NOTE — PROGRESS NOTES
Physical Therapy    Facility/Department: [facility] 4B STEPDOWN  Initial Assessment    NAME: Donita Peter  : 1960  MRN: [id number]    Date of Service: 2020  Chief Complaint   Patient presents with    Chest Pain       Discharge Recommendations:  Patient would benefit from continued therapy after discharge        Assessment   Body structures, Functions, Activity limitations: Decreased functional mobility ; Decreased endurance;Decreased safe awareness  Assessment: Pt ambulated 300 ft w/ CGA for safety. Pt presents with decreased endurance. Pt would benefit from continued therapy. Prognosis: Good  Decision Making: Medium Complexity  PT Education: Goals; General Safety;Plan of Care;PT Role  REQUIRES PT FOLLOW UP: Yes  Activity Tolerance  Activity Tolerance: Patient Tolerated treatment well       Patient Diagnosis(es): The primary encounter diagnosis was Chest pain, unspecified type. A diagnosis of Abdominal pain, unspecified abdominal location was also pertinent to this visit. has a past medical history of ADHD (attention deficit hyperactivity disorder), Depression, DM2 (diabetes mellitus, type 2) ([address] Erectile dysfunction, Gastroesophageal reflux disease, Hyperlipidemia, Hypertension, Kidney stone, Low back pain of thoracolumbar region with sciatica, and Neuropathy. has a past surgical history that includes Tonsillectomy; Cystoscopy (2018); pr cystoscopy,insert ureteral stent (Bilateral, 2018); Cystocopy (2018); and pr cysto/uretero/pyeloscopy, calculus tx (Bilateral, 8/3/2018). Restrictions  Restrictions/Precautions  Restrictions/Precautions: Fall Risk, Seizure, General Precautions  Position Activity Restriction  Other position/activity restrictions: Up with assistance.   Vision/Hearing  Vision: Impaired  Vision Exceptions: Wears glasses for reading  Hearing: Within functional limits     Subjective  General  Patient assessed for rehabilitation services?: Yes  Follows Commands: Within Functional Limits  Subjective  Subjective: Pt and RN agreeable to therapy. Pt found supine in bed upon arrival. Pt pleasant and cooperative throughout session.   Pain Screening  Patient Currently in Pain: Yes  Pain Assessment  Last-Hernandez Pain Rating: Hurts a little bit  Non-Pharmaceutical Pain Intervention(s): Ambulation/Increased Activity  Response to Pain Intervention: Patient Satisfied  Vital Signs  Patient Currently in Pain: Yes       Orientation  Orientation  Overall Orientation Status: Within Functional Limits  Social/Functional History  Social/Functional History  Lives With: Family(Lives with adult saul)  Type of Home: House  Home Layout: Two level, Laundry in basement, Bed/Bath upstairs  Home Access: Stairs to enter with rails  Entrance Stairs - Number of Steps: 4  Entrance Stairs - Rails: Left  Bathroom Shower/Tub: Tub/Shower unit  Bathroom Toilet: Standard  Home Equipment: (No DME.)  Receives Help From: Family  ADL Assistance: Independent  Homemaking Assistance: Independent  Homemaking Responsibilities: Yes  Ambulation Assistance: Independent  Transfer Assistance: Independent  Active : No  Mode of Transportation: Walk, Bus  Cognition   Cognition  Overall Cognitive Status: WFL    Objective     Observation/Palpation  Posture: Fair    AROM RLE (degrees)  RLE AROM: WFL  AROM LLE (degrees)  LLE AROM : WFL  AROM RUE (degrees)  RUE AROM : WFL  AROM LUE (degrees)  LUE AROM : WFL  Strength RLE  Strength RLE: WFL  Comment: Grossly 5/5  Strength LLE  Strength LLE: WFL  Comment: Grossly 5/5  Strength RUE  Strength RUE: WFL  Comment: Grossly 4/5  Strength LUE  Strength LUE: WFL  Comment: Grossly 4/5  Motor Control  Gross Motor?: WFL  Sensation  Overall Sensation Status: Impaired(Neuropathy in bilateral feet.)  Bed mobility  Bridging: Stand by assistance  Rolling to Left: Stand by assistance  Rolling to Right: Stand by assistance  Supine to Sit: Stand by assistance  Sit to Supine: Stand by assistance  Scooting: Stand by assistance  Comment: SBA for safety  Transfers  Sit to Stand: Contact guard assistance  Stand to sit: Contact guard assistance  Comment: CGA for safety  Ambulation  Ambulation?: Yes  Ambulation 1  Surface: level tile  Device: No Device  Assistance: Contact guard assistance  Gait Deviations: Staggers;Decreased step length;Decreased step height  Distance: 300 ft  Comments: CGA for safety. Pt reported lightheadedness and headache while ambulating. Resolved after sitting back down. Pt unsteady and swaying while ambulating     Balance  Posture: Fair  Sitting - Static: Good  Sitting - Dynamic: Good  Standing - Static: Fair;+  Standing - Dynamic: [address]  Times per week: 5x/week  Current Treatment Recommendations: Transfer Training, Stair training, Functional Mobility Training, Gait Training, Balance Training  Safety Devices  Type of devices:  All fall risk precautions in place, Patient at risk for falls, Left in bed, Call light within reach, Nurse notified, Gait belt, Bed alarm in place      AM-PAC Score     AM-PAC Inpatient Mobility without Stair Climbing Raw Score : 17 (08/26/20 1539)  AM-PAC Inpatient without Stair Climbing T-Scale Score : 48.47 (08/26/20 1539)  Mobility Inpatient CMS 0-100% Score: 32.72 (08/26/20 1539)  Mobility Inpatient without Stair CMS G-Code Modifier : Kristen Serna (08/26/20 1539)       Goals  Short term goals  Time Frame for Short term goals: 8 visits  Short term goal 1: Pt to perform ind bed mobility  Short term goal 2: Pt to perform ind transfers  Short term goal 3: Pt to perform good standing balance  Short term goal 4: Pt to perform 300 ft ambulation ind  Short term goal 5: Pt to perform 6 stairs independently  Patient Goals   Patient goals : Pt wants to return to work       Therapy Time   Individual Concurrent Group Co-treatment   Time In 1418         Time Out 1437         Minutes 19         Timed Code Treatment Minutes: 10 Minutes       Tono Henry This treatment/evaluation completed by signing SPT. Signing PT agrees with treatment and documentation.

## 2020-08-26 NOTE — PROGRESS NOTES
Pharmacy Note     Renal Dose Adjustment    Filomena Krabbe is a 61 y.o. male. Pharmacist assessment of renally cleared medications. Recent Labs     08/25/20 1821   BUN 32*       Recent Labs     08/25/20 1821   CREATININE 2.54*       Estimated Creatinine Clearance: 37 mL/min (A) (based on SCr of 2.54 mg/dL (H)).   Estimated CrCl using Ideal Body Weight: 30 mL/min (based on IBW 73 kg)    Height:   Ht Readings from Last 1 Encounters:   08/25/20 5' 10\" (1.778 m)     Weight:  Wt Readings from Last 1 Encounters:   08/25/20 230 lb (104.3 kg)       The following medication dose has been adjusted based upon renal function per P&T Guidelines:             Hunter [facility]  8/26/2020 12:15 AM

## 2020-08-26 NOTE — ED PROVIDER NOTES
Care assumed from Dr. Anneliese Song,    Patient with chest pain with elevated creatinine. Patient is admitted. Patient is boarded. Will continue to watch in the department.      Doris Gorman MD  08/26/20 5229

## 2020-08-26 NOTE — H&P
[facility]  History & Physical Examination Note              Date:   8/25/2020  Patient name:  Anahi Winkler  Date of admission:  8/25/2020  6:01 PM  MRN:   [id number]  YOB: 1960    CHIEF COMPLAINT:       Chief Complaint   Patient presents with    Chest Pain       History Obtained From:  Patient and chart review. HPI:     The patient is a 61 y.o.  male with history of PVD, hypertension, diabetes, GERD, who presented with epigastric/substernal chest pain. Patient reports that chest pain started few days ago after helping a friend cut her grass and  2-3 lawnmowers. He describes a radiating to right flank, constant, burning 7-8 out of 10 pain at rest and 10 out of 10 pain with activity. Patient denies any alleviating factors. Patient also reports constipation. He took Pepto-Bismol  Which resulted in a small dark dry stool 2 days ago. Patient also reports a poor diet primarily consisting of sausage and very little vegetables. He also reports drinking soda and very little water. Patient also reports drinking 224 ounce cans of beer daily. Last drink was yesterday. Denies any seizure-like movements. Patient quit smoking 2 to 3 years ago, but was 1 pack/day smoker for about 40 years. At ER chest x-ray was normal, tropes 29 and 22, EKG showed T wave inversion, urine drug screen pending, Cr-2.54 (1.6)  and he is admitted to the hospital for atypical chest pain. PAST MEDICAL HISTORY:        has a past medical history of ADHD (attention deficit hyperactivity disorder), Depression, DM2 (diabetes mellitus, type 2) ([facility] [address] Erectile dysfunction, Gastroesophageal reflux disease, Hyperlipidemia, Hypertension, Kidney stone, Low back pain of thoracolumbar region with sciatica, and Neuropathy. PAST SURGICAL HISTORY:      has a past surgical history that includes Tonsillectomy;  Cystoscopy (07/20/2018); pr cystoscopy,insert ureteral stent (Bilateral, 7/20/2018); Cystocopy (08/03/2018); and pr cysto/uretero/pyeloscopy, calculus tx (Bilateral, 8/3/2018). FAMILY HISTORY:     family history includes Diabetes in his mother; High Blood Pressure in his father. HOME MEDICATIONS:     Prior to Admission medications    Medication Sig Start Date End Date Taking?  Authorizing Provider   glyBURIDE (DIABETA) 5 MG tablet Take 1 tablet by mouth daily (with breakfast) 2/26/20   Carla Cantrell MD   SITagliptin (JANUVIA) 50 MG tablet Take 1 tablet by mouth daily 1/14/20   Zenia Elizabeth MD   metFORMIN (GLUCOPHAGE) 500 MG tablet Take 1 tablet by mouth 2 times daily (with meals) 1/14/20   Zenia Elizabeth MD   SENNA-PLUS 8.6-50 MG per tablet Take 1 tablet by mouth daily 1/14/20   Zenia Elizabeth MD   amLODIPine (NORVASC) 10 MG tablet Take 1 tablet by mouth daily 1/14/20   Zenia Elizabeth MD   aspirin 81 MG chewable tablet Take 1 tablet by mouth daily 1/14/20   Zenia Elizabeth MD   atorvastatin (LIPITOR) 20 MG tablet Take 1 tablet by mouth daily 1/14/20   Zenia Elizabeth MD   carvedilol (COREG) 6.25 MG tablet Take 1 tablet by mouth 2 times daily (with meals) 1/14/20   Zenia Elizabeth MD   acetaminophen (TYLENOL) 325 MG tablet Take 2 tablets by mouth every 6 hours as needed for Pain 1/14/20   Zenia Elizabeth MD   docusate sodium (COLACE) 100 MG capsule Take 1 capsule by mouth 2 times daily as needed for Constipation 1/14/20   Zenia Elizabeth MD   hydrochlorothiazide (HYDRODIURIL) 25 MG tablet Take 1 tablet by mouth daily 1/14/20 1/8/21  Zenia Elizabeth MD   isosorbide mononitrate (IMDUR) 30 MG extended release tablet Take 1 tablet by mouth daily 1/14/20   Zenia Elizabeth MD   omeprazole (PRILOSEC) 20 MG delayed release capsule Take 1 capsule by mouth 2 times daily (before meals) 1/14/20   Zenia Elizabeth MD   oxybutynin (DITROPAN-XL) 10 MG extended release tablet Take 1 tablet by mouth daily 1/14/20 2/13/20  MD lillian Shorezine (RANEXA) 500 MG extended release tablet Take 1 tablet by mouth 2 times daily 1/14/20   Magdaleno Gray MD   blood glucose monitor kit and supplies Test 2 times a day & as needed for symptoms of irregular blood glucose. 1/14/20   Magdaleno Gray MD   Lancets MISC 1 each by Does not apply route 2 times daily 1/14/20   Magdaleno Gray MD   blood glucose monitor strips Test 2 times a day & as needed for symptoms of irregular blood glucose. 1/14/20   Magdaleno Gray MD       ALLERGIES:      Patient has no known allergies. SOCIAL HISTORY:      reports that he quit smoking about 21 months ago. His smoking use included cigarettes. He has a 15.00 pack-year smoking history. He has never used smokeless tobacco. He reports current alcohol use of about 12.0 standard drinks of alcohol per week. He reports that he does not use drugs. REVIEW OF SYSTEMS:     CONSTITUTIONAL:  no fevers  EYES: negative for double vision  HEENT: No headaches, no rhinorrhea, no nasal congestion, no sore throat, no difficulty swallowing  RESPIRATORY:negative for dyspnea, no wheezing.   CARDIOVASCULAR: positive for chest pain (mid-epigastric), no palpitations, no fatigue, no edema   GASTROINTESTINAL: no nausea, no vomiting, no change in bowel habits, no abdominal pain   GENITOURINARY: negative for frequency, no dysuria, no nocturia   INTEGUMENT: negative for rash, no easy bruising   HEMATOLOGIC/LYMPHATIC: negative for swelling/edema   ALLERGIC/IMMUNOLOGIC: negative for urticaria   ENDOCRINE: no polydipsia, no polyuria, no hot or cold intolerance  MUSCULOSKELETAL: no joint pains, no muscle aches, no swelling of joints or extremities  NEUROLOGICAL: Positive for numbness/tingling in lower extremity bilateral.  BEHAVIOR/PSYCH: negative    PHYSICAL EXAM:     Vitals:    08/25/20 1808 08/25/20 1831 08/25/20 1846 08/25/20 2008   BP: (!) 170/90 (!) 175/78 (!) 166/66 (!) 162/128   Pulse: 91 109 102 94   Resp: 20 25 24 24   Temp:       TempSrc: SpO2: 100% 98% 99% 97%   Weight:    230 lb (104.3 kg)   Height:    5' 10\" (1.778 m)       No intake or output data in the 24 hours ending 08/25/20 2230    General Appearance  Alert , awake , oriented x 3, not in acute distress  HEENT - Head is normocephalic, atraumatic. Eye - no icterus no redness, EOMI  Ear- NO ear pain, normal external ear , no discharge  Lungs - Bilateral equal air entry , no wheezes, rales or rhonchi, aeration good  Cardiovascular - Heart sounds are normal.  Regular rhythm, normal rate without murmur, gallop or rub. Abdomen - Soft, nontender, nondistended, no masses or organomegaly  Neurologic - There are no new focal motor or sensory deficits, muscle strength 5/5 in all extremities, normal muscle tone and bulk, no abnormal sensation. Skin - No bruising or bleeding on exposed skin area  Extremities - No cyanosis, clubbing or edema  Psych - normal affect   Musculoskeletal-positive TTP right thoracic paraspinal and subscapular muscles.      DIAGNOSTICS:      Laboratory Testing:    Recent Results (from the past 24 hour(s))   CBC WITH AUTO DIFFERENTIAL    Collection Time: 08/25/20  6:21 PM   Result Value Ref Range    WBC 7.2 3.5 - 11.3 k/uL    RBC 4.22 4.21 - 5.77 m/uL    Hemoglobin 12.6 (L) 13.0 - 17.0 g/dL    Hematocrit 39.1 (L) 40.7 - 50.3 %    MCV 92.7 82.6 - 102.9 fL    MCH 29.9 25.2 - 33.5 pg    MCHC 32.2 28.4 - 34.8 g/dL    RDW 13.6 11.8 - 14.4 %    Platelets 683 249 - 390 k/uL    MPV 9.9 8.1 - 13.5 fL    NRBC Automated 0.0 0.0 per 100 WBC    Differential Type NOT REPORTED     Seg Neutrophils 63 36 - 65 %    Lymphocytes 25 24 - 43 %    Monocytes 9 3 - 12 %    Eosinophils % 2 1 - 4 %    Basophils 1 0 - 2 %    Immature Granulocytes 0 0 %    Segs Absolute 4.50 1.50 - 8.10 k/uL    Absolute Lymph # 1.80 1.10 - 3.70 k/uL    Absolute Mono # 0.67 0.10 - 1.20 k/uL    Absolute Eos # 0.16 0.00 - 0.44 k/uL    Basophils Absolute 0.05 0.00 - 0.20 k/uL    Absolute Immature Granulocyte <0.03 0.00 - 0.30 k/uL    WBC Morphology NOT REPORTED     RBC Morphology NOT REPORTED     Platelet Estimate NOT REPORTED    COMPREHENSIVE METABOLIC PANEL    Collection Time: 08/25/20  6:21 PM   Result Value Ref Range    Glucose 195 (H) 70 - 99 mg/dL    BUN 32 (H) 8 - 23 mg/dL    CREATININE 2.54 (H) 0.70 - 1.20 mg/dL    Bun/Cre Ratio NOT REPORTED 9 - 20    Calcium 9.8 8.6 - 10.4 mg/dL    Sodium 135 135 - 144 mmol/L    Potassium 4.3 3.7 - 5.3 mmol/L    Chloride 100 98 - 107 mmol/L    CO2 20 20 - 31 mmol/L    Anion Gap 15 9 - 17 mmol/L    Alkaline Phosphatase 108 40 - 129 U/L    ALT 19 5 - 41 U/L    AST 21 <40 U/L    Total Bilirubin 0.81 0.3 - 1.2 mg/dL    Total Protein 8.0 6.4 - 8.3 g/dL    Alb 4.2 3.5 - 5.2 g/dL    Albumin/Globulin Ratio 1.1 1.0 - 2.5    GFR Non-African American 26 (L) >60 mL/min    GFR  32 (L) >60 mL/min    GFR Comment          GFR Staging NOT REPORTED    LIPASE    Collection Time: 08/25/20  6:21 PM   Result Value Ref Range    Lipase 36 13 - 60 U/L   Troponin    Collection Time: 08/25/20  6:21 PM   Result Value Ref Range    Troponin, High Sensitivity 29 (H) 0 - 22 ng/L    Troponin T NOT REPORTED <0.03 ng/mL    Troponin Interp NOT REPORTED    D-Dimer, Quantitative    Collection Time: 08/25/20  6:21 PM   Result Value Ref Range    D-Dimer, Quant 1.06 mg/L FEU   Troponin    Collection Time: 08/25/20  8:20 PM   Result Value Ref Range    Troponin, High Sensitivity 22 0 - 22 ng/L    Troponin T NOT REPORTED <0.03 ng/mL    Troponin Interp NOT REPORTED          Imaging/Diagonstics:  Xr Chest (2 Vw)    Result Date: 8/25/2020  EXAMINATION: TWO XRAY VIEWS OF THE CHEST 8/25/2020 7:05 pm COMPARISON: 12/19/2019 HISTORY: ORDERING SYSTEM PROVIDED HISTORY: CP TECHNOLOGIST PROVIDED HISTORY: CP FINDINGS: Shallow inflation. The cardiomediastinal silhouette is within normal limits. There is no consolidation, pneumothorax or evidence for edema. No evidence for effusion. No acute osseous abnormality is identified.      No with the care latrell plan.

## 2020-08-26 NOTE — CONSULTS
Attestation signed by      Attending Physician Statement:    I have discussed the care of  Reji Snow , including pertinent history and exam findings, with the Cardiology fellow/resident. I have seen and examined the patient and the key elements of all parts of the encounter have been performed by me. I agree with the assessment, plan and orders as documented by the fellow/resident, after I modified exam findings and plan of treatments, and the final version is my approved version of the assessment. Additional Comments:   Chest pain with risk factors concerning for ischemia  DM2  HTN  DL  CKD  - acute MI ruled out with troponins  - will check Lexiscan stress test to rule out ischemia/further risk stratify   - will check 2d Echo to eval for structural heart disease  - If stress test and Echo are negative or low risk plan for d/c home from cardiac standpoint and follow up with cardiology in 2 weeks. Discussed with patient and nursing. Thank you for allowing me to participate in the care of this patient, please do not hesitate to call if you have any questions. Rosario Bhardwaj DO, [place] [id number] [facility]  [facility] [place]  [phone]     [facility]    Consult / H&P               Today's Date: 8/26/2020  Patient Name: Reji Snow  Date of admission: 8/25/2020  6:01 PM  Patient's age: 61 y.o., 1960  Admission Dx: Atypical chest pain [R07.89]  Atypical chest pain [R07.89]    Reason for Consult:  Cardiac evaluation    Requesting Physician: No admitting provider for patient encounter. CHIEF COMPLAINT:  Chest Pain, Abdominal Pain, Back Pain    History Obtained From:  patient    HISTORY OF PRESENT ILLNESS:      The patient is a 61 y.o.  male who is admitted to the hospital for Chest Pain  Onset was 2 day ago. Symptoms have improved since that time. The patient's pain is associated with activities.  He was doing yard work and had back pain since since which radiates to his abdomen and then his chest. He states he has not had a bowl movement in four days, he complains of nausea at this time. He does have a history of diabetes, GERD, HTN, and kidney stones. Patient's cardiac risk factors are: advanced age, hyperlipidemia, diabetes, hypertension. Patient's risk factors for DVT/PE: none. Previous cardiac testing: echocardiogram.       Past Medical History:   has a past medical history of ADHD (attention deficit hyperactivity disorder), Depression, DM2 (diabetes mellitus, type 2) ([address] Erectile dysfunction, Gastroesophageal reflux disease, Hyperlipidemia, Hypertension, Kidney stone, Low back pain of thoracolumbar region with sciatica, and Neuropathy. Past Surgical History:   has a past surgical history that includes Tonsillectomy; Cystoscopy (07/20/2018); pr cystoscopy,insert ureteral stent (Bilateral, 7/20/2018); Cystocopy (08/03/2018); and pr cysto/uretero/pyeloscopy, calculus tx (Bilateral, 8/3/2018). Home Medications:    Prior to Admission medications    Medication Sig Start Date End Date Taking?  Authorizing Provider   glyBURIDE (DIABETA) 5 MG tablet Take 1 tablet by mouth daily (with breakfast) 2/26/20   Cesia Ríos MD   SITagliptin (JANUVIA) 50 MG tablet Take 1 tablet by mouth daily 1/14/20   Serenity Onofre MD   metFORMIN (GLUCOPHAGE) 500 MG tablet Take 1 tablet by mouth 2 times daily (with meals) 1/14/20   Serenity Onofre MD   SENNA-PLUS 8.6-50 MG per tablet Take 1 tablet by mouth daily 1/14/20   Serenity Onofre MD   amLODIPine (NORVASC) 10 MG tablet Take 1 tablet by mouth daily 1/14/20   Serenity Onofre MD   aspirin 81 MG chewable tablet Take 1 tablet by mouth daily 1/14/20   Serenity Onofre MD   atorvastatin (LIPITOR) 20 MG tablet Take 1 tablet by mouth daily 1/14/20   Serenity Onofre MD   carvedilol (COREG) 6.25 MG tablet Take 1 tablet by mouth 2 times daily (with meals) 1/14/20   Serenity Onofre MD   acetaminophen (TYLENOL) 325 MG tablet Take 2 tablets by mouth every 6 hours as needed for Pain 1/14/20   Cynthia Ashton MD   docusate sodium (COLACE) 100 MG capsule Take 1 capsule by mouth 2 times daily as needed for Constipation 1/14/20   Cynthia Ashton MD   hydrochlorothiazide (HYDRODIURIL) 25 MG tablet Take 1 tablet by mouth daily 1/14/20 1/8/21  Cynthia Ashton MD   isosorbide mononitrate (IMDUR) 30 MG extended release tablet Take 1 tablet by mouth daily 1/14/20   Cynthia Ashton MD   omeprazole (PRILOSEC) 20 MG delayed release capsule Take 1 capsule by mouth 2 times daily (before meals) 1/14/20   Cynthia Ashton MD   oxybutynin (DITROPAN-XL) 10 MG extended release tablet Take 1 tablet by mouth daily 1/14/20 2/13/20  Cynthia Ashton MD   ranolazine (RANEXA) 500 MG extended release tablet Take 1 tablet by mouth 2 times daily 1/14/20   Cynthia Ashton MD   blood glucose monitor kit and supplies Test 2 times a day & as needed for symptoms of irregular blood glucose. 1/14/20   Cynthia Ashton MD   Lancets MISC 1 each by Does not apply route 2 times daily 1/14/20   Cynthia Ashton MD   blood glucose monitor strips Test 2 times a day & as needed for symptoms of irregular blood glucose.  1/14/20   Cynthia Ashton MD      Current Facility-Administered Medications: amLODIPine (NORVASC) tablet 10 mg, 10 mg, Oral, Daily  atorvastatin (LIPITOR) tablet 20 mg, 20 mg, Oral, Daily  carvedilol (COREG) tablet 6.25 mg, 6.25 mg, Oral, BID WC  hydroCHLOROthiazide (HYDRODIURIL) tablet 25 mg, 25 mg, Oral, Daily  isosorbide mononitrate (IMDUR) extended release tablet 30 mg, 30 mg, Oral, Daily  ranolazine (RANEXA) extended release tablet 500 mg, 500 mg, Oral, BID  acetaminophen (TYLENOL) tablet 650 mg, 650 mg, Oral, Q6H PRN **OR** acetaminophen (TYLENOL) suppository 650 mg, 650 mg, Rectal, Q6H PRN  promethazine (PHENERGAN) tablet 12.5 mg, 12.5 mg, Oral, Q6H PRN **OR** ondansetron (ZOFRAN) injection 4 mg, 4 mg, Intravenous, Q6H PRN  0.9 % sodium 12.0 standard drinks of alcohol per week. He reports that he does not use drugs. Family History: family history includes Diabetes in his mother; High Blood Pressure in his father. No h/o sudden cardiac death. Yes for premature CAD    REVIEW OF SYSTEMS:    · Constitutional: there has been no unanticipated weight loss. There's been No change in energy level, No change in activity level. · Eyes: No visual changes or diplopia. No scleral icterus. · ENT: No Headaches  · Cardiovascular: Chest pain, palpitations   · Respiratory: No previous pulmonary problems, No cough  · Gastrointestinal: No abdominal pain. No change in bowel or bladder habits. · Genitourinary: No dysuria, trouble voiding, or hematuria. · Musculoskeletal:  No gait disturbance, No weakness or joint complaints. · Integumentary: No rash or pruritis. · Neurological: No headache, diplopia, change in muscle strength, numbness or tingling. No change in gait, balance, coordination, mood, affect, memory, mentation, behavior. · Psychiatric: No anxiety, or depression. · Endocrine: No temperature intolerance. No excessive thirst, fluid intake, or urination. No tremor. · Hematologic/Lymphatic: No abnormal bruising or bleeding, blood clots or swollen lymph nodes. · Allergic/Immunologic: No nasal congestion or hives. PHYSICAL EXAM:      /73   Pulse 79   Temp 98.7 °F (37.1 °C) (Oral)   Resp 15   Ht 5' 10\" (1.778 m)   Wt 230 lb (104.3 kg)   SpO2 98%   BMI 33.00 kg/m²    Constitutional and General Appearance: alert, cooperative, no distress and appears stated age  HEENT: PERRL, no cervical lymphadenopathy. No masses palpable. Normal oral mucosa  Respiratory:  · Normal excursion and expansion without use of accessory muscles  · Resp Auscultation: Good respiratory effort. No for increased work of breathing.  On auscultation: clear to auscultation bilaterally  Cardiovascular:  · The apical impulse is not displaced  · Heart tones are crisp and normal. regular S1 and S2.  · Jugular venous pulsation Normal  · The carotid upstroke is normal in amplitude and contour without delay or bruit  · Peripheral pulses are symmetrical and full   Abdomen:   · No masses or tenderness  · Bowel sounds present  Extremities:  ·  No Cyanosis or Clubbing  ·  Lower extremity edema: No  ·  Skin: Warm and dry  Neurological:  · Alert and oriented. · Moves all extremities well  · No abnormalities of mood, affect, memory, mentation, or behavior are noted    DATA:    Diagnostics:    EKG: indicate septal infarct with left atrial enlargement   ECHO: previously taken 2019 and show LVH with mild diastolic dysfunction  Ejection fraction: 55%  Stress Test: not obtained. Cardiac Angiography: not obtained. Labs:     CBC:   Recent Labs     08/25/20 1821 08/26/20  0628   WBC 7.2 5.7   HGB 12.6* 11.0*   HCT 39.1* 35.0*    269     BMP:   Recent Labs     08/25/20 1821 08/26/20  0628 08/26/20  0813    137  --    K 4.3 4.3  --    CO2 20 19*  --    BUN 32* 26*  --    CREATININE 2.54* 2.47*  --    LABGLOM 26* 27*  --    GLUCOSE 195* 129* 133     BNP: No results for input(s): BNP in the last 72 hours. PT/INR: No results for input(s): PROTIME, INR in the last 72 hours. APTT:No results for input(s): APTT in the last 72 hours. CARDIAC ENZYMES:No results for input(s): CKTOTAL, CKMB, CKMBINDEX, TROPONINI in the last 72 hours.   FASTING LIPID PANEL:  Lab Results   Component Value Date    HDL 83 08/23/2019    TRIG 143 08/23/2019     LIVER PROFILE:  Recent Labs     08/25/20 1821 08/26/20  0628   AST 21 20   ALT 19 15   LABALBU 4.2 3.5       IMPRESSION:    Patient Active Problem List   Diagnosis    DM2 (diabetes mellitus, type 2) [facility] [address]    Sciatica of right side    Atypical chest pain    Low back pain of thoracolumbar region with sciatica    Kidney stone    Chest pain    Need for prophylactic vaccination against diphtheria-tetanus-pertussis (DTP)    Gastroesophageal reflux disease  Tachycardia    Essential hypertension    Stage 3 chronic kidney disease (HCC)       RECOMMENDATIONS:  1. Chest pain and EKG abnormalities: Cardiac stress testing and echocardiogram should be done before patient is discharged.          Electronically signed by Roberto Carlos Thomson on 8/26/2020 at 12:20 PM    [facility]      [phone]

## 2020-08-26 NOTE — CARE COORDINATION
Case Management Initial Discharge Plan  Sabina Gomes             Met with:patient to discuss discharge plans. Information verified: address, contacts, phone number, , insurance Yes    Emergency Contact/Next of Kin name & number: Natasha ortiz(sister) [phone]    PCP: Harsha Eldridge MD  Date of last visit: per [facility] 2019    Insurance Provider: Horace Ng    Discharge Planning    Living Arrangements:  Family Members   Support Systems:  Family Members    Home has 2 stories  4 stairs to climb to get into front door, 1 flight stairs to climb to reach second floor  Location of bedroom/bathroom in home 2nd floor. Patient able to perform ADL's:Independent    Current Services (outpatient & in home) none  DME equipment: glucometer  DME provider:     Receiving oral anticoagulation therapy? Yes-asa 81mg. If indicated:   Physician managing anticoagulation treatment:   Where does patient obtain lab work for ATC treatment? Potential Assistance Needed:  N/A    Patient agreeable to home care: No  Union of choice provided:  n/a    Prior SNF/Rehab Placement and Facility:   Agreeable to SNF/Rehab: No  Union of choice provided: n/a     Evaluation: no    Expected Discharge date:  20    Patient expects to be discharged to:  return to home  Follow Up Appointment: Best Day/ Time: Monday AM    Transportation provider: bus, walks  Transportation arrangements needed for discharge: Yes, medical cab    Readmission Risk              Risk of Unplanned Readmission:        16             Does patient have a readmission risk score greater than 14?: Yes  If yes, follow-up appointment must be made within 7 days of discharge.      Goals of Care: decreased chest pain      Discharge Plan: return to home, no skilled needs          Electronically signed by Ginna Devries RN on 20 at 1:35 PM EDT

## 2020-08-26 NOTE — ED NOTES
Pt night medication given.  Call light in reach, door closed to promote rest.      Connor Gutiérrez RN  08/26/20 [id number]

## 2020-08-27 ENCOUNTER — APPOINTMENT (OUTPATIENT)
Dept: NUCLEAR MEDICINE | Age: 60
End: 2020-08-27
Payer: MEDICAID

## 2020-08-27 LAB
ABSOLUTE EOS #: 0.2 K/UL (ref 0–0.44)
ABSOLUTE IMMATURE GRANULOCYTE: <0.03 K/UL (ref 0–0.3)
ABSOLUTE LYMPH #: 2.07 K/UL (ref 1.1–3.7)
ABSOLUTE MONO #: 0.55 K/UL (ref 0.1–1.2)
ANION GAP SERPL CALCULATED.3IONS-SCNC: 13 MMOL/L (ref 9–17)
BASOPHILS # BLD: 1 % (ref 0–2)
BASOPHILS ABSOLUTE: 0.03 K/UL (ref 0–0.2)
BUN BLDV-MCNC: 24 MG/DL (ref 8–23)
BUN/CREAT BLD: ABNORMAL (ref 9–20)
CALCIUM SERPL-MCNC: 8.8 MG/DL (ref 8.6–10.4)
CHLORIDE BLD-SCNC: 100 MMOL/L (ref 98–107)
CO2: 20 MMOL/L (ref 20–31)
CREAT SERPL-MCNC: 2.62 MG/DL (ref 0.7–1.2)
DIFFERENTIAL TYPE: ABNORMAL
EKG ATRIAL RATE: 84 BPM
EKG P AXIS: 51 DEGREES
EKG P-R INTERVAL: 166 MS
EKG Q-T INTERVAL: 372 MS
EKG QRS DURATION: 76 MS
EKG QTC CALCULATION (BAZETT): 439 MS
EKG R AXIS: 4 DEGREES
EKG T AXIS: 37 DEGREES
EKG VENTRICULAR RATE: 84 BPM
EOSINOPHILS RELATIVE PERCENT: 4 % (ref 1–4)
GFR AFRICAN AMERICAN: 30 ML/MIN
GFR NON-AFRICAN AMERICAN: 25 ML/MIN
GFR SERPL CREATININE-BSD FRML MDRD: ABNORMAL ML/MIN/{1.73_M2}
GFR SERPL CREATININE-BSD FRML MDRD: ABNORMAL ML/MIN/{1.73_M2}
GLUCOSE BLD-MCNC: 147 MG/DL (ref 75–110)
GLUCOSE BLD-MCNC: 169 MG/DL (ref 70–99)
GLUCOSE BLD-MCNC: 179 MG/DL (ref 75–110)
GLUCOSE BLD-MCNC: 187 MG/DL (ref 75–110)
GLUCOSE BLD-MCNC: 193 MG/DL (ref 75–110)
HCT VFR BLD CALC: 34.9 % (ref 40.7–50.3)
HEMOGLOBIN: 11.1 G/DL (ref 13–17)
IMMATURE GRANULOCYTES: 0 %
LV EF: 44 %
LVEF MODALITY: NORMAL
LYMPHOCYTES # BLD: 36 % (ref 24–43)
MCH RBC QN AUTO: 30.3 PG (ref 25.2–33.5)
MCHC RBC AUTO-ENTMCNC: 31.8 G/DL (ref 28.4–34.8)
MCV RBC AUTO: 95.4 FL (ref 82.6–102.9)
MONOCYTES # BLD: 10 % (ref 3–12)
NRBC AUTOMATED: 0 PER 100 WBC
PDW BLD-RTO: 13.8 % (ref 11.8–14.4)
PLATELET # BLD: 259 K/UL (ref 138–453)
PLATELET ESTIMATE: ABNORMAL
PMV BLD AUTO: 9.7 FL (ref 8.1–13.5)
POTASSIUM SERPL-SCNC: 4.6 MMOL/L (ref 3.7–5.3)
RBC # BLD: 3.66 M/UL (ref 4.21–5.77)
RBC # BLD: ABNORMAL 10*6/UL
SEG NEUTROPHILS: 49 % (ref 36–65)
SEGMENTED NEUTROPHILS ABSOLUTE COUNT: 2.87 K/UL (ref 1.5–8.1)
SODIUM BLD-SCNC: 133 MMOL/L (ref 135–144)
WBC # BLD: 5.7 K/UL (ref 3.5–11.3)
WBC # BLD: ABNORMAL 10*3/UL

## 2020-08-27 PROCEDURE — 85025 COMPLETE CBC W/AUTO DIFF WBC: CPT

## 2020-08-27 PROCEDURE — 6370000000 HC RX 637 (ALT 250 FOR IP): Performed by: STUDENT IN AN ORGANIZED HEALTH CARE EDUCATION/TRAINING PROGRAM

## 2020-08-27 PROCEDURE — 96372 THER/PROPH/DIAG INJ SC/IM: CPT

## 2020-08-27 PROCEDURE — 99224 PR SBSQ OBSERVATION CARE/DAY 15 MINUTES: CPT | Performed by: FAMILY MEDICINE

## 2020-08-27 PROCEDURE — 36415 COLL VENOUS BLD VENIPUNCTURE: CPT

## 2020-08-27 PROCEDURE — 82947 ASSAY GLUCOSE BLOOD QUANT: CPT

## 2020-08-27 PROCEDURE — G0378 HOSPITAL OBSERVATION PER HR: HCPCS

## 2020-08-27 PROCEDURE — 3430000000 HC RX DIAGNOSTIC RADIOPHARMACEUTICAL: Performed by: INTERNAL MEDICINE

## 2020-08-27 PROCEDURE — 93017 CV STRESS TEST TRACING ONLY: CPT

## 2020-08-27 PROCEDURE — 2580000003 HC RX 258: Performed by: INTERNAL MEDICINE

## 2020-08-27 PROCEDURE — 2580000003 HC RX 258: Performed by: STUDENT IN AN ORGANIZED HEALTH CARE EDUCATION/TRAINING PROGRAM

## 2020-08-27 PROCEDURE — 93010 ELECTROCARDIOGRAM REPORT: CPT | Performed by: INTERNAL MEDICINE

## 2020-08-27 PROCEDURE — 80048 BASIC METABOLIC PNL TOTAL CA: CPT

## 2020-08-27 PROCEDURE — A9500 TC99M SESTAMIBI: HCPCS | Performed by: INTERNAL MEDICINE

## 2020-08-27 PROCEDURE — 6360000002 HC RX W HCPCS: Performed by: STUDENT IN AN ORGANIZED HEALTH CARE EDUCATION/TRAINING PROGRAM

## 2020-08-27 PROCEDURE — 97116 GAIT TRAINING THERAPY: CPT

## 2020-08-27 PROCEDURE — 78452 HT MUSCLE IMAGE SPECT MULT: CPT

## 2020-08-27 PROCEDURE — 6360000002 HC RX W HCPCS: Performed by: INTERNAL MEDICINE

## 2020-08-27 PROCEDURE — 93306 TTE W/DOPPLER COMPLETE: CPT

## 2020-08-27 RX ORDER — AMINOPHYLLINE DIHYDRATE 25 MG/ML
50 INJECTION, SOLUTION INTRAVENOUS PRN
Status: DISCONTINUED | OUTPATIENT
Start: 2020-08-27 | End: 2020-08-27 | Stop reason: ALTCHOICE

## 2020-08-27 RX ORDER — SODIUM CHLORIDE 9 MG/ML
500 INJECTION, SOLUTION INTRAVENOUS CONTINUOUS PRN
Status: DISCONTINUED | OUTPATIENT
Start: 2020-08-27 | End: 2020-08-27 | Stop reason: ALTCHOICE

## 2020-08-27 RX ORDER — BISACODYL 10 MG
10 SUPPOSITORY, RECTAL RECTAL DAILY PRN
Status: DISCONTINUED | OUTPATIENT
Start: 2020-08-27 | End: 2020-08-28 | Stop reason: HOSPADM

## 2020-08-27 RX ORDER — SODIUM CHLORIDE 0.9 % (FLUSH) 0.9 %
10 SYRINGE (ML) INJECTION PRN
Status: DISCONTINUED | OUTPATIENT
Start: 2020-08-27 | End: 2020-08-28 | Stop reason: HOSPADM

## 2020-08-27 RX ORDER — SODIUM CHLORIDE 0.9 % (FLUSH) 0.9 %
10 SYRINGE (ML) INJECTION PRN
Status: DISCONTINUED | OUTPATIENT
Start: 2020-08-27 | End: 2020-08-27 | Stop reason: ALTCHOICE

## 2020-08-27 RX ORDER — ALBUTEROL SULFATE 90 UG/1
2 AEROSOL, METERED RESPIRATORY (INHALATION) PRN
Status: DISCONTINUED | OUTPATIENT
Start: 2020-08-27 | End: 2020-08-27 | Stop reason: ALTCHOICE

## 2020-08-27 RX ORDER — NITROGLYCERIN 0.4 MG/1
0.4 TABLET SUBLINGUAL EVERY 5 MIN PRN
Status: DISCONTINUED | OUTPATIENT
Start: 2020-08-27 | End: 2020-08-27 | Stop reason: ALTCHOICE

## 2020-08-27 RX ORDER — METOPROLOL TARTRATE 5 MG/5ML
5 INJECTION INTRAVENOUS EVERY 5 MIN PRN
Status: DISCONTINUED | OUTPATIENT
Start: 2020-08-27 | End: 2020-08-27 | Stop reason: ALTCHOICE

## 2020-08-27 RX ORDER — ATROPINE SULFATE 0.1 MG/ML
0.5 INJECTION INTRAVENOUS EVERY 5 MIN PRN
Status: DISCONTINUED | OUTPATIENT
Start: 2020-08-27 | End: 2020-08-27 | Stop reason: ALTCHOICE

## 2020-08-27 RX ADMIN — CARVEDILOL 6.25 MG: 12.5 TABLET, FILM COATED ORAL at 17:01

## 2020-08-27 RX ADMIN — Medication 10 ML: at 09:27

## 2020-08-27 RX ADMIN — RANOLAZINE 500 MG: 500 TABLET, FILM COATED, EXTENDED RELEASE ORAL at 20:03

## 2020-08-27 RX ADMIN — HEPARIN SODIUM 5000 UNITS: 5000 INJECTION INTRAVENOUS; SUBCUTANEOUS at 21:51

## 2020-08-27 RX ADMIN — SENNOSIDES 8.6 MG: 8.6 TABLET, FILM COATED ORAL at 20:03

## 2020-08-27 RX ADMIN — SODIUM CHLORIDE, PRESERVATIVE FREE 10 ML: 5 INJECTION INTRAVENOUS at 09:40

## 2020-08-27 RX ADMIN — GABAPENTIN 100 MG: 100 CAPSULE ORAL at 08:44

## 2020-08-27 RX ADMIN — Medication 10 ML: at 20:07

## 2020-08-27 RX ADMIN — NITROGLYCERIN 0.4 MG: 0.4 TABLET SUBLINGUAL at 21:56

## 2020-08-27 RX ADMIN — REGADENOSON 0.4 MG: 0.08 INJECTION, SOLUTION INTRAVENOUS at 09:41

## 2020-08-27 RX ADMIN — RANOLAZINE 500 MG: 500 TABLET, FILM COATED, EXTENDED RELEASE ORAL at 08:43

## 2020-08-27 RX ADMIN — INSULIN LISPRO 2 UNITS: 100 INJECTION, SOLUTION INTRAVENOUS; SUBCUTANEOUS at 13:01

## 2020-08-27 RX ADMIN — POLYETHYLENE GLYCOL 3350 17 G: 17 POWDER, FOR SOLUTION ORAL at 14:11

## 2020-08-27 RX ADMIN — ISOSORBIDE MONONITRATE 30 MG: 30 TABLET ORAL at 08:43

## 2020-08-27 RX ADMIN — SODIUM CHLORIDE, PRESERVATIVE FREE 10 ML: 5 INJECTION INTRAVENOUS at 07:55

## 2020-08-27 RX ADMIN — CARVEDILOL 6.25 MG: 12.5 TABLET, FILM COATED ORAL at 08:30

## 2020-08-27 RX ADMIN — AMLODIPINE BESYLATE 10 MG: 10 TABLET ORAL at 08:43

## 2020-08-27 RX ADMIN — ATORVASTATIN CALCIUM 20 MG: 20 TABLET, FILM COATED ORAL at 08:43

## 2020-08-27 RX ADMIN — TETRAKIS(2-METHOXYISOBUTYLISOCYANIDE)COPPER(I) TETRAFLUOROBORATE 39 MILLICURIE: 1 INJECTION, POWDER, LYOPHILIZED, FOR SOLUTION INTRAVENOUS at 09:40

## 2020-08-27 RX ADMIN — HEPARIN SODIUM 5000 UNITS: 5000 INJECTION INTRAVENOUS; SUBCUTANEOUS at 06:18

## 2020-08-27 RX ADMIN — FAMOTIDINE 20 MG: 20 TABLET, FILM COATED ORAL at 08:44

## 2020-08-27 RX ADMIN — ACETAMINOPHEN 650 MG: 325 TABLET ORAL at 21:56

## 2020-08-27 RX ADMIN — GABAPENTIN 100 MG: 100 CAPSULE ORAL at 20:03

## 2020-08-27 RX ADMIN — INSULIN LISPRO 1 UNITS: 100 INJECTION, SOLUTION INTRAVENOUS; SUBCUTANEOUS at 20:04

## 2020-08-27 RX ADMIN — POLYETHYLENE GLYCOL 3350 17 G: 17 POWDER, FOR SOLUTION ORAL at 20:03

## 2020-08-27 RX ADMIN — HEPARIN SODIUM 5000 UNITS: 5000 INJECTION INTRAVENOUS; SUBCUTANEOUS at 14:07

## 2020-08-27 RX ADMIN — INSULIN LISPRO 2 UNITS: 100 INJECTION, SOLUTION INTRAVENOUS; SUBCUTANEOUS at 17:01

## 2020-08-27 RX ADMIN — GABAPENTIN 100 MG: 100 CAPSULE ORAL at 14:07

## 2020-08-27 RX ADMIN — ASPIRIN 81 MG: 81 TABLET, CHEWABLE ORAL at 08:43

## 2020-08-27 RX ADMIN — HYDROCHLOROTHIAZIDE 25 MG: 25 TABLET ORAL at 08:43

## 2020-08-27 RX ADMIN — TETRAKIS(2-METHOXYISOBUTYLISOCYANIDE)COPPER(I) TETRAFLUOROBORATE 14.9 MILLICURIE: 1 INJECTION, POWDER, LYOPHILIZED, FOR SOLUTION INTRAVENOUS at 07:55

## 2020-08-27 ASSESSMENT — ENCOUNTER SYMPTOMS
COUGH: 0
VOMITING: 0
WHEEZING: 0
NAUSEA: 0
BACK PAIN: 1
CONSTIPATION: 0
CHEST TIGHTNESS: 0
DIARRHEA: 0
SHORTNESS OF BREATH: 0
ABDOMINAL PAIN: 0

## 2020-08-27 ASSESSMENT — PAIN DESCRIPTION - PAIN TYPE: TYPE: ACUTE PAIN

## 2020-08-27 ASSESSMENT — PAIN SCALES - GENERAL
PAINLEVEL_OUTOF10: 3
PAINLEVEL_OUTOF10: 0
PAINLEVEL_OUTOF10: 2
PAINLEVEL_OUTOF10: 0

## 2020-08-27 ASSESSMENT — PAIN DESCRIPTION - ORIENTATION: ORIENTATION: LEFT

## 2020-08-27 ASSESSMENT — PAIN DESCRIPTION - LOCATION: LOCATION: CHEST

## 2020-08-27 NOTE — FLOWSHEET NOTE
Assessment: The patient is a 61 y.o. male with history of PVD, hypertension, diabetes. Patient reports that chest pain started few days ago after helping a friend cut her grass and  2-3 lawnmowers. He describes a radiating to right flank, constant, burning 7-8 out of 10 pain at rest and 10 out of 10 pain with activity. Intervention:  was rounding on Floor 4. Pt was open to spiritual care.  was bedside presence offering prayer and nurtured hope. Follow Up;  will follow up as needed during hospital stay.         08/26/20 1321   Encounter Summary   Services provided to: Patient   Referral/Consult From: [facility] Family members   Continue Visiting   (8/26/20)   Complexity of Encounter Low   Length of Encounter 15 minutes   Spiritual Assessment Completed Yes   Spiritual/Gnosticist   Type Spiritual support   Assessment Approachable   Intervention Nurtured hope   Outcome Comfort   Advance Directives (For Healthcare)   Healthcare Directive No, patient does not have an advance directive for healthcare treatment   Information on Healthcare Directives Requested No   Values / Beliefs   Do you have any ethnic, cultural, sacramental, or spiritual Confucianist needs you would like us to be aware of while you are in the hospital? No

## 2020-08-27 NOTE — PROGRESS NOTES
Physical Therapy  Facility/Department: [facility] 4B STEPDOWN  Daily Treatment Note  NAME: Era Hannon  : 1960  MRN: [id number]    Date of Service: 2020    Discharge Recommendations:  Patient would benefit from continued therapy after discharge   PT Equipment Recommendations  Equipment Needed: No    Assessment   Body structures, Functions, Activity limitations: Decreased functional mobility ; Decreased endurance;Decreased safe awareness;Decreased balance  Assessment: Pt able to ambulate 300ft with CGA, pt displays decreased balance placing him at increased risk for falls. Pt would benefit from continued skilled physical therapy to focus on deficits and return to PLOF. Prognosis: Good  Decision Making: Medium Complexity  PT Education: Goals;PT Role;Plan of Care;General Safety;Gait Training;Transfer Training;Functional Mobility Training  REQUIRES PT FOLLOW UP: Yes  Activity Tolerance  Activity Tolerance: Patient Tolerated treatment well     Patient Diagnosis(es): The primary encounter diagnosis was Chest pain, unspecified type. A diagnosis of Abdominal pain, unspecified abdominal location was also pertinent to this visit. has a past medical history of ADHD (attention deficit hyperactivity disorder), Depression, DM2 (diabetes mellitus, type 2) ([address] Erectile dysfunction, Gastroesophageal reflux disease, Hyperlipidemia, Hypertension, Kidney stone, Low back pain of thoracolumbar region with sciatica, and Neuropathy. has a past surgical history that includes Tonsillectomy; Cystoscopy (2018); pr cystoscopy,insert ureteral stent (Bilateral, 2018); Cystocopy (2018); and pr cysto/uretero/pyeloscopy, calculus tx (Bilateral, 8/3/2018). Restrictions  Restrictions/Precautions  Restrictions/Precautions: Fall Risk, Seizure, General Precautions  Position Activity Restriction  Other position/activity restrictions: Up with assistance.   Subjective   General  Response To Previous Treatment: Patient with no complaints from previous session. Family / Caregiver Present: No  Subjective  Subjective: Pt lying supine in bed upon PT arrival. Pt and RN agreeable to PT this afternoon. Pt very pleasant and cooperative throughout session. Pain Screening  Patient Currently in Pain: No(Pt reports having a headache after ambulation.)  Pain Assessment  Pain Assessment: 0-10  Pain Level: 0  Vital Signs  Patient Currently in Pain: No(Pt reports having a headache after ambulation.)       Orientation  Orientation  Overall Orientation Status: Within Functional Limits  Cognition   Cognition  Overall Cognitive Status: WFL  Objective   Bed mobility  Supine to Sit: Stand by assistance  Sit to Supine: Stand by assistance  Scooting: Stand by assistance  Transfers  Sit to Stand: Stand by assistance  Stand to sit: Stand by assistance  Ambulation  Ambulation?: Yes  Ambulation 1  Surface: level tile  Device: No Device  Assistance: Contact guard assistance  Quality of Gait: Pt displays increased SHEILA, increased lateral sway, unsteady. Distance: 300 ft  Comments: Pt reports headache after ambulation. Stairs/Curb  Stairs?: No     Balance  Posture: Fair  Sitting - Static: Good  Sitting - Dynamic: Good  Standing - Static: Fair;+  Standing - Dynamic: Fair  Comments: Standing balance assessed without AD. Goals  Short term goals  Time Frame for Short term goals: 8 visits  Short term goal 1: Pt to perform ind bed mobility  Short term goal 2: Pt to perform ind transfers  Short term goal 3: Pt to perform good standing balance  Short term goal 4: Pt to perform 300 ft ambulation ind  Short term goal 5: Pt to perform 6 stairs independently  Patient Goals   Patient goals : Pt wants to return to work    Plan    Plan  Times per week: 5x/week  Current Treatment Recommendations: Transfer Training, Stair training, Functional Mobility Training, Gait Training, Balance Training  Safety Devices  Type of devices:  All fall risk precautions in place, Call light within reach, Gait belt, Patient at risk for falls, Left in bed  Restraints  Initially in place: No     Therapy Time   Individual Concurrent Group Co-treatment   Time In 1536         Time Out 1544         Minutes 8         Timed Code Treatment Minutes: [address] Marisol Boss, PT

## 2020-08-27 NOTE — PROGRESS NOTES
[facility]  Progress Note    Date:   8/27/2020  Patient name:  Cailin Elaine  Date of admission:  8/25/2020  6:01 PM  MRN:   [id number]  YOB: 1960    SUBJECTIVE/Last 24 hours update:     Day 3 Hospitalization:   Patient was seen and examined by bedside. Patient is scheduled to go for stress test and echo today. Patient denies bowel movement. Last bowel movement was 4 days ago. Typical pattern is 2-3 bowel movements daily. Will give dulcolax. REVIEW OF SYSTEMS:      Review of Systems   Constitutional: Negative for activity change, appetite change, chills, diaphoresis and fever. HENT: Negative for congestion. Respiratory: Negative for cough, chest tightness, shortness of breath and wheezing. Cardiovascular: Negative for palpitations and leg swelling. Gastrointestinal: Negative for abdominal pain, constipation, diarrhea, nausea and vomiting. Genitourinary: Negative for difficulty urinating, dysuria and hematuria. Musculoskeletal: Positive for back pain. Negative for joint swelling. Skin: Negative for rash. Neurological: Negative for dizziness, weakness and headaches. Psychiatric/Behavioral: Negative for agitation and behavioral problems. PAST MEDICAL HISTORY:      has a past medical history of ADHD (attention deficit hyperactivity disorder), Depression, DM2 (diabetes mellitus, type 2) ([address] Erectile dysfunction, Gastroesophageal reflux disease, Hyperlipidemia, Hypertension, Kidney stone, Low back pain of thoracolumbar region with sciatica, and Neuropathy. PAST SURGICAL HISTORY:      has a past surgical history that includes Tonsillectomy; Cystoscopy (07/20/2018); pr cystoscopy,insert ureteral stent (Bilateral, 7/20/2018); Cystocopy (08/03/2018); and pr cysto/uretero/pyeloscopy, calculus tx (Bilateral, 8/3/2018). SOCIALHISTORY:      reports that he quit smoking about 21 months ago.  His smoking use included monitor strips Test 2 times a day & as needed for symptoms of irregular blood glucose. 1/14/20  Yes Kevin Adler MD   SENNA-PLUS 8.6-50 MG per tablet Take 1 tablet by mouth daily 1/14/20   Kevin Adler MD   aspirin 81 MG chewable tablet Take 1 tablet by mouth daily 1/14/20   Kevin Adler MD   docusate sodium (COLACE) 100 MG capsule Take 1 capsule by mouth 2 times daily as needed for Constipation 1/14/20   Kevin Adler MD   omeprazole (PRILOSEC) 20 MG delayed release capsule Take 1 capsule by mouth 2 times daily (before meals) 1/14/20   Kevin Adler MD   oxybutynin (DITROPAN-XL) 10 MG extended release tablet Take 1 tablet by mouth daily 1/14/20 2/13/20  Kevin Adler MD       ALLERGIES:     Patient has no known allergies. OBJECTIVE:       Vitals:    08/27/20 0009 08/27/20 0345 08/27/20 0620 08/27/20 0815   BP: (!) 152/90 139/70  (!) 142/79   Pulse: 92 85  81   Resp: 20 18  19   Temp: 98.7 °F (37.1 °C) 98.6 °F (37 °C)  97.3 °F (36.3 °C)   TempSrc: Oral Oral  Oral   SpO2: 94% 96%  96%   Weight:   220 lb 11.2 oz (100.1 kg)    Height:             Intake/Output Summary (Last 24 hours) at 8/27/2020 0920  Last data filed at 8/27/2020 8190  Gross per 24 hour   Intake 2083 ml   Output --   Net 2083 ml       PHYSICAL EXAM:    Physical Exam  Vitals signs reviewed. Constitutional:       Appearance: He is obese. HENT:      Head: Normocephalic. Mouth/Throat:      Mouth: Mucous membranes are moist.   Cardiovascular:      Rate and Rhythm: Normal rate and regular rhythm. Pulses: Normal pulses. Heart sounds: Normal heart sounds. No murmur. No gallop. Pulmonary:      Effort: Pulmonary effort is normal.      Breath sounds: Normal breath sounds. No rales. Abdominal:      General: Bowel sounds are normal.      Palpations: Abdomen is soft. Tenderness: There is no abdominal tenderness. There is no guarding. Musculoskeletal:         General: Tenderness present.       Right lower leg: No edema. Left lower leg: No edema. Comments: Left sided flank tenderness   Skin:     General: Skin is warm and dry. Neurological:      Mental Status: He is alert and oriented to person, place, and time. ASSESSMENT:      Principal Problem:    Atypical chest pain  Active Problems:    DM2 (diabetes mellitus, type 2) ([facility])    Essential hypertension    Stage 3 chronic kidney disease ([facility] [address]  Resolved Problems:    * No resolved hospital problems. *      PLAN:     Atypical chest pain, likely 2/2 to non-cardiac, r/o potential cardiac causes  -In the ER, EKG showed NSR, possible left atrial enlargement, septal infarct age undetermined  -Troponin trending down; 29, 22  -ECHO (12/2019): EF 95%, mild diastolic dysfunction  -Lexiscan stress test at [address] (8/2019): No ischemia, LVEF 44%  -KUB: Suggests constipation. No abnormal calcification in kidneys/ureters  -Continue Imdur and Ranexa  -Order dulcolax  -Cardiology onboard: Awaiting results from cardiac stress test and echo.     JEFFREY  -Creatinine 2.54, 2.47 (base 1.6)  -IV fluid hydration  -Urine sodium and creatinine pending, calculate FeNa  -Avoid metformin, NSAIDs, nephrotoxic drugs, IV contrast or dyes     Diabetes Mellitus Type 2  -In the ER: Glucose 195  -HgbA1c (12/2019): 8.6  -HgbA1c (8/2020): 7.4  -Med ISS  -POCT   -Hypoglycemia protocol      Alcohol abuse  -Patient drinks two 24 ounce cans of beer daily, CAGE 3/4  -CIWA protocol     Hypertension  -Continue Norvasc, carvedilol, hydrochlorothiazide,      GERD  -Famotidine 20 mg p.o. twice daily        Diet: Cardiac, NPO after midnight  DVT prophylaxis: heparin (porcine) injection 5,000 TID  GI prophylaxis: Famotidine 20 mg BID    Plan will be discussed with the attending, DR Kajal Simental MD  Family Medicine Resident  8/27/2020 9:20 AM     ATTENDING NOTE    i have reviewed key elements of Eufemia Clancy history and exam. And I examined Eufemia Clancy  .  I have discussed the treatment plan with the resident and agree with the plan. Resting comfortably. Lungs clear. CV RRR. Home today if pending tests neg.

## 2020-08-27 NOTE — PROCEDURES
[address]                  [id number] [facility] 30                              CARDIAC STRESS TEST    PATIENT NAME: Eladio Mendez                    :        1960  MED REC NO:   [id number]                             ROOM:       9697  ACCOUNT NO:   [de-identified]                           ADMIT DATE: 2020  PROVIDER:     Nacho Bowman      LEXISCAN STRESS STUDY    DATE OF STUDY:  2020  ORDERING PROVIDER:  LYNETTE Sosa  PRIMARY CARE PROVIDER:  JOLIE Loza  INDICATION: Chest pain  CONSENT:  The test was explained and consent was signed. PROTOCOL: Lexiscan, 0.4 mg infused. PREINFUSION EKG: Abnormal-normal sinus rhythm, possible old anterior MI. PREINFUSION HR: 81 bpm, infusion HR, 110 bpm.  HR response to Lexiscan  was Normal,  PREINFUSION BP: 124/63 mmHg, infusion BP, 144/61 mmHg. BP response to  Lexiscan was appropriate. CHEST PAIN:  Chest discomfort (4 of 10) noted with Lexiscan infusion,  relieved in recovery. LEXISCAN EKG:  No changes were noted. ISCHEMIC EKG CHANGES: None. IMPRESSION:  Electrocardiographically negative Lexiscan stress study.   **Cardiolite report issued from the department of Nuclear Medicine**      Alexys Berman    D: 2020 14:36:37       T: 2020 14:37:36     MT/BEN  Job#: [id number]     Doc#: Unknown

## 2020-08-27 NOTE — PLAN OF CARE
Problem: Falls - Risk of:  Goal: Will remain free from falls  Description: Will remain free from falls  8/27/2020 1605 by Joe Monteiro RN  Outcome: Ongoing  8/27/2020 0727 by Cris Paredes RN  Outcome: Met This Shift  Note: Patient's bed remained locked and in lowest position throughout shift. Patient belonings and call light remain within reach. 2/4 side rails are up, and non-skid footwear is on. Problem: Cardiac:  Goal: Ability to maintain an adequate cardiac output will improve, vitals remain WDL, pt. Went for stress test and echo today, awaiting results.    Description: Ability to maintain an adequate cardiac output will improve  8/27/2020 1605 by Joe Monteiro RN  Outcome: Ongoing  8/27/2020 0727 by Cris Paredes RN  Outcome: Ongoing

## 2020-08-27 NOTE — PROGRESS NOTES
[facility]  Progress Note                   Date:   8/27/2020  Patient name: Jeniffer Montesinos  Date of admission:  8/25/2020  6:01 PM  MRN:   [id number]  YOB: 1960  PCP: Felix Rosen MD    Reason for Admission: Atypical chest pain [R07.89]  Atypical chest pain [R07.89]  Atypical chest pain [R07.89]    Subjective:       Clinical Changes /Abnormalities: Pt. Seen & examined in stress lab. Denies any CP or SOB presently. Tele SR. Review of Systems    Medications:   Scheduled Meds:   amLODIPine  10 mg Oral Daily    atorvastatin  20 mg Oral Daily    carvedilol  6.25 mg Oral BID WC    hydroCHLOROthiazide  25 mg Oral Daily    isosorbide mononitrate  30 mg Oral Daily    ranolazine  500 mg Oral BID    sodium chloride flush  10 mL Intravenous 2 times per day    polyethylene glycol  17 g Oral BID    senna  1 tablet Oral Nightly    famotidine  20 mg Oral Daily    heparin (porcine)  5,000 Units Subcutaneous 3 times per day    insulin lispro  0-12 Units Subcutaneous TID WC    insulin lispro  0-6 Units Subcutaneous Nightly    gabapentin  100 mg Oral TID    aspirin  81 mg Oral Daily     Continuous Infusions:   sodium chloride      sodium chloride 75 mL/hr at 08/26/20 2259    dextrose       CBC:   Recent Labs     08/25/20 1821 08/26/20  0628   WBC 7.2 5.7   HGB 12.6* 11.0*    269     BMP:    Recent Labs     08/25/20 1821 08/26/20  0628 08/26/20  0813    137  --    K 4.3 4.3  --     106  --    CO2 20 19*  --    BUN 32* 26*  --    CREATININE 2.54* 2.47*  --    GLUCOSE 195* 129* 133     Hepatic:  Recent Labs     08/25/20 1821 08/26/20  0628   AST 21 20   ALT 19 15   BILITOT 0.81 0.51   ALKPHOS 108 87     Troponin:   Recent Labs     08/25/20 1821 08/25/20  2020   TROPHS 29* 22     BNP: No results for input(s): BNP in the last 72 hours. Lipids: No results for input(s): CHOL, HDL in the last 72 hours.     Invalid input(s): LDLCALCU  INR: No results for input(s): INR in the last 72 hours. Objective:   Vitals: BP (!) 142/79   Pulse 81   Temp 97.3 °F (36.3 °C) (Oral)   Resp 19   Ht 5' 10\" (1.778 m)   Wt 220 lb 11.2 oz (100.1 kg)   SpO2 96%   BMI 31.67 kg/m²   General appearance: alert and cooperative with exam  HEENT: Head: Normocephalic, no lesions, without obvious abnormality. Neck:no JVD, trachea midline, no adenopathy  Lungs: Clear to auscultation  Heart: Regular rate and rhythm, s1/s2 auscultated, no murmurs  Abdomen: soft, non-tender, bowel sounds active  Extremities: no edema  Neurologic: not done    ECHO: previously taken 2019 and show LVH with mild diastolic dysfunction  Ejection fraction: 55%    Assessment / Acute Cardiac Problems:   1. Chest pain  2. HTN  3. DM2  4. CKD    Patient Active Problem List:     DM2 (diabetes mellitus, type 2) ([facility]     Sciatica of right side     Atypical chest pain     Low back pain of thoracolumbar region with sciatica     Kidney stone     Chest pain     Need for prophylactic vaccination against diphtheria-tetanus-pertussis (DTP)     Gastroesophageal reflux disease     Tachycardia     Essential hypertension     Stage 3 chronic kidney disease ([facility]      Plan of Treatment:   1. Stress test today. Further work-up pending ischemia findings. 2. Echo pending.     Electronically signed by JEOVANNY Sahni CNP on 8/27/2020 at 9:41 AM  [address]  [phone]

## 2020-08-27 NOTE — PROGRESS NOTES
Occupational Therapy Not Seen Note    DATE: 2020  Name: Eufemia Clancy  : 1960  MRN: [id number]    Patient not available for Occupational Therapy due to:    Testing: to Nuclear medicine with transport on attempt      Next Scheduled Treatment: Re-check 2020    Electronically signed by CATHY Rowley on 2020 at 8:50 AM

## 2020-08-27 NOTE — PLAN OF CARE
Problem: Falls - Risk of:  Goal: Will remain free from falls  Description: Will remain free from falls  Outcome: Met This Shift  Note: Patient's bed remained locked and in lowest position throughout shift. Patient belonings and call light remain within reach. 2/4 side rails are up, and non-skid footwear is on. Problem: Pain:  Description: Pain management should include both nonpharmacologic and pharmacologic interventions. Goal: Pain level will decrease  Description: Pain level will decrease  Outcome: Ongoing     Problem: Cardiac:  Goal: Ability to maintain an adequate cardiac output will improve  Description: Ability to maintain an adequate cardiac output will improve  Outcome: Ongoing   Patient's bed remained locked and in lowest position throughout shift. Patient belonings and call light remain within reach. 2/4 side rails are up, and non-skid footwear is on.    Electronically signed by Jac Lezama RN on 8/27/2020 at 7:28 AM

## 2020-08-28 VITALS
HEIGHT: 70 IN | HEART RATE: 79 BPM | RESPIRATION RATE: 20 BRPM | OXYGEN SATURATION: 96 % | DIASTOLIC BLOOD PRESSURE: 82 MMHG | WEIGHT: 220 LBS | TEMPERATURE: 97.8 F | SYSTOLIC BLOOD PRESSURE: 147 MMHG | BODY MASS INDEX: 31.5 KG/M2

## 2020-08-28 LAB
GLUCOSE BLD-MCNC: 142 MG/DL (ref 75–110)
GLUCOSE BLD-MCNC: 167 MG/DL (ref 75–110)

## 2020-08-28 PROCEDURE — 96372 THER/PROPH/DIAG INJ SC/IM: CPT

## 2020-08-28 PROCEDURE — 82947 ASSAY GLUCOSE BLOOD QUANT: CPT

## 2020-08-28 PROCEDURE — 6360000002 HC RX W HCPCS: Performed by: STUDENT IN AN ORGANIZED HEALTH CARE EDUCATION/TRAINING PROGRAM

## 2020-08-28 PROCEDURE — G0378 HOSPITAL OBSERVATION PER HR: HCPCS

## 2020-08-28 PROCEDURE — 2580000003 HC RX 258: Performed by: STUDENT IN AN ORGANIZED HEALTH CARE EDUCATION/TRAINING PROGRAM

## 2020-08-28 PROCEDURE — 6370000000 HC RX 637 (ALT 250 FOR IP): Performed by: STUDENT IN AN ORGANIZED HEALTH CARE EDUCATION/TRAINING PROGRAM

## 2020-08-28 PROCEDURE — 99225 PR SBSQ OBSERVATION CARE/DAY 25 MINUTES: CPT | Performed by: FAMILY MEDICINE

## 2020-08-28 RX ORDER — METOPROLOL TARTRATE 50 MG/1
50 TABLET, FILM COATED ORAL 2 TIMES DAILY
Status: DISCONTINUED | OUTPATIENT
Start: 2020-08-28 | End: 2020-08-28 | Stop reason: HOSPADM

## 2020-08-28 RX ORDER — METOPROLOL TARTRATE 50 MG/1
50 TABLET, FILM COATED ORAL 2 TIMES DAILY
Qty: 60 TABLET | Refills: 3 | Status: SHIPPED | OUTPATIENT
Start: 2020-08-28 | End: 2021-12-10

## 2020-08-28 RX ORDER — FAMOTIDINE 20 MG/1
20 TABLET, FILM COATED ORAL DAILY
Qty: 60 TABLET | Refills: 3 | Status: ON HOLD | OUTPATIENT
Start: 2020-08-29 | End: 2020-12-07 | Stop reason: HOSPADM

## 2020-08-28 RX ADMIN — ASPIRIN 81 MG: 81 TABLET, CHEWABLE ORAL at 08:21

## 2020-08-28 RX ADMIN — ACETAMINOPHEN 650 MG: 325 TABLET ORAL at 08:21

## 2020-08-28 RX ADMIN — INSULIN LISPRO 2 UNITS: 100 INJECTION, SOLUTION INTRAVENOUS; SUBCUTANEOUS at 08:23

## 2020-08-28 RX ADMIN — POLYETHYLENE GLYCOL 3350 17 G: 17 POWDER, FOR SOLUTION ORAL at 08:21

## 2020-08-28 RX ADMIN — RANOLAZINE 500 MG: 500 TABLET, FILM COATED, EXTENDED RELEASE ORAL at 08:21

## 2020-08-28 RX ADMIN — GABAPENTIN 100 MG: 100 CAPSULE ORAL at 13:24

## 2020-08-28 RX ADMIN — ATORVASTATIN CALCIUM 20 MG: 20 TABLET, FILM COATED ORAL at 08:21

## 2020-08-28 RX ADMIN — FAMOTIDINE 20 MG: 20 TABLET, FILM COATED ORAL at 08:20

## 2020-08-28 RX ADMIN — CARVEDILOL 6.25 MG: 12.5 TABLET, FILM COATED ORAL at 08:21

## 2020-08-28 RX ADMIN — HYDROCHLOROTHIAZIDE 25 MG: 25 TABLET ORAL at 08:20

## 2020-08-28 RX ADMIN — HEPARIN SODIUM 5000 UNITS: 5000 INJECTION INTRAVENOUS; SUBCUTANEOUS at 06:39

## 2020-08-28 RX ADMIN — AMLODIPINE BESYLATE 10 MG: 10 TABLET ORAL at 08:20

## 2020-08-28 RX ADMIN — INSULIN LISPRO 2 UNITS: 100 INJECTION, SOLUTION INTRAVENOUS; SUBCUTANEOUS at 11:43

## 2020-08-28 RX ADMIN — Medication 10 ML: at 08:22

## 2020-08-28 RX ADMIN — ISOSORBIDE MONONITRATE 30 MG: 30 TABLET ORAL at 08:20

## 2020-08-28 RX ADMIN — GABAPENTIN 100 MG: 100 CAPSULE ORAL at 08:20

## 2020-08-28 ASSESSMENT — ENCOUNTER SYMPTOMS
DIARRHEA: 0
VOMITING: 0
COUGH: 0
CHEST TIGHTNESS: 0
WHEEZING: 0
ABDOMINAL PAIN: 0
CONSTIPATION: 0
NAUSEA: 0
SHORTNESS OF BREATH: 0

## 2020-08-28 ASSESSMENT — PAIN SCALES - GENERAL: PAINLEVEL_OUTOF10: 10

## 2020-08-28 NOTE — PROGRESS NOTES
[address]  Occupational Therapy Not Seen Note     Patient not available for Occupational Therapy due to:     [] Testing:     [] Hemodialysis     [] Blood Transfusion in Progress     [x]Refusal by Patient: pt reports 10/10 pain at this time, refusing eval.     [] Surgery/Procedure:     [] Strict Bedrest     [] Sedation     [] Spine Precautions      [] Pt being transferred to palliative care at this time. Spoke with pt/family and OT services to be defered.     [] Pt independent with functional mobility and functional tasks.  Pt with no OT acute care needs at this time, will defer OT eval.     [] Other    Next Scheduled Treatment: 8/28/2020 PM as able, or 8/29/2020     Signature: FRANCK Spencer/L

## 2020-08-28 NOTE — PROGRESS NOTES
input(s): CHOL, HDL in the last 72 hours. Invalid input(s): LDLCALCU  INR: No results for input(s): INR in the last 72 hours. Objective:   Vitals: BP (!) 147/82   Pulse 79   Temp 97.8 °F (36.6 °C) (Temporal)   Resp 20   Ht 5' 10\" (1.778 m)   Wt 220 lb (99.8 kg)   SpO2 96%   BMI 31.57 kg/m²   General appearance: alert and cooperative with exam  HEENT: Head: Normocephalic, no lesions, without obvious abnormality. Neck:no JVD, trachea midline, no adenopathy  Lungs: Clear to auscultation  Heart: Regular rate and rhythm, s1/s2 auscultated, no murmurs  Abdomen: soft, non-tender, bowel sounds active  Extremities: no edema  Neurologic: not done    ECHO: previously taken 2019 and show LVH with mild diastolic dysfunction  Ejection fraction: 55%    Echo 8/27/20  Summary  Left ventricle is normal in size with normal systolic function globally. Calculated ejection fraction is 52%. Prominent false tendon is noted. Mild to moderate left ventricular hypertrophy. Grade I (mild) left ventricular diastolic dysfunction. Left atrium is moderately dilated. Focal calcification on the posterior leaflet. Mild mitral regurgitation. Tricuspid valve structure is normal.  Mild tricuspid regurgitation. Estimated right ventricular systolic pressure is 24 mmHg. Stress Test 8/27/20  Impression    1. No definitive scintigraphic evidence for reversible ischemia or infarct. 2. Left ventricular ejection fraction of 44%.  Septal wall hypokinesis. 3.  Please see report for EKG portion of the examination which will be    performed separately by physician from cardiology. Risk stratification:  Intermediate risk          Assessment / Acute Cardiac Problems:   1. Chest pain  2. HTN  3. DM2  4.  CKD    Patient Active Problem List:     DM2 (diabetes mellitus, type 2) ([address]     Sciatica of right side     Atypical chest pain     Low back pain of thoracolumbar region with sciatica     Kidney stone     Chest pain     Need for prophylactic vaccination against diphtheria-tetanus-pertussis (DTP)     Gastroesophageal reflux disease     Tachycardia     Essential hypertension     Stage 3 chronic kidney disease ([facility]      Plan of Treatment:   1. Echo and stress reviewed. No ischemia on stress with intermed risk d/t EF - which is better on echo. Continue PO ASA, staitn, HCTZ, CCB. Will switch Coreg to Metoprolol for added rate control. 2. OK for discharge from CV standpoint. F/U in clinic in 2 weeks.      Electronically signed by JEOVANNY Markham CNP on 8/28/2020 at [address] [facility].  [phone]

## 2020-08-28 NOTE — PROGRESS NOTES
[address] [facility]  Progress Note    Date:   8/28/2020  Patient name:  Sowmya Underwood  Date of admission:  8/25/2020  6:01 PM  MRN:   [id number]  YOB: 1960    SUBJECTIVE/Last 24 hours update:     Day 4 Hospitalization:   Patient was seen and examined by bedside. No acute events overnight. Patient reports having a bowel movement last night. He reports epigastric chest pain shortly after his breakfast.  Denies fever/chills, nausea/vomiting, shortness of breath, abdominal pain. Cardiology has reviewed the stress test and echo. Sress test shows no ischemia with intermediate risk. Echo showed moderately dilated left atrium. Cardiology to follow-up in clinic in 2 weeks. Plan to discharge today    REVIEW OF SYSTEMS:      Review of Systems   Constitutional: Negative for activity change, appetite change, chills, diaphoresis and fever. HENT: Negative for congestion. Respiratory: Negative for cough, chest tightness, shortness of breath and wheezing. Cardiovascular: Positive for chest pain (epigastric). Negative for palpitations and leg swelling. Gastrointestinal: Negative for abdominal pain, constipation, diarrhea, nausea and vomiting. Genitourinary: Negative for difficulty urinating, dysuria and hematuria. Musculoskeletal: Negative for joint swelling. Skin: Negative for rash. Neurological: Negative for dizziness, weakness and headaches. Psychiatric/Behavioral: Negative for agitation and behavioral problems. PAST MEDICAL HISTORY:      has a past medical history of ADHD (attention deficit hyperactivity disorder), Depression, DM2 (diabetes mellitus, type 2) ([facility] Erectile dysfunction, Gastroesophageal reflux disease, Hyperlipidemia, Hypertension, Kidney stone, Low back pain of thoracolumbar region with sciatica, and Neuropathy. PAST SURGICAL HISTORY:      has a past surgical history that includes Tonsillectomy;  Cystoscopy (07/20/2018); pr cystoscopy,insert ureteral stent (Bilateral, 7/20/2018); Cystocopy (08/03/2018); and pr cysto/uretero/pyeloscopy, calculus tx (Bilateral, 8/3/2018). SOCIALHISTORY:      reports that he quit smoking about 21 months ago. His smoking use included cigarettes. He has a 15.00 pack-year smoking history. He has never used smokeless tobacco. He reports current alcohol use of about 12.0 standard drinks of alcohol per week. He reports that he does not use drugs. FAMILY HISTORY:      family history includes Diabetes in his mother; High Blood Pressure in his father. HOME MEDICATIONS:      Prior to Admission medications    Medication Sig Start Date End Date Taking? Authorizing Provider   gabapentin (NEURONTIN) 100 MG capsule Take 150 mg by mouth 3 times daily.    Yes Historical Provider, MD   glyBURIDE (DIABETA) 5 MG tablet Take 1 tablet by mouth daily (with breakfast) 2/26/20  Yes Merlene Buck MD   SITagliptin (JANUVIA) 50 MG tablet Take 1 tablet by mouth daily 1/14/20  Yes Danyell Berman MD   metFORMIN (GLUCOPHAGE) 500 MG tablet Take 1 tablet by mouth 2 times daily (with meals) 1/14/20  Yes Danyell Berman MD   amLODIPine (NORVASC) 10 MG tablet Take 1 tablet by mouth daily 1/14/20  Yes Danyell Berman MD   atorvastatin (LIPITOR) 20 MG tablet Take 1 tablet by mouth daily 1/14/20  Yes Danyell Berman MD   carvedilol (COREG) 6.25 MG tablet Take 1 tablet by mouth 2 times daily (with meals) 1/14/20  Yes Danyell Berman MD   acetaminophen (TYLENOL) 325 MG tablet Take 2 tablets by mouth every 6 hours as needed for Pain 1/14/20  Yes Danyell Berman MD   hydrochlorothiazide (HYDRODIURIL) 25 MG tablet Take 1 tablet by mouth daily 1/14/20 1/8/21 Yes Danyell Berman MD   isosorbide mononitrate (IMDUR) 30 MG extended release tablet Take 1 tablet by mouth daily 1/14/20  Yes Danyell Berman MD   ranolazine (RANEXA) 500 MG extended release tablet Take 1 tablet by mouth 2 times daily 1/14/20  Yes Marixa Schmidt Nelly Doshi MD   blood glucose monitor kit and supplies Test 2 times a day & as needed for symptoms of irregular blood glucose. 1/14/20  Yes John Shah MD   Lancets MISC 1 each by Does not apply route 2 times daily 1/14/20  Yes John Shah MD   blood glucose monitor strips Test 2 times a day & as needed for symptoms of irregular blood glucose. 1/14/20  Yes John Shah MD   SENNA-PLUS 8.6-50 MG per tablet Take 1 tablet by mouth daily 1/14/20   John Shah MD   aspirin 81 MG chewable tablet Take 1 tablet by mouth daily 1/14/20   John Shah MD   docusate sodium (COLACE) 100 MG capsule Take 1 capsule by mouth 2 times daily as needed for Constipation 1/14/20   John Shah MD   omeprazole (PRILOSEC) 20 MG delayed release capsule Take 1 capsule by mouth 2 times daily (before meals) 1/14/20   John Shah MD   oxybutynin (DITROPAN-XL) 10 MG extended release tablet Take 1 tablet by mouth daily 1/14/20 2/13/20  John Shah MD       ALLERGIES:     Patient has no known allergies. OBJECTIVE:       Vitals:    08/28/20 0008 08/28/20 0346 08/28/20 0641 08/28/20 0715   BP: (!) 142/79 139/70  (!) 147/82   Pulse: 82 79  79   Resp: 20 20  20   Temp: 99 °F (37.2 °C) 98.6 °F (37 °C)  97.8 °F (36.6 °C)   TempSrc: Oral Oral  Temporal   SpO2: 95% 94%  96%   Weight:   220 lb (99.8 kg)    Height:             Intake/Output Summary (Last 24 hours) at 8/28/2020 1257  Last data filed at 8/28/2020 0641  Gross per 24 hour   Intake 880 ml   Output 1200 ml   Net -320 ml       PHYSICAL EXAM:    Physical Exam  Vitals signs reviewed. Constitutional:       Appearance: He is obese. HENT:      Head: Normocephalic. Mouth/Throat:      Mouth: Mucous membranes are moist.   Cardiovascular:      Rate and Rhythm: Normal rate and regular rhythm. Pulses: Normal pulses. Heart sounds: Normal heart sounds. No murmur. No gallop.     Pulmonary:      Effort: Pulmonary effort is normal.      Breath sounds: Normal breath sounds. No rales. Abdominal:      General: Bowel sounds are normal.      Palpations: Abdomen is soft. Tenderness: There is no abdominal tenderness. There is no guarding. Musculoskeletal:      Right lower leg: No edema. Left lower leg: No edema. Skin:     General: Skin is warm and dry. Neurological:      Mental Status: He is alert and oriented to person, place, and time. ASSESSMENT:      Principal Problem:    Atypical chest pain  Active Problems:    DM2 (diabetes mellitus, type 2) ([facility])    Essential hypertension    Stage 3 chronic kidney disease ([facility]  Resolved Problems:    * No resolved hospital problems. *      PLAN:     Atypical chest pain, likely 2/2 to non-cardiac, r/o potential cardiac causes  -In the ER, EKG showed NSR, possible left atrial enlargement, septal infarct age undetermined  -Troponin trending down; 29, 22  -ECHO (12/2019): EF 71%, mild diastolic dysfunction  -Lexiscan stress test at [address] (8/2019): No ischemia, LVEF 44%  -KUB: Suggests constipation. No abnormal calcification in kidneys/ureters  -Continue Imdur and Ranexa  -Ordered dulcolax, bowel movement yesterday  -ECHO(8/25): EF 52%. Left atrium moderately dilated. Mild diastolic dysfunction.  -Lexiscan stress study: Negative  -NM cardiac stress test: No evidence of reversible ischemia/infarct. LVEF 44%. Risk stratification: intermediate risk.  -Cardiology onboard:  Reviewed echo and stress. Continue aspirin, statin, hydrochlorothiazide, calcium channel blocker. Change Coreg to metoprolol.   Follow-up in clinic in 2 weeks     JEFFREY  -Creatinine 2.54, 2.47 (base 1.6)  -IV fluid hydration  -Urine sodium and creatinine pending, calculate FeNa  -Avoid metformin, NSAIDs, nephrotoxic drugs, IV contrast or dyes     Diabetes Mellitus Type 2  -In the ER: Glucose 195  -HgbA1c (12/2019): 8.6  -HgbA1c (8/2020): 7.4  -Med ISS  -POCT   -Hypoglycemia protocol      Alcohol abuse  -Patient drinks two 24 ounce cans of beer

## 2020-08-28 NOTE — PROGRESS NOTES
Physical Therapy   DATE: 2020    NAME: Savanna Burrows  MRN: [id number]   : 1960    Patient not seen this date for Physical Therapy due to:  [] Blood transfusion in progress  [] Hemodialysis  []  Patient Declined  [] Spine Precautions   [] Strict Bedrest  [] Surgery/ Procedure  [] Testing      [] Other        [x] PT being discontinued at this time. Per nurse, patient independent. No further needs. [] PT being discontinued at this time as the patient has been transferred to palliative care. No further needs.     Jackie Weir, PT

## 2020-08-28 NOTE — PLAN OF CARE
Problem: Falls - Risk of:  Goal: Will remain free from falls  Description: Will remain free from falls  8/28/2020 0355 by Jana Kirk RN  Outcome: Met This Shift  Note: Patient's bed remained locked and in lowest position throughout shift. Patient belonings and call light remain within reach. 2/4 side rails are up, and non-skid footwear is on. Problem: Pain:  Description: Pain management should include both nonpharmacologic and pharmacologic interventions.   Goal: Pain level will decrease  Description: Pain level will decrease  Outcome: Ongoing     Problem: Cardiac:  Goal: Ability to maintain an adequate cardiac output will improve  Description: Ability to maintain an adequate cardiac output will improve  8/28/2020 0355 by Jana Kirk RN  Outcome: Ongoing  ,Electronically signed by Jana Kirk RN on 8/28/2020 at 4:00 AM

## 2020-08-28 NOTE — PROGRESS NOTES
CLINICAL PHARMACY NOTE: MEDS TO [address] Select Patient?: No  Total # of Prescriptions Filled: 2   The following medications were delivered to the patient:  · Famotidine  · Metoprolol Tartrate  Total # of Interventions Completed: 0  Time Spent (min): 5    Additional Documentation: meds delivered to patient 8/28 at 2:25pm

## 2020-08-28 NOTE — DISCHARGE SUMMARY
08/23/2019    ALT 15 08/26/2020    AST 20 08/26/2020     (L) 08/27/2020    K 4.6 08/27/2020     08/27/2020    CREATININE 2.62 (H) 08/27/2020    BUN 24 (H) 08/27/2020    CO2 20 08/27/2020    TSH 1.08 01/14/2020    INR 0.9 05/12/2016    LABA1C 7.4 (H) 08/26/2020    LABMICR 913 (H) 08/23/2019         Disposition:   home    Instructions to Patient:   - Take all medications as prescribed  - Follow up with PCP  - Follow up with cardiology in 2 weeks  - In case of any worsening condition please visit Emergency Room. Follow up with Yanna Fisher MD in  1 week    Yanna Fisher MD  [facility] [address] [place] [address] [id number] [place] [address] [facility]  [address] [place] [address]  [phone]  On 9/11/2020  for hospital follow-up at 2:45pm      Discharge Medications:     Francisco Egan   Home Medication Instructions REM:[id number]    Printed on:08/28/20 1430   Medication Information                      acetaminophen (TYLENOL) 325 MG tablet  Take 2 tablets by mouth every 6 hours as needed for Pain             amLODIPine (NORVASC) 10 MG tablet  Take 1 tablet by mouth daily             aspirin 81 MG chewable tablet  Take 1 tablet by mouth daily             atorvastatin (LIPITOR) 20 MG tablet  Take 1 tablet by mouth daily             blood glucose monitor kit and supplies  Test 2 times a day & as needed for symptoms of irregular blood glucose. blood glucose monitor strips  Test 2 times a day & as needed for symptoms of irregular blood glucose. docusate sodium (COLACE) 100 MG capsule  Take 1 capsule by mouth 2 times daily as needed for Constipation             famotidine (PEPCID) 20 MG tablet  Take 1 tablet by mouth daily             gabapentin (NEURONTIN) 100 MG capsule  Take 150 mg by mouth 3 times daily.              glyBURIDE (DIABETA) 5 MG tablet  Take 1 tablet by mouth daily (with breakfast)             hydrochlorothiazide (HYDRODIURIL) 25 MG

## 2020-08-31 ENCOUNTER — TELEPHONE (OUTPATIENT)
Dept: FAMILY MEDICINE CLINIC | Age: 60
End: 2020-08-31

## 2020-08-31 NOTE — TELEPHONE ENCOUNTER
[facility] Transitions Initial Follow Up Call    Call within 2 business days of discharge: Yes     Patient: Woo Soto Patient : 1960 MRN: [id number]    [unfilled]    RARS: Readmission Risk Score: 23       Spoke with:  Spoke with pt he states he is doing well no more chest pain since being discharged from [facility]. Pt states he is taking all his current medications and thinks he needs refills but doesn't know which ones. Advised to call his pharmacy when he can look at pills. Pt is agreeable to appointment and that was make for pt.     Discharge department/facility: [address] [place] [address] services provided:  Scheduled appointment with PCP- 2020 @ 345 pm  Obtained and reviewed discharge summary and/or continuity of care documents    Follow Up  Future Appointments   Date Time Provider Armin Rocha   2020  3:45 PM MD Heena Madden 157, LPN

## 2020-09-14 RX ORDER — GABAPENTIN 100 MG/1
CAPSULE ORAL
Qty: 90 CAPSULE | Refills: 0 | Status: SHIPPED | OUTPATIENT
Start: 2020-09-14 | End: 2020-11-27 | Stop reason: SDUPTHER

## 2020-09-14 NOTE — TELEPHONE ENCOUNTER
Last Visit Date:  9-25-19  Next Visit Date:  Visit date not found    Hemoglobin A1C (%)   Date Value   08/26/2020 7.4 (H)   12/20/2019 8.9 (H)   08/23/2019 8.5             ( goal A1C is < 7)   Microalb/Crt.  Ratio (mcg/mg creat)   Date Value   08/23/2019 913 (H)     LDL Cholesterol (mg/dL)   Date Value   08/23/2019 46       (goal LDL is <100)   AST (U/L)   Date Value   08/26/2020 20     ALT (U/L)   Date Value   08/26/2020 15     BUN (mg/dL)   Date Value   08/27/2020 24 (H)     BP Readings from Last 3 Encounters:   08/28/20 (!) 147/82   01/14/20 (!) 148/93   12/21/19 139/77          (goal 120/80)        Patient Active Problem List:     DM2 (diabetes mellitus, type 2) (H[facility])     Sciatica of right side     Atypical chest pain     Low back pain of thoracolumbar region with sciatica     Kidney stone     Chest pain     Need for prophylactic vaccination against diphtheria-tetanus-pertussis (DTP)     Gastroesophageal reflux disease     Tachycardia     Essential hypertension     Stage 3 chronic kidney disease ([facility]      ----[place] Linear

## 2020-11-23 ENCOUNTER — TELEPHONE (OUTPATIENT)
Dept: FAMILY MEDICINE CLINIC | Age: 60
End: 2020-11-23

## 2020-11-23 NOTE — TELEPHONE ENCOUNTER
Pt came into office and would like refills on the following    Acetaminophen  Gabapentin       Would like them called into [facility] pharmacy. Did schedule an appointment but stated he only wants to see Dr. Roldan Courser.  Appointment in on 12/14/20

## 2020-11-27 RX ORDER — GABAPENTIN 100 MG/1
CAPSULE ORAL
Qty: 90 CAPSULE | Refills: 0 | Status: SHIPPED | OUTPATIENT
Start: 2020-11-27 | End: 2021-03-11

## 2020-11-27 RX ORDER — ACETAMINOPHEN 325 MG/1
650 TABLET ORAL EVERY 6 HOURS PRN
Qty: 120 TABLET | Refills: 0 | Status: SHIPPED | OUTPATIENT
Start: 2020-11-27 | End: 2021-02-11

## 2020-12-01 ENCOUNTER — HOSPITAL ENCOUNTER (EMERGENCY)
Age: 60
Discharge: HOME | End: 2020-12-01
Attending: EMERGENCY MEDICINE
Payer: MEDICAID

## 2020-12-01 VITALS
OXYGEN SATURATION: 99 % | HEART RATE: 80 BPM | HEIGHT: 70 IN | BODY MASS INDEX: 31.5 KG/M2 | DIASTOLIC BLOOD PRESSURE: 72 MMHG | WEIGHT: 220 LBS | RESPIRATION RATE: 18 BRPM | TEMPERATURE: 97.2 F | SYSTOLIC BLOOD PRESSURE: 151 MMHG

## 2020-12-04 ENCOUNTER — APPOINTMENT (OUTPATIENT)
Dept: ULTRASOUND IMAGING | Age: 60
DRG: 469 | End: 2020-12-04
Payer: MEDICAID

## 2020-12-04 ENCOUNTER — APPOINTMENT (OUTPATIENT)
Dept: GENERAL RADIOLOGY | Age: 60
DRG: 469 | End: 2020-12-04
Payer: MEDICAID

## 2020-12-04 ENCOUNTER — HOSPITAL ENCOUNTER (INPATIENT)
Age: 60
LOS: 3 days | Discharge: HOME | DRG: 469 | End: 2020-12-07
Attending: EMERGENCY MEDICINE | Admitting: STUDENT IN AN ORGANIZED HEALTH CARE EDUCATION/TRAINING PROGRAM
Payer: MEDICAID

## 2020-12-04 PROBLEM — N17.9 ACUTE KIDNEY INJURY SUPERIMPOSED ON CKD (HCC): Status: ACTIVE | Noted: 2020-12-04

## 2020-12-04 PROBLEM — N18.9 ACUTE KIDNEY INJURY SUPERIMPOSED ON CKD (HCC): Status: ACTIVE | Noted: 2020-12-04

## 2020-12-04 PROBLEM — I21.4 NSTEMI (NON-ST ELEVATED MYOCARDIAL INFARCTION) (HCC): Status: ACTIVE | Noted: 2020-12-04

## 2020-12-04 PROBLEM — D64.9 ANEMIA: Status: ACTIVE | Noted: 2020-12-04

## 2020-12-04 LAB
ABSOLUTE EOS #: 0.09 K/UL (ref 0–0.44)
ABSOLUTE IMMATURE GRANULOCYTE: 0.03 K/UL (ref 0–0.3)
ABSOLUTE LYMPH #: 1.96 K/UL (ref 1.1–3.7)
ABSOLUTE MONO #: 0.75 K/UL (ref 0.1–1.2)
ABSOLUTE RETIC #: 0.14 M/UL (ref 0.03–0.08)
ANION GAP SERPL CALCULATED.3IONS-SCNC: 14 MMOL/L (ref 9–17)
BASOPHILS # BLD: 1 % (ref 0–2)
BASOPHILS ABSOLUTE: 0.04 K/UL (ref 0–0.2)
BUN BLDV-MCNC: 60 MG/DL (ref 8–23)
BUN/CREAT BLD: ABNORMAL (ref 9–20)
CALCIUM SERPL-MCNC: 8.2 MG/DL (ref 8.6–10.4)
CHLORIDE BLD-SCNC: 99 MMOL/L (ref 98–107)
CHP ED QC CHECK: YES
CO2: 20 MMOL/L (ref 20–31)
CREAT SERPL-MCNC: 4.24 MG/DL (ref 0.7–1.2)
CREATININE URINE: 80 MG/DL (ref 39–259)
DIFFERENTIAL TYPE: ABNORMAL
EOSINOPHILS RELATIVE PERCENT: 1 % (ref 1–4)
GFR AFRICAN AMERICAN: 17 ML/MIN
GFR NON-AFRICAN AMERICAN: 14 ML/MIN
GFR SERPL CREATININE-BSD FRML MDRD: ABNORMAL ML/MIN/{1.73_M2}
GFR SERPL CREATININE-BSD FRML MDRD: ABNORMAL ML/MIN/{1.73_M2}
GLUCOSE BLD-MCNC: 249 MG/DL
GLUCOSE BLD-MCNC: 249 MG/DL (ref 75–110)
GLUCOSE BLD-MCNC: 305 MG/DL (ref 75–110)
GLUCOSE BLD-MCNC: 510 MG/DL (ref 70–99)
HCT VFR BLD CALC: 20.2 % (ref 40.7–50.3)
HCT VFR BLD CALC: 21.8 % (ref 40.7–50.3)
HEMOGLOBIN: 6.5 G/DL (ref 13–17)
HEMOGLOBIN: 7 G/DL (ref 13–17)
IMMATURE GRANULOCYTES: 0 %
IMMATURE RETIC FRACT: 18.6 % (ref 2.7–18.3)
LYMPHOCYTES # BLD: 28 % (ref 24–43)
MCH RBC QN AUTO: 29.4 PG (ref 25.2–33.5)
MCHC RBC AUTO-ENTMCNC: 32.1 G/DL (ref 28.4–34.8)
MCV RBC AUTO: 91.6 FL (ref 82.6–102.9)
MONOCYTES # BLD: 11 % (ref 3–12)
NRBC AUTOMATED: 0 PER 100 WBC
PDW BLD-RTO: 15 % (ref 11.8–14.4)
PLATELET # BLD: 325 K/UL (ref 138–453)
PLATELET ESTIMATE: ABNORMAL
PMV BLD AUTO: 11.4 FL (ref 8.1–13.5)
POTASSIUM SERPL-SCNC: 4.5 MMOL/L (ref 3.7–5.3)
RBC # BLD: 2.38 M/UL (ref 4.21–5.77)
RBC # BLD: ABNORMAL 10*6/UL
RETIC %: 6.6 % (ref 0.5–1.9)
RETIC HEMOGLOBIN: 33.6 PG (ref 28.2–35.7)
SEG NEUTROPHILS: 59 % (ref 36–65)
SEGMENTED NEUTROPHILS ABSOLUTE COUNT: 4.1 K/UL (ref 1.5–8.1)
SODIUM BLD-SCNC: 133 MMOL/L (ref 135–144)
SODIUM,UR: 49 MMOL/L
TROPONIN INTERP: ABNORMAL
TROPONIN INTERP: ABNORMAL
TROPONIN T: ABNORMAL NG/ML
TROPONIN T: ABNORMAL NG/ML
TROPONIN, HIGH SENSITIVITY: 37 NG/L (ref 0–22)
TROPONIN, HIGH SENSITIVITY: 43 NG/L (ref 0–22)
WBC # BLD: 7 K/UL (ref 3.5–11.3)
WBC # BLD: ABNORMAL 10*3/UL

## 2020-12-04 PROCEDURE — 85014 HEMATOCRIT: CPT

## 2020-12-04 PROCEDURE — P9016 RBC LEUKOCYTES REDUCED: HCPCS

## 2020-12-04 PROCEDURE — 93005 ELECTROCARDIOGRAM TRACING: CPT | Performed by: STUDENT IN AN ORGANIZED HEALTH CARE EDUCATION/TRAINING PROGRAM

## 2020-12-04 PROCEDURE — 99284 EMERGENCY DEPT VISIT MOD MDM: CPT

## 2020-12-04 PROCEDURE — 85018 HEMOGLOBIN: CPT

## 2020-12-04 PROCEDURE — 83036 HEMOGLOBIN GLYCOSYLATED A1C: CPT

## 2020-12-04 PROCEDURE — 82570 ASSAY OF URINE CREATININE: CPT

## 2020-12-04 PROCEDURE — 85045 AUTOMATED RETICULOCYTE COUNT: CPT

## 2020-12-04 PROCEDURE — 85025 COMPLETE CBC W/AUTO DIFF WBC: CPT

## 2020-12-04 PROCEDURE — 2580000003 HC RX 258: Performed by: STUDENT IN AN ORGANIZED HEALTH CARE EDUCATION/TRAINING PROGRAM

## 2020-12-04 PROCEDURE — 83550 IRON BINDING TEST: CPT

## 2020-12-04 PROCEDURE — 83540 ASSAY OF IRON: CPT

## 2020-12-04 PROCEDURE — 80048 BASIC METABOLIC PNL TOTAL CA: CPT

## 2020-12-04 PROCEDURE — G0328 FECAL BLOOD SCRN IMMUNOASSAY: HCPCS

## 2020-12-04 PROCEDURE — 36430 TRANSFUSION BLD/BLD COMPNT: CPT

## 2020-12-04 PROCEDURE — 2580000003 HC RX 258: Performed by: EMERGENCY MEDICINE

## 2020-12-04 PROCEDURE — 86901 BLOOD TYPING SEROLOGIC RH(D): CPT

## 2020-12-04 PROCEDURE — 86850 RBC ANTIBODY SCREEN: CPT

## 2020-12-04 PROCEDURE — 6360000002 HC RX W HCPCS: Performed by: STUDENT IN AN ORGANIZED HEALTH CARE EDUCATION/TRAINING PROGRAM

## 2020-12-04 PROCEDURE — 76770 US EXAM ABDO BACK WALL COMP: CPT

## 2020-12-04 PROCEDURE — 82728 ASSAY OF FERRITIN: CPT

## 2020-12-04 PROCEDURE — 2060000000 HC ICU INTERMEDIATE R&B

## 2020-12-04 PROCEDURE — 93005 ELECTROCARDIOGRAM TRACING: CPT | Performed by: EMERGENCY MEDICINE

## 2020-12-04 PROCEDURE — 84300 ASSAY OF URINE SODIUM: CPT

## 2020-12-04 PROCEDURE — 6370000000 HC RX 637 (ALT 250 FOR IP): Performed by: STUDENT IN AN ORGANIZED HEALTH CARE EDUCATION/TRAINING PROGRAM

## 2020-12-04 PROCEDURE — 86920 COMPATIBILITY TEST SPIN: CPT

## 2020-12-04 PROCEDURE — 82947 ASSAY GLUCOSE BLOOD QUANT: CPT

## 2020-12-04 PROCEDURE — 86900 BLOOD TYPING SEROLOGIC ABO: CPT

## 2020-12-04 PROCEDURE — 6370000000 HC RX 637 (ALT 250 FOR IP): Performed by: EMERGENCY MEDICINE

## 2020-12-04 PROCEDURE — 71046 X-RAY EXAM CHEST 2 VIEWS: CPT

## 2020-12-04 PROCEDURE — 84484 ASSAY OF TROPONIN QUANT: CPT

## 2020-12-04 PROCEDURE — 2500000003 HC RX 250 WO HCPCS: Performed by: STUDENT IN AN ORGANIZED HEALTH CARE EDUCATION/TRAINING PROGRAM

## 2020-12-04 RX ORDER — METOPROLOL TARTRATE 5 MG/5ML
5 INJECTION INTRAVENOUS EVERY 6 HOURS PRN
Status: DISCONTINUED | OUTPATIENT
Start: 2020-12-04 | End: 2020-12-04

## 2020-12-04 RX ORDER — SODIUM CHLORIDE 9 MG/ML
10 INJECTION INTRAVENOUS DAILY
Status: DISCONTINUED | OUTPATIENT
Start: 2020-12-04 | End: 2020-12-04

## 2020-12-04 RX ORDER — PANTOPRAZOLE SODIUM 40 MG/10ML
40 INJECTION, POWDER, LYOPHILIZED, FOR SOLUTION INTRAVENOUS 2 TIMES DAILY
Status: DISCONTINUED | OUTPATIENT
Start: 2020-12-04 | End: 2020-12-04

## 2020-12-04 RX ORDER — 0.9 % SODIUM CHLORIDE 0.9 %
20 INTRAVENOUS SOLUTION INTRAVENOUS ONCE
Status: COMPLETED | OUTPATIENT
Start: 2020-12-04 | End: 2020-12-05

## 2020-12-04 RX ORDER — NITROGLYCERIN 0.4 MG/1
0.4 TABLET SUBLINGUAL ONCE
Status: COMPLETED | OUTPATIENT
Start: 2020-12-04 | End: 2020-12-04

## 2020-12-04 RX ORDER — SODIUM CHLORIDE 9 MG/ML
INJECTION, SOLUTION INTRAVENOUS CONTINUOUS
Status: DISCONTINUED | OUTPATIENT
Start: 2020-12-04 | End: 2020-12-07 | Stop reason: HOSPADM

## 2020-12-04 RX ORDER — MORPHINE SULFATE 4 MG/ML
2 INJECTION, SOLUTION INTRAMUSCULAR; INTRAVENOUS EVERY 6 HOURS PRN
Status: DISCONTINUED | OUTPATIENT
Start: 2020-12-04 | End: 2020-12-07 | Stop reason: HOSPADM

## 2020-12-04 RX ORDER — ASPIRIN 81 MG/1
324 TABLET, CHEWABLE ORAL ONCE
Status: COMPLETED | OUTPATIENT
Start: 2020-12-04 | End: 2020-12-04

## 2020-12-04 RX ORDER — DEXTROSE MONOHYDRATE 25 G/50ML
12.5 INJECTION, SOLUTION INTRAVENOUS PRN
Status: DISCONTINUED | OUTPATIENT
Start: 2020-12-04 | End: 2020-12-07 | Stop reason: HOSPADM

## 2020-12-04 RX ORDER — SODIUM CHLORIDE 0.9 % (FLUSH) 0.9 %
10 SYRINGE (ML) INJECTION EVERY 12 HOURS SCHEDULED
Status: DISCONTINUED | OUTPATIENT
Start: 2020-12-04 | End: 2020-12-07 | Stop reason: HOSPADM

## 2020-12-04 RX ORDER — PROMETHAZINE HYDROCHLORIDE 12.5 MG/1
12.5 TABLET ORAL EVERY 6 HOURS PRN
Status: DISCONTINUED | OUTPATIENT
Start: 2020-12-04 | End: 2020-12-07 | Stop reason: HOSPADM

## 2020-12-04 RX ORDER — HEPARIN SODIUM 5000 [USP'U]/ML
5000 INJECTION, SOLUTION INTRAVENOUS; SUBCUTANEOUS EVERY 8 HOURS SCHEDULED
Status: CANCELLED | OUTPATIENT
Start: 2020-12-04

## 2020-12-04 RX ORDER — SODIUM CHLORIDE 0.9 % (FLUSH) 0.9 %
10 SYRINGE (ML) INJECTION PRN
Status: DISCONTINUED | OUTPATIENT
Start: 2020-12-04 | End: 2020-12-07 | Stop reason: HOSPADM

## 2020-12-04 RX ORDER — DEXTROSE MONOHYDRATE 50 MG/ML
100 INJECTION, SOLUTION INTRAVENOUS PRN
Status: DISCONTINUED | OUTPATIENT
Start: 2020-12-04 | End: 2020-12-07 | Stop reason: HOSPADM

## 2020-12-04 RX ORDER — NICOTINE POLACRILEX 4 MG
15 LOZENGE BUCCAL PRN
Status: DISCONTINUED | OUTPATIENT
Start: 2020-12-04 | End: 2020-12-07 | Stop reason: HOSPADM

## 2020-12-04 RX ORDER — POTASSIUM CHLORIDE 7.45 MG/ML
10 INJECTION INTRAVENOUS PRN
Status: DISCONTINUED | OUTPATIENT
Start: 2020-12-04 | End: 2020-12-07 | Stop reason: HOSPADM

## 2020-12-04 RX ORDER — METOPROLOL TARTRATE 5 MG/5ML
5 INJECTION INTRAVENOUS EVERY 6 HOURS PRN
Status: DISCONTINUED | OUTPATIENT
Start: 2020-12-04 | End: 2020-12-07 | Stop reason: HOSPADM

## 2020-12-04 RX ORDER — ACETAMINOPHEN 325 MG/1
650 TABLET ORAL EVERY 4 HOURS PRN
Status: DISCONTINUED | OUTPATIENT
Start: 2020-12-04 | End: 2020-12-07 | Stop reason: HOSPADM

## 2020-12-04 RX ORDER — ONDANSETRON 2 MG/ML
4 INJECTION INTRAMUSCULAR; INTRAVENOUS EVERY 6 HOURS PRN
Status: DISCONTINUED | OUTPATIENT
Start: 2020-12-04 | End: 2020-12-07 | Stop reason: HOSPADM

## 2020-12-04 RX ADMIN — MORPHINE SULFATE 2 MG: 4 INJECTION INTRAVENOUS at 20:01

## 2020-12-04 RX ADMIN — NITROGLYCERIN 0.4 MG: 0.4 TABLET SUBLINGUAL at 14:53

## 2020-12-04 RX ADMIN — METOPROLOL TARTRATE 5 MG: 1 INJECTION, SOLUTION INTRAVENOUS at 21:09

## 2020-12-04 RX ADMIN — ASPIRIN 324 MG: 81 TABLET, CHEWABLE ORAL at 14:52

## 2020-12-04 RX ADMIN — SODIUM CHLORIDE, PRESERVATIVE FREE 10 ML: 5 INJECTION INTRAVENOUS at 21:18

## 2020-12-04 RX ADMIN — SODIUM CHLORIDE, PRESERVATIVE FREE 10 ML: 5 INJECTION INTRAVENOUS at 21:13

## 2020-12-04 RX ADMIN — ACETAMINOPHEN 650 MG: 325 TABLET ORAL at 20:00

## 2020-12-04 RX ADMIN — SODIUM CHLORIDE: 9 INJECTION, SOLUTION INTRAVENOUS at 18:08

## 2020-12-04 RX ADMIN — INSULIN LISPRO 3 UNITS: 100 INJECTION, SOLUTION INTRAVENOUS; SUBCUTANEOUS at 21:09

## 2020-12-04 RX ADMIN — PROMETHAZINE HYDROCHLORIDE 12.5 MG: 12.5 TABLET ORAL at 20:00

## 2020-12-04 ASSESSMENT — ENCOUNTER SYMPTOMS
CONSTIPATION: 1
SHORTNESS OF BREATH: 1
NAUSEA: 0
ABDOMINAL PAIN: 0
SHORTNESS OF BREATH: 0
DIARRHEA: 0
CONSTIPATION: 0
SORE THROAT: 0
COUGH: 0
ABDOMINAL PAIN: 1
WHEEZING: 0
VOMITING: 0

## 2020-12-04 ASSESSMENT — PAIN SCALES - GENERAL
PAINLEVEL_OUTOF10: 6
PAINLEVEL_OUTOF10: 7
PAINLEVEL_OUTOF10: 7

## 2020-12-04 ASSESSMENT — PAIN DESCRIPTION - PAIN TYPE: TYPE: ACUTE PAIN

## 2020-12-04 ASSESSMENT — PAIN DESCRIPTION - LOCATION: LOCATION: CHEST;BACK

## 2020-12-04 NOTE — ED NOTES
Pt to ED from triage with midsternal intermittent CP since July, worse today. Pt c/o bilat leg weakness when the CP starts, CP worse on exertion. Pt states he has not taken any meds since July of this year. Pt states he was helping his friend move, and his CP became worse and pt felt light headed. Pt states some mild SOB, VSS. Denies n/v/d, states some constipation with some blood on toilet paper when he wipes, states last BM was normal today. Pt on full cardiac monitor, EKG obtained, IV inserted, labs drawn and correctly labeled. Awaiting physician orders.      Jeanne Mireles RN  12/04/20 1347

## 2020-12-04 NOTE — ED NOTES
Report given to SHEFALI GONZALES, RN. All questions answered.      Cece Ramos, NAOMIE  12/04/20 [id number]
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
## 2020-12-04 NOTE — ED NOTES
Repeat trop correctly labeled and sent to lab via tube system. Type and screen drawn, awaiting physician orders to send to lab.      Janette Roca RN  12/04/20 9002

## 2020-12-04 NOTE — ED PROVIDER NOTES
FACULTY SIGN-OUT  ADDENDUM       Patient: Eliz Coto   MRN: [id number]  PCP:  Darnell Mckenzie MD  Attestation  I was available and discussed any additional care issues that arose and coordinated the management plans with the resident(s) caring for the patient during my duty period. Any areas of disagreement with resident's documentation of care or procedures are noted on the chart. I was personally present for the key portions of any/all procedures during my duty period. I have documented in the chart those procedures where I was not present during the key portions. The patient's initial evaluation and plan have been discussed with the prior provider who initially evaluated the patient. Pertinent Comments: The patient is a 61 y.o. male taken in signout with chest pain and generalized weakness and found to be in acute renal failure with anemia and troponin appears to be flat at this time with no ischemic EKG changes and normal potassium.     We are awaiting admission    ED COURSE      The patient was given the following medications:  Orders Placed This Encounter   Medications    aspirin chewable tablet 324 mg    nitroGLYCERIN (NITROSTAT) SL tablet 0.4 mg       RECENT VITALS:   BP: (!) 158/80  Pulse: 85  Resp: 14  Temp: 97.4 °F (36.3 °C) SpO2: 99 %    (Please note that portions of this note were completed with a voice recognition program.  Efforts were made to edit the dictations but occasionally words are mis-transcribed.)    MD Neto Rodriguez  Attending Emergency Medicine Physician       Hannah Patton MD  12/04/20 7834

## 2020-12-04 NOTE — H&P
[facility]  History & Physical Examination Note              Date:   12/4/2020  Patient name:  Esperanza Rankin  Date of admission:  12/4/2020  1:22 PM  MRN:   [id number]  YOB: 1960    CHIEF COMPLAINT:       Chief Complaint   Patient presents with    Chest Pain    Back Pain       History Obtained From:  Patient and chart review. HPI:     The patient is a 61 y.o.  male with history of tension, diabetes mellitus, hyperlipidemia, GERD, and recent alcohol use in the past 24 hours, who presented to the ED with chest pain. Patient reports chest pain started at 9 AM when patient was helping his friend move and did not resolve for 3 hours. Patient reports substernal constant, dull, squeezing, 7 out of 10 pain with weakness in right arm and shortness of breath. Patient reports having a similar pain in the past and was started on Imdur but patient is noncompliant with medications. Patient cannot differentiate whether this pain is from his heart or from gastric reflux. Patient reports dark stools on bright red blood on toilet paper when wiping. Patient has no history of colonoscopy. Patient denies any recent weight loss. Patient does report drinking 6 beers and 1/2 pint of gin last night. Patient reports he has decreasing alcohol since function but still drinks 4 times a week 3-6 beers. CAGE 1 out of 4. Patient also reports drug use and marijuana but denies any recent cocaine use. Labs: CBC- hgb- 7.0. CMP- BG-510, Cr- 4.24 (Baseline around 2). In the ED, EKG showed normal sinus rhythm and left ventricular hypertrophy. Troponins 37>43, CXR no acute findings and he is admitted to the hospital for chest pain. Cardiology was consulted and repeat EKG ordered and monitor Troponins.      PAST MEDICAL HISTORY:        has a past medical history of ADHD (attention deficit hyperactivity disorder), Depression, DM2 (diabetes mellitus, type 2) hydrochlorothiazide (HYDRODIURIL) 25 MG tablet Take 1 tablet by mouth daily 1/14/20 1/8/21  Jenna Ferrara MD   isosorbide mononitrate (IMDUR) 30 MG extended release tablet Take 1 tablet by mouth daily 1/14/20   Jenna Ferrara MD   omeprazole (PRILOSEC) 20 MG delayed release capsule Take 1 capsule by mouth 2 times daily (before meals) 1/14/20   Jenna Ferrara MD   oxybutynin (DITROPAN-XL) 10 MG extended release tablet Take 1 tablet by mouth daily 1/14/20 2/13/20  Jenna Ferrara MD   ranolazine (RANEXA) 500 MG extended release tablet Take 1 tablet by mouth 2 times daily 1/14/20   Jenna Ferrara MD   blood glucose monitor kit and supplies Test 2 times a day & as needed for symptoms of irregular blood glucose. 1/14/20   Jenna Ferrara MD   Lancets MISC 1 each by Does not apply route 2 times daily 1/14/20   Jenna Ferrara MD   blood glucose monitor strips Test 2 times a day & as needed for symptoms of irregular blood glucose. 1/14/20   Jenna Ferrara MD       ALLERGIES:      Patient has no known allergies. SOCIAL HISTORY:      reports that he quit smoking about 2 years ago. His smoking use included cigarettes. He has a 15.00 pack-year smoking history. He has never used smokeless tobacco. He reports current alcohol use of about 12.0 standard drinks of alcohol per week. He reports that he does not use drugs. REVIEW OF SYSTEMS:     Review of Systems   Constitutional: Positive for fatigue. Negative for fever. Eyes: Positive for visual disturbance. Respiratory: Negative for cough, shortness of breath and wheezing. Cardiovascular: Positive for chest pain. Negative for palpitations and leg swelling. Gastrointestinal: Positive for abdominal pain. Negative for constipation, diarrhea, nausea and vomiting. Endocrine: Negative. Genitourinary: Negative. Musculoskeletal: Negative. Skin: Negative. Neurological: Positive for weakness.  Negative for dizziness, seizures, syncope, light-headedness and headaches. PHYSICAL EXAM:     Vitals:    12/04/20 1332 12/04/20 1346 12/04/20 1401 12/04/20 1505   BP: (!) 155/91 (!) 150/79 (!) 158/80    Pulse: 90 88 84 85   Resp: 17 16 16 14   Temp:       TempSrc:       SpO2: 98% 97% 100% 99%       No intake or output data in the 24 hours ending 12/04/20 1758    Physical Exam  Constitutional:       Appearance: He is obese. Cardiovascular:      Rate and Rhythm: Normal rate and regular rhythm. Pulses: Normal pulses. Heart sounds: Normal heart sounds. Pulmonary:      Effort: Pulmonary effort is normal. No respiratory distress. Breath sounds: Normal breath sounds. Abdominal:      Palpations: Abdomen is soft. Tenderness: There is no abdominal tenderness. Musculoskeletal: Normal range of motion. Skin:     General: Skin is warm. Neurological:      Mental Status: He is alert and oriented to person, place, and time.            DIAGNOSTICS:      Laboratory Testing:    Recent Results (from the past 24 hour(s))   CBC Auto Differential    Collection Time: 12/04/20  2:01 PM   Result Value Ref Range    WBC 7.0 3.5 - 11.3 k/uL    RBC 2.38 (L) 4.21 - 5.77 m/uL    Hemoglobin 7.0 (LL) 13.0 - 17.0 g/dL    Hematocrit 21.8 (L) 40.7 - 50.3 %    MCV 91.6 82.6 - 102.9 fL    MCH 29.4 25.2 - 33.5 pg    MCHC 32.1 28.4 - 34.8 g/dL    RDW 15.0 (H) 11.8 - 14.4 %    Platelets 082 856 - 136 k/uL    MPV 11.4 8.1 - 13.5 fL    NRBC Automated 0.0 0.0 per 100 WBC    Differential Type NOT REPORTED     WBC Morphology NOT REPORTED     RBC Morphology ANISOCYTOSIS PRESENT     Platelet Estimate NOT REPORTED     Seg Neutrophils 59 36 - 65 %    Lymphocytes 28 24 - 43 %    Monocytes 11 3 - 12 %    Eosinophils % 1 1 - 4 %    Basophils 1 0 - 2 %    Immature Granulocytes 0 0 %    Segs Absolute 4.10 1.50 - 8.10 k/uL    Absolute Lymph # 1.96 1.10 - 3.70 k/uL    Absolute Mono # 0.75 0.10 - 1.20 k/uL    Absolute Eos # 0.09 0.00 - 0.44 k/uL    Basophils Absolute 0.04 0.00 *      PLAN:     Patient status: Admit the patient as Inpatient in the Progressive Unit     NSTEMI   -EKG  - ECHO 08/2020   - Stress test 08/25/2020: LVEF 44%, septal wall hypokinesis, stratification: Intermediate risk  -Troponins 37 > 43  -Continue to monitor troponins every 8 hours 2 times  -Repeat EKG at 1900 hrs. -Heparin drip held due to hemoglobin 7.0  -Cardiology consulted    JEFFREY and CKD stage III  -Creatinine 4.24 baseline around 2  -Ordered urine creatinine urine sodium  -Renal ultrasound  -Continue to monitor creatinine  -Started on normal saline  mL/hour    Anemia secondary to lower GI bleed versus chronic kidney disease  -Hemoglobin 7.0  -Continue to monitor H&H every 6 Hours  -Ordered ferritin, TIBC, iron, reticulocyte count  -FB OT  -GI consulted  - Protonix IV 40 mg twice daily DISCONTINUED until GI evaluation per pharmacy request.   -Transfusion of PRBC his hemoglobin falls below 7.0. Diabetes mellitus type II  -Blood glucose 510  -POCT glucose checks  -Home medications held  -HISS  -Ordered hemoglobin A1c  -Hypoglycemia protocol    Hypertension  -Started  metoprolol 5 mg IV Q6 PRN. -HELDamlodipine 10 mg, hydrochlorothiazide 25 mg, Imdur 30 mg ER,    Code: Full  Diet: N.p.o.   Dispo: SW to consult  DVT prophylaxis: Lovenox 40 mg subq  GI prophylaxis: Protonix 40 mg IV daily twice daily      Consultations:   Consults: IP CONSULT TO INTERNAL MEDICINE  IP CONSULT TO FAMILY MEDICINE  IP CONSULT TO CARDIOLOGY  IP CONSULT TO CARDIOLOGY  IP CONSULT TO GI  IP CONSULT TO SOCIAL WORK  PT/OT       The severity of this patient's signs and symptoms Chest pain indicate the need for an inpatient admission.       Above plan discussed with the patient who agreed to the above plan     Plan will be discussed with the attending, Dr. Jose Angel Hernandez MD  Family Medicine Resident  12/4/2020 5:58 PM

## 2020-12-05 PROBLEM — I21.A1 TYPE 2 MI (MYOCARDIAL INFARCTION) (HCC): Status: ACTIVE | Noted: 2020-12-04

## 2020-12-05 LAB
ANION GAP SERPL CALCULATED.3IONS-SCNC: 10 MMOL/L (ref 9–17)
BUN BLDV-MCNC: 48 MG/DL (ref 8–23)
BUN/CREAT BLD: ABNORMAL (ref 9–20)
CALCIUM SERPL-MCNC: 8.1 MG/DL (ref 8.6–10.4)
CHLORIDE BLD-SCNC: 108 MMOL/L (ref 98–107)
CO2: 18 MMOL/L (ref 20–31)
CREAT SERPL-MCNC: 3.79 MG/DL (ref 0.7–1.2)
FERRITIN: 221 UG/L (ref 30–400)
GFR AFRICAN AMERICAN: 20 ML/MIN
GFR NON-AFRICAN AMERICAN: 16 ML/MIN
GFR SERPL CREATININE-BSD FRML MDRD: ABNORMAL ML/MIN/{1.73_M2}
GFR SERPL CREATININE-BSD FRML MDRD: ABNORMAL ML/MIN/{1.73_M2}
GLUCOSE BLD-MCNC: 112 MG/DL (ref 75–110)
GLUCOSE BLD-MCNC: 144 MG/DL (ref 75–110)
GLUCOSE BLD-MCNC: 148 MG/DL (ref 70–99)
GLUCOSE BLD-MCNC: 189 MG/DL (ref 75–110)
GLUCOSE BLD-MCNC: 193 MG/DL (ref 75–110)
GLUCOSE BLD-MCNC: 229 MG/DL (ref 75–110)
GLUCOSE BLD-MCNC: 252 MG/DL (ref 75–110)
GLUCOSE BLD-MCNC: 270 MG/DL (ref 75–110)
GLUCOSE BLD-MCNC: 290 MG/DL (ref 75–110)
HCT VFR BLD CALC: 22.5 % (ref 40.7–50.3)
HCT VFR BLD CALC: 22.7 % (ref 40.7–50.3)
HCT VFR BLD CALC: 24 % (ref 40.7–50.3)
HCT VFR BLD CALC: 29.7 % (ref 40.7–50.3)
HCT VFR BLD CALC: 30 % (ref 40.7–50.3)
HEMOGLOBIN: 7.1 G/DL (ref 13–17)
HEMOGLOBIN: 7.1 G/DL (ref 13–17)
HEMOGLOBIN: 7.4 G/DL (ref 13–17)
HEMOGLOBIN: 9.5 G/DL (ref 13–17)
HEMOGLOBIN: 9.7 G/DL (ref 13–17)
IRON SATURATION: 26 % (ref 20–55)
IRON: 60 UG/DL (ref 59–158)
LV EF: 60 %
LVEF MODALITY: NORMAL
POTASSIUM SERPL-SCNC: 4.2 MMOL/L (ref 3.7–5.3)
SODIUM BLD-SCNC: 136 MMOL/L (ref 135–144)
TOTAL IRON BINDING CAPACITY: 230 UG/DL (ref 250–450)
TROPONIN INTERP: ABNORMAL
TROPONIN INTERP: ABNORMAL
TROPONIN T: ABNORMAL NG/ML
TROPONIN T: ABNORMAL NG/ML
TROPONIN, HIGH SENSITIVITY: 62 NG/L (ref 0–22)
TROPONIN, HIGH SENSITIVITY: 83 NG/L (ref 0–22)
UNSATURATED IRON BINDING CAPACITY: 170 UG/DL (ref 112–347)

## 2020-12-05 PROCEDURE — 93306 TTE W/DOPPLER COMPLETE: CPT

## 2020-12-05 PROCEDURE — 2580000003 HC RX 258: Performed by: EMERGENCY MEDICINE

## 2020-12-05 PROCEDURE — 6370000000 HC RX 637 (ALT 250 FOR IP): Performed by: EMERGENCY MEDICINE

## 2020-12-05 PROCEDURE — 80048 BASIC METABOLIC PNL TOTAL CA: CPT

## 2020-12-05 PROCEDURE — 6360000002 HC RX W HCPCS: Performed by: STUDENT IN AN ORGANIZED HEALTH CARE EDUCATION/TRAINING PROGRAM

## 2020-12-05 PROCEDURE — 36430 TRANSFUSION BLD/BLD COMPNT: CPT

## 2020-12-05 PROCEDURE — P9016 RBC LEUKOCYTES REDUCED: HCPCS

## 2020-12-05 PROCEDURE — 2060000000 HC ICU INTERMEDIATE R&B

## 2020-12-05 PROCEDURE — 99223 1ST HOSP IP/OBS HIGH 75: CPT | Performed by: INTERNAL MEDICINE

## 2020-12-05 PROCEDURE — 84484 ASSAY OF TROPONIN QUANT: CPT

## 2020-12-05 PROCEDURE — 99223 1ST HOSP IP/OBS HIGH 75: CPT | Performed by: STUDENT IN AN ORGANIZED HEALTH CARE EDUCATION/TRAINING PROGRAM

## 2020-12-05 PROCEDURE — 6370000000 HC RX 637 (ALT 250 FOR IP): Performed by: NURSE PRACTITIONER

## 2020-12-05 PROCEDURE — 85014 HEMATOCRIT: CPT

## 2020-12-05 PROCEDURE — C9113 INJ PANTOPRAZOLE SODIUM, VIA: HCPCS | Performed by: STUDENT IN AN ORGANIZED HEALTH CARE EDUCATION/TRAINING PROGRAM

## 2020-12-05 PROCEDURE — 2580000003 HC RX 258: Performed by: STUDENT IN AN ORGANIZED HEALTH CARE EDUCATION/TRAINING PROGRAM

## 2020-12-05 PROCEDURE — 6370000000 HC RX 637 (ALT 250 FOR IP): Performed by: STUDENT IN AN ORGANIZED HEALTH CARE EDUCATION/TRAINING PROGRAM

## 2020-12-05 PROCEDURE — 82947 ASSAY GLUCOSE BLOOD QUANT: CPT

## 2020-12-05 PROCEDURE — 85018 HEMOGLOBIN: CPT

## 2020-12-05 PROCEDURE — 86900 BLOOD TYPING SEROLOGIC ABO: CPT

## 2020-12-05 PROCEDURE — 36415 COLL VENOUS BLD VENIPUNCTURE: CPT

## 2020-12-05 RX ORDER — 0.9 % SODIUM CHLORIDE 0.9 %
20 INTRAVENOUS SOLUTION INTRAVENOUS ONCE
Status: COMPLETED | OUTPATIENT
Start: 2020-12-05 | End: 2020-12-05

## 2020-12-05 RX ORDER — HYDROCHLOROTHIAZIDE 25 MG/1
25 TABLET ORAL DAILY
Status: DISCONTINUED | OUTPATIENT
Start: 2020-12-05 | End: 2020-12-07 | Stop reason: HOSPADM

## 2020-12-05 RX ORDER — METOPROLOL TARTRATE 50 MG/1
50 TABLET, FILM COATED ORAL 2 TIMES DAILY
Status: DISCONTINUED | OUTPATIENT
Start: 2020-12-05 | End: 2020-12-07 | Stop reason: HOSPADM

## 2020-12-05 RX ORDER — ATORVASTATIN CALCIUM 20 MG/1
20 TABLET, FILM COATED ORAL NIGHTLY
Status: DISCONTINUED | OUTPATIENT
Start: 2020-12-05 | End: 2020-12-07 | Stop reason: HOSPADM

## 2020-12-05 RX ORDER — AMLODIPINE BESYLATE 10 MG/1
10 TABLET ORAL DAILY
Status: DISCONTINUED | OUTPATIENT
Start: 2020-12-05 | End: 2020-12-07 | Stop reason: HOSPADM

## 2020-12-05 RX ORDER — PANTOPRAZOLE SODIUM 40 MG/10ML
40 INJECTION, POWDER, LYOPHILIZED, FOR SOLUTION INTRAVENOUS 2 TIMES DAILY
Status: DISCONTINUED | OUTPATIENT
Start: 2020-12-05 | End: 2020-12-05

## 2020-12-05 RX ORDER — PANTOPRAZOLE SODIUM 40 MG/1
40 TABLET, DELAYED RELEASE ORAL
Status: DISCONTINUED | OUTPATIENT
Start: 2020-12-05 | End: 2020-12-07 | Stop reason: HOSPADM

## 2020-12-05 RX ORDER — POLYETHYLENE GLYCOL 3350 17 G/17G
17 POWDER, FOR SOLUTION ORAL DAILY
Status: DISCONTINUED | OUTPATIENT
Start: 2020-12-05 | End: 2020-12-07 | Stop reason: HOSPADM

## 2020-12-05 RX ORDER — POLYETHYLENE GLYCOL 3350 17 G/17G
119 POWDER, FOR SOLUTION ORAL ONCE
Status: DISCONTINUED | OUTPATIENT
Start: 2020-12-05 | End: 2020-12-05

## 2020-12-05 RX ORDER — ISOSORBIDE MONONITRATE 30 MG/1
30 TABLET, EXTENDED RELEASE ORAL DAILY
Status: DISCONTINUED | OUTPATIENT
Start: 2020-12-05 | End: 2020-12-07 | Stop reason: HOSPADM

## 2020-12-05 RX ORDER — SODIUM CHLORIDE 9 MG/ML
10 INJECTION INTRAVENOUS DAILY
Status: DISCONTINUED | OUTPATIENT
Start: 2020-12-05 | End: 2020-12-07

## 2020-12-05 RX ADMIN — SODIUM CHLORIDE 8 MG/HR: 9 INJECTION, SOLUTION INTRAVENOUS at 13:26

## 2020-12-05 RX ADMIN — METOPROLOL TARTRATE 50 MG: 50 TABLET, FILM COATED ORAL at 20:14

## 2020-12-05 RX ADMIN — INSULIN LISPRO 4 UNITS: 100 INJECTION, SOLUTION INTRAVENOUS; SUBCUTANEOUS at 10:18

## 2020-12-05 RX ADMIN — SODIUM CHLORIDE, PRESERVATIVE FREE 10 ML: 5 INJECTION INTRAVENOUS at 09:30

## 2020-12-05 RX ADMIN — SODIUM CHLORIDE, PRESERVATIVE FREE 10 ML: 5 INJECTION INTRAVENOUS at 23:31

## 2020-12-05 RX ADMIN — ISOSORBIDE MONONITRATE 30 MG: 30 TABLET ORAL at 16:25

## 2020-12-05 RX ADMIN — ATORVASTATIN CALCIUM 20 MG: 20 TABLET, FILM COATED ORAL at 23:29

## 2020-12-05 RX ADMIN — SODIUM CHLORIDE 20 ML: 0.9 INJECTION, SOLUTION INTRAVENOUS at 00:10

## 2020-12-05 RX ADMIN — HYDROCHLOROTHIAZIDE 25 MG: 25 TABLET ORAL at 16:00

## 2020-12-05 RX ADMIN — AMLODIPINE BESYLATE 10 MG: 10 TABLET ORAL at 16:00

## 2020-12-05 RX ADMIN — SODIUM CHLORIDE 20 ML: 0.9 INJECTION, SOLUTION INTRAVENOUS at 16:30

## 2020-12-05 RX ADMIN — PANTOPRAZOLE SODIUM 40 MG: 40 INJECTION, POWDER, FOR SOLUTION INTRAVENOUS at 10:17

## 2020-12-05 RX ADMIN — ACETAMINOPHEN 650 MG: 325 TABLET ORAL at 19:51

## 2020-12-05 RX ADMIN — SODIUM CHLORIDE 10 ML: 9 INJECTION INTRAMUSCULAR; INTRAVENOUS; SUBCUTANEOUS at 10:17

## 2020-12-05 RX ADMIN — POLYETHYLENE GLYCOL 3350 17 G: 17 POWDER, FOR SOLUTION ORAL at 16:02

## 2020-12-05 RX ADMIN — INSULIN LISPRO 9 UNITS: 100 INJECTION, SOLUTION INTRAVENOUS; SUBCUTANEOUS at 17:47

## 2020-12-05 RX ADMIN — SODIUM CHLORIDE: 9 INJECTION, SOLUTION INTRAVENOUS at 20:15

## 2020-12-05 RX ADMIN — INSULIN LISPRO 2 UNITS: 100 INJECTION, SOLUTION INTRAVENOUS; SUBCUTANEOUS at 21:02

## 2020-12-05 RX ADMIN — PANTOPRAZOLE SODIUM 40 MG: 40 TABLET, DELAYED RELEASE ORAL at 16:00

## 2020-12-05 ASSESSMENT — ENCOUNTER SYMPTOMS
VOMITING: 0
NAUSEA: 0
DIARRHEA: 0
COUGH: 0
CHEST TIGHTNESS: 0
SORE THROAT: 0
ABDOMINAL PAIN: 0
SHORTNESS OF BREATH: 0
CONSTIPATION: 0

## 2020-12-05 ASSESSMENT — PAIN SCALES - GENERAL: PAINLEVEL_OUTOF10: 6

## 2020-12-05 NOTE — ED NOTES
Lab called with critical H/H of 6.5 down from 7.0    Admitting team messaged  Awaiting further orders at this time     Henrik Castillo RN  12/04/20 1655

## 2020-12-05 NOTE — ED NOTES
Patient resting comfortably on stretcher, in no apparent distress  Respirations even and non-labored  Patient has no needs at this time  Call light remains within reach     Germania Pratt RN  12/05/20 9659

## 2020-12-05 NOTE — ED NOTES
Patient resting comfortably on stretcher, in no apparent distress  Respirations even and non-labored  Patient has no needs at this time  Call light remains within reach     Talbert Brittle, RN  12/05/20 2473

## 2020-12-05 NOTE — ED NOTES
Report received from Armin Yanes, [facility]  All questions answered    Previous nurse states he did not give the patient insulin with his meal. Writer to provide insulin for nightly check at 2100  Previous nurse states that the ER doctors and admitting team did not wish to transfuse the patient at this time  Previous nurse states that a repeat EKG was obtained at [address]    Further floor orders released  Writer to obtain blood work and urine     Nelida Halsted, RN  12/04/20 2012

## 2020-12-05 NOTE — ED NOTES
Patient resting comfortably on stretcher, in no apparent distress  Respirations even and non-labored  Patient has no needs at this time  Call light remains within reach     Nelida Halsted, RN  12/04/20 2044

## 2020-12-05 NOTE — ED NOTES
Patient resting comfortably on stretcher, in no apparent distress  Respirations even and non-labored  Patient has no needs at this time  Call light remains within reach     Eben Halsted, RN  12/05/20 1123

## 2020-12-05 NOTE — ED NOTES
Dr. Justin Barboza would not like any other interventions at this time     Deirdre Saenz RN  12/05/20 0140

## 2020-12-05 NOTE — ED NOTES
Pt back in room, GI speaking to pt. Per GI attending, pt to get STAT 2 units of blood, Dr. Shaji Son notified.      Grace Trejo RN  12/05/20 6410

## 2020-12-05 NOTE — ED NOTES
Patient resting comfortably on stretcher, in no apparent distress  Respirations even and non-labored  Patient has no needs at this time  Call light remains within reach     aDniele Willett RN  12/05/20 2711

## 2020-12-05 NOTE — ED NOTES
Blood work collected, labeled, and sent to lab    Patient up and ambulatory to use restroom; tolerated well with strong steady gait  Patient provided writer with urine sample; labeled and sent to lab    Patient with complaint of generalized pain and stomach upset; writer to provide patient with PRN pain and nausea medication     Eben Halsted, RN  12/04/20 2014

## 2020-12-05 NOTE — ED NOTES
Morning blood work collected, labeled, and sent to lab    FSBS obtained     Patient resting comfortably on stretcher, in no apparent distress  Respirations even and non-labored  Patient has no needs at this time  Call light remains within reach     Rubbie Closs, RN  12/05/20 5741

## 2020-12-05 NOTE — ED NOTES
Pt POC blood sugar 252, resident perfect served to see if insulin is wanted now.      Zak Leyva RN  12/05/20 0193

## 2020-12-05 NOTE — ED NOTES
Family medicine at bedside. Pt NPO d/t possible scope today. Pt allowed ice chips.       Herlinda Etienne RN  12/05/20 9549

## 2020-12-05 NOTE — CONSULTS
Attestation signed by      Attending Physician Statement:    I have discussed the care of  Esperanza Rankin , including pertinent history and exam findings, with the Cardiology fellow/resident. I have seen and examined the patient and the key elements of all parts of the encounter have been performed by me. I agree with the assessment, plan and orders as documented by the fellow/resident, after I modified exam findings and plan of treatments, and the final version is my approved version of the assessment. Additional Comments:     Patient with hx of non-obs CAD on cath in 2018 admitted with dyspnea and chest pain, found to have severe anemia with hb < 7, reports black tarry stools and being evaluated by GI. ECG shows NSR, LVH and no ischemic changes. HS trops mildly elevated consistent with type II MI from anemia and renal insuff. TTE 3 months ago shows normal LVEF. Ok to proceed with invasive GI work up with intermediate risk of MACE. No indication for any cardiac work up for now. Will follow in office and consider functional stress test once acute issues are resolved. [facility]   Admission Note                 Patient's name:  Esperanza Rankin  Medical Record Number: [id number]  Patient's account/billing number: [de-identified]  Patient's YOB: 1960  Age: 61 y.o. Date of Admission: 12/4/2020  1:22 PM  Date of History and Physical Examination: 12/5/2020  Primary Care Physician: Emilee Enrique MD    Code Status: Full Code    CHIEF COMPLAINT:    Chief Complaint   Patient presents with    Chest Pain    Back Pain       HISTORY OF PRESENT ILLNESS:      History was obtained from chart review and the patient. Esperanza Rankin is a 61 y.o.  male with PMH of   - HTN   - DM   -Hyperlipidemia  -Alcohol use  -GERD  Presented to the ER with chief complaint of chest pain and shortness of breath. Chest Pain  Esperanza Rankin complains of chest pain.  Onset was 1 (Bilateral, 8/3/2018). Home Medications:    Prior to Admission medications    Medication Sig Start Date End Date Taking?  Authorizing Provider   acetaminophen (TYLENOL) 325 MG tablet Take 2 tablets by mouth every 6 hours as needed for Pain 11/27/20   Marisa Adam MD   gabapentin (NEURONTIN) 100 MG capsule TAKE 1 CAPSULE BY MOUTH 3 TIMES A DAY 11/27/20 12/27/20  Marisa Adam MD   famotidine (PEPCID) 20 MG tablet Take 1 tablet by mouth daily 8/29/20   Rock Zafar MD   metoprolol tartrate (LOPRESSOR) 50 MG tablet Take 1 tablet by mouth 2 times daily 8/28/20   Rock Zafar MD   glyBURIDE (DIABETA) 5 MG tablet Take 1 tablet by mouth daily (with breakfast) 2/26/20   Marisa Adam MD   SITagliptin (JANUVIA) 50 MG tablet Take 1 tablet by mouth daily 1/14/20   Rosa Fitzgerald MD   metFORMIN (GLUCOPHAGE) 500 MG tablet Take 1 tablet by mouth 2 times daily (with meals) 1/14/20   Rosa Fitzgerald MD   SENNA-PLUS 8.6-50 MG per tablet Take 1 tablet by mouth daily 1/14/20   Rosa Fitzgerald MD   amLODIPine (NORVASC) 10 MG tablet Take 1 tablet by mouth daily 1/14/20   Rosa Fitzgerald MD   aspirin 81 MG chewable tablet Take 1 tablet by mouth daily 1/14/20   Rosa Fitzgerald MD   atorvastatin (LIPITOR) 20 MG tablet Take 1 tablet by mouth daily 1/14/20   Rosa Fitzgerald MD   docusate sodium (COLACE) 100 MG capsule Take 1 capsule by mouth 2 times daily as needed for Constipation 1/14/20   Rosa Fitzgerald MD   hydrochlorothiazide (HYDRODIURIL) 25 MG tablet Take 1 tablet by mouth daily 1/14/20 1/8/21  Rosa Fitzgerald MD   isosorbide mononitrate (IMDUR) 30 MG extended release tablet Take 1 tablet by mouth daily 1/14/20   Rosa Fitzgerald MD   omeprazole (PRILOSEC) 20 MG delayed release capsule Take 1 capsule by mouth 2 times daily (before meals) 1/14/20   Rosa Fitzgerald MD   oxybutynin (DITROPAN-XL) 10 MG extended release tablet Take 1 tablet by mouth daily 1/14/20 2/13/20  Rosa Fitzgerald MD   ranolazine (RANEXA) 500 MG extended release tablet Take 1 tablet by mouth 2 times daily 1/14/20   Glo Vega MD   blood glucose monitor kit and supplies Test 2 times a day & as needed for symptoms of irregular blood glucose. 1/14/20   Glo Vega MD   Lancets MISC 1 each by Does not apply route 2 times daily 1/14/20   Glo Vega MD   blood glucose monitor strips Test 2 times a day & as needed for symptoms of irregular blood glucose. 1/14/20   Glo Vega MD       Allergies:   Patient has no known allergies. Social History:     reports that he quit smoking about 2 years ago. His smoking use included cigarettes. He has a 15.00 pack-year smoking history. He has never used smokeless tobacco. He reports current alcohol use of about 12.0 standard drinks of alcohol per week. He reports that he does not use drugs. Family History:  family history includes Diabetes in his mother; High Blood Pressure in his father. No h/o sudden cardiac death. No for premature CAD     REVIEW OF SYSTEMS:    General: Denies any fevers, chills, rigors. HEENT: No visual disturbances, hearing disturbances, nasal drainage or neck swelling  Heart: Has chest pain, no palpitations, PND,   Lungs: Has SOB, DESAI, no cough, wheezing or pleurisy  Abdomen: Denies abdominal pain, nausea, vomiting, constipation, diarrhea, has rectal  bleeding   Extremities: Denies any leg swelling or calf tenderness  Musculo skeletal: Denies any arthralgias, joint swelling  Skin: Denies any rash/skin changes  Hem/Onc: Denies bleeding gums, swollen lymph nodes  Endo: Denies polydypsia, polyphagia  Psychiatric: Denies any mood changes, agitation or psychosis    PHYSICAL EXAM:    Physical Examination:    Temperature:  Temp: 98.4 °F (36.9 °C)  Respirations:  Resp: 17  Pulse:   Pulse: 80  BP:    BP: 128/71    Constitutional and General Appearance: alert, cooperative, no distress and appears stated age  HEENT: PERRL, no cervical lymphadenopathy. No masses palpable.  Normal oral mucosa  Respiratory:  · Normal excursion and expansion without use of accessory muscles  · Resp Auscultation: Good respiratory effort. No for increased work of breathing. On auscultation: clear to auscultation bilaterally  Cardiovascular:  · The apical impulse is not displaced  · Heart auscultation: S1, S2 heard, no abnormal murmur, rubs or gallops. · Jugular venous pulsation Normal  · The carotid upstroke is normal in amplitude and contour without delay or bruit  · Peripheral pulses are symmetrical and full   Abdomen:  · No masses or tenderness  · Bowel sounds present  Extremities:  ·  No Cyanosis or Clubbing  ·  Lower extremity edema: No  ·  Skin: Warm and dry  Neurological:  · Alert and oriented. · Moves all extremities well  · No abnormalities of mood, affect, memory, mentation, or behavior are noted    DATA:    Labs:   CBC:   Recent Labs     12/04/20  1401  12/05/20  0031 12/05/20  0413   WBC 7.0  --   --   --    HGB 7.0*   < > 7.1* 7.1*   HCT 21.8*   < > 22.5* 22.7*     --   --   --     < > = values in this interval not displayed. BMP:   Recent Labs     12/04/20  1401 12/04/20  2108 12/05/20  0413   *  --  136   K 4.5  --  4.2   CO2 20  --  18*   BUN 60*  --  48*   CREATININE 4.24*  --  3.79*   LABGLOM 14*  --  16*   GLUCOSE 510* 249 148*     BNP: No results for input(s): BNP in the last 72 hours. PT/INR: No results for input(s): PROTIME, INR in the last 72 hours. APTT:No results for input(s): APTT in the last 72 hours. CARDIAC ENZYMES:No results for input(s): CKTOTAL, CKMB, CKMBINDEX, TROPONINI in the last 72 hours. FASTING LIPID PANEL:  Lab Results   Component Value Date    HDL 83 08/23/2019    TRIG 143 08/23/2019     LIVER PROFILE:No results for input(s): AST, ALT, LABALBU in the last 72 hours.     Imaging  Xr Chest (2 Vw)    Result Date: 12/4/2020  EXAMINATION: TWO XRAY VIEWS OF THE CHEST 12/4/2020 11:20 am COMPARISON: 08/25/2020, 12/19/2019, 08/06/2019 HISTORY: [address] GERD  6. Microvascular CAD with anginal pain  7. Stage III CKD    RECOMMENDATIONS:  1. Follow-up 2D echo. 2.  Continue to hold heparin drip in view of acute anemia. 3.  Trend troponin for 1 more occurrence. 4.  Resume home medications including Lopressor 50 twice daily, Norvasc 10 mg daily, Lipitor, hydrochlorothiazide, Imdur and Ranexa (as tolerated by BP). 5.  Anemia work-up as per GI.     Gee Adame MD      Department of Internal Medicine  [facility]         12/5/2020, 9:45 AM

## 2020-12-05 NOTE — PROGRESS NOTES
[address] [facility]  Progress Note    Date:   12/5/2020  Patient name:  Maddie Thomson  Date of admission:  12/4/2020  1:22 PM  MRN:   [id number]  YOB: 1960    SUBJECTIVE/Last 24 hours update:     Day 2 Hospitalization:     Patient seen and examined at bedside. No acute overnight events per patient or nurse. Patient resting comfortably and watching television. Patient states he continues to have chest pain that he describes to be pressure-like and 5 out of 10 in intensity. Patient states pain does not warrant any pain medication. Patient states that pain is at baseline that has been occurring for 1 month. Patient denies having any radiation of pain to the back or stomach. Awaiting cardiology and GI recommendations for further evaluation and treatment. REVIEW OF SYSTEMS:      Review of Systems   Constitutional: Negative for chills, fatigue, fever and unexpected weight change. HENT: Negative for congestion, mouth sores and sore throat. Eyes: Negative for visual disturbance. Respiratory: Negative for cough, chest tightness and shortness of breath. Cardiovascular: Negative for chest pain and leg swelling. Gastrointestinal: Negative for abdominal pain, constipation, diarrhea, nausea and vomiting. Genitourinary: Negative for difficulty urinating. Musculoskeletal: Negative for joint swelling. Skin: Negative for rash. Neurological: Negative for dizziness, weakness and headaches. PAST MEDICAL HISTORY:      has a past medical history of ADHD (attention deficit hyperactivity disorder), Depression, DM2 (diabetes mellitus, type 2) ([facility] [address] Erectile dysfunction, Gastroesophageal reflux disease, Hyperlipidemia, Hypertension, Kidney stone, Low back pain of thoracolumbar region with sciatica, and Neuropathy. PAST SURGICAL HISTORY:      has a past surgical history that includes Tonsillectomy;  Cystoscopy (07/20/2018); pr cystoscopy,insert ureteral stent (Bilateral, 7/20/2018); Cystocopy (08/03/2018); and pr cysto/uretero/pyeloscopy, calculus tx (Bilateral, 8/3/2018). SOCIALHISTORY:      reports that he quit smoking about 2 years ago. His smoking use included cigarettes. He has a 15.00 pack-year smoking history. He has never used smokeless tobacco. He reports current alcohol use of about 12.0 standard drinks of alcohol per week. He reports that he does not use drugs. FAMILY HISTORY:      family history includes Diabetes in his mother; High Blood Pressure in his father. HOME MEDICATIONS:      Prior to Admission medications    Medication Sig Start Date End Date Taking?  Authorizing Provider   acetaminophen (TYLENOL) 325 MG tablet Take 2 tablets by mouth every 6 hours as needed for Pain 11/27/20   Rik Sorto MD   gabapentin (NEURONTIN) 100 MG capsule TAKE 1 CAPSULE BY MOUTH 3 TIMES A DAY 11/27/20 12/27/20  Rik Sorto MD   famotidine (PEPCID) 20 MG tablet Take 1 tablet by mouth daily 8/29/20   Jeri Khan MD   metoprolol tartrate (LOPRESSOR) 50 MG tablet Take 1 tablet by mouth 2 times daily 8/28/20   Jeri Khan MD   glyBURIDE (DIABETA) 5 MG tablet Take 1 tablet by mouth daily (with breakfast) 2/26/20   Rik Sorto MD   SITagliptin (JANUVIA) 50 MG tablet Take 1 tablet by mouth daily 1/14/20   Jessica Meadows MD   metFORMIN (GLUCOPHAGE) 500 MG tablet Take 1 tablet by mouth 2 times daily (with meals) 1/14/20   Jessica Meadows MD   SENNA-PLUS 8.6-50 MG per tablet Take 1 tablet by mouth daily 1/14/20   Jessica Meadows MD   amLODIPine (NORVASC) 10 MG tablet Take 1 tablet by mouth daily 1/14/20   Jessica Meadows MD   aspirin 81 MG chewable tablet Take 1 tablet by mouth daily 1/14/20   Jessica Meadows MD   atorvastatin (LIPITOR) 20 MG tablet Take 1 tablet by mouth daily 1/14/20   Jessica Meadows MD   docusate sodium (COLACE) 100 MG capsule Take 1 capsule by mouth 2 times daily as needed for Constipation 1/14/20   Brittany Del Valle General: Bowel sounds are normal. There is no distension. Palpations: Abdomen is soft. There is no mass. Tenderness: There is no abdominal tenderness. There is no guarding. Skin:     Capillary Refill: Capillary refill takes less than 2 seconds. Neurological:      Mental Status: He is alert and oriented to person, place, and time. ASSESSMENT:      Active Problems:    DM2 (diabetes mellitus, type 2) ([facility])    Gastroesophageal reflux disease    Essential hypertension    Stage 3 chronic kidney disease    BMI 31.0-31.9,adult    Anemia    Acute kidney injury superimposed on CKD ([facility])    NSTEMI (non-ST elevated myocardial infarction) ([facility] [address]  Resolved Problems:    * No resolved hospital problems. *      PLAN:     1. NSTEMI   -Awaiting cardiology recommendations  -Troponin at 83 from 43 from 37. Continue to trend  -Repeat EKG (12/5/2020) - NSR; Possible sinus rhythm; LVH  -Chest x-ray unremarkable for any acute process  -ECHO (08/27/2020) - LVEF 52%  -Stress test 08/25/2020: LVEF 44%, septal wall hypokinesis, stratification: Intermediate risk  -Heparin not administered due to hemoglobin 7.0     2. Anemia likely 2/2 upper GI bleed  -Awaiting GI recommendations  -N.p.o. until GI clears  -Hemoglobin 7.1  -Status post 1 unit PRBC  -Continue to monitor H&H every 6 Hours  -Iron studies unremarkable  -FBOT  -Protonix IV 40 mg twice daily  started  -Transfusion of PRBC his hemoglobin falls below 7.0.  -Cologuard test positive in September 2019. Patient noncompliant with obtaining colonoscopy procedure. Letter was sent by surgery scheduling department educating on importance of colonoscopy procedure. 3. JEFFREY on CKD stage 4 likely 2/2 prerenal  -Creatinine at 3.79; BL at 2  -Ordered urine creatinine and urine sodium  -Renal ultrasound unremarkable  -Continue to monitor creatinine  -Started on normal saline  mL/hour     4.  Diabetes mellitus type II  -Blood glucose 148  -POCT glucose checks  -Home medications held due to being n.p.o.  -HISS  -Ordered hemoglobin A1c  -Hypoglycemia protocol     5. Hypertension  -Started  metoprolol 5 mg IV Q6 PRN.    -HELDamlodipine 10 mg, hydrochlorothiazide 25 mg, Imdur 30 mg ER,    Plan will be discussed with the attending, Dr. Griselda Mcdonnell MD  Family Medicine Resident  12/5/2020 7:54 AM

## 2020-12-05 NOTE — CONSULTS
THE [facility] [place] [facility]  Consultation Note     . REASON FOR CONSULTATION: Black dark stool, bright red blood on toilet paper      HISTORY OF PRESENT ILLNESS:     This is a 61 y.o. male with past medical history of DMT2,essential HTN, stage 3CKD, nephrolithiasis presented with chest pain that started when he was helping his friend move out. According to patient the pain was dull substernal and radiating to the left arm and associated with shortness of breath. Patient has been having this shortness of breath on exertion for quite a while but this chest pain was new. Of note patient has GERD history, but not sure whether this pain was because of the GERD . Patient also report dark stool and bright red blood on the toilet paper from the last 2 to 3 weeks. Patient has been using aspirin and Motrin for a long time for his pains and sciatica. Patient has been constipated on and off from the last 1 year. Patient has a family history of colon cancer . Patient denies any weight loss, skin changes, change in stool caliber. In the ED,   Labs: CBC- hgb- 7.0. CMP- BG-510,   Cr- 4.24 (Baseline around 2)  Blood pressure 131/70, pulse 76, respiratory rate 15, afebrile      EKG showed normal sinus rhythm and left ventricular hypertrophy. Troponins 37>43, CXR no acute findings and he is admitted to the hospital for chest pain. Cardiology was consulted and repeat EKG ordered and monitor Troponins.      Gastroenterology consultation  gastroenterology was consulted because of the black stool and red blood on the toilet paper and low hemoglobin    Previous GI history:   Colonoscopy was inconclusive last 2 times because patient was not prepared and there is no record and it read cancel    Positive Cologuard test 2019    PAST MEDICAL HISTORY:  Past Medical History:   Diagnosis Date    ADHD (attention deficit hyperactivity disorder)     Depression     [facility]/15/[facility]    Erectile dysfunction     Gastroesophageal reflux disease 8/23/2019    Hyperlipidemia     Hypertension     Kidney stone 7/19/2018    Low back pain of thoracolumbar region with sciatica     Neuropathy      Past Surgical History:   Procedure Laterality Date    CYSTOSCOPY  07/20/2018    bilat stent replacement    CYSTOSCOPY  08/03/2018    b/l ureteroscopy, b/l stent, HLL    AL CYSTO/URETERO/PYELOSCOPY, CALCULUS TX Bilateral 8/3/2018    FORTEC HOLMIUM - CYSTO, URETEROSCOPY LITHOTRIPSY, STENT EXCHANGE Nely Talavera #[id number] - ESPERANZA) performed by Genny Figueroa MD at [facility] Bilateral 7/20/2018    CYSTOSCOPY URETERAL STENT INSERTION BILATERAL performed by Genny Figueroa MD at [facility]      as a child       ALLERGIES:  No Known Allergies    HOME MEDICATIONS:  Prior to Admission medications    Medication Sig Start Date End Date Taking?  Authorizing Provider   acetaminophen (TYLENOL) 325 MG tablet Take 2 tablets by mouth every 6 hours as needed for Pain 11/27/20   Miryam Miranda MD   gabapentin (NEURONTIN) 100 MG capsule TAKE 1 CAPSULE BY MOUTH 3 TIMES A DAY 11/27/20 12/27/20  Miryam Miranda MD   famotidine (PEPCID) 20 MG tablet Take 1 tablet by mouth daily 8/29/20   Meño Grubbs MD   metoprolol tartrate (LOPRESSOR) 50 MG tablet Take 1 tablet by mouth 2 times daily 8/28/20   Meño Grubbs MD   glyBURIDE (DIABETA) 5 MG tablet Take 1 tablet by mouth daily (with breakfast) 2/26/20   Miryam Miranda MD   SITagliptin (JANUVIA) 50 MG tablet Take 1 tablet by mouth daily 1/14/20   Kimberly Godfrey MD   metFORMIN (GLUCOPHAGE) 500 MG tablet Take 1 tablet by mouth 2 times daily (with meals) 1/14/20   Kimberly Godfrey MD   SENNA-PLUS 8.6-50 MG per tablet Take 1 tablet by mouth daily 1/14/20   Kimberly Godfrey MD   amLODIPine (NORVASC) 10 MG tablet Take 1 tablet by mouth daily 1/14/20   Kimberly Godfrey MD   aspirin 81 MG chewable tablet Take 1 tablet by mouth daily 1/14/20   Kimberly Godfrey MD atorvastatin (LIPITOR) 20 MG tablet Take 1 tablet by mouth daily 1/14/20   Rincon Overall, MD   docusate sodium (COLACE) 100 MG capsule Take 1 capsule by mouth 2 times daily as needed for Constipation 1/14/20   Rincon Overall, MD   hydrochlorothiazide (HYDRODIURIL) 25 MG tablet Take 1 tablet by mouth daily 1/14/20 1/8/21  Rincon Overall, MD   isosorbide mononitrate (IMDUR) 30 MG extended release tablet Take 1 tablet by mouth daily 1/14/20   Rincon Overall, MD   omeprazole (PRILOSEC) 20 MG delayed release capsule Take 1 capsule by mouth 2 times daily (before meals) 1/14/20   Rincon Overall, MD   oxybutynin (DITROPAN-XL) 10 MG extended release tablet Take 1 tablet by mouth daily 1/14/20 2/13/20  Rincon Overall, MD   ranolazine (RANEXA) 500 MG extended release tablet Take 1 tablet by mouth 2 times daily 1/14/20   Rinocn Overall, MD   blood glucose monitor kit and supplies Test 2 times a day & as needed for symptoms of irregular blood glucose. 1/14/20   Rincon Overall, MD   Lancets MISC 1 each by Does not apply route 2 times daily 1/14/20   Rincon Overall, MD   blood glucose monitor strips Test 2 times a day & as needed for symptoms of irregular blood glucose. 1/14/20   Rincon Overall, MD     .  CURRENT MEDICATIONS:  Scheduled Meds:   sodium chloride (PF)  10 mL Intravenous Daily    sodium chloride  20 mL Intravenous Once    sodium chloride flush  10 mL Intravenous 2 times per day    sodium chloride flush  10 mL Intravenous 2 times per day    insulin lispro  0-18 Units Subcutaneous TID WC    insulin lispro  0-9 Units Subcutaneous Nightly     . Continuous Infusions:   pantoprozole (PROTONIX) infusion 8 mg/hr (12/05/20 1326)    sodium chloride 100 mL/hr at 12/04/20 1808    dextrose       . PRN Meds:sodium chloride flush, acetaminophen, sodium chloride flush, promethazine **OR** ondansetron, potassium chloride, morphine, metoprolol, glucose, dextrose, glucagon (rDNA), dextrose  .   SOCIAL HISTORY: Social History     Socioeconomic History    Marital status: Single     Spouse name: Not on file    Number of children: Not on file    Years of education: Not on file    Highest education level: Not on file   Occupational History    Not on file   Social Needs    Financial resource strain: Not on file    Food insecurity     Worry: Not on file     Inability: Not on file    Transportation needs     Medical: Not on file     Non-medical: Not on file   Tobacco Use    Smoking status: Former Smoker     Packs/day: 0.50     Years: 30.00     Pack years: 15.00     Types: Cigarettes     Last attempt to quit: 2018     Years since quittin.0    Smokeless tobacco: Never Used   Substance and Sexual Activity    Alcohol use: Yes     Alcohol/week: 12.0 standard drinks     Types: 12 Cans of beer per week     Comment: drinks \"a few\" beers a day    Drug use: No     Types: Marijuana     Comment:  last use one month    Sexual activity: Never     Partners: Female   Lifestyle    Physical activity     Days per week: Not on file     Minutes per session: Not on file    Stress: Not on file   Relationships    Social connections     Talks on phone: Not on file     Gets together: Not on file     Attends Gnosticism service: Not on file     Active member of club or organization: Not on file     Attends meetings of clubs or organizations: Not on file     Relationship status: Not on file    Intimate partner violence     Fear of current or ex partner: Not on file     Emotionally abused: Not on file     Physically abused: Not on file     Forced sexual activity: Not on file   Other Topics Concern    Not on file   Social History Narrative    Not on file       FAMILY HISTORY:   Family History   Problem Relation Age of Onset    Diabetes Mother     High Blood Pressure Father        REVIEW OF SYSTEMS:   At around 8:45 AM  Constitutional: No fever, no chills, no lethargy, no weakness.   HEENT:  No headache, otalgia, itchy eyes, nasal discharge or sore throat. Cardiac:  No chest pain, dyspnea, orthopnea or PND. Chest:   No cough, phlegm or wheezing. Abdomen:  No abdominal pain, nausea or vomiting. Neuro:  No focal weakness, abnormal movements or seizure like activity. Skin:   No rashes, no itching. :   No hematuria, no pyuria, no dysuria, no flank pain. Extremities:  No swelling or joint pains. ROS was otherwise negative except as mentioned in the [address] PHYSICAL EXAM:    /74   Pulse 75   Temp 98.9 °F (37.2 °C) (Oral)   Resp 16   Ht 5' 11\" (1.803 m)   Wt 220 lb (99.8 kg)   SpO2 97%   BMI 30.68 kg/m²   . TMAX[24]    General: Well developed, Well nourished, No apparent distress  Head:  Normocephalic, Atraumatic  EENT: EOMI, Sclera not icteric, Oropharynx moist  Neck:  Supple, Trachea midline  Lungs:CTA Bilaterally  Heart: RRR, No murmur, No rub, No gallop, PMI nondisplaced. Abdomen:Soft, Non tender,  distended, liver enlarged on palpation ,BS WNL,  No masses. Ext:No clubbing. No cyanosis. No edema. Skin: No rashes. No jaundice. No stigmata of liver disease. Neuro:  A&O x Three, No focal neurological deficits    LABS and IMAGING:     Hemotological labs:   ANEMIA STUDIES  Recent Labs     12/05/20  0038   LABIRON 26   TIBC 230*   FERRITIN 221       CBC  Recent Labs     12/04/20  1401 12/04/20  2102 12/05/20  0031 12/05/20  0413 12/05/20  1135   WBC 7.0  --   --   --   --    HGB 7.0* 6.5* 7.1* 7.1* 7.4*   MCV 91.6  --   --   --   --    RDW 15.0*  --   --   --   --      --   --   --   --        PT/INR  No results for input(s): PROTIME, INR in the last 72 hours.     BMP  Recent Labs     12/04/20  1401 12/04/20  2108 12/05/20  0413   *  --  136   K 4.5  --  4.2   CL 99  --  108*   CO2 20  --  18*   BUN 60*  --  48*   CREATININE 4.24*  --  3.79*   GLUCOSE 510* 249 148*   CALCIUM 8.2*  --  8.1*       LIVER WORK UP  Hepatitis Functional Panel:  No results for input(s): ALKPHOS, ALT, AST, PROT, BILITOT, BILIDIR, LABALBU in the last 72 hours. AMYLASE/LIPASE/AMMONIA  No results for input(s): AMYLASE, LIPASE, AMMONIA in the last 72 hours. Acute Hepatitis Panel   Lab Results   Component Value Date    HEPCAB NONREACTIVE 08/23/2019       HCV Genotype   No results found for: HEPATITISCGENOTYPE    HCV Quantitative   No results found for: HCVQNT    Metabolic work up:  Ceruloplasmin  AA work up:  Alpha 1 antitrypsin   No results found for: A1A  AMA:  No results found for: MITOAB    ASMA:  No results found for: SMOOTHMUSCAB    ANCA:    AMBROSE:  No results found for: AMBROSE    Anti - Liver/Kidney Ab:  No results found for: LIVER-KIDNEYMICROSOMALAB    Ceruloplasmin:  No results found for: CERULOPLSM    Celiac panel:  No results found for: TISSTRNTIIGG, TTGIGA, IGA    Cancer Markers:  CEA:    No results for input(s): CEA in the last 72 hours. Ca 125:   No results for input(s):  in the last 72 hours. Ca 19-9:     Invalid input(s):   AFP: No results for input(s): AFP in the last 72 hours. ASSESSMENT and PLAN:   Anemia most likely secondary to GI bleed:  Family history of cancer and long-term NSAID /aspirin use  Hb 6.5-7  H&H every 6 hour  Transfuse 2 units PRBC since patient appears to be showing signs of demand ischemia. Transfuse PRBC well above 8 g/dL to avoid demand ischemia  IV Protonix 80 mg bolus followed by IV 40 mg twice daily  We will plan for EGD and colonoscopy on Monday  Will need cardiology clearance  Full liquid diet today and start half bowel prep today and full bowel prep starting tomorrow afternoon  Will need cardiology evaluation and clearance prior to proceeding with EGD and colonoscopy on Monday.       NSTEMI type II most likely secondary to demand ischemia type II from blood loss and renal insufficiency  Transfuse PRBC  Monitor serum electrolytes and kidney functions  Follow-up with cardiology  Follow echo  Follow cardiology recommendation    JEFFREY superimposed on chronic kidney disease stage III

## 2020-12-05 NOTE — ED NOTES
Bed: 37  Expected date:   Expected time:   Means of arrival:   Comments:     Tato Zhang, RN  12/04/20 1931

## 2020-12-05 NOTE — ED NOTES
Admitting team messaged in regards to elevated troponin level from 43 to 83  Also messaged in regards to patient's post-transfusion H/H that resulted at 7.1, up from 6.5     Awaiting further orders at this time     Talbert Brittle, RN  12/05/20 [id number]

## 2020-12-06 PROBLEM — D62 ACUTE POSTHEMORRHAGIC ANEMIA: Status: ACTIVE | Noted: 2020-12-06

## 2020-12-06 PROBLEM — G62.9 NEUROPATHY: Status: ACTIVE | Noted: 2020-12-06

## 2020-12-06 PROBLEM — D50.0 ANEMIA DUE TO BLOOD LOSS: Status: ACTIVE | Noted: 2020-12-06

## 2020-12-06 LAB
ABO/RH: NORMAL
ANION GAP SERPL CALCULATED.3IONS-SCNC: 13 MMOL/L (ref 9–17)
ANTIBODY SCREEN: NEGATIVE
ARM BAND NUMBER: NORMAL
BLD PROD TYP BPU: NORMAL
BUN BLDV-MCNC: 34 MG/DL (ref 8–23)
BUN/CREAT BLD: ABNORMAL (ref 9–20)
CALCIUM SERPL-MCNC: 8.6 MG/DL (ref 8.6–10.4)
CHLORIDE BLD-SCNC: 103 MMOL/L (ref 98–107)
CO2: 19 MMOL/L (ref 20–31)
CREAT SERPL-MCNC: 3.18 MG/DL (ref 0.7–1.2)
CROSSMATCH RESULT: NORMAL
DATE, STOOL #1: NORMAL
DATE, STOOL #2: NORMAL
DATE, STOOL #3: NORMAL
DISPENSE STATUS BLOOD BANK: NORMAL
ESTIMATED AVERAGE GLUCOSE: 252 MG/DL
EXPIRATION DATE: NORMAL
GFR AFRICAN AMERICAN: 24 ML/MIN
GFR NON-AFRICAN AMERICAN: 20 ML/MIN
GFR SERPL CREATININE-BSD FRML MDRD: ABNORMAL ML/MIN/{1.73_M2}
GFR SERPL CREATININE-BSD FRML MDRD: ABNORMAL ML/MIN/{1.73_M2}
GLUCOSE BLD-MCNC: 232 MG/DL (ref 75–110)
GLUCOSE BLD-MCNC: 260 MG/DL (ref 75–110)
GLUCOSE BLD-MCNC: 270 MG/DL (ref 70–99)
GLUCOSE BLD-MCNC: 385 MG/DL (ref 75–110)
GLUCOSE BLD-MCNC: 70 MG/DL (ref 75–110)
HBA1C MFR BLD: 10.4 % (ref 4–6)
HCT VFR BLD CALC: 30 % (ref 40.7–50.3)
HCT VFR BLD CALC: 30.6 % (ref 40.7–50.3)
HCT VFR BLD CALC: 31.6 % (ref 40.7–50.3)
HCT VFR BLD CALC: 33.1 % (ref 40.7–50.3)
HEMOCCULT SP1 STL QL: NEGATIVE
HEMOCCULT SP2 STL QL: NORMAL
HEMOCCULT SP3 STL QL: NORMAL
HEMOGLOBIN: 10 G/DL (ref 13–17)
HEMOGLOBIN: 10.1 G/DL (ref 13–17)
HEMOGLOBIN: 10.3 G/DL (ref 13–17)
HEMOGLOBIN: 9.4 G/DL (ref 13–17)
POTASSIUM SERPL-SCNC: 5 MMOL/L (ref 3.7–5.3)
SARS-COV-2, RAPID: NOT DETECTED
SARS-COV-2: NORMAL
SARS-COV-2: NORMAL
SODIUM BLD-SCNC: 135 MMOL/L (ref 135–144)
SOURCE: NORMAL
TIME, STOOL #1: NORMAL
TIME, STOOL #2: NORMAL
TIME, STOOL #3: NORMAL
TRANSFUSION STATUS: NORMAL
UNIT DIVISION: 0
UNIT NUMBER: NORMAL

## 2020-12-06 PROCEDURE — 80048 BASIC METABOLIC PNL TOTAL CA: CPT

## 2020-12-06 PROCEDURE — 85014 HEMATOCRIT: CPT

## 2020-12-06 PROCEDURE — 2580000003 HC RX 258: Performed by: STUDENT IN AN ORGANIZED HEALTH CARE EDUCATION/TRAINING PROGRAM

## 2020-12-06 PROCEDURE — 82947 ASSAY GLUCOSE BLOOD QUANT: CPT

## 2020-12-06 PROCEDURE — 94761 N-INVAS EAR/PLS OXIMETRY MLT: CPT

## 2020-12-06 PROCEDURE — U0002 COVID-19 LAB TEST NON-CDC: HCPCS

## 2020-12-06 PROCEDURE — 99232 SBSQ HOSP IP/OBS MODERATE 35: CPT | Performed by: INTERNAL MEDICINE

## 2020-12-06 PROCEDURE — 6370000000 HC RX 637 (ALT 250 FOR IP): Performed by: EMERGENCY MEDICINE

## 2020-12-06 PROCEDURE — 97162 PT EVAL MOD COMPLEX 30 MIN: CPT

## 2020-12-06 PROCEDURE — 85018 HEMOGLOBIN: CPT

## 2020-12-06 PROCEDURE — G0328 FECAL BLOOD SCRN IMMUNOASSAY: HCPCS

## 2020-12-06 PROCEDURE — 97530 THERAPEUTIC ACTIVITIES: CPT

## 2020-12-06 PROCEDURE — 6370000000 HC RX 637 (ALT 250 FOR IP): Performed by: STUDENT IN AN ORGANIZED HEALTH CARE EDUCATION/TRAINING PROGRAM

## 2020-12-06 PROCEDURE — 97535 SELF CARE MNGMENT TRAINING: CPT

## 2020-12-06 PROCEDURE — 36415 COLL VENOUS BLD VENIPUNCTURE: CPT

## 2020-12-06 PROCEDURE — 6370000000 HC RX 637 (ALT 250 FOR IP): Performed by: NURSE PRACTITIONER

## 2020-12-06 PROCEDURE — 99232 SBSQ HOSP IP/OBS MODERATE 35: CPT | Performed by: STUDENT IN AN ORGANIZED HEALTH CARE EDUCATION/TRAINING PROGRAM

## 2020-12-06 PROCEDURE — 2580000003 HC RX 258: Performed by: EMERGENCY MEDICINE

## 2020-12-06 PROCEDURE — 97166 OT EVAL MOD COMPLEX 45 MIN: CPT

## 2020-12-06 PROCEDURE — 2060000000 HC ICU INTERMEDIATE R&B

## 2020-12-06 PROCEDURE — 97116 GAIT TRAINING THERAPY: CPT

## 2020-12-06 RX ORDER — RANOLAZINE 500 MG/1
500 TABLET, EXTENDED RELEASE ORAL 2 TIMES DAILY
Status: DISCONTINUED | OUTPATIENT
Start: 2020-12-06 | End: 2020-12-07 | Stop reason: HOSPADM

## 2020-12-06 RX ORDER — GABAPENTIN 100 MG/1
100 CAPSULE ORAL 3 TIMES DAILY
Status: DISCONTINUED | OUTPATIENT
Start: 2020-12-06 | End: 2020-12-07 | Stop reason: HOSPADM

## 2020-12-06 RX ADMIN — SODIUM CHLORIDE, PRESERVATIVE FREE 10 ML: 5 INJECTION INTRAVENOUS at 21:03

## 2020-12-06 RX ADMIN — METOPROLOL TARTRATE 50 MG: 50 TABLET, FILM COATED ORAL at 20:44

## 2020-12-06 RX ADMIN — SODIUM CHLORIDE, PRESERVATIVE FREE 10 ML: 5 INJECTION INTRAVENOUS at 21:00

## 2020-12-06 RX ADMIN — ACETAMINOPHEN 650 MG: 325 TABLET ORAL at 11:12

## 2020-12-06 RX ADMIN — GABAPENTIN 100 MG: 100 CAPSULE ORAL at 11:12

## 2020-12-06 RX ADMIN — AMLODIPINE BESYLATE 10 MG: 10 TABLET ORAL at 08:32

## 2020-12-06 RX ADMIN — METOPROLOL TARTRATE 50 MG: 50 TABLET, FILM COATED ORAL at 08:32

## 2020-12-06 RX ADMIN — GABAPENTIN 100 MG: 100 CAPSULE ORAL at 20:44

## 2020-12-06 RX ADMIN — PANTOPRAZOLE SODIUM 40 MG: 40 TABLET, DELAYED RELEASE ORAL at 06:18

## 2020-12-06 RX ADMIN — POLYETHYLENE GLYCOL 3350, SODIUM SULFATE ANHYDROUS, SODIUM BICARBONATE, SODIUM CHLORIDE, POTASSIUM CHLORIDE 4000 ML: 236; 22.74; 6.74; 5.86; 2.97 POWDER, FOR SOLUTION ORAL at 18:46

## 2020-12-06 RX ADMIN — INSULIN LISPRO 7 UNITS: 100 INJECTION, SOLUTION INTRAVENOUS; SUBCUTANEOUS at 20:44

## 2020-12-06 RX ADMIN — HYDROCHLOROTHIAZIDE 25 MG: 25 TABLET ORAL at 08:32

## 2020-12-06 RX ADMIN — PANTOPRAZOLE SODIUM 40 MG: 40 TABLET, DELAYED RELEASE ORAL at 17:04

## 2020-12-06 RX ADMIN — GABAPENTIN 100 MG: 100 CAPSULE ORAL at 14:30

## 2020-12-06 RX ADMIN — INSULIN LISPRO 9 UNITS: 100 INJECTION, SOLUTION INTRAVENOUS; SUBCUTANEOUS at 08:39

## 2020-12-06 RX ADMIN — RANOLAZINE 500 MG: 500 TABLET, FILM COATED, EXTENDED RELEASE ORAL at 20:44

## 2020-12-06 RX ADMIN — RANOLAZINE 500 MG: 500 TABLET, FILM COATED, EXTENDED RELEASE ORAL at 11:12

## 2020-12-06 RX ADMIN — INSULIN LISPRO 6 UNITS: 100 INJECTION, SOLUTION INTRAVENOUS; SUBCUTANEOUS at 13:12

## 2020-12-06 RX ADMIN — ATORVASTATIN CALCIUM 20 MG: 20 TABLET, FILM COATED ORAL at 20:44

## 2020-12-06 RX ADMIN — POLYETHYLENE GLYCOL 3350 17 G: 17 POWDER, FOR SOLUTION ORAL at 08:32

## 2020-12-06 RX ADMIN — BISACODYL 20 MG: 5 TABLET ORAL at 11:12

## 2020-12-06 RX ADMIN — ISOSORBIDE MONONITRATE 30 MG: 30 TABLET ORAL at 08:32

## 2020-12-06 ASSESSMENT — PAIN DESCRIPTION - ORIENTATION
ORIENTATION: RIGHT

## 2020-12-06 ASSESSMENT — ENCOUNTER SYMPTOMS
VOMITING: 0
NAUSEA: 0
SHORTNESS OF BREATH: 0
WHEEZING: 0
CONSTIPATION: 0
DIARRHEA: 0
ABDOMINAL PAIN: 1

## 2020-12-06 ASSESSMENT — PAIN DESCRIPTION - PAIN TYPE
TYPE: ACUTE PAIN

## 2020-12-06 ASSESSMENT — PAIN DESCRIPTION - FREQUENCY: FREQUENCY: CONTINUOUS

## 2020-12-06 ASSESSMENT — PAIN SCALES - GENERAL
PAINLEVEL_OUTOF10: 8
PAINLEVEL_OUTOF10: 3
PAINLEVEL_OUTOF10: 8
PAINLEVEL_OUTOF10: 8

## 2020-12-06 ASSESSMENT — PAIN DESCRIPTION - LOCATION
LOCATION: FOOT
LOCATION: FOOT

## 2020-12-06 ASSESSMENT — PAIN DESCRIPTION - DESCRIPTORS
DESCRIPTORS: ACHING;DISCOMFORT
DESCRIPTORS: ACHING;DISCOMFORT

## 2020-12-06 NOTE — CARE COORDINATION
Case Management Initial Discharge Plan  [place],             Met with:patient to discuss discharge plans. Information verified: address, contacts, phone number, , insurance Yes    Emergency Contact/Next of Kin name & number: Elham Cui (niece) [phone]    PCP: Tyra Davila MD  Date of last visit: 19 per [address] Provider: Chema Mittal    Discharge Planning    Living Arrangements:  Family Members(niece)   Support Systems:  Family Members    Home has 2 stories  5 stairs to climb to get into front door, 1 flight stairs to climb to reach second floor  Location of bedroom/bathroom in home second story    Patient able to perform ADL's:Independent    Current Services (outpatient & in home) DME  DME equipment: glucometer   DME provider:      Receiving oral anticoagulation therapy? No    If indicated:   Physician managing anticoagulation treatment: n/a  Where does patient obtain lab work for ATC treatment? n/a      Potential Assistance Needed:  Transportation    Patient agreeable to home care: No  [place] of choice provided:  n/a    Prior SNF/Rehab Placement and Facility: No  Agreeable to SNF/Rehab: No  [place] of choice provided: n/a     Evaluation: n/a    Expected Discharge date:  20    Patient expects to be discharged to:  home  Follow Up Appointment: Best Day/ Time:      Transportation provider: walk or bus  Transportation arrangements needed for discharge: Yes     Readmission Risk              Risk of Unplanned Readmission:        20             Does patient have a readmission risk score greater than 14?: Yes  If yes, follow-up appointment must be made within 7 days of discharge.      Goals of Care:       Discharge Plan: Home independently, lives with niece, may need transportation    Home pharmacy: [facility] [employer] signed by Caroline Krause RN on 20 at 3:49 PM EST

## 2020-12-06 NOTE — PROGRESS NOTES
THE [facility]   Progress Note    Katlyn Valdivia is a 61 y.o. male patient. Hospitalization Day:2      Chief consult reason:   Black dark stool, bright red blood on toilet paper    Subjective:  Pt seen and examined. No acute events overnight. No active bleeding. Hgb stable. VITALS:  /76   Pulse 75   Temp 98.6 °F (37 °C) (Oral)   Resp 21   Ht 5' 11\" (1.803 m)   Wt 207 lb 14.3 oz (94.3 kg)   SpO2 98%   BMI 29.00 kg/m²   TEMPERATURE:  Current - Temp: 98.6 °F (37 °C); Max - Temp  Av.7 °F (37.1 °C)  Min: 97.7 °F (36.5 °C)  Max: 99 °F (37.2 °C)    Physical Assessment:  General appearance:  alert, cooperative and no distress  Mental Status:  oriented to person, place and time and normal affect  Lungs:  clear to auscultation bilaterally, normal effort  Heart:  regular rate and rhythm, no murmur  Abdomen:  soft, nontender, nondistended, normal bowel sounds, no masses, hepatomegaly, splenomegaly  Extremities:  no edema, redness, tenderness in the calves  Skin:  no gross lesions, rashes, induration    Data Review:    Labs and Imaging:     CBC:  Recent Labs     20  1401  20  1135 20  1737 20  2223 20  0516 20  1058   WBC 7.0  --   --   --   --   --   --    HGB 7.0*   < > 7.4* 9.5* 9.7* 10.0* 10.1*   MCV 91.6  --   --   --   --   --   --    RDW 15.0*  --   --   --   --   --   --      --   --   --   --   --   --     < > = values in this interval not displayed.        ANEMIA STUDIES:  Recent Labs     20  0038   LABIRON 26   TIBC 230*   FERRITIN 221       BMP:  Recent Labs     20  1401 20  2108 20  0413 20  0516   *  --  136 135   K 4.5  --  4.2 5.0   CL 99  --  108* 103   CO2 20  --  18* 19*   BUN 60*  --  48* 34*   CREATININE 4.24*  --  3.79* 3.18*   GLUCOSE 510* 249 148* 270*   CALCIUM 8.2*  --  8.1* 8.6       LFTS:  No results for input(s): ALKPHOS, ALT, AST, BILITOT, BILIDIR, LABALBU in the last 72 hours. Amylase/Lipase and Ammonia:  No results for input(s): AMYLASE, LIPASE, AMMONIA in the last 72 hours. Acute Hepatitis Panel:  Lab Results   Component Value Date    HEPCAB NONREACTIVE 08/23/2019       HCV Genotype:  No results found for: HEPATITISCGENOTYPE    HCV Quantitative:  No results found for: HCVQNT    LIVER WORK UP:    AFP  No results found for: AFP    Alpha 1 antitrypsin   No results found for: A1A    AMBROSE  No results found for: AMBROSE    AMA  No results found for: MITOAB    ASMA  No results found for: SMOOTHMUSCAB    PT/INR  No results for input(s): PROTIME, INR in the last 72 hours. Cancer Markers:  CEA:  No results for input(s): CEA in the last 72 hours. Ca 125:  No results for input(s):  in the last 72 hours. Ca 19-9:   Invalid input(s):   AFP: No results for input(s): AFP in the last 72 hours. Lactic acid:Invalid input(s): LACTIC ACID    Radiology Review:    No results found. Principal Problem:    Anemia due to blood loss  Active Problems:    DM2 (diabetes mellitus, type 2) (HCC)    Gastroesophageal reflux disease    Essential hypertension    Stage 3 chronic kidney disease    BMI 31.0-31.9,adult    Acute kidney injury superimposed on CKD (HCC)    [facility]    Gastrointestinal hemorrhage    Neuropathy  Resolved Problems:    * No resolved hospital problems. *       GI Impression:    Melena-resolved. No active bleeding. Hgb stable  Hemorrhoidal bleed-resolved. NSTEMI-no further work up at this time. Intermediate risk for endoscopy    Plan and Recommendations:    1. Will plan for EGD and colonoscopy tomorrow am  2. Clear diet-NPO MN  3. Prep ordered  4. Monitor for active bleeding  5. Daily Labs including CBC, BMP. Hgb Q 12 hrs  6. Cont Protonix 40 mg po BID  7. COVID ordered  8. Will follow    This plan was formulated in collaboration with Dr. Crio Crowley MD    Thank you for allowing me to participate in the care of your patient.   Please feel free to contact me with any questions or concerns.      [address] [facility]

## 2020-12-06 NOTE — PROGRESS NOTES
Physical Therapy    Facility/Department: [facility] 4B STEPDOWN  Initial Assessment    NAME: Esperanza Rankin  : 1960  MRN: [id number]  Chief Complaint   Patient presents with    Chest Pain    Back Pain     Date of Service: 2020    Discharge Recommendations: Further therapy recommended at discharge. PT Equipment Recommendations  Equipment Needed: No    Assessment   Body structures, Functions, Activity limitations: Decreased functional mobility ; Decreased strength;Decreased endurance;Decreased balance  Assessment: The pt ambulated 150ft with CGA due to mild balance and gait deficits, which pt attributed to R foot pain. Recommend continued acute PT to address deficits. Prognosis: Good  Decision Making: Medium Complexity  PT Education: Goals;PT Role;Plan of Care; Functional Mobility Training;Transfer Training  REQUIRES PT FOLLOW UP: Yes  Activity Tolerance  Activity Tolerance: Patient Tolerated treatment well       Patient Diagnosis(es): The primary encounter diagnosis was Chest pain, unspecified type. Diagnoses of Acute renal failure superimposed on chronic kidney disease, unspecified CKD stage, unspecified acute renal failure type (HCC) and Acute anemia were also pertinent to this visit. has a past medical history of ADHD (attention deficit hyperactivity disorder), Depression, DM2 (diabetes mellitus, type 2) ([facility] [address] Erectile dysfunction, Gastroesophageal reflux disease, Hyperlipidemia, Hypertension, Kidney stone, Low back pain of thoracolumbar region with sciatica, and Neuropathy. has a past surgical history that includes Tonsillectomy; Cystoscopy (2018); pr cystoscopy,insert ureteral stent (Bilateral, 2018); Cystocopy (2018); and pr cysto/uretero/pyeloscopy, calculus tx (Bilateral, 8/3/2018).     Restrictions  Restrictions/Precautions  Restrictions/Precautions: Up as Tolerated  Required Braces or Orthoses?: No  Position Activity Restriction  Other position/activity restrictions: up with assist  Vision/Hearing  Vision: Impaired  Vision Exceptions: Wears glasses for reading  Hearing: Within functional limits     Subjective  General  Patient assessed for rehabilitation services?: Yes  Family / Caregiver Present: No  Follows Commands: Within Functional Limits  Subjective  Subjective: RN and pt agreeable to PT. Pt supine in bed upon arrival, pleasant and cooperative throughout.   Pain Screening  Patient Currently in Pain: Yes  Pain Assessment  Pain Assessment: 0-10  Pain Level: 8  Pain Type: Acute pain  Pain Location: Foot  Pain Orientation: Right  Pain Descriptors: Aching;Discomfort  Pain Frequency: Continuous  Non-Pharmaceutical Pain Intervention(s): Ambulation/Increased Activity  Response to Pain Intervention: Patient Satisfied  Vital Signs  Patient Currently in Pain: Yes       Orientation  Orientation  Overall Orientation Status: Within Functional Limits  Social/Functional History  Social/Functional History  Lives With: Family(niece)  Type of Home: House  Home Layout: Two level, Laundry in basement, Bed/Bath upstairs(pt reports mild difficulty going up to/from bedroom and bathroom)  Home Access: Stairs to enter with rails  Entrance Stairs - Number of Steps: 5  Entrance Stairs - Rails: Left  Bathroom Shower/Tub: Tub only(pt reports no problems getting in/out of tub)  Bathroom Toilet: Standard  Home Equipment: (no DME use at baseline)  ADL Assistance: [address]: Independent  Homemaking Responsibilities: Yes  Ambulation Assistance: Independent  Transfer Assistance: Independent  Active : No  Mode of Transportation: Walk, Bus  Occupation: On disability  Leisure & Hobbies: do odd jobs  Cognition   Cognition  Overall Cognitive Status: WFL    Objective          Joint Mobility  Spine: WFL  ROM RLE: WFL  ROM LLE: WFL  ROM RUE: WFL  ROM LUE: WFL  Strength RLE  Strength RLE: WFL  Strength LLE  Strength LLE: WFL  Strength RUE  Strength RUE: WFL  Strength LUE  Strength LUE: WFL  Tone RLE  RLE Tone: Normotonic  Tone LLE  LLE Tone: Normotonic  Motor Control  Gross Motor?: WFL  Sensation  Overall Sensation Status: Impaired(chronic numbness/tingling B feet)  Bed mobility  Supine to Sit: Stand by assistance  Sit to Supine: Stand by assistance  Scooting: Stand by assistance  Transfers  Sit to Stand: Contact guard assistance  Stand to sit: Contact guard assistance  Stand Pivot Transfers: Contact guard assistance  Comment: pt with wide SHEILA and mildly unsteady initially  Ambulation  Ambulation?: Yes  Ambulation 1  Surface: level tile  Device: (pt pushing IV pole)  Assistance: Contact guard assistance  Quality of Gait: antalgic, lateral sway, very unsteady initially- improved with increasing distance  Distance: 150ft  Stairs/Curb  Stairs?: No     Balance  Posture: Good  Sitting - Static: Good  Sitting - Dynamic: Good  Standing - Static: Fair;+  Standing - Dynamic: Fair  Comments: standing balance assessed without AD        Plan   Plan  Times per week: 5x/wk  Current Treatment Recommendations: Strengthening, ROM, Balance Training, Functional Mobility Training, Transfer Training, Gait Training, Stair training, Endurance Training, Home Exercise Program, Safety Education & Training, Patient/Caregiver Education & Training  Safety Devices  Type of devices: Nurse notified, Call light within reach, Gait belt, Left in chair  Restraints  Initially in place: No    AM-PAC Score  AM-PAC Inpatient Mobility Raw Score : 20 (12/06/20 1314)  AM-PAC Inpatient T-Scale Score : 47.67 (12/06/20 1314)  Mobility Inpatient CMS 0-100% Score: 35.83 (12/06/20 1314)  Mobility Inpatient CMS G-Code Modifier : Nick Montgomery (12/06/20 1314)          Goals  Short term goals  Time Frame for Short term goals: 14 visits  Short term goal 1: Perform bed mobility and functional transfers independently  Short term goal 2: Ambulate 300ft without AD independently  Short term goal 3: Ascend/descend 1 flight of steps with HR and supervision  Short term goal 4: Demo Good dynamic standing balance to decrease risk of falls  Short term goal 5: Participate in 30 minutes of therapy to demo increased endurance       Therapy Time   Individual Concurrent Group Co-treatment   Time In 1025         Time Out 1045         Minutes 20         Timed Code Treatment Minutes: 8 Minutes       Lizzie Hassan, PT

## 2020-12-06 NOTE — ED NOTES
Report given to [facility], 4B RN. All questions answered.      Darwin Clark, NAOMIE  12/05/20 [id number]

## 2020-12-06 NOTE — PROGRESS NOTES
Occupational Therapy   Occupational Therapy Initial Assessment  Date: 2020   Patient Name: Kathy Arambula  MRN: [id number]     : 1960    Date of Service: 2020    Discharge Recommendations:    No therapy recommended at discharge. OT Equipment Recommendations  Equipment Needed: No    Assessment   Assessment: Pt agreeable to OT eval this date. Pt demo supervision/SBA for all ADLs and functional transfers/mobility at this time. Pt demonstrates no further OT needs at this time. Please reorder OT if there is a change in function. Prognosis: Good  Decision Making: Medium Complexity  Patient Education: Pt educated on OT role, OT POC, safety awareness. Pt verbalized good understanding  No Skilled OT: No OT goals identified  REQUIRES OT FOLLOW UP: No  Activity Tolerance  Activity Tolerance: Patient Tolerated treatment well  Safety Devices  Safety Devices in place: Yes  Type of devices: Nurse notified; Left in chair;Call light within reach;Gait belt  Restraints  Initially in place: No           Patient Diagnosis(es): The primary encounter diagnosis was Chest pain, unspecified type. Diagnoses of Acute renal failure superimposed on chronic kidney disease, unspecified CKD stage, unspecified acute renal failure type (HCC) and Acute anemia were also pertinent to this visit. has a past medical history of ADHD (attention deficit hyperactivity disorder), Depression, DM2 (diabetes mellitus, type 2) ([address] Erectile dysfunction, Gastroesophageal reflux disease, Hyperlipidemia, Hypertension, Kidney stone, Low back pain of thoracolumbar region with sciatica, and Neuropathy. has a past surgical history that includes Tonsillectomy; Cystoscopy (2018); pr cystoscopy,insert ureteral stent (Bilateral, 2018); Cystocopy (2018); and pr cysto/uretero/pyeloscopy, calculus tx (Bilateral, 8/3/2018).            Restrictions  Restrictions/Precautions  Restrictions/Precautions: Up as Tolerated  Required Braces or Orthoses?: No  Position Activity Restriction  Other position/activity restrictions: up with assist    Subjective   General  Patient assessed for rehabilitation services?: Yes  Family / Caregiver Present: No  Patient Currently in Pain: Yes  Pain Assessment  Pain Assessment: 0-10  Pain Level: 8  Pain Type: Acute pain  Pain Location: Foot  Pain Orientation: Right  Pain Descriptors: Aching;Discomfort  Pain Frequency: Continuous  Non-Pharmaceutical Pain Intervention(s): Ambulation/Increased Activity  Response to Pain Intervention: Patient Satisfied  Vital Signs  Patient Currently in Pain: Yes     Social/Functional History  Social/Functional History  Lives With: Family(niece)  Type of Home: House  Home Layout: Two level, Laundry in basement, Bed/Bath upstairs(pt reports mild difficulty going up to/from bedroom and bathroom)  Home Access: Stairs to enter with rails  Entrance Stairs - Number of Steps: 5  Entrance Stairs - Rails: Left  Bathroom Shower/Tub: Tub only(pt reports no problems getting in/out of tub)  Bathroom Toilet: Standard  Home Equipment: (no DME use at baseline)  ADL Assistance: [facility]: Independent  Homemaking Responsibilities: Yes  Ambulation Assistance: Independent  Transfer Assistance: Independent  Active : No  Mode of Transportation: Walk, Bus  Occupation: On disability  Leisure & Hobbies: do odd jobs       Objective   Vision: Impaired  Vision Exceptions: Wears glasses for reading  Hearing: Within functional limits         Balance  Sitting Balance: Supervision  Standing Balance: Supervision  Functional Mobility  Functional - Mobility Device: Other  Activity: Other; To/from bathroom  Assist Level: Stand by assistance  Functional Mobility Comments: pt completed functional mobility within hospital room and hallway while pushing IV pole and SBA for safety only  Toilet Transfers  Toilet - Technique: Stand step  Equipment Used: Standard toilet  Toilet Transfer: Supervision ADL  Feeding: Independent;Setup  Grooming: Independent;Setup  UE Bathing: Setup;Supervision  LE Bathing: Setup;Supervision  UE Dressing: Setup;Supervision  LE Dressing: Setup;Supervision(pt donned hospital socks with supervision for safety only)  Toileting: Supervision(pt completed toileting task with supervision for safety only)  Tone RUE  RUE Tone: Normotonic  Tone LUE  LUE Tone: Normotonic  Coordination  Movements Are Fluid And Coordinated: Yes    Bed mobility  Supine to Sit: Modified independent  Scooting: Modified independent  Comment: pt retired to recliner at end of session  Transfers  Sit to stand: Supervision  Stand to sit: Supervision    Cognition  Overall Cognitive Status: WFL    Sensation  Overall Sensation Status: Impaired(chronic numbness/tingling B feet)        LUE AROM (degrees)  LUE AROM : WFL  Left Hand AROM (degrees)  Left Hand AROM: WFL  RUE AROM (degrees)  RUE AROM : WFL  Right Hand AROM (degrees)  Right Hand AROM: WFL  LUE Strength  Gross LUE Strength: WFL  L Hand General: 5/5  RUE Strength  Gross RUE Strength: WFL  R Hand General: 5/5                   AM-PAC Score        AM-[facility] Inpatient Daily Activity Raw Score: 24 (12/06/20 1409)  AM-PAC Inpatient ADL T-Scale Score : 57.54 (12/06/20 1409)  ADL Inpatient CMS 0-100% Score: 0 (12/06/20 1409)  ADL Inpatient CMS G-Code Modifier : CH (12/06/20 1409)    Therapy Time   Individual Concurrent Group Co-treatment   Time In 1025         Time Out 1043         Minutes 18         Timed Code Treatment Minutes: 8 Minutes       FRANCK Perez/L

## 2020-12-06 NOTE — PLAN OF CARE
Problem: Falls - Risk of:  Goal: Will remain free from falls  Description: Will remain free from falls  Outcome: Ongoing  Note: Patient assessed for fall risk and fall precautions initiated as needed. Patient and family  instructed about safety devices and allowed to make decisions related to safey. Environment kept free of clutter and adequate lighting provided. Bed in lowest position with brakes locked. Call light in reach. Patient ID band correct and in place. Patient transferred with appropriate methods. Will continue to monitor. Problem:  Bowel Function - Altered:  Goal: Bowel elimination is within specified parameters  Description: Bowel elimination is within specified parameters  Outcome: Ongoing     Problem: Fluid Volume - Imbalance:  Goal: Absence of imbalanced fluid volume signs and symptoms  Description: Absence of imbalanced fluid volume signs and symptoms  Outcome: Ongoing  Goal: Will show no signs and symptoms of excessive bleeding  Description: Will show no signs and symptoms of excessive bleeding  Outcome: Ongoing

## 2020-12-06 NOTE — PROGRESS NOTES
45 [facility]  Progress Note    Date:   12/6/2020  Patient name:  Katlyn Valdivia  Date of admission:  12/4/2020  1:22 PM  MRN:   [id number]  YOB: 1960    SUBJECTIVE/Last 24 hours update:     Patient seen and examined at the bed side. Per nurse report no acute events over night. Pt is laying comfortably in bed. Nurse at bedside. Pt continues to have mild substernal chest pain which improving not requiring pain medication. Hgb 10 stable s/p 2 units PRBC transfusion. On clear liquid diet. Notes from nursing staff and Consults had been reviewed, and the overnight progress had been checked with the nursing staff as well. HPI:   See H&P    REVIEW OF SYSTEMS:      Review of Systems   Constitutional: Negative for fatigue and fever. Respiratory: Negative for shortness of breath and wheezing. Cardiovascular: Positive for chest pain. Negative for palpitations and leg swelling. Gastrointestinal: Positive for abdominal pain. Negative for constipation, diarrhea, nausea and vomiting. Skin: Negative. Neurological: Positive for numbness. Negative for dizziness, weakness, light-headedness and headaches. Psychiatric/Behavioral: Negative for dysphoric mood. PAST MEDICAL HISTORY:      has a past medical history of ADHD (attention deficit hyperactivity disorder), Depression, DM2 (diabetes mellitus, type 2) ([address] Erectile dysfunction, Gastroesophageal reflux disease, Hyperlipidemia, Hypertension, Kidney stone, Low back pain of thoracolumbar region with sciatica, and Neuropathy. PAST SURGICAL HISTORY:      has a past surgical history that includes Tonsillectomy; Cystoscopy (07/20/2018); pr cystoscopy,insert ureteral stent (Bilateral, 7/20/2018); Cystocopy (08/03/2018); and pr cysto/uretero/pyeloscopy, calculus tx (Bilateral, 8/3/2018). SOCIAL HISTORY:      reports that he quit smoking about 2 years ago.  His smoking use Mirian Guidry MD   omeprazole (PRILOSEC) 20 MG delayed release capsule Take 1 capsule by mouth 2 times daily (before meals) 1/14/20   Bess Montalvo MD   oxybutynin (DITROPAN-XL) 10 MG extended release tablet Take 1 tablet by mouth daily 1/14/20 2/13/20  Bess Montalvo MD   ranolazine (RANEXA) 500 MG extended release tablet Take 1 tablet by mouth 2 times daily 1/14/20   Bess Montalvo MD   blood glucose monitor kit and supplies Test 2 times a day & as needed for symptoms of irregular blood glucose. 1/14/20   Bess Montalvo MD   Lancets MISC 1 each by Does not apply route 2 times daily 1/14/20   Bess Montalvo MD   blood glucose monitor strips Test 2 times a day & as needed for symptoms of irregular blood glucose. 1/14/20   Bess Montalvo MD       ALLERGIES:     Patient has no known allergies. OBJECTIVE:       Vitals:    12/05/20 2014 12/05/20 2315 12/05/20 2330 12/06/20 0340   BP:   (!) 145/83 131/72   Pulse: 84  77 74   Resp:   22 18   Temp:   98 °F (36.7 °C) 97.7 °F (36.5 °C)   TempSrc:   Oral Temporal   SpO2:   93% 97%   Weight:  207 lb 14.3 oz (94.3 kg)     Height:             Intake/Output Summary (Last 24 hours) at 12/6/2020 0801  Last data filed at 12/6/2020 0752  Gross per 24 hour   Intake 1852 ml   Output 1300 ml   Net 552 ml       PHYSICAL EXAM:  Physical Exam  Cardiovascular:      Rate and Rhythm: Normal rate and regular rhythm. Pulses: Normal pulses. Heart sounds: Normal heart sounds. No murmur. Pulmonary:      Effort: Pulmonary effort is normal. No respiratory distress. Breath sounds: Normal breath sounds. Abdominal:      Palpations: Abdomen is soft. Tenderness: There is abdominal tenderness. Musculoskeletal:      Right lower leg: No edema. Left lower leg: No edema. Skin:     General: Skin is warm. Neurological:      Mental Status: He is alert and oriented to person, place, and time.    Psychiatric:         Mood and Affect: Mood normal.         Behavior: Behavior normal.          DIAGNOSTICS:     Laboratory Testing:    Recent Results (from the past 24 hour(s))   POC Glucose Fingerstick    Collection Time: 12/05/20  8:36 AM   Result Value Ref Range    POC Glucose 252 (H) 75 - 110 mg/dL   POC Glucose Fingerstick    Collection Time: 12/05/20 11:24 AM   Result Value Ref Range    POC Glucose 229 (H) 75 - 110 mg/dL   Hemoglobin and hematocrit, blood    Collection Time: 12/05/20 11:35 AM   Result Value Ref Range    Hemoglobin 7.4 (L) 13.0 - 17.0 g/dL    Hematocrit 24.0 (L) 40.7 - 50.3 %   Troponin    Collection Time: 12/05/20 11:35 AM   Result Value Ref Range    Troponin, High Sensitivity 62 (HH) 0 - 22 ng/L    Troponin T NOT REPORTED <0.03 ng/mL    Troponin Interp NOT REPORTED    POC Glucose Fingerstick    Collection Time: 12/05/20  1:25 PM   Result Value Ref Range    POC Glucose 193 (H) 75 - 110 mg/dL   Hemoglobin and hematocrit, blood    Collection Time: 12/05/20  5:37 PM   Result Value Ref Range    Hemoglobin 9.5 (L) 13.0 - 17.0 g/dL    Hematocrit 30.0 (L) 40.7 - 50.3 %   POC Glucose Fingerstick    Collection Time: 12/05/20  5:49 PM   Result Value Ref Range    POC Glucose 270 (H) 75 - 110 mg/dL   POC Glucose Fingerstick    Collection Time: 12/05/20  7:02 PM   Result Value Ref Range    POC Glucose 290 (H) 75 - 110 mg/dL   POC Glucose Fingerstick    Collection Time: 12/05/20  8:18 PM   Result Value Ref Range    POC Glucose 189 (H) 75 - 110 mg/dL   Hemoglobin and hematocrit, blood    Collection Time: 12/05/20 10:23 PM   Result Value Ref Range    Hemoglobin 9.7 (L) 13.0 - 17.0 g/dL    Hematocrit 29.7 (L) 40.7 - 50.3 %   Hemoglobin and hematocrit, blood    Collection Time: 12/06/20  5:16 AM   Result Value Ref Range    Hemoglobin 10.0 (L) 13.0 - 17.0 g/dL    Hematocrit 30.6 (L) 40.7 - 50.3 %   Basic Metabolic Panel w/ Reflex to MG    Collection Time: 12/06/20  5:16 AM   Result Value Ref Range    Glucose 270 (H) 70 - 99 mg/dL    BUN 34 (H) 8 - 23 mg/dL    CREATININE 3.18 (H) 0.70 - 1.20 mg/dL    Bun/Cre Ratio NOT REPORTED 9 - 20    Calcium 8.6 8.6 - 10.4 mg/dL    Sodium 135 135 - 144 mmol/L    Potassium 5.0 3.7 - 5.3 mmol/L    Chloride 103 98 - 107 mmol/L    CO2 19 (L) 20 - 31 mmol/L    Anion Gap 13 9 - 17 mmol/L    GFR Non-African American 20 (L) >60 mL/min    GFR  24 (L) >60 mL/min    GFR Comment          GFR Staging NOT REPORTED          Imaging/Diagonstics:    Current Facility-Administered Medications   Medication Dose Route Frequency Provider Last Rate Last Dose    sodium chloride (PF) 0.9 % injection 10 mL  10 mL Intravenous Daily Milla Ramos MD   10 mL at 12/05/20 1017    amLODIPine (NORVASC) tablet 10 mg  10 mg Oral Daily Milla Ramos MD   10 mg at 12/05/20 1600    atorvastatin (LIPITOR) tablet 20 mg  20 mg Oral Nightly Milla Ramos MD   20 mg at 12/05/20 2329    hydroCHLOROthiazide (HYDRODIURIL) tablet 25 mg  25 mg Oral Daily Milla Ramos MD   25 mg at 12/05/20 1600    isosorbide mononitrate (IMDUR) extended release tablet 30 mg  30 mg Oral Daily Milla Ramos MD   30 mg at 12/05/20 1625    metoprolol tartrate (LOPRESSOR) tablet 50 mg  50 mg Oral BID Milla Ramos MD   50 mg at 12/05/20 2014    pantoprazole (PROTONIX) tablet 40 mg  40 mg Oral BID AC JEOVANNY Gonzalez - CNP   40 mg at 12/06/20 0618    polyethylene glycol (GLYCOLAX) packet 17 g  17 g Oral Daily Milla Ramos MD   17 g at 12/05/20 1602    sodium chloride flush 0.9 % injection 10 mL  10 mL Intravenous 2 times per day Bernda Oconnor MD   10 mL at 12/05/20 2331    sodium chloride flush 0.9 % injection 10 mL  10 mL Intravenous PRN Brenda Oconnor MD        acetaminophen (TYLENOL) tablet 650 mg  650 mg Oral Q4H PRN Brenda Oconnor MD   650 mg at 12/05/20 1951    sodium chloride flush 0.9 % injection 10 mL  10 mL Intravenous 2 times per day Riccardo MD Kandace   10 mL at 12/05/20 0930    sodium chloride flush 0.9 % injection 10 mL  10 mL Intravenous PRN Yamel Greene MD  promethazine (PHENERGAN) tablet 12.5 mg  12.5 mg Oral Q6H PRN Riccardo Jeronimo MD   12.5 mg at 12/04/20 2000    Or    ondansetron (ZOFRAN) injection 4 mg  4 mg Intravenous Q6H PRN Riccardo Jeronimo MD        0.9 % sodium chloride infusion   Intravenous Continuous Riccardo Jeronimo  mL/hr at 12/05/20 2015      potassium chloride 10 mEq/100 mL IVPB (Peripheral Line)  10 mEq Intravenous PRN Yamel Greene MD        insulin lispro (HUMALOG) injection vial 0-18 Units  0-18 Units Subcutaneous TID WC Milla Ramos MD   9 Units at 12/05/20 1747    insulin lispro (HUMALOG) injection vial 0-9 Units  0-9 Units Subcutaneous Nightly Milla Ramos MD   2 Units at 12/05/20 2102    morphine injection 2 mg  2 mg Intravenous Q6H PRN Milla Ramos MD   2 mg at 12/04/20 2001    metoprolol (LOPRESSOR) injection 5 mg  5 mg Intravenous Q6H PRN Yamel Greene MD   5 mg at 12/04/20 2109    glucose (GLUTOSE) 40 % oral gel 15 g  15 g Oral PRN Riccardo Jeronimo MD        dextrose 50 % IV solution  12.5 g Intravenous PRN Yamel Greene MD        glucagon (rDNA) injection 1 mg  1 mg Intramuscular PRN Riccardo Jeronimo MD        dextrose 5 % solution  100 mL/hr Intravenous PRN Riccardo Jeronimo MD           ASSESSMENT:     Active Problems:    DM2 (diabetes mellitus, type 2) (HCC)    Gastroesophageal reflux disease    Essential hypertension    Stage 3 chronic kidney disease    BMI 31.0-31.9,adult    Anemia    Acute kidney injury superimposed on CKD ([facility]    Type 2 MI (myocardial infarction) ([facility]  Resolved Problems:    * No resolved hospital problems. *      PLAN:     1. NSTEMI, likely type II, from demand ischemia from acute blood loss anemia and renal insufficiency  -Troponin at 62 >83 > 43 > 37.   Continue to trend  -Repeat EKG (12/5/2020) - NSR; Possible sinus rhythm; LVH  -Chest x-ray unremarkable for any acute process  -ECHO (08/27/2020) - LVEF 52%  -Stress test 08/25/2020: LVEF 44%, septal wall hypokinesis, stratification: Intermediate risk  -Cardiology, recommend start home BP medications and BP tolerates. -Continue to HOLD Heparin drip due to anemia   -Repeat ECHO completed      2. Anemia likely 2/2 upper GI bleed  - Hx of NSAID use. -Clear liquid diet   -Hemoglobin 7.1>10.0  -Status post 2 unit PRBC Day 1   -Continue to monitor H&H every 6 Hours  -Iron studies unremarkable  -FBOT ordered  -Protonix 40 mg PO twice daily  -Transfusion of PRBC his hemoglobin falls below 7.0.  -Cologuard test positive in September 2019. Patient noncompliant with obtaining colonoscopy procedure. Letter was sent by surgery scheduling department educating on importance of colonoscopy procedure.   -GI recommends Cardiology cleared pt for EGD and Colonoscopy on 12/07/2020, complete bowel prep today  -COVID 19 negative     3. JEFFREY on CKD stage 4 likely 2/2 prerenal  -Creatinine at 3.79>3.18; BL at 2.5  -Urine creatinine-80 and urine sodium-49   -FeNa 1.4% suggestive of intrinsic disease but clinical Pre-renal ideology.   -Renal ultrasound unremarkable  -Continue to monitor creatinine  -Started on normal saline  mL/hour     4. Diabetes mellitus type II  -Blood glucose 270  -POCT glucose checks  -HISS  -Hemoglobin A1c 10.4  -Hypoglycemia protocol     5. Hypertension  -Continue Metoprolol 50 mg PO BID, amlodipine 10 mg, hydrochlorothiazide 25 mg, Imdur 30 mg ER  -PRN medications; Nirtostat 0.4 tab, and  Metoprolol 5 mg IV Q6 PRN. 6. Neuropathy   - Gabapentin 100 mg PO TID     Discussed care plan with nurse after getting her input.     Above plan discussed with the patient  who agreed to the above plan     Plan will be discussed with the attending Dr. Bc Waller MD  Family Medicine Resident  12/6/2020 8:01 AM

## 2020-12-07 ENCOUNTER — ANESTHESIA EVENT (OUTPATIENT)
Dept: ENDOSCOPY | Age: 60
DRG: 469 | End: 2020-12-07
Payer: MEDICAID

## 2020-12-07 ENCOUNTER — ANESTHESIA (OUTPATIENT)
Dept: ENDOSCOPY | Age: 60
DRG: 469 | End: 2020-12-07
Payer: MEDICAID

## 2020-12-07 VITALS
BODY MASS INDEX: 29.1 KG/M2 | DIASTOLIC BLOOD PRESSURE: 71 MMHG | OXYGEN SATURATION: 92 % | HEART RATE: 81 BPM | TEMPERATURE: 98.9 F | SYSTOLIC BLOOD PRESSURE: 132 MMHG | RESPIRATION RATE: 23 BRPM | WEIGHT: 207.89 LBS | HEIGHT: 71 IN

## 2020-12-07 VITALS
SYSTOLIC BLOOD PRESSURE: 73 MMHG | OXYGEN SATURATION: 100 % | RESPIRATION RATE: 21 BRPM | DIASTOLIC BLOOD PRESSURE: 44 MMHG

## 2020-12-07 LAB
ANION GAP SERPL CALCULATED.3IONS-SCNC: 15 MMOL/L (ref 9–17)
BUN BLDV-MCNC: 21 MG/DL (ref 8–23)
BUN/CREAT BLD: ABNORMAL (ref 9–20)
CALCIUM SERPL-MCNC: 8.5 MG/DL (ref 8.6–10.4)
CHLORIDE BLD-SCNC: 104 MMOL/L (ref 98–107)
CO2: 18 MMOL/L (ref 20–31)
CREAT SERPL-MCNC: 2.94 MG/DL (ref 0.7–1.2)
EKG ATRIAL RATE: 82 BPM
EKG ATRIAL RATE: 83 BPM
EKG ATRIAL RATE: 89 BPM
EKG P AXIS: 30 DEGREES
EKG P AXIS: 31 DEGREES
EKG P AXIS: 32 DEGREES
EKG P-R INTERVAL: 162 MS
EKG P-R INTERVAL: 162 MS
EKG P-R INTERVAL: 168 MS
EKG Q-T INTERVAL: 356 MS
EKG Q-T INTERVAL: 356 MS
EKG Q-T INTERVAL: 378 MS
EKG QRS DURATION: 74 MS
EKG QRS DURATION: 76 MS
EKG QRS DURATION: 80 MS
EKG QTC CALCULATION (BAZETT): 415 MS
EKG QTC CALCULATION (BAZETT): 433 MS
EKG QTC CALCULATION (BAZETT): 444 MS
EKG R AXIS: 16 DEGREES
EKG R AXIS: 6 DEGREES
EKG R AXIS: 7 DEGREES
EKG T AXIS: 55 DEGREES
EKG T AXIS: 60 DEGREES
EKG T AXIS: 74 DEGREES
EKG VENTRICULAR RATE: 82 BPM
EKG VENTRICULAR RATE: 83 BPM
EKG VENTRICULAR RATE: 89 BPM
GFR AFRICAN AMERICAN: 27 ML/MIN
GFR NON-AFRICAN AMERICAN: 22 ML/MIN
GFR SERPL CREATININE-BSD FRML MDRD: ABNORMAL ML/MIN/{1.73_M2}
GFR SERPL CREATININE-BSD FRML MDRD: ABNORMAL ML/MIN/{1.73_M2}
GLUCOSE BLD-MCNC: 163 MG/DL (ref 75–110)
GLUCOSE BLD-MCNC: 178 MG/DL (ref 75–110)
GLUCOSE BLD-MCNC: 180 MG/DL (ref 70–99)
GLUCOSE BLD-MCNC: 261 MG/DL (ref 75–110)
HCT VFR BLD CALC: 29.3 % (ref 40.7–50.3)
HCT VFR BLD CALC: 33.5 % (ref 40.7–50.3)
HEMOGLOBIN: 10.5 G/DL (ref 13–17)
HEMOGLOBIN: 9.3 G/DL (ref 13–17)
POTASSIUM SERPL-SCNC: 3.9 MMOL/L (ref 3.7–5.3)
SODIUM BLD-SCNC: 137 MMOL/L (ref 135–144)

## 2020-12-07 PROCEDURE — 3609012400 HC EGD TRANSORAL BIOPSY SINGLE/MULTIPLE: Performed by: INTERNAL MEDICINE

## 2020-12-07 PROCEDURE — 85014 HEMATOCRIT: CPT

## 2020-12-07 PROCEDURE — 80048 BASIC METABOLIC PNL TOTAL CA: CPT

## 2020-12-07 PROCEDURE — 6370000000 HC RX 637 (ALT 250 FOR IP): Performed by: INTERNAL MEDICINE

## 2020-12-07 PROCEDURE — 36415 COLL VENOUS BLD VENIPUNCTURE: CPT

## 2020-12-07 PROCEDURE — G0108 DIAB MANAGE TRN  PER INDIV: HCPCS

## 2020-12-07 PROCEDURE — 6360000002 HC RX W HCPCS: Performed by: NURSE ANESTHETIST, CERTIFIED REGISTERED

## 2020-12-07 PROCEDURE — 43239 EGD BIOPSY SINGLE/MULTIPLE: CPT | Performed by: INTERNAL MEDICINE

## 2020-12-07 PROCEDURE — 88342 IMHCHEM/IMCYTCHM 1ST ANTB: CPT

## 2020-12-07 PROCEDURE — 6370000000 HC RX 637 (ALT 250 FOR IP): Performed by: NURSE PRACTITIONER

## 2020-12-07 PROCEDURE — 3609027000 HC COLONOSCOPY: Performed by: INTERNAL MEDICINE

## 2020-12-07 PROCEDURE — 45378 DIAGNOSTIC COLONOSCOPY: CPT | Performed by: INTERNAL MEDICINE

## 2020-12-07 PROCEDURE — 99239 HOSP IP/OBS DSCHRG MGMT >30: CPT | Performed by: STUDENT IN AN ORGANIZED HEALTH CARE EDUCATION/TRAINING PROGRAM

## 2020-12-07 PROCEDURE — 2709999900 HC NON-CHARGEABLE SUPPLY: Performed by: INTERNAL MEDICINE

## 2020-12-07 PROCEDURE — 88341 IMHCHEM/IMCYTCHM EA ADD ANTB: CPT

## 2020-12-07 PROCEDURE — 88305 TISSUE EXAM BY PATHOLOGIST: CPT

## 2020-12-07 PROCEDURE — 0DJD8ZZ INSPECTION OF LOWER INTESTINAL TRACT, VIA NATURAL OR ARTIFICIAL OPENING ENDOSCOPIC: ICD-10-PCS | Performed by: INTERNAL MEDICINE

## 2020-12-07 PROCEDURE — 85018 HEMOGLOBIN: CPT

## 2020-12-07 PROCEDURE — 2580000003 HC RX 258: Performed by: NURSE ANESTHETIST, CERTIFIED REGISTERED

## 2020-12-07 PROCEDURE — 0DB78ZX EXCISION OF STOMACH, PYLORUS, VIA NATURAL OR ARTIFICIAL OPENING ENDOSCOPIC, DIAGNOSTIC: ICD-10-PCS | Performed by: INTERNAL MEDICINE

## 2020-12-07 PROCEDURE — 3700000001 HC ADD 15 MINUTES (ANESTHESIA): Performed by: INTERNAL MEDICINE

## 2020-12-07 PROCEDURE — 7100000011 HC PHASE II RECOVERY - ADDTL 15 MIN: Performed by: INTERNAL MEDICINE

## 2020-12-07 PROCEDURE — 7100000010 HC PHASE II RECOVERY - FIRST 15 MIN: Performed by: INTERNAL MEDICINE

## 2020-12-07 PROCEDURE — 82947 ASSAY GLUCOSE BLOOD QUANT: CPT

## 2020-12-07 PROCEDURE — 94761 N-INVAS EAR/PLS OXIMETRY MLT: CPT

## 2020-12-07 PROCEDURE — 3700000000 HC ANESTHESIA ATTENDED CARE: Performed by: INTERNAL MEDICINE

## 2020-12-07 PROCEDURE — 2580000003 HC RX 258: Performed by: INTERNAL MEDICINE

## 2020-12-07 PROCEDURE — 2500000003 HC RX 250 WO HCPCS: Performed by: NURSE ANESTHETIST, CERTIFIED REGISTERED

## 2020-12-07 RX ORDER — ATORVASTATIN CALCIUM 20 MG/1
20 TABLET, FILM COATED ORAL DAILY
Qty: 30 TABLET | Refills: 5 | Status: ON HOLD | OUTPATIENT
Start: 2020-12-07 | End: 2021-06-29 | Stop reason: HOSPADM

## 2020-12-07 RX ORDER — PHENYLEPHRINE HYDROCHLORIDE 10 MG/ML
INJECTION INTRAVENOUS PRN
Status: DISCONTINUED | OUTPATIENT
Start: 2020-12-07 | End: 2020-12-07 | Stop reason: SDUPTHER

## 2020-12-07 RX ORDER — LIDOCAINE HYDROCHLORIDE 10 MG/ML
INJECTION, SOLUTION EPIDURAL; INFILTRATION; INTRACAUDAL; PERINEURAL PRN
Status: DISCONTINUED | OUTPATIENT
Start: 2020-12-07 | End: 2020-12-07

## 2020-12-07 RX ORDER — SODIUM CHLORIDE 9 MG/ML
INJECTION INTRAVENOUS PRN
Status: DISCONTINUED | OUTPATIENT
Start: 2020-12-07 | End: 2020-12-07 | Stop reason: SDUPTHER

## 2020-12-07 RX ORDER — AMLODIPINE BESYLATE 10 MG/1
10 TABLET ORAL DAILY
Qty: 90 TABLET | Refills: 1 | Status: SHIPPED | OUTPATIENT
Start: 2020-12-07 | End: 2020-12-14

## 2020-12-07 RX ORDER — METOPROLOL TARTRATE 50 MG/1
50 TABLET, FILM COATED ORAL 2 TIMES DAILY
Qty: 180 TABLET | Refills: 1 | Status: SHIPPED | OUTPATIENT
Start: 2020-12-07 | End: 2021-08-10

## 2020-12-07 RX ORDER — PANTOPRAZOLE SODIUM 40 MG/1
40 TABLET, DELAYED RELEASE ORAL
Qty: 180 TABLET | Refills: 3 | Status: ON HOLD | OUTPATIENT
Start: 2020-12-07 | End: 2021-06-29 | Stop reason: HOSPADM

## 2020-12-07 RX ORDER — PROPOFOL 10 MG/ML
INJECTION, EMULSION INTRAVENOUS PRN
Status: DISCONTINUED | OUTPATIENT
Start: 2020-12-07 | End: 2020-12-07 | Stop reason: SDUPTHER

## 2020-12-07 RX ORDER — PROPOFOL 10 MG/ML
INJECTION, EMULSION INTRAVENOUS CONTINUOUS PRN
Status: DISCONTINUED | OUTPATIENT
Start: 2020-12-07 | End: 2020-12-07 | Stop reason: SDUPTHER

## 2020-12-07 RX ORDER — LIDOCAINE HYDROCHLORIDE 10 MG/ML
INJECTION, SOLUTION EPIDURAL; INFILTRATION; INTRACAUDAL; PERINEURAL PRN
Status: DISCONTINUED | OUTPATIENT
Start: 2020-12-07 | End: 2020-12-07 | Stop reason: SDUPTHER

## 2020-12-07 RX ORDER — PANTOPRAZOLE SODIUM 40 MG/1
40 TABLET, DELAYED RELEASE ORAL
Qty: 30 TABLET | Refills: 3 | Status: SHIPPED | OUTPATIENT
Start: 2020-12-07 | End: 2021-11-18 | Stop reason: SDUPTHER

## 2020-12-07 RX ADMIN — ISOSORBIDE MONONITRATE 30 MG: 30 TABLET ORAL at 11:22

## 2020-12-07 RX ADMIN — PANTOPRAZOLE SODIUM 40 MG: 40 TABLET, DELAYED RELEASE ORAL at 11:20

## 2020-12-07 RX ADMIN — LIDOCAINE HYDROCHLORIDE 50 MG: 10 INJECTION, SOLUTION EPIDURAL; INFILTRATION; INTRACAUDAL; PERINEURAL at 09:24

## 2020-12-07 RX ADMIN — PANTOPRAZOLE SODIUM 40 MG: 40 TABLET, DELAYED RELEASE ORAL at 06:36

## 2020-12-07 RX ADMIN — SODIUM CHLORIDE: 9 INJECTION, SOLUTION INTRAVENOUS at 11:21

## 2020-12-07 RX ADMIN — PROPOFOL 50 MG: 10 INJECTION, EMULSION INTRAVENOUS at 09:33

## 2020-12-07 RX ADMIN — RANOLAZINE 500 MG: 500 TABLET, FILM COATED, EXTENDED RELEASE ORAL at 11:21

## 2020-12-07 RX ADMIN — GABAPENTIN 100 MG: 100 CAPSULE ORAL at 14:37

## 2020-12-07 RX ADMIN — INSULIN LISPRO 3 UNITS: 100 INJECTION, SOLUTION INTRAVENOUS; SUBCUTANEOUS at 12:18

## 2020-12-07 RX ADMIN — HYDROCHLOROTHIAZIDE 25 MG: 25 TABLET ORAL at 11:17

## 2020-12-07 RX ADMIN — PHENYLEPHRINE HYDROCHLORIDE 150 MCG: 10 INJECTION INTRAVENOUS at 09:45

## 2020-12-07 RX ADMIN — PROPOFOL 50 MG: 10 INJECTION, EMULSION INTRAVENOUS at 09:29

## 2020-12-07 RX ADMIN — METOPROLOL TARTRATE 50 MG: 50 TABLET, FILM COATED ORAL at 11:19

## 2020-12-07 RX ADMIN — SODIUM CHLORIDE 10 ML: 9 INJECTION INTRAMUSCULAR; INTRAVENOUS; SUBCUTANEOUS at 09:45

## 2020-12-07 RX ADMIN — PROPOFOL 80 MG: 10 INJECTION, EMULSION INTRAVENOUS at 09:24

## 2020-12-07 RX ADMIN — PROPOFOL 50 MG: 10 INJECTION, EMULSION INTRAVENOUS at 09:30

## 2020-12-07 RX ADMIN — PROPOFOL 150 MCG/KG/MIN: 10 INJECTION, EMULSION INTRAVENOUS at 09:34

## 2020-12-07 RX ADMIN — PROPOFOL 20 MG: 10 INJECTION, EMULSION INTRAVENOUS at 09:26

## 2020-12-07 RX ADMIN — AMLODIPINE BESYLATE 10 MG: 10 TABLET ORAL at 11:21

## 2020-12-07 RX ADMIN — ATORVASTATIN CALCIUM 20 MG: 20 TABLET, FILM COATED ORAL at 11:36

## 2020-12-07 RX ADMIN — PHENYLEPHRINE HYDROCHLORIDE 150 MCG: 10 INJECTION INTRAVENOUS at 09:50

## 2020-12-07 ASSESSMENT — ENCOUNTER SYMPTOMS
CONSTIPATION: 0
NAUSEA: 0
SHORTNESS OF BREATH: 0
ABDOMINAL PAIN: 0
VOMITING: 0
WHEEZING: 0
DIARRHEA: 0

## 2020-12-07 ASSESSMENT — PAIN SCALES - GENERAL
PAINLEVEL_OUTOF10: 0
PAINLEVEL_OUTOF10: 0

## 2020-12-07 ASSESSMENT — LIFESTYLE VARIABLES: SMOKING_STATUS: 0

## 2020-12-07 NOTE — DISCHARGE SUMMARY
45 [facility]  Discharge Summary      NAME:  Jm Bailey  :  1960  MRN:  [id number]    Admit date:  2020  Discharge date:  2020    Admitting Physician:  Jenny Hsieh MD    Primary Diagnosis on Admission:   Present on Admission:   DM2 (diabetes mellitus, type 2) ([facility]   Gastroesophageal reflux disease   Essential hypertension   Stage 3 chronic kidney disease   BMI 31.0-31.9,adult   Acute kidney injury superimposed on CKD ([facility]   Type 2 MI (myocardial infarction) ([facility]   Acute posthemorrhagic anemia   Gastrointestinal hemorrhage   Neuropathy   NSAID long-term use   Family history of malignant neoplasm of colon   Positive colorectal cancer screening using Cologuard test      Secondary Diagnoses:  does not have any pertinent problems on file. Admission Condition:  poor     Discharged Condition: good    Hospital Course: The patient was admitted for the management of NSTEMI 2/2 acute upper GI bleed. Patient presented to the ED complaining of constant, dull, excluding the low 7 out of 10 substernal chest pain associated with weakness of the right arm with shortness of breath. Patient reports baseline dull constant chest pain but this was worsening in nature and one episode occurred in the past.  Patient reports being noncompliant with medications. Patient also reported dark stools and bright red blood on toilet paper when wiping. He denies any recent weight loss. Patient also reports drinking 6 beers and 1/2 pint of gin night before chest pain occurred, CAGE 1/4. Patient reports recent drug use marijuana but denies cocaine. In the ED EKG showed normal sinus rhythm left ventricular hypertrophy, trope 37>43, hemoglobin of 7. Chest x-ray normal.  Patient was admitted to the inpatient progressive unit. Patient received 2 units packed red blood cell. Hemoglobin stabilized. Chest pain continue to improve daily. [facility]    Schedule an appointment as soon as possible for a visit in 2 weeks  Patient had a positive Cologuard test.  Needs a repeat colonoscopy with an 3 months due to inadequate prep    Chai Roche MD  [address] [place] [address]    In 1 week  Hospital Follow up      Discharge Medications:     Carolyne Gomez   [facility] Medication Instructions SPF:[id number]    Printed on:12/07/20 2992   Medication Information                      acetaminophen (TYLENOL) 325 MG tablet  Take 2 tablets by mouth every 6 hours as needed for Pain             amLODIPine (NORVASC) 10 MG tablet  Take 1 tablet by mouth daily             aspirin 81 MG chewable tablet  Take 1 tablet by mouth daily             atorvastatin (LIPITOR) 20 MG tablet  Take 1 tablet by mouth daily             blood glucose monitor kit and supplies  Test 2 times a day & as needed for symptoms of irregular blood glucose. blood glucose monitor strips  Test 2 times a day & as needed for symptoms of irregular blood glucose.              docusate sodium (COLACE) 100 MG capsule  Take 1 capsule by mouth 2 times daily as needed for Constipation             gabapentin (NEURONTIN) 100 MG capsule  TAKE 1 CAPSULE BY MOUTH 3 TIMES A DAY             glyBURIDE (DIABETA) 5 MG tablet  Take 1 tablet by mouth daily (with breakfast)             hydrochlorothiazide (HYDRODIURIL) 25 MG tablet  Take 1 tablet by mouth daily             isosorbide mononitrate (IMDUR) 30 MG extended release tablet  Take 1 tablet by mouth daily             Lancets MISC  1 each by Does not apply route 2 times daily             metFORMIN (GLUCOPHAGE) 500 MG tablet  Take 1 tablet by mouth 2 times daily (with meals)             metoprolol tartrate (LOPRESSOR) 50 MG tablet  Take 1 tablet by mouth 2 times daily             oxybutynin (DITROPAN-XL) 10 MG extended release tablet  Take 1 tablet by mouth daily             pantoprazole (PROTONIX) 40 MG tablet  Take 1 tablet by mouth 2 times daily (before meals)             ranolazine (RANEXA) 500 MG extended release tablet  Take 1 tablet by mouth 2 times daily             SENNA-PLUS 8.6-50 MG per tablet  Take 1 tablet by mouth daily             SITagliptin (JANUVIA) 50 MG tablet  Take 1 tablet by mouth daily                 Send Copies to: Curtis Oliveira MD, Barber Lopez      Note that over 30 minutes was spent in preparing discharge papers, discussing discharge with patient and family, medication review, etc.    Signed:  Art Mccullough MD  Family medicine Resident  12/7/2020 12:55 PM

## 2020-12-07 NOTE — OP NOTE
Operative Note      Patient: Crystal Sage  YOB: 1960  MRN: [id number]    Date of Procedure: 12/7/2020    Pre-Op Diagnosis: ANEMIA, MELENA    Post-Op Diagnosis: Same       Procedure(s):  EGD BIOPSY  COLONOSCOPY DIAGNOSTIC    Surgeon(s):  Sharee Sage MD    Assistant:   * No surgical staff found *    Anesthesia: Monitor Anesthesia Care    Estimated Blood Loss (mL): Minimal    Complications: None    Specimens:   ID Type Source Tests Collected by Time Destination   A : gastric bx, look for [facility] Tissue Stomach [facility] MD 12/7/2020 0497        Implants:  * No implants in log *      Drains: * No LDAs found *    Findings:    EGD  The GE junction was noted at 43 cm. LA grade B erosive esophagitis at the GE junction. Patchy erythema suggestive of gastritis was found in the antrum and pylorus. Biopsies performed to assess for H. pylori. Retroflexed view of the fundus and cardia appeared normal.  A 10 mm cratered ulcer with a clean base was found in the anterior wall duodenal bulb. Most likely source of patient's GI bleed. Likely cause due to NSAID use. Remainder of the duodenum up to the third portion appeared normal.    Colonoscopy  Moderate amount of brown semisolid and liquid stool in the colon making visualization difficult. Copious amount of sterile water irrigation was performed however some areas could not be cleared off of stool. There were no signs or stigmata of bleeding found on today's exam.  Small internal hemorrhoids were noted in the rectum on retroflexion. There was no evidence of mass or large polypS (> 5 mm) found on today's exam.  However smaller polyps could have been missed due to fair prep. With the patient's history of positive Cologuard test would recommend a repeat colonoscopy as an outpatient within 3 months. Recommendations  1. Continue pantoprazole 40 mg twice daily for 8 weeks then once daily thereafter.   2. Avoid NSAID Kathe Pollack MD on 12/7/2020 at 9:57 AM

## 2020-12-07 NOTE — CARE COORDINATION
Discharge order noted. Received call from pharmacy, prior authorization needed for Januvia (Key: [id number]). PA request submitted through CoverMyMeds. Discharge [facility] Case Management Department  Written by: Maddy Fitzpatrick RN    Patient Name: Niyah Davila  Attending Provider: Radha Abraham MD  Admit Date: 2020  1:22 PM  MRN: [id number]  Account: [de-identified]                     : 1960  Discharge Date:  20       Disposition: home    CHRISTOPHER Estevan Napier RN    1800 - Patient discharged home. Notified of denial for Januvia. PS to primary team requesting alternative medication be sent to pharmacy.

## 2020-12-07 NOTE — PROGRESS NOTES
Physical Therapy  DATE: 2020    NAME: Justin Ortiz  MRN: [id number]   : 1960    Patient not seen this date for Physical Therapy due to:  [] Blood transfusion in progress  [] Hemodialysis  [] Patient Declined  [] Spine Precautions   [] Strict Bedrest  [] Surgery/ Procedure  [x] Testing  EDG; next scheduled for 2020    [] Other        [] PT is being discontinued at this time. Patient independent. No further needs. [] PT is being discontinued at this time due to declining physical/ medical status. Therapy is not appropriate at this time.     Triston Davis, PTA

## 2020-12-07 NOTE — DISCHARGE SUMMARY
Patients IV access discontinued, pressure applied and pressure dressing applied. Reviewed discharge orders with patient. Pt has new medication from pharmacy with patient. All personal belongings packed and sent with patient. Pt discharged home via wheel chair with family in personal vehicle.

## 2020-12-07 NOTE — ANESTHESIA PRE PROCEDURE
Department of Anesthesiology  Preprocedure Note       Name:  Ian Garcia   Age:  61 y.o.  :  1960                                          MRN:  [id number]         Date:  2020      Surgeon: Apryl Li):  Claire Verma MD    Procedure: Procedure(s):  EGD ESOPHAGOGASTRODUODENOSCOPY  COLONOSCOPY DIAGNOSTIC    Medications prior to admission:   Prior to Admission medications    Medication Sig Start Date End Date Taking?  Authorizing Provider   acetaminophen (TYLENOL) 325 MG tablet Take 2 tablets by mouth every 6 hours as needed for Pain 20   Gallo Ziegler MD   gabapentin (NEURONTIN) 100 MG capsule TAKE 1 CAPSULE BY MOUTH 3 TIMES A DAY 20  Gallo Ziegler MD   famotidine (PEPCID) 20 MG tablet Take 1 tablet by mouth daily 20   Milla Ramos MD   metoprolol tartrate (LOPRESSOR) 50 MG tablet Take 1 tablet by mouth 2 times daily 20   Milla Ramos MD   glyBURIDE (DIABETA) 5 MG tablet Take 1 tablet by mouth daily (with breakfast) 20   Gallo Ziegler MD   SITagliptin (JANUVIA) 50 MG tablet Take 1 tablet by mouth daily 20   Cabrera Shukla MD   metFORMIN (GLUCOPHAGE) 500 MG tablet Take 1 tablet by mouth 2 times daily (with meals) 20   Cabrera Shukla MD   SENNA-PLUS 8.6-50 MG per tablet Take 1 tablet by mouth daily 20   Cabrera Shukla MD   amLODIPine (NORVASC) 10 MG tablet Take 1 tablet by mouth daily 20   Cabrera Shukla MD   aspirin 81 MG chewable tablet Take 1 tablet by mouth daily 20   Cabrera Shukla MD   atorvastatin (LIPITOR) 20 MG tablet Take 1 tablet by mouth daily 20   Cabrera Shukla MD   docusate sodium (COLACE) 100 MG capsule Take 1 capsule by mouth 2 times daily as needed for Constipation 20   Cabrera Shukla MD   hydrochlorothiazide (HYDRODIURIL) 25 MG tablet Take 1 tablet by mouth daily 20  Cabrera Shukla MD   isosorbide mononitrate (IMDUR) 30 MG extended release tablet Take 1 tablet by mouth daily 20 Rica Smyth MD   omeprazole (PRILOSEC) 20 MG delayed release capsule Take 1 capsule by mouth 2 times daily (before meals) 1/14/20   Rica Smyth MD   oxybutynin (DITROPAN-XL) 10 MG extended release tablet Take 1 tablet by mouth daily 1/14/20 2/13/20  Rica Smyth MD   ranolazine (RANEXA) 500 MG extended release tablet Take 1 tablet by mouth 2 times daily 1/14/20   Rica Smyth MD   blood glucose monitor kit and supplies Test 2 times a day & as needed for symptoms of irregular blood glucose. 1/14/20   Rica Smyth MD   Lancets MISC 1 each by Does not apply route 2 times daily 1/14/20   Rica Smyth MD   blood glucose monitor strips Test 2 times a day & as needed for symptoms of irregular blood glucose.  1/14/20   Rica Smyth MD       Current medications:    Current Facility-Administered Medications   Medication Dose Route Frequency Provider Last Rate Last Dose    gabapentin (NEURONTIN) capsule 100 mg  100 mg Oral TID Shahida Perdomo MD   100 mg at 12/06/20 2044    ranolazine (RANEXA) extended release tablet 500 mg  500 mg Oral BID Shahida Perdomo MD   500 mg at 12/06/20 2044    sodium chloride (PF) 0.9 % injection 10 mL  10 mL Intravenous Daily Tico Mcclellan MD   10 mL at 12/05/20 1017    amLODIPine (NORVASC) tablet 10 mg  10 mg Oral Daily Tico Mcclellan MD   10 mg at 12/06/20 0088    atorvastatin (LIPITOR) tablet 20 mg  20 mg Oral Nightly Tico Mcclellan MD   20 mg at 12/06/20 2044    hydroCHLOROthiazide (HYDRODIURIL) tablet 25 mg  25 mg Oral Daily Tico Mcclellan MD   25 mg at 12/06/20 7121    isosorbide mononitrate (IMDUR) extended release tablet 30 mg  30 mg Oral Daily Tico Mcclellan MD   30 mg at 12/06/20 1147    metoprolol tartrate (LOPRESSOR) tablet 50 mg  50 mg Oral BID Tico Mcclellan MD   50 mg at 12/06/20 2044    pantoprazole (PROTONIX) tablet 40 mg  40 mg Oral BID JEOVANNY Hood CNP   40 mg at 12/07/20 0636    polyethylene glycol (GLYCOLAX) packet 17 g  17 g Oral Daily Lesvia Schulz MD   17 g at 12/06/20 2970    sodium chloride flush 0.9 % injection 10 mL  10 mL Intravenous 2 times per day Getachew Wilkerson MD   10 mL at 12/06/20 2100    sodium chloride flush 0.9 % injection 10 mL  10 mL Intravenous PRN Getachew Wilkerson MD        acetaminophen (TYLENOL) tablet 650 mg  650 mg Oral Q4H PRN Getachew Wilkerson MD   650 mg at 12/06/20 1112    sodium chloride flush 0.9 % injection 10 mL  10 mL Intravenous 2 times per day Riccardo Jeronimo MD   10 mL at 12/06/20 2103    sodium chloride flush 0.9 % injection 10 mL  10 mL Intravenous PRN Nabor Sanchez MD        promethazine (PHENERGAN) tablet 12.5 mg  12.5 mg Oral Q6H PRN Riccardo Jeronimo MD   12.5 mg at 12/04/20 2000    Or    ondansetron (ZOFRAN) injection 4 mg  4 mg Intravenous Q6H PRN Riccardo Jeronimo MD        0.9 % sodium chloride infusion   Intravenous Continuous Riccardo Jeronimo  mL/hr at 12/05/20 2015      potassium chloride 10 mEq/100 mL IVPB (Peripheral Line)  10 mEq Intravenous PRN Nabor Sanchez MD        insulin lispro (HUMALOG) injection vial 0-18 Units  0-18 Units Subcutaneous TID WC Lesvia Schulz MD   6 Units at 12/06/20 1312    insulin lispro (HUMALOG) injection vial 0-9 Units  0-9 Units Subcutaneous Nightly Lesvia Schulz MD   7 Units at 12/06/20 2044    morphine injection 2 mg  2 mg Intravenous Q6H PRN Lesvia Schulz MD   2 mg at 12/04/20 2001    metoprolol (LOPRESSOR) injection 5 mg  5 mg Intravenous Q6H PRN Nabor Sanchez MD   5 mg at 12/04/20 2109    glucose (GLUTOSE) 40 % oral gel 15 g  15 g Oral PRN Riccardo Jeronimo MD        dextrose 50 % IV solution  12.5 g Intravenous PRN Nabor Sanchez MD        glucagon (rDNA) injection 1 mg  1 mg Intramuscular PRN Riccardo Jeronimo MD        dextrose 5 % solution  100 mL/hr Intravenous PRN Nabor Sanchez MD           Allergies:  No Known Allergies    Problem List:    Patient Active Problem List   Diagnosis Code    DM2 (diabetes mellitus, type 2) ([facility] E11.9    since quittin.0    Smokeless tobacco: Never Used   Substance Use Topics    Alcohol use: Yes     Alcohol/week: 12.0 standard drinks     Types: 12 Cans of beer per week     Comment: drinks \"a few\" beers a day                                Counseling given: Not Answered      Vital Signs (Current):   Vitals:    20 2044 20 0000 20 0354 20 0850   BP: (!) 166/94 (!) 159/87     Pulse: 86 73 76    Resp:      Temp:  36.9 °C (98.5 °F)  37.1 °C (98.7 °F)   TempSrc:  Temporal  Infrared   SpO2:  97%     Weight:       Height:                                                  BP Readings from Last 3 Encounters:   20 (!) 159/87   20 (!) 151/72   20 (!) 147/82       NPO Status:                                                                                 BMI:   Wt Readings from Last 3 Encounters:   20 207 lb 14.3 oz (94.3 kg)   20 220 lb (99.8 kg)   20 220 lb (99.8 kg)     Body mass index is 29 kg/m².     CBC:   Lab Results   Component Value Date    WBC 7.0 2020    RBC 2.38 2020    HGB 9.3 2020    HCT 29.3 2020    MCV 91.6 2020    RDW 15.0 2020     2020       CMP:   Lab Results   Component Value Date     2020    K 3.9 2020     2020    CO2 18 2020    BUN 21 2020    CREATININE 2.94 2020    GFRAA 27 2020    LABGLOM 22 2020    GLUCOSE 180 2020    PROT 6.6 2020    CALCIUM 8.5 2020    BILITOT 0.51 2020    ALKPHOS 87 2020    AST 20 2020    ALT 15 2020       POC Tests:   Recent Labs     20  0710   POCGLU 163*       Coags:   Lab Results   Component Value Date    PROTIME 9.8 2016    INR 0.9 2016    APTT 26.1 2016       HCG (If Applicable): No results found for: PREGTESTUR, PREGSERUM, HCG, HCGQUANT     ABGs: No results found for: PHART, PO2ART, NYO1IXW, DUH2YUS, BEART, T7MFQOAW     Type & Screen (If Applicable):  No results found for: LABABO, LABRH    Drug/Infectious Status (If Applicable):  Lab Results   Component Value Date    HEPCAB NONREACTIVE 08/23/2019       COVID-19 Screening (If Applicable):   Lab Results   Component Value Date    COVID19 Not Detected 12/06/2020         Anesthesia Evaluation  Patient summary reviewed and Nursing notes reviewed no history of anesthetic complications:   Airway: Mallampati: III  TM distance: >3 FB   Neck ROM: full  Mouth opening: > = 3 FB Dental:          Pulmonary:normal exam        (-) not a current smoker          Patient did not smoke on day of surgery. ROS comment: Quit smoking 2 years ago   Cardiovascular:    (+) hypertension:, angina:, past MI:, CAD: non-obstructive, hyperlipidemia      ECG reviewed      Echocardiogram reviewed  Stress test reviewed  Cleared by cardiology              Neuro/Psych:   (+) neuromuscular disease:, psychiatric history:            GI/Hepatic/Renal:   (+) GERD:, renal disease: CRI and kidney stones,           Endo/Other:    (+) DiabetesType II DM, poorly controlled, , blood dyscrasia: anemia:., .                 Abdominal:           Vascular: negative vascular ROS. EKG 12/4/2020  Normal sinus rhythm  Possible Left atrial enlargement  Left ventricular hypertrophy  Abnormal ECG  When compared with ECG of 04-DEC-2020 13:28,  No significant change was found    ECHO 12/7/2020  Left ventricle is normal in size with normal systolic function globally. Prominent false tendon. Calculated ejection fraction is 60%. Evidence of moderate diastolic dysfunction. Left atrium is moderately dilated. Aortic valve is trileaflet. Aortic sclerosis without stenosis. No aortic insufficiency. Focal calcification on the posterior leaflet. Noted on previous echo  Mild mitral regurgitation. Mild tricuspid regurgitation. Estimated right ventricular systolic pressure is 25 mmHg.        Anesthesia Plan      MAC ASA 4       Induction: intravenous. Anesthetic plan and risks discussed with patient. Plan discussed with attending.                   JEOVANNY Tong - CRNA   12/7/2020

## 2020-12-07 NOTE — ANESTHESIA POSTPROCEDURE EVALUATION
Department of Anesthesiology  Postprocedure Note    Patient: Darcie Anthony  MRN: [id number]  YOB: 1960  Date of evaluation: 12/7/2020  Time:  10:54 AM     Procedure Summary     Date:  12/07/20 Room / Location:  [address]    Anesthesia Start:  0920 Anesthesia Stop:  9934    Procedures:       EGD BIOPSY (N/A )      COLONOSCOPY DIAGNOSTIC (N/A ) Diagnosis:  (ANEMIA, MELENA)    Surgeon:  Rose Myles MD Responsible Provider:  Daniel Padilla MD    Anesthesia Type:  MAC ASA Status:  4          Anesthesia Type: MAC    June Phase I: June Score: 10    June Phase II: June Score: 10    Last vitals: Reviewed and per EMR flowsheets.        Anesthesia Post Evaluation    Patient location during evaluation: bedside (Endoscopy Unit)  Patient participation: complete - patient participated  Level of consciousness: awake and alert  Pain score: 0  Airway patency: patent  Nausea & Vomiting: no nausea and no vomiting  Complications: no  Cardiovascular status: hemodynamically stable  Respiratory status: acceptable, spontaneous ventilation and room air  Hydration status: euvolemic

## 2020-12-07 NOTE — PLAN OF CARE
Problem: Falls - Risk of:  Goal: Will remain free from falls  Description: Will remain free from falls  Outcome: Ongoing  Note: Patient assessed for fall risk and fall precautions initiated as needed. Patient and family  instructed about safety devices and allowed to make decisions related to safey. Environment kept free of clutter and adequate lighting provided. Bed in lowest position with brakes locked. Call light in reach. Patient ID band correct and in place. Patient transferred with appropriate methods. Will continue to monitor. Goal: Absence of physical injury  Description: Absence of physical injury  Outcome: Ongoing     Problem: Bowel Function - Altered:  Goal: Bowel elimination is within specified parameters  Description: Bowel elimination is within specified parameters  Outcome: Ongoing     Problem: Fluid Volume - Imbalance:  Goal: Absence of imbalanced fluid volume signs and symptoms  Description: Absence of imbalanced fluid volume signs and symptoms  Outcome: Ongoing  Goal: Will show no signs and symptoms of excessive bleeding  Description: Will show no signs and symptoms of excessive bleeding  Outcome: Ongoing     Problem: Pain:  Goal: Pain level will decrease  Description: Pain level will decrease  Outcome: Ongoing  Note: Pt able to communicate needs for pain medications and states satisfaction with outcome.     Goal: Control of acute pain  Description: Control of acute pain  Outcome: Ongoing  Goal: Control of chronic pain  Description: Control of chronic pain  Outcome: Ongoing

## 2020-12-07 NOTE — PROGRESS NOTES
Inpatient Diabetes  Education     Type and Reason for Visit: Patient Education  Met with patient at bedside - he is aware of DM dx and has BG meter at home. He also admitted to being without diabetes medications for some time, at least a few months. He expressed willing to re start medications, BG checks and healthy eating at home. Verbally reviewed following information with patient  Survival skills Diagnosis, A1C, Blood glucose targets, hypo and hyperglycemia, importance of home blood glucose monitoring, heathy eating  plate method for CHO control portions, be active as recommended by health care providers, take medications oral and or insulin as directed  Written educational materials provided  ___ Educational Booklets as available \" \"Diabetes and you\"  ___ Be safe with [place] teaching sheet /  [employer]  · Contact number provided. For out patient needs/ follow -up.     Lio Brown RN CDE

## 2020-12-07 NOTE — PROGRESS NOTES
CLINICAL PHARMACY NOTE: MEDS TO [address] Select Patient?: Yes  Total # of Prescriptions Filled: 1   The following medications were delivered to the patient:  · Pantoprazole 40 mg tab  Total # of Interventions Completed: 1  Time Spent (min): 60    Additional Documentation:Medication delivered to the patient in his room([phone])

## 2020-12-07 NOTE — PROGRESS NOTES
[facility]  Progress Note    Date:   12/7/2020  Patient name:  Obi Wray  Date of admission:  12/4/2020  1:22 PM  MRN:   [id number]  YOB: 1960    SUBJECTIVE/Last 24 hours update:     Patient seen and examined at the bed side. No new acute events overnight. Pt is stable. Pt is laying comfortably in bed. Pt reports chest pain continues to improve and denies palpitations, SOB, abdominal pain. Pt also reports completing bowel prep and passing stools. Pt is scheduled for EGD and colonoscopy today. Notes from nursing staff and Consults had been reviewed, and the overnight progress had been checked with the nursing staff as well. HPI:   See H&P    REVIEW OF SYSTEMS:      Review of Systems   Constitutional: Negative for fatigue and fever. Respiratory: Negative for shortness of breath and wheezing. Cardiovascular: Positive for chest pain. Negative for palpitations and leg swelling. Gastrointestinal: Negative for abdominal pain, constipation, diarrhea, nausea and vomiting. Endocrine: Negative. Genitourinary: Negative. Musculoskeletal: Negative. Skin: Negative. Neurological: Positive for numbness. Negative for weakness and headaches. PAST MEDICAL HISTORY:      has a past medical history of ADHD (attention deficit hyperactivity disorder), Depression, DM2 (diabetes mellitus, type 2) ([address] Erectile dysfunction, Gastroesophageal reflux disease, Hyperlipidemia, Hypertension, Kidney stone, Low back pain of thoracolumbar region with sciatica, and Neuropathy. PAST SURGICAL HISTORY:      has a past surgical history that includes Tonsillectomy; Cystoscopy (07/20/2018); pr cystoscopy,insert ureteral stent (Bilateral, 7/20/2018); Cystocopy (08/03/2018); and pr cysto/uretero/pyeloscopy, calculus tx (Bilateral, 8/3/2018). SOCIAL HISTORY:      reports that he quit smoking about 2 years ago.  His smoking use included cigarettes. He has a 15.00 pack-year smoking history. He has never used smokeless tobacco. He reports current alcohol use of about 12.0 standard drinks of alcohol per week. He reports that he does not use drugs. FAMILY HISTORY:     family history includes Diabetes in his mother; High Blood Pressure in his father. HOME MEDICATIONS:      Prior to Admission medications    Medication Sig Start Date End Date Taking?  Authorizing Provider   acetaminophen (TYLENOL) 325 MG tablet Take 2 tablets by mouth every 6 hours as needed for Pain 11/27/20   Grecia Rivera MD   gabapentin (NEURONTIN) 100 MG capsule TAKE 1 CAPSULE BY MOUTH 3 TIMES A DAY 11/27/20 12/27/20  Grecia Rivera MD   famotidine (PEPCID) 20 MG tablet Take 1 tablet by mouth daily 8/29/20   Jose Ervin MD   metoprolol tartrate (LOPRESSOR) 50 MG tablet Take 1 tablet by mouth 2 times daily 8/28/20   Jose Ervin MD   glyBURIDE (DIABETA) 5 MG tablet Take 1 tablet by mouth daily (with breakfast) 2/26/20   Grecia Rivera MD   SITagliptin (JANUVIA) 50 MG tablet Take 1 tablet by mouth daily 1/14/20   Glo Vega MD   metFORMIN (GLUCOPHAGE) 500 MG tablet Take 1 tablet by mouth 2 times daily (with meals) 1/14/20   Glo Vega MD   SENNA-PLUS 8.6-50 MG per tablet Take 1 tablet by mouth daily 1/14/20   Glo Vega MD   amLODIPine (NORVASC) 10 MG tablet Take 1 tablet by mouth daily 1/14/20   Glo Vega MD   aspirin 81 MG chewable tablet Take 1 tablet by mouth daily 1/14/20   Glo Vega MD   atorvastatin (LIPITOR) 20 MG tablet Take 1 tablet by mouth daily 1/14/20   Glo Vega MD   docusate sodium (COLACE) 100 MG capsule Take 1 capsule by mouth 2 times daily as needed for Constipation 1/14/20   Glo Vega MD   hydrochlorothiazide (HYDRODIURIL) 25 MG tablet Take 1 tablet by mouth daily 1/14/20 1/8/21  Glo Vega MD   isosorbide mononitrate (IMDUR) 30 MG extended release tablet Take 1 tablet by mouth daily 1/14/20   Josie Borja Desiree Landis MD   omeprazole (PRILOSEC) 20 MG delayed release capsule Take 1 capsule by mouth 2 times daily (before meals) 1/14/20   Varsha Avalos MD   oxybutynin (DITROPAN-XL) 10 MG extended release tablet Take 1 tablet by mouth daily 1/14/20 2/13/20  Varsha Avalos MD   ranolazine (RANEXA) 500 MG extended release tablet Take 1 tablet by mouth 2 times daily 1/14/20   Varsha Avalos MD   blood glucose monitor kit and supplies Test 2 times a day & as needed for symptoms of irregular blood glucose. 1/14/20   Varsha Avalos MD   Lancets MISC 1 each by Does not apply route 2 times daily 1/14/20   Varsha Avalos MD   blood glucose monitor strips Test 2 times a day & as needed for symptoms of irregular blood glucose. 1/14/20   Varsha Avalos MD       ALLERGIES:     Patient has no known allergies. OBJECTIVE:       Vitals:    12/06/20 2025 12/06/20 2044 12/07/20 0000 12/07/20 0354   BP:  (!) 166/94 (!) 159/87    Pulse: 82 86 73 76   Resp: 20 21 17   Temp:   98.5 °F (36.9 °C)    TempSrc:   Temporal    SpO2: 98%  97%    Weight:       Height:             Intake/Output Summary (Last 24 hours) at 12/7/2020 0708  Last data filed at 12/6/2020 2025  Gross per 24 hour   Intake --   Output 1100 ml   Net -1100 ml       PHYSICAL EXAM:  Physical Exam  Cardiovascular:      Rate and Rhythm: Normal rate. Pulses: Normal pulses. Heart sounds: Normal heart sounds. Pulmonary:      Effort: Pulmonary effort is normal. No respiratory distress. Breath sounds: Normal breath sounds. Abdominal:      Palpations: Abdomen is soft. Tenderness: There is no abdominal tenderness. Neurological:      Mental Status: He is alert and oriented to person, place, and time.           DIAGNOSTICS:     Laboratory Testing:    Recent Results (from the past 24 hour(s))   POC Glucose Fingerstick    Collection Time: 12/06/20  8:38 AM   Result Value Ref Range    POC Glucose 260 (H) 75 - 110 mg/dL   COVID-19    Collection Time: 12/06/20  9:40 AM    Specimen: Other   Result Value Ref Range    SARS-CoV-2          SARS-CoV-2, Rapid Not Detected Not Detected    Source . NASOPHARYNGEAL SWAB     SARS-CoV-2         Hemoglobin and hematocrit, blood    Collection Time: 12/06/20 10:58 AM   Result Value Ref Range    Hemoglobin 10.1 (L) 13.0 - 17.0 g/dL    Hematocrit 31.6 (L) 40.7 - 50.3 %   POC Glucose Fingerstick    Collection Time: 12/06/20 11:49 AM   Result Value Ref Range    POC Glucose 232 (H) 75 - 110 mg/dL   Occult Blood Screen    Collection Time: 12/06/20 12:55 PM   Result Value Ref Range    Occult Blood, Stool #1 NEGATIVE NEGATIVE    Date, Stool #1 [phone]     Time, Stool #1 UNKNOWN     Occult Blood, Stool #2 NOT REPORTED NEGATIVE    Date, Stool #2 NOT REPORTED     Time, Stool #2 NOT REPORTED     Occult Blood, Stool #3 NOT REPORTED NEGATIVE    Date, Stool #3 NOT REPORTED     Time, Stool #3 NOT REPORTED    POC Glucose Fingerstick    Collection Time: 12/06/20  4:11 PM   Result Value Ref Range    POC Glucose 70 (L) 75 - 110 mg/dL   Hemoglobin and hematocrit, blood    Collection Time: 12/06/20  4:22 PM   Result Value Ref Range    Hemoglobin 9.4 (L) 13.0 - 17.0 g/dL    Hematocrit 30.0 (L) 40.7 - 50.3 %   POC Glucose Fingerstick    Collection Time: 12/06/20  7:57 PM   Result Value Ref Range    POC Glucose 385 (H) 75 - 110 mg/dL   Hemoglobin and hematocrit, blood    Collection Time: 12/06/20 10:42 PM   Result Value Ref Range    Hemoglobin 10.3 (L) 13.0 - 17.0 g/dL    Hematocrit 33.1 (L) 40.7 - 50.3 %         Imaging/Diagonstics:    Current Facility-Administered Medications   Medication Dose Route Frequency Provider Last Rate Last Dose    gabapentin (NEURONTIN) capsule 100 mg  100 mg Oral TID Bianca Ruiz MD   100 mg at 12/06/20 2044    ranolazine (RANEXA) extended release tablet 500 mg  500 mg Oral BID Bianca Ruiz MD   500 mg at 12/06/20 2044    sodium chloride (PF) 0.9 % injection 10 mL  10 mL Intravenous Daily Helena Ruiz MD 10 mL at 12/05/20 1017    amLODIPine (NORVASC) tablet 10 mg  10 mg Oral Daily Carla Castañeda MD   10 mg at 12/06/20 9055    atorvastatin (LIPITOR) tablet 20 mg  20 mg Oral Nightly Carla Castañeda MD   20 mg at 12/06/20 2044    hydroCHLOROthiazide (HYDRODIURIL) tablet 25 mg  25 mg Oral Daily Carla Castañeda MD   25 mg at 12/06/20 7578    isosorbide mononitrate (IMDUR) extended release tablet 30 mg  30 mg Oral Daily Carla Castañeda MD   30 mg at 12/06/20 5297    metoprolol tartrate (LOPRESSOR) tablet 50 mg  50 mg Oral BID Carla Castañeda MD   50 mg at 12/06/20 2044    pantoprazole (PROTONIX) tablet 40 mg  40 mg Oral BID  JEOVANNY Bliss CNP   40 mg at 12/07/20 0636    polyethylene glycol (GLYCOLAX) packet 17 g  17 g Oral Daily Carla Castañeda MD   17 g at 12/06/20 2269    sodium chloride flush 0.9 % injection 10 mL  10 mL Intravenous 2 times per day Zoran Urbina MD   10 mL at 12/06/20 2100    sodium chloride flush 0.9 % injection 10 mL  10 mL Intravenous PRN Zoran Urbina MD        acetaminophen (TYLENOL) tablet 650 mg  650 mg Oral Q4H PRN Zoran Urbina MD   650 mg at 12/06/20 1112    sodium chloride flush 0.9 % injection 10 mL  10 mL Intravenous 2 times per day Riccardo Jeronimo MD   10 mL at 12/06/20 2103    sodium chloride flush 0.9 % injection 10 mL  10 mL Intravenous PRN Cynthia Concepcion MD        promethazine (PHENERGAN) tablet 12.5 mg  12.5 mg Oral Q6H PRN Riccardo Jeronimo MD   12.5 mg at 12/04/20 2000    Or    ondansetron (ZOFRAN) injection 4 mg  4 mg Intravenous Q6H PRN Riccardo Jeronimo MD        0.9 % sodium chloride infusion   Intravenous Continuous Riccardo Jeronimo  mL/hr at 12/05/20 2015      potassium chloride 10 mEq/100 mL IVPB (Peripheral Line)  10 mEq Intravenous PRN Riccardo Jeronimo MD        insulin lispro (HUMALOG) injection vial 0-18 Units  0-18 Units Subcutaneous TID  Carla Castañeda MD   6 Units at 12/06/20 1312    insulin lispro (HUMALOG) injection vial 0-9 Units  0-9 Units Subcutaneous Nightly Sarina Escobar MD   7 Units at 12/06/20 2044    morphine injection 2 mg  2 mg Intravenous Q6H PRN Sarina Escobar MD   2 mg at 12/04/20 2001    metoprolol (LOPRESSOR) injection 5 mg  5 mg Intravenous Q6H PRN Bubba Hunter MD   5 mg at 12/04/20 2109    glucose (GLUTOSE) 40 % oral gel 15 g  15 g Oral PRN Riccardo Jeronimo MD        dextrose 50 % IV solution  12.5 g Intravenous PRN Bubba Hunter MD        glucagon (rDNA) injection 1 mg  1 mg Intramuscular PRN Riccardo Jeronimo MD        dextrose 5 % solution  100 mL/hr Intravenous PRN Riccardo Jeronimo MD           ASSESSMENT:     Principal Problem:    Acute posthemorrhagic anemia  Active Problems:    DM2 (diabetes mellitus, type 2) (HCC)    Gastroesophageal reflux disease    Essential hypertension    Stage 3 chronic kidney disease    BMI 31.0-31.9,adult    Acute kidney injury superimposed on CKD (HCC)    Type 2 MI (myocardial infarction) ([facility] [address]    Gastrointestinal hemorrhage    Neuropathy    NSAID long-term use    Family history of malignant neoplasm of colon    Positive colorectal cancer screening using Cologuard test  Resolved Problems:    * No resolved hospital problems. *      PLAN:     1. NSTEMI, likely type II, from demand ischemia from acute blood loss anemia and renal insufficiency  -Troponin at 62 >83 > 43 > 37.   -Repeat EKG (12/5/2020) - NSR; Possible sinus rhythm; LVH  -Chest x-ray unremarkable for any acute process  -ECHO (08/27/2020) - LVEF 52%  -Stress test 08/25/2020: LVEF 44%, septal wall hypokinesis, stratification: Intermediate risk  -Cardiology, recommend start home BP medications and BP tolerates. -Continue to HOLD Heparin drip due to anemia   -12/04/20 ECHO: LVEF 60%, moderate diastolic dysfunction,       2. Anemia likely 2/2 upper GI bleed  - Hx of NSAID use.    -Clear liquid diet   -Hemoglobin 7.1>10.0>9.3  -Status post 2 unit PRBC Day 1   -Continue to monitor H&H every 6 Hours  -Iron studies unremarkable  -FBOT, negative   -Protonix 40 mg PO twice daily  -Transfusion of PRBC his hemoglobin falls below 7.0.  -Cologuard test positive in September 2019.  Patient noncompliant with obtaining colonoscopy procedure.  Letter was sent by surgery scheduling department educating on importance of colonoscopy procedure.   -GI recommends Cardiology cleared pt for EGD and Colonoscopy scheduled for today  -COVID 19 negative      3. JEFFREY on CKD stage 4 likely 2/2 prerenal  -Creatinine at 3.79>3.18>2.94; BL at 2.5  -Urine creatinine-80 and urine sodium-49   -FeNa 1.4% suggestive of intrinsic disease but clinical Pre-renal ideology.   -Renal ultrasound unremarkable  -Continue to monitor creatinine  -Started on normal saline  mL/hour     4. Diabetes mellitus type II  -Blood glucose 180  -POCT glucose checks 019>456>297  -HISS  -Hemoglobin A1c 10.4  -Hypoglycemia protocol     5. Hypertension  -Continue Metoprolol 50 mg PO BID, amlodipine 10 mg, hydrochlorothiazide 25 mg, Imdur 30 mg ER  -PRN medications; Nirtostat 0.4 tab, and  Metoprolol 5 mg IV Q6 PRN.      6. Neuropathy   - Gabapentin 100 mg PO TID     Discussed care plan with nurse after getting her input.     Above plan discussed with the patient who agreed to the above plan     Plan will be discussed with the attending Dr. Camille Matias MD  Family Medicine Resident  12/7/2020 7:08 AM

## 2020-12-09 ENCOUNTER — TELEPHONE (OUTPATIENT)
Dept: GASTROENTEROLOGY | Age: 60
End: 2020-12-09

## 2020-12-09 LAB — SURGICAL PATHOLOGY REPORT: NORMAL

## 2020-12-09 RX ORDER — CLARITHROMYCIN 500 MG/1
500 TABLET, COATED ORAL 2 TIMES DAILY
Qty: 28 TABLET | Refills: 0 | OUTPATIENT
Start: 2020-12-09 | End: 2020-12-09 | Stop reason: SINTOL

## 2020-12-09 RX ORDER — AMOXICILLIN 500 MG/1
1000 CAPSULE ORAL 2 TIMES DAILY
Qty: 56 CAPSULE | Refills: 0 | OUTPATIENT
Start: 2020-12-09 | End: 2020-12-09 | Stop reason: SINTOL

## 2020-12-09 NOTE — TELEPHONE ENCOUNTER
Received a call from pharmacy stating that the patient's Ranexa has a significant interaction with Biaxin and thus was recommended alternative therapy. Quadruple therapy was prescribed with bismuth subsalicylate tetracycline and Flagyl.   The combination therapy was sent to patient's pharmacy and discussed with the pharmacist.

## 2020-12-09 NOTE — TELEPHONE ENCOUNTER
Pathology results were noted. Gastric biopsy shows H. pylori gastritis. Writer called the patient and informed him of the results of the biopsies from his recent EGD. The patient states that he is feeling well. He reports no recent or remote use of antibiotics. Denies any allergies to penicillin or has no known allergies. Writer recommended to the patient that he will need 2 weeks of antibiotic therapy to treat the H. pylori infection in his stomach. Recommended the patient to continue taking his pantoprazole as prescribed for 8 weeks. Also recommended the patient to call the GI clinic and make a follow-up appointment to schedule his colonoscopy which was performed while he was in the hospital and was noted to have an inadequate prep. Thus he would need a repeat colonoscopy within 3 months. The patient verbalized understanding and was grateful for the call. Patient's prefers prescription to be called to the outpatient pharmacy at [facility]. Prescription for amoxicillin 1000 mg twice daily and clarithromycin 500 mg twice daily for total of 14 days was called in to the pharmacy.     Nelly Tse MD  Gastroenterologist  [address] [place] [address]

## 2020-12-14 ENCOUNTER — HOSPITAL ENCOUNTER (OUTPATIENT)
Age: 60
Discharge: HOME | End: 2020-12-14
Payer: MEDICAID

## 2020-12-14 ENCOUNTER — OFFICE VISIT (OUTPATIENT)
Dept: FAMILY MEDICINE CLINIC | Age: 60
End: 2020-12-14
Payer: MEDICAID

## 2020-12-14 VITALS
SYSTOLIC BLOOD PRESSURE: 133 MMHG | BODY MASS INDEX: 30.74 KG/M2 | DIASTOLIC BLOOD PRESSURE: 77 MMHG | WEIGHT: 220.4 LBS | HEART RATE: 81 BPM | TEMPERATURE: 97 F

## 2020-12-14 LAB
CHOLESTEROL/HDL RATIO: 2
CHOLESTEROL: 116 MG/DL
CREATININE URINE: 96.4 MG/DL (ref 39–259)
HCT VFR BLD CALC: 28.6 % (ref 40.7–50.3)
HDLC SERPL-MCNC: 58 MG/DL
HEMOGLOBIN: 8.8 G/DL (ref 13–17)
LDL CHOLESTEROL: 33 MG/DL (ref 0–130)
MICROALBUMIN/CREAT 24H UR: 3021 MG/L
MICROALBUMIN/CREAT UR-RTO: 3134 MCG/MG CREAT
TRIGL SERPL-MCNC: 126 MG/DL
VLDLC SERPL CALC-MCNC: NORMAL MG/DL (ref 1–30)

## 2020-12-14 PROCEDURE — 36415 COLL VENOUS BLD VENIPUNCTURE: CPT

## 2020-12-14 PROCEDURE — 99211 OFF/OP EST MAY X REQ PHY/QHP: CPT | Performed by: STUDENT IN AN ORGANIZED HEALTH CARE EDUCATION/TRAINING PROGRAM

## 2020-12-14 PROCEDURE — 90686 IIV4 VACC NO PRSV 0.5 ML IM: CPT | Performed by: STUDENT IN AN ORGANIZED HEALTH CARE EDUCATION/TRAINING PROGRAM

## 2020-12-14 PROCEDURE — 85014 HEMATOCRIT: CPT

## 2020-12-14 PROCEDURE — 85018 HEMOGLOBIN: CPT

## 2020-12-14 PROCEDURE — 1111F DSCHRG MED/CURRENT MED MERGE: CPT | Performed by: STUDENT IN AN ORGANIZED HEALTH CARE EDUCATION/TRAINING PROGRAM

## 2020-12-14 PROCEDURE — 82570 ASSAY OF URINE CREATININE: CPT

## 2020-12-14 PROCEDURE — 99215 OFFICE O/P EST HI 40 MIN: CPT | Performed by: STUDENT IN AN ORGANIZED HEALTH CARE EDUCATION/TRAINING PROGRAM

## 2020-12-14 PROCEDURE — 80061 LIPID PANEL: CPT

## 2020-12-14 PROCEDURE — 82043 UR ALBUMIN QUANTITATIVE: CPT

## 2020-12-14 RX ORDER — INSULIN GLARGINE 100 [IU]/ML
15 INJECTION, SOLUTION SUBCUTANEOUS NIGHTLY
Qty: 5 PEN | Refills: 3 | Status: ON HOLD | OUTPATIENT
Start: 2020-12-14 | End: 2021-06-29 | Stop reason: HOSPADM

## 2020-12-14 ASSESSMENT — PATIENT HEALTH QUESTIONNAIRE - PHQ9
2. FEELING DOWN, DEPRESSED OR HOPELESS: 0
SUM OF ALL RESPONSES TO PHQ QUESTIONS 1-9: 0
SUM OF ALL RESPONSES TO PHQ QUESTIONS 1-9: 0
SUM OF ALL RESPONSES TO PHQ9 QUESTIONS 1 & 2: 0
SUM OF ALL RESPONSES TO PHQ QUESTIONS 1-9: 0
1. LITTLE INTEREST OR PLEASURE IN DOING THINGS: 0

## 2020-12-14 ASSESSMENT — ENCOUNTER SYMPTOMS
CONSTIPATION: 0
ABDOMINAL PAIN: 0
BLOOD IN STOOL: 0
CHEST TIGHTNESS: 0
SINUS PRESSURE: 0
NAUSEA: 0
COLOR CHANGE: 0
VOMITING: 0
RHINORRHEA: 0
COUGH: 0
SINUS PAIN: 0
WHEEZING: 0
SHORTNESS OF BREATH: 0
DIARRHEA: 0

## 2020-12-14 NOTE — PROGRESS NOTES
assessment, plan and the diagnosis of    Diagnosis Orders   1. Type 2 diabetes mellitus with diabetic autonomic neuropathy, without long-term current use of insulin ([facility])   DIABETES FOOT EXAM    Microalbumin, Ur    [place] DISCHARGE MEDS RECONCILED W/ CURRENT OUTPATIENT MED LIST    insulin glargine (LANTUS SOLOSTAR) 100 UNIT/ML injection pen    Insulin Pen Needle 32G X 4 MM MISC    Alcohol prep pad    [facility] Diabetes Education - Jaspreet Mays MD, Ophthalmology, [place]   2. Peptic ulcer  Hemoglobin And Hematocrit, Blood   3. Screening for hyperlipidemia  Microalbumin, Ur    Lipid Panel    . I agree with orders as documented by the resident. Recommendations:  Pt post hospital follow up. Doing well. No refills needed at this time   DM2- A1C 10.4, will start on insulin, DM education  Refer to podiatry  Lab work ordered- check H/H    More than 25 minutes spent  in face to face encounter with the patient and more than half in counseling. Patient's questions were answered. Patient Voiced understanding to the counseling. Return in 1 month (on 1/14/2021) for DM II.    (GC Modifier)-Dr. Patricia Stein MD

## 2020-12-14 NOTE — PROGRESS NOTES
Post-Discharge Transitional Care Management Services or Hospital Follow Up      Ben Abraham   YOB: 1960    Date of Office Visit:  12/14/2020  Date of Hospital Admission: 12/4/20  Date of Hospital Discharge: 12/7/20  Readmission Risk Score(high >=14%.  Medium >=10%):Readmission Risk Score: 21      Care management risk score Rising risk (score 2-5) and Complex Care (Scores >=6): 6     Non face to face  following discharge, date last encounter closed (first attempt may have been earlier): *No documented post hospital discharge outreach found in the last 14 days *No documented post hospital discharge outreach found in the last 14 days    Call initiated 2 business days of discharge: *No response recorded in the last 14 days     Patient Active Problem List   Diagnosis    DM2 (diabetes mellitus, type 2) ([facility]    Sciatica of right side    Atypical chest pain    Low back pain of thoracolumbar region with sciatica    Kidney stone    Chest pain    Need for prophylactic vaccination against diphtheria-tetanus-pertussis (DTP)    Gastroesophageal reflux disease    Tachycardia    Essential hypertension    Stage 3 chronic kidney disease    BMI 31.0-31.9,adult    Acute kidney injury superimposed on CKD ([facility]    Type 2 MI (myocardial infarction) ([facility]    Gastrointestinal hemorrhage    Acute posthemorrhagic anemia    Neuropathy    NSAID long-term use    Family history of malignant neoplasm of colon    Positive colorectal cancer screening using Cologuard test       No Known Allergies    Medications listed as ordered at the time of discharge from hospital   Elías Poster   Home Medication Instructions OXK:[id number]    Printed on:12/14/20 1379   Medication Information                      acetaminophen (TYLENOL) 325 MG tablet  Take 2 tablets by mouth every 6 hours as needed for Pain             amLODIPine (NORVASC) 10 MG tablet  Take 1 tablet by mouth daily             aspirin 81 MG chewable tablet  Take 1 tablet by mouth daily             atorvastatin (LIPITOR) 20 MG tablet  Take 1 tablet by mouth daily             atorvastatin (LIPITOR) 20 MG tablet  Take 1 tablet by mouth daily             bismuth-metronidazole-tetracycline (PLYERA) 140-125-125 MG per capsule  Take 3 capsules by mouth 4 times daily (before meals and nightly) for 10 days             blood glucose monitor kit and supplies  Test 2 times a day & as needed for symptoms of irregular blood glucose. blood glucose monitor strips  Test 2 times a day & as needed for symptoms of irregular blood glucose.              docusate sodium (COLACE) 100 MG capsule  Take 1 capsule by mouth 2 times daily as needed for Constipation             gabapentin (NEURONTIN) 100 MG capsule  TAKE 1 CAPSULE BY MOUTH 3 TIMES A DAY             hydrochlorothiazide (HYDRODIURIL) 25 MG tablet  Take 1 tablet by mouth daily             insulin glargine (LANTUS SOLOSTAR) 100 UNIT/ML injection pen  Inject 15 Units into the skin nightly             Insulin Pen Needle 32G X 4 MM MISC  1 each by Does not apply route daily             isosorbide mononitrate (IMDUR) 30 MG extended release tablet  Take 1 tablet by mouth daily             Lancets MISC  1 each by Does not apply route 2 times daily             metFORMIN (GLUCOPHAGE) 500 MG tablet  Take 2 tablets by mouth 2 times daily (with meals)             metoprolol tartrate (LOPRESSOR) 50 MG tablet  Take 1 tablet by mouth 2 times daily             metoprolol tartrate (LOPRESSOR) 50 MG tablet  Take 1 tablet by mouth 2 times daily             oxybutynin (DITROPAN-XL) 10 MG extended release tablet  Take 1 tablet by mouth daily             pantoprazole (PROTONIX) 40 MG tablet  Take 1 tablet by mouth 2 times daily (before meals)             pantoprazole (PROTONIX) 40 MG tablet  Take 1 tablet by mouth 2 times daily (before meals)             ranolazine (RANEXA) 500 MG extended release tablet  Take 1 tablet by mouth 2 times daily             SENNA-PLUS 8.6-50 MG per tablet  Take 1 tablet by mouth daily                   Medications marked \"taking\" at this time  Outpatient Medications Marked as Taking for the 12/14/20 encounter (Office Visit) with Alban Pulido MD   Medication Sig Dispense Refill    insulin glargine (LANTUS SOLOSTAR) 100 UNIT/ML injection pen Inject 15 Units into the skin nightly 5 pen 3    Insulin Pen Needle 32G X 4 MM MISC 1 each by Does not apply route daily 100 each 3    bismuth-metronidazole-tetracycline (PLYERA) 140-125-125 MG per capsule Take 3 capsules by mouth 4 times daily (before meals and nightly) for 10 days 120 capsule 0    metFORMIN (GLUCOPHAGE) 500 MG tablet Take 2 tablets by mouth 2 times daily (with meals) 60 tablet 1    pantoprazole (PROTONIX) 40 MG tablet Take 1 tablet by mouth 2 times daily (before meals) 30 tablet 3    atorvastatin (LIPITOR) 20 MG tablet Take 1 tablet by mouth daily 30 tablet 5    metoprolol tartrate (LOPRESSOR) 50 MG tablet Take 1 tablet by mouth 2 times daily 180 tablet 1    pantoprazole (PROTONIX) 40 MG tablet Take 1 tablet by mouth 2 times daily (before meals) 180 tablet 3    acetaminophen (TYLENOL) 325 MG tablet Take 2 tablets by mouth every 6 hours as needed for Pain 120 tablet 0    gabapentin (NEURONTIN) 100 MG capsule TAKE 1 CAPSULE BY MOUTH 3 TIMES A DAY 90 capsule 0    metoprolol tartrate (LOPRESSOR) 50 MG tablet Take 1 tablet by mouth 2 times daily 60 tablet 3    SENNA-PLUS 8.6-50 MG per tablet Take 1 tablet by mouth daily 30 tablet 0    amLODIPine (NORVASC) 10 MG tablet Take 1 tablet by mouth daily 30 tablet 0    aspirin 81 MG chewable tablet Take 1 tablet by mouth daily 30 tablet 0    atorvastatin (LIPITOR) 20 MG tablet Take 1 tablet by mouth daily 30 tablet 0    docusate sodium (COLACE) 100 MG capsule Take 1 capsule by mouth 2 times daily as needed for Constipation 30 capsule 0    hydrochlorothiazide (HYDRODIURIL) 25 MG tablet Take 1 tablet by mouth daily 30 tablet 0    isosorbide mononitrate (IMDUR) 30 MG extended release tablet Take 1 tablet by mouth daily 30 tablet 0    ranolazine (RANEXA) 500 MG extended release tablet Take 1 tablet by mouth 2 times daily 60 tablet 0    blood glucose monitor kit and supplies Test 2 times a day & as needed for symptoms of irregular blood glucose. 1 kit 0    Lancets MISC 1 each by Does not apply route 2 times daily 100 each 5    blood glucose monitor strips Test 2 times a day & as needed for symptoms of irregular blood glucose. 100 strip 3        Medications patient taking as of now reconciled against medications ordered at time of hospital discharge: Yes    Chief Complaint   Patient presents with   [address] from Hospital     patient states he is feeling light headed    Eye Problem     patient states that his right eye been jumping for about four days        HPI    Inpatient course: Discharge summary reviewed- see chart. Interval history/Current status:  Pt is a 80-year-old male presenting for hospital stay follow-up  Patient was recently admitted for anemia secondary to GI bleed  Endoscopy was done this patient was found to have gastric ulcer secondary to his NSAID use. Patient reports doing well today. Denies any dark, black stool. Denies any lightheadedness, chest pain, shortness of breath. DM2  A1c in the hospital 10.4  Denies any fatigue, nocturia, polyuria, polyphagia  Currently on metformin 1000 mg BID    Review of Systems   Constitutional: Negative for appetite change, chills, fatigue and fever. HENT: Negative for ear discharge, ear pain, rhinorrhea, sinus pressure and sinus pain. Respiratory: Negative for cough, chest tightness, shortness of breath and wheezing. Cardiovascular: Negative for chest pain and palpitations. Gastrointestinal: Negative for abdominal pain, blood in stool, constipation, diarrhea, nausea and vomiting. Genitourinary: Negative for dysuria and flank pain. Musculoskeletal: Negative for joint swelling, neck pain and neck stiffness. Skin: Negative for color change and wound. Neurological: Negative for dizziness, light-headedness, numbness and headaches. Vitals:    12/14/20 1421   BP: 133/77   Site: Left Upper Arm   Position: Sitting   Cuff Size: Large Adult   Pulse: 81   Temp: 97 °F (36.1 °C)   TempSrc: Temporal   Weight: 220 lb 6.4 oz (100 kg)     Body mass index is 30.74 kg/m². Wt Readings from Last 3 Encounters:   12/14/20 220 lb 6.4 oz (100 kg)   12/05/20 207 lb 14.3 oz (94.3 kg)   12/01/20 220 lb (99.8 kg)     BP Readings from Last 3 Encounters:   12/14/20 133/77   12/07/20 132/71   12/07/20 (!) 73/44       Physical Exam  Vitals signs and nursing note reviewed. Constitutional:       Appearance: He is well-developed. HENT:      Head: Normocephalic and atraumatic. Eyes:      Pupils: Pupils are equal, round, and reactive to light. Neck:      Musculoskeletal: Normal range of motion and neck supple. Cardiovascular:      Rate and Rhythm: Normal rate and regular rhythm. Heart sounds: Normal heart sounds. Pulmonary:      Effort: Pulmonary effort is normal. No respiratory distress. Breath sounds: Normal breath sounds. No wheezing. Abdominal:      General: Bowel sounds are normal. There is no distension. Palpations: Abdomen is soft. Tenderness: There is no abdominal tenderness. There is no guarding. Musculoskeletal: Normal range of motion. Skin:     General: Skin is warm and dry. Findings: No erythema. Neurological:      Mental Status: He is alert and oriented to person, place, and time. Cranial Nerves: No cranial nerve deficit. Assessment/Plan:  1.  Type 2 diabetes mellitus with diabetic autonomic neuropathy, without long-term current use of insulin ([facility])  -  DIABETES FOOT EXAM  - Microalbumin, Ur; Future  - NE DISCHARGE MEDS RECONCILED W/ CURRENT OUTPATIENT MED LIST  - insulin glargine (LANTUS SOLOSTAR) 100 UNIT/ML injection pen; Inject 15 Units into the skin nightly  Dispense: 5 pen; Refill: 3  - Insulin Pen Needle 32G X 4 MM MISC; 1 each by Does not apply route daily  Dispense: 100 each; Refill: 3  - Alcohol prep pad  - [facility] Diabetes Education - [facility] Bhatti MD, Ophthalmology, [facility]    2. Peptic ulcer  - Hemoglobin And Hematocrit, Blood; Future  Follow-up with GI    3. Screening for hyperlipidemia  - Microalbumin, Ur; Future  - Lipid Panel;  Future        Medical Decision Making: moderate complexity

## 2020-12-14 NOTE — PROGRESS NOTES
Diabetic visit information    BP Readings from Last 3 Encounters:   12/07/20 132/71   12/07/20 (!) 73/44   12/01/20 (!) 151/72       Hemoglobin A1C (%)   Date Value   12/04/2020 10.4 (H)   08/26/2020 7.4 (H)   12/20/2019 8.9 (H)     Microalb/Crt. Ratio (mcg/mg creat)   Date Value   08/23/2019 913 (H)     LDL Cholesterol (mg/dL)   Date Value   08/23/2019 46               Have you changed or started any medications since your last visit including any over-the-counter medicines, vitamins, or herbal medicines? no   Have you stopped taking any of your medications? Is so, why? -  no  Are you having any side effects from any of your medications? - no    Have you seen any other physician or provider since your last visit?  no   Have you had any other diagnostic tests since your last visit?  no   Have you been seen in the emergency room and/or had an admission in a hospital since we last saw you?  no     Have you had your annual diabetic retinal (eye) exam? No   (ensure copy of exam is in the chart)    Have you had your routine dental cleaning in the past 6 months? no    Do you have an active MyChart account? If not, what are your barriers? No:     Patient Care Team:  Denae Arias MD as PCP - General (Family Medicine)  Molly Avelar DO as Consulting Physician (General Surgery)    Medical history Review  Past Medical, Family, and Social History reviewed and does not contribute to the patient presenting condition.     Health Maintenance   Topic Date Due    Diabetic retinal exam  07/18/1970    Shingles Vaccine (1 of 2) 07/18/2010    Diabetic foot exam  08/23/2020    Diabetic microalbuminuria test  08/23/2020    Lipid screen  08/23/2020    Flu vaccine (1) 09/01/2020    A1C test (Diabetic or Prediabetic)  03/04/2021    Potassium monitoring  12/07/2021    Creatinine monitoring  12/07/2021    DTaP/Tdap/Td vaccine (2 - Td) 08/23/2029    Colon cancer screen colonoscopy  12/07/2030    Pneumococcal 0-64 years Vaccine Completed    Hepatitis C screen  Completed    HIV screen  Completed    Hepatitis A vaccine  Aged Out    Hib vaccine  Aged Out    Meningococcal (ACWY) vaccine  Aged Out

## 2020-12-14 NOTE — PATIENT INSTRUCTIONS
Thank you for letting us take care of you today. We hope all your questions were addressed. If a question was overlooked or something else comes to mind after you return home, please contact a member of your Care Team listed below. Your Care Team at [place] [address] is Team #2  Maxim Ignacio DO (Faculty)  Yancy Herzog (Faculty)  Refugio Blank MD (Resident)  Domenica Moreno MD (Resident)  Aby Kim MD (Resident)  Andrew Armenta MD (Resident)  Azul Naik MD (Resident)  POONAM Spears ,EDD JACINTO [facility] ([address] [facility] office)  Ben Michaels, [address] (Clinical Practice Manager)  Delfina Gowers, [facility] (Clinical Pharmacist)     Office phone number: [phone]    If you need to get in right away due to illness, please be advised we have \"Same Day\" appointments available Monday-Friday. Please call us at [phone] option #3 to schedule your \"Same Day\" appointment.

## 2020-12-29 RX ORDER — ISOSORBIDE MONONITRATE 30 MG/1
TABLET, EXTENDED RELEASE ORAL
Qty: 30 TABLET | Refills: 3 | OUTPATIENT
Start: 2020-12-29

## 2020-12-30 RX ORDER — RANOLAZINE 500 MG/1
500 TABLET, EXTENDED RELEASE ORAL 2 TIMES DAILY
Qty: 60 TABLET | Refills: 3 | Status: ON HOLD | OUTPATIENT
Start: 2020-12-30 | End: 2021-06-29 | Stop reason: HOSPADM

## 2020-12-30 RX ORDER — ISOSORBIDE MONONITRATE 30 MG/1
30 TABLET, EXTENDED RELEASE ORAL DAILY
Qty: 30 TABLET | Refills: 3 | Status: SHIPPED | OUTPATIENT
Start: 2020-12-30 | End: 2021-11-18 | Stop reason: SDUPTHER

## 2021-01-18 ENCOUNTER — HOSPITAL ENCOUNTER (OUTPATIENT)
Age: 61
Setting detail: SPECIMEN
Discharge: HOME | End: 2021-01-18
Payer: MEDICAID

## 2021-01-18 ENCOUNTER — OFFICE VISIT (OUTPATIENT)
Dept: FAMILY MEDICINE CLINIC | Age: 61
End: 2021-01-18
Payer: MEDICAID

## 2021-01-18 VITALS
SYSTOLIC BLOOD PRESSURE: 136 MMHG | TEMPERATURE: 98.4 F | BODY MASS INDEX: 32.44 KG/M2 | HEART RATE: 87 BPM | HEIGHT: 69 IN | WEIGHT: 219 LBS | DIASTOLIC BLOOD PRESSURE: 76 MMHG

## 2021-01-18 DIAGNOSIS — E11.43 TYPE 2 DIABETES MELLITUS WITH DIABETIC AUTONOMIC NEUROPATHY, WITHOUT LONG-TERM CURRENT USE OF INSULIN (HCC): Primary | ICD-10-CM

## 2021-01-18 DIAGNOSIS — E11.43 TYPE 2 DIABETES MELLITUS WITH DIABETIC AUTONOMIC NEUROPATHY, WITHOUT LONG-TERM CURRENT USE OF INSULIN (HCC): ICD-10-CM

## 2021-01-18 LAB
ESTIMATED AVERAGE GLUCOSE: 197 MG/DL
HBA1C MFR BLD: 8.5 % (ref 4–6)

## 2021-01-18 PROCEDURE — 99213 OFFICE O/P EST LOW 20 MIN: CPT | Performed by: STUDENT IN AN ORGANIZED HEALTH CARE EDUCATION/TRAINING PROGRAM

## 2021-01-18 PROCEDURE — 2022F DILAT RTA XM EVC RTNOPTHY: CPT | Performed by: STUDENT IN AN ORGANIZED HEALTH CARE EDUCATION/TRAINING PROGRAM

## 2021-01-18 PROCEDURE — 3046F HEMOGLOBIN A1C LEVEL >9.0%: CPT | Performed by: STUDENT IN AN ORGANIZED HEALTH CARE EDUCATION/TRAINING PROGRAM

## 2021-01-18 PROCEDURE — G8427 DOCREV CUR MEDS BY ELIG CLIN: HCPCS | Performed by: STUDENT IN AN ORGANIZED HEALTH CARE EDUCATION/TRAINING PROGRAM

## 2021-01-18 PROCEDURE — 1036F TOBACCO NON-USER: CPT | Performed by: STUDENT IN AN ORGANIZED HEALTH CARE EDUCATION/TRAINING PROGRAM

## 2021-01-18 PROCEDURE — G8417 CALC BMI ABV UP PARAM F/U: HCPCS | Performed by: STUDENT IN AN ORGANIZED HEALTH CARE EDUCATION/TRAINING PROGRAM

## 2021-01-18 PROCEDURE — G8482 FLU IMMUNIZE ORDER/ADMIN: HCPCS | Performed by: STUDENT IN AN ORGANIZED HEALTH CARE EDUCATION/TRAINING PROGRAM

## 2021-01-18 PROCEDURE — 3017F COLORECTAL CA SCREEN DOC REV: CPT | Performed by: STUDENT IN AN ORGANIZED HEALTH CARE EDUCATION/TRAINING PROGRAM

## 2021-01-18 SDOH — ECONOMIC STABILITY: TRANSPORTATION INSECURITY
IN THE PAST 12 MONTHS, HAS THE LACK OF TRANSPORTATION KEPT YOU FROM MEDICAL APPOINTMENTS OR FROM GETTING MEDICATIONS?: PATIENT DECLINED

## 2021-01-18 SDOH — ECONOMIC STABILITY: INCOME INSECURITY: HOW HARD IS IT FOR YOU TO PAY FOR THE VERY BASICS LIKE FOOD, HOUSING, MEDICAL CARE, AND HEATING?: PATIENT DECLINED

## 2021-01-18 SDOH — ECONOMIC STABILITY: FOOD INSECURITY: WITHIN THE PAST 12 MONTHS, THE FOOD YOU BOUGHT JUST DIDN'T LAST AND YOU DIDN'T HAVE MONEY TO GET MORE.: PATIENT DECLINED

## 2021-01-18 ASSESSMENT — PATIENT HEALTH QUESTIONNAIRE - PHQ9
1. LITTLE INTEREST OR PLEASURE IN DOING THINGS: 0
SUM OF ALL RESPONSES TO PHQ9 QUESTIONS 1 & 2: 0

## 2021-01-18 ASSESSMENT — ENCOUNTER SYMPTOMS
SINUS PRESSURE: 0
COUGH: 0
WHEEZING: 0
RHINORRHEA: 0
SINUS PAIN: 0
SHORTNESS OF BREATH: 0
CHEST TIGHTNESS: 0

## 2021-01-18 NOTE — PROGRESS NOTES
I have reviewed and discussed key elements of [address] with the resident including plan of care and follow up and agree with the care latrell plan.

## 2021-01-18 NOTE — PROGRESS NOTES
Subjective:    Daren Stern is a 61 y.o. male with  has a past medical history of ADHD (attention deficit hyperactivity disorder), Depression, DM2 (diabetes mellitus, type 2) ([address] Erectile dysfunction, Gastroesophageal reflux disease, Hyperlipidemia, Hypertension, Kidney stone, Low back pain of thoracolumbar region with sciatica, and Neuropathy. Family History   Problem Relation Age of Onset    Diabetes Mother     High Blood Pressure Father        Presented tot office today for:  Chief Complaint   Patient presents with    Diabetes     follow up   [facility]     due for A1c patient wanted to complete at lab due to [address]       HPI  Patient is a 60-year-old male presenting for diabetes follow-up  Last A1c 10.4 (12/4/2020)  Reports occasional noncompliance with his insulin  Patient was advised to follow-up with diabetes education, however failed to do so citing that his friend taught him how to use insulin. Patient also states he has mistakenly been taking 11 units of Lantus instead of the prescribed dose of 15 units nightly  Denies any fatigue, nocturia, polyphagia, polydipsia  Was unable to complete his eye exam as ophthalmologist did not take his insurance  Deferring POCT HbA1c due to waiver form    Review of Systems   Constitutional: Negative for appetite change, chills, fatigue and fever. HENT: Negative for ear discharge, ear pain, rhinorrhea, sinus pressure and sinus pain. Respiratory: Negative for cough, chest tightness, shortness of breath and wheezing. Cardiovascular: Negative for chest pain and palpitations. Endocrine: Negative for polydipsia, polyphagia and polyuria.        Objective:    /76 (Site: Left Upper Arm, Position: Sitting, Cuff Size: Large Adult)   Pulse 87   Temp 98.4 °F (36.9 °C) (Temporal)   Ht 5' 9\" (1.753 m)   Wt 219 lb (99.3 kg)   BMI 32.34 kg/m²    BP Readings from Last 3 Encounters:   01/18/21 136/76   12/14/20 133/77   12/07/20 132/71     Physical Exam  Vitals signs and nursing note reviewed. Constitutional:       Appearance: He is well-developed. HENT:      Head: Normocephalic and atraumatic. Cardiovascular:      Rate and Rhythm: Normal rate and regular rhythm. Heart sounds: Normal heart sounds. Pulmonary:      Effort: Pulmonary effort is normal. No respiratory distress. Breath sounds: Normal breath sounds. No wheezing. Skin:     General: Skin is warm and dry. Findings: No erythema. Neurological:      Mental Status: He is alert and oriented to person, place, and time. Cranial Nerves: No cranial nerve deficit. Lab Results   Component Value Date    WBC 7.0 12/04/2020    HGB 8.8 (L) 12/14/2020    HCT 28.6 (L) 12/14/2020     12/04/2020    CHOL 116 12/14/2020    TRIG 126 12/14/2020    HDL 58 12/14/2020    ALT 15 08/26/2020    AST 20 08/26/2020     12/07/2020    K 3.9 12/07/2020     12/07/2020    CREATININE 2.94 (H) 12/07/2020    BUN 21 12/07/2020    CO2 18 (L) 12/07/2020    TSH 1.08 01/14/2020    INR 0.9 05/12/2016    LABA1C 10.4 (H) 12/04/2020    LABMICR 3,134 (H) 12/14/2020     Lab Results   Component Value Date    CALCIUM 8.5 (L) 12/07/2020     Lab Results   Component Value Date    LDLCHOLESTEROL 33 12/14/2020       Assessment and Plan:    1. Type 2 diabetes mellitus with diabetic autonomic neuropathy, without long-term current use of insulin ([facility]  Counseled patient on med compliance  Reiterated dose of Lantus 15 units nightly, metformin 1000 mg twice daily  Advised patient to keep blood sugar log daily  - Hemoglobin A1C; Future          Requested Prescriptions      No prescriptions requested or ordered in this encounter       There are no discontinued medications. Return in about 4 weeks (around 2/15/2021) for DM II.

## 2021-01-18 NOTE — PROGRESS NOTES
Diabetic visit information    BP Readings from Last 3 Encounters:   12/14/20 133/77   12/07/20 132/71   12/07/20 (!) 73/44       Hemoglobin A1C (%)   Date Value   12/04/2020 10.4 (H)   08/26/2020 7.4 (H)   12/20/2019 8.9 (H)     Microalb/Crt. Ratio (mcg/mg creat)   Date Value   12/14/2020 3,134 (H)     LDL Cholesterol (mg/dL)   Date Value   12/14/2020 33               Have you changed or started any medications since your last visit including any over-the-counter medicines, vitamins, or herbal medicines? no   Have you stopped taking any of your medications? Is so, why? -  no  Are you having any side effects from any of your medications? - no    Have you seen any other physician or provider since your last visit?  no   Have you had any other diagnostic tests since your last visit?  no   Have you been seen in the emergency room and/or had an admission in a hospital since we last saw you?  no     Have you had your annual diabetic retinal (eye) exam? No   (ensure copy of exam is in the chart)    Have you had your routine dental cleaning in the past 6 months? no    Do you have an active MyChart account? If not, what are your barriers? Yes    Patient Care Team:  Chiquis Krause MD as PCP - General (Family Medicine)  Emilie Malloy DO as Consulting Physician (General Surgery)    Medical history Review  Past Medical, Family, and Social History reviewed and does not contribute to the patient presenting condition.     Health Maintenance   Topic Date Due    Diabetic retinal exam  07/18/1970    Shingles Vaccine (1 of 2) 07/18/2010    A1C test (Diabetic or Prediabetic)  03/04/2021    Potassium monitoring  12/07/2021    Creatinine monitoring  12/07/2021    Diabetic foot exam  12/14/2021    Diabetic microalbuminuria test  12/14/2021    Lipid screen  12/14/2021    DTaP/Tdap/Td vaccine (2 - Td) 08/23/2029    Colon cancer screen colonoscopy  12/07/2030    Flu vaccine  Completed    Pneumococcal 0-64 years Vaccine Completed    Hepatitis C screen  Completed    HIV screen  Completed    Hepatitis A vaccine  Aged Out    Hib vaccine  Aged Out    Meningococcal (ACWY) vaccine  Aged Out

## 2021-02-09 DIAGNOSIS — G89.29 CHRONIC BILATERAL LOW BACK PAIN: ICD-10-CM

## 2021-02-09 DIAGNOSIS — M54.50 CHRONIC BILATERAL LOW BACK PAIN: ICD-10-CM

## 2021-02-09 NOTE — TELEPHONE ENCOUNTER
Last visit: 01/18/21  Last Med refill:   Does patient have enough medication for 72 hours:     Next Visit Date:  Future Appointments   Date Time Provider Armin Irahetai   2/15/2021  1:45 PM Chito Brito MD [id number] [id number]   2/17/2021  1:30 PM Lauren Saavedra MD [address] Maintenance   Topic Date Due    Diabetic retinal exam  07/18/1970    Shingles Vaccine (1 of 2) 07/18/2010    Potassium monitoring  12/07/2021    Creatinine monitoring  12/07/2021    Diabetic foot exam  12/14/2021    Diabetic microalbuminuria test  12/14/2021    Lipid screen  12/14/2021    A1C test (Diabetic or Prediabetic)  01/18/2022    DTaP/Tdap/Td vaccine (2 - Td) 08/23/2029    Colon cancer screen colonoscopy  12/07/2030    Flu vaccine  Completed    Pneumococcal 0-64 years Vaccine  Completed    Hepatitis C screen  Completed    HIV screen  Completed    Hepatitis A vaccine  Aged Out    Hib vaccine  Aged Out    Meningococcal (ACWY) vaccine  Aged Out       Hemoglobin A1C (%)   Date Value   01/18/2021 8.5 (H)   12/04/2020 10.4 (H)   08/26/2020 7.4 (H)             ( goal A1C is < 7)   Microalb/Crt.  Ratio (mcg/mg creat)   Date Value   12/14/2020 3,134 (H)     LDL Cholesterol (mg/dL)   Date Value   12/14/2020 33   08/23/2019 46       (goal LDL is <100)   AST (U/L)   Date Value   08/26/2020 20     ALT (U/L)   Date Value   08/26/2020 15     BUN (mg/dL)   Date Value   12/07/2020 21     BP Readings from Last 3 Encounters:   01/18/21 136/76   12/14/20 133/77   12/07/20 132/71          (goal 120/80)    All Future Testing planned in [facility]  Lab Frequency Next Occurrence   Hemoglobin and Hematocrit, Blood, Post Transfusion POST TRANSFUSION    Hemoglobin and Hematocrit, Blood, Post Transfusion POST TRANSFUSION                Patient Active Problem List:     DM2 (diabetes mellitus, type 2) (HCC)     Sciatica of right side     Atypical chest pain     Low back pain of thoracolumbar region with sciatica     Kidney stone

## 2021-02-11 RX ORDER — ACETAMINOPHEN 325 MG/1
TABLET ORAL
Qty: 120 TABLET | Refills: 0 | Status: SHIPPED | OUTPATIENT
Start: 2021-02-11 | End: 2021-03-11

## 2021-02-17 ENCOUNTER — TELEPHONE (OUTPATIENT)
Dept: GASTROENTEROLOGY | Age: 61
End: 2021-02-17

## 2021-03-11 DIAGNOSIS — M54.50 CHRONIC BILATERAL LOW BACK PAIN: ICD-10-CM

## 2021-03-11 DIAGNOSIS — G89.29 CHRONIC BILATERAL LOW BACK PAIN: ICD-10-CM

## 2021-03-11 RX ORDER — ACETAMINOPHEN 325 MG/1
TABLET ORAL
Qty: 120 TABLET | Refills: 0 | Status: SHIPPED | OUTPATIENT
Start: 2021-03-11 | End: 2022-03-24 | Stop reason: SDUPTHER

## 2021-03-11 RX ORDER — RANOLAZINE 500 MG/1
TABLET, EXTENDED RELEASE ORAL
Qty: 60 TABLET | Refills: 3 | OUTPATIENT
Start: 2021-03-11

## 2021-03-11 RX ORDER — GABAPENTIN 100 MG/1
CAPSULE ORAL
Qty: 90 CAPSULE | Refills: 2 | Status: SHIPPED | OUTPATIENT
Start: 2021-03-11 | End: 2021-07-27

## 2021-03-11 NOTE — TELEPHONE ENCOUNTER
Last visit:   Last Med refill:   Does patient have enough medication for 72 hours: No:     Next Visit Date:  Future Appointments   Date Time Provider Armin Josefina   3/18/2021 10:00 AM Cyrus Keller MD [address] [place] [address] Maintenance   Topic Date Due    Diabetic retinal exam  Never done    COVID-19 Vaccine (1 of 2) Never done    Shingles Vaccine (1 of 2) Never done    Potassium monitoring  12/07/2021    Creatinine monitoring  12/07/2021    Diabetic foot exam  12/14/2021    Diabetic microalbuminuria test  12/14/2021    Lipid screen  12/14/2021    A1C test (Diabetic or Prediabetic)  01/18/2022    DTaP/Tdap/Td vaccine (2 - Td) 08/23/2029    Colon cancer screen colonoscopy  12/07/2030    Flu vaccine  Completed    Pneumococcal 0-64 years Vaccine  Completed    Hepatitis C screen  Completed    HIV screen  Completed    Hepatitis A vaccine  Aged Out    Hib vaccine  Aged Out    Meningococcal (ACWY) vaccine  Aged Out       Hemoglobin A1C (%)   Date Value   01/18/2021 8.5 (H)   12/04/2020 10.4 (H)   08/26/2020 7.4 (H)             ( goal A1C is < 7)   Microalb/Crt.  Ratio (mcg/mg creat)   Date Value   12/14/2020 3,134 (H)     LDL Cholesterol (mg/dL)   Date Value   12/14/2020 33   08/23/2019 46       (goal LDL is <100)   AST (U/L)   Date Value   08/26/2020 20     ALT (U/L)   Date Value   08/26/2020 15     BUN (mg/dL)   Date Value   12/07/2020 21     BP Readings from Last 3 Encounters:   01/18/21 136/76   12/14/20 133/77   12/07/20 132/71          (goal 120/80)    All Future Testing planned in [facility]  Lab Frequency Next Occurrence   Hemoglobin and Hematocrit, Blood, Post Transfusion POST TRANSFUSION    Hemoglobin and Hematocrit, Blood, Post Transfusion POST TRANSFUSION                Patient Active Problem List:     DM2 (diabetes mellitus, type 2) (HCC)     Sciatica of right side     Atypical chest pain     Low back pain of thoracolumbar region with sciatica     Kidney stone     Chest pain     Need for prophylactic vaccination against diphtheria-tetanus-pertussis (DTP)     Gastroesophageal reflux disease     Tachycardia     Essential hypertension     Stage 3 chronic kidney disease     BMI 31.0-31.9,adult     Acute kidney injury superimposed on CKD (HCC)     Type 2 MI (myocardial infarction) (HCC)     Gastrointestinal hemorrhage     Acute posthemorrhagic anemia     Neuropathy     NSAID long-term use     Family history of malignant neoplasm of colon     Positive colorectal cancer screening using Cologuard test           Please address the medication refill and close the encounter. If I can be of assistance, please route to the applicable pool. Thank you.

## 2021-03-29 RX ORDER — ISOSORBIDE MONONITRATE 30 MG/1
TABLET, EXTENDED RELEASE ORAL
Qty: 30 TABLET | Refills: 3 | OUTPATIENT
Start: 2021-03-29

## 2021-05-25 RX ORDER — AMLODIPINE BESYLATE 10 MG/1
TABLET ORAL
Qty: 90 TABLET | Refills: 1 | Status: ON HOLD | OUTPATIENT
Start: 2021-05-25 | End: 2021-09-20 | Stop reason: HOSPADM

## 2021-05-25 NOTE — TELEPHONE ENCOUNTER
E-scribe request for amlodipine. Please review and e-scribe if applicable. Last Visit Date: 1/18/2021  Next Visit Date:  Visit date not found    Hemoglobin A1C (%)   Date Value   01/18/2021 8.5 (H)   12/04/2020 10.4 (H)   08/26/2020 7.4 (H)             ( goal A1C is < 7)   Microalb/Crt.  Ratio (mcg/mg creat)   Date Value   12/14/2020 3,134 (H)     LDL Cholesterol (mg/dL)   Date Value   12/14/2020 33       (goal LDL is <100)   AST (U/L)   Date Value   08/26/2020 20     ALT (U/L)   Date Value   08/26/2020 15     BUN (mg/dL)   Date Value   12/07/2020 21     BP Readings from Last 3 Encounters:   01/18/21 136/76   12/14/20 133/77   12/07/20 132/71          (goal 120/80)        Patient Active Problem List:     DM2 (diabetes mellitus, type 2) (HCC)     Sciatica of right side     Atypical chest pain     Low back pain of thoracolumbar region with sciatica     Kidney stone     Chest pain     Need for prophylactic vaccination against diphtheria-tetanus-pertussis (DTP)     Gastroesophageal reflux disease     Tachycardia     Essential hypertension     Stage 3 chronic kidney disease     BMI 31.0-31.9,adult     Acute kidney injury superimposed on CKD (HCC)     Type 2 MI (myocardial infarction) ([facility] [address]     Gastrointestinal hemorrhage     Acute posthemorrhagic anemia     Neuropathy     NSAID long-term use     Family history of malignant neoplasm of colon     Positive colorectal cancer screening using Cologuard test

## 2021-06-21 ENCOUNTER — HOSPITAL ENCOUNTER (EMERGENCY)
Age: 61
Discharge: LEFT BEFORE DISCHARGE | End: 2021-06-21
Payer: MEDICAID

## 2021-06-26 ENCOUNTER — APPOINTMENT (OUTPATIENT)
Dept: ULTRASOUND IMAGING | Age: 61
DRG: 469 | End: 2021-06-26
Payer: MEDICAID

## 2021-06-26 ENCOUNTER — APPOINTMENT (OUTPATIENT)
Dept: GENERAL RADIOLOGY | Age: 61
DRG: 469 | End: 2021-06-26
Payer: MEDICAID

## 2021-06-26 ENCOUNTER — HOSPITAL ENCOUNTER (INPATIENT)
Age: 61
LOS: 5 days | Discharge: HOME | DRG: 469 | End: 2021-07-01
Attending: EMERGENCY MEDICINE | Admitting: FAMILY MEDICINE
Payer: MEDICAID

## 2021-06-26 DIAGNOSIS — N17.9 AKI (ACUTE KIDNEY INJURY) (HCC): ICD-10-CM

## 2021-06-26 DIAGNOSIS — E83.51 HYPOCALCEMIA: ICD-10-CM

## 2021-06-26 DIAGNOSIS — N18.9 ACUTE KIDNEY INJURY SUPERIMPOSED ON CKD (HCC): Primary | ICD-10-CM

## 2021-06-26 DIAGNOSIS — N17.9 ACUTE KIDNEY INJURY SUPERIMPOSED ON CKD (HCC): Primary | ICD-10-CM

## 2021-06-26 DIAGNOSIS — D64.9 ANEMIA, UNSPECIFIED TYPE: ICD-10-CM

## 2021-06-26 LAB
-: NORMAL
ABSOLUTE EOS #: 0.14 K/UL (ref 0–0.44)
ABSOLUTE IMMATURE GRANULOCYTE: 0.03 K/UL (ref 0–0.3)
ABSOLUTE LYMPH #: 1.39 K/UL (ref 1.1–3.7)
ABSOLUTE MONO #: 0.89 K/UL (ref 0.1–1.2)
ALBUMIN SERPL-MCNC: 3 G/DL (ref 3.5–5.2)
ALBUMIN/GLOBULIN RATIO: 0.8 (ref 1–2.5)
ALP BLD-CCNC: 97 U/L (ref 40–129)
ALT SERPL-CCNC: 10 U/L (ref 5–41)
AMORPHOUS: NORMAL
ANION GAP SERPL CALCULATED.3IONS-SCNC: 17 MMOL/L (ref 9–17)
AST SERPL-CCNC: 15 U/L
BACTERIA: NORMAL
BASOPHILS # BLD: 0 % (ref 0–2)
BASOPHILS ABSOLUTE: <0.03 K/UL (ref 0–0.2)
BILIRUB SERPL-MCNC: 0.26 MG/DL (ref 0.3–1.2)
BILIRUBIN DIRECT: 0.08 MG/DL
BILIRUBIN URINE: NEGATIVE
BILIRUBIN, INDIRECT: 0.18 MG/DL (ref 0–1)
BUN BLDV-MCNC: 63 MG/DL (ref 8–23)
BUN/CREAT BLD: ABNORMAL (ref 9–20)
C-REACTIVE PROTEIN: 14.9 MG/L (ref 0–5)
CALCIUM SERPL-MCNC: 7 MG/DL (ref 8.6–10.4)
CASTS UA: NORMAL /LPF (ref 0–8)
CHLORIDE BLD-SCNC: 101 MMOL/L (ref 98–107)
CO2: 16 MMOL/L (ref 20–31)
COLOR: YELLOW
COMMENT UA: ABNORMAL
CREAT SERPL-MCNC: 6.85 MG/DL (ref 0.7–1.2)
CRYSTALS, UA: NORMAL /HPF
DIFFERENTIAL TYPE: ABNORMAL
EOSINOPHILS RELATIVE PERCENT: 2 % (ref 1–4)
EPITHELIAL CELLS UA: NORMAL /HPF (ref 0–5)
GFR AFRICAN AMERICAN: 10 ML/MIN
GFR NON-AFRICAN AMERICAN: 8 ML/MIN
GFR SERPL CREATININE-BSD FRML MDRD: ABNORMAL ML/MIN/{1.73_M2}
GFR SERPL CREATININE-BSD FRML MDRD: ABNORMAL ML/MIN/{1.73_M2}
GLOBULIN: ABNORMAL G/DL (ref 1.5–3.8)
GLUCOSE BLD-MCNC: 166 MG/DL (ref 75–110)
GLUCOSE BLD-MCNC: 193 MG/DL (ref 70–99)
GLUCOSE BLD-MCNC: 226 MG/DL (ref 75–110)
GLUCOSE URINE: ABNORMAL
HCT VFR BLD CALC: 24 % (ref 40.7–50.3)
HEMOGLOBIN: 7.5 G/DL (ref 13–17)
IMMATURE GRANULOCYTES: 0 %
KETONES, URINE: NEGATIVE
LEUKOCYTE ESTERASE, URINE: NEGATIVE
LYMPHOCYTES # BLD: 18 % (ref 24–43)
MCH RBC QN AUTO: 29.4 PG (ref 25.2–33.5)
MCHC RBC AUTO-ENTMCNC: 31.3 G/DL (ref 28.4–34.8)
MCV RBC AUTO: 94.1 FL (ref 82.6–102.9)
MONOCYTES # BLD: 12 % (ref 3–12)
MUCUS: NORMAL
NITRITE, URINE: NEGATIVE
NRBC AUTOMATED: 0 PER 100 WBC
OTHER OBSERVATIONS UA: NORMAL
PDW BLD-RTO: 13.2 % (ref 11.8–14.4)
PH UA: 5.5 (ref 5–8)
PLATELET # BLD: 251 K/UL (ref 138–453)
PLATELET ESTIMATE: ABNORMAL
PMV BLD AUTO: 9.5 FL (ref 8.1–13.5)
POTASSIUM SERPL-SCNC: 4.9 MMOL/L (ref 3.7–5.3)
PROTEIN UA: ABNORMAL
RBC # BLD: 2.55 M/UL (ref 4.21–5.77)
RBC # BLD: ABNORMAL 10*6/UL
RBC UA: NORMAL /HPF (ref 0–4)
RENAL EPITHELIAL, UA: NORMAL /HPF
SEDIMENTATION RATE, ERYTHROCYTE: 31 MM (ref 0–20)
SEG NEUTROPHILS: 68 % (ref 36–65)
SEGMENTED NEUTROPHILS ABSOLUTE COUNT: 5.28 K/UL (ref 1.5–8.1)
SODIUM BLD-SCNC: 134 MMOL/L (ref 135–144)
SPECIFIC GRAVITY UA: 1.01 (ref 1–1.03)
TOTAL PROTEIN: 6.7 G/DL (ref 6.4–8.3)
TRICHOMONAS: NORMAL
TURBIDITY: CLEAR
URIC ACID: 10.1 MG/DL (ref 3.4–7)
URINE HGB: NEGATIVE
UROBILINOGEN, URINE: NORMAL
WBC # BLD: 7.8 K/UL (ref 3.5–11.3)
WBC # BLD: ABNORMAL 10*3/UL
WBC UA: NORMAL /HPF (ref 0–5)
YEAST: NORMAL

## 2021-06-26 PROCEDURE — 82947 ASSAY GLUCOSE BLOOD QUANT: CPT

## 2021-06-26 PROCEDURE — 85652 RBC SED RATE AUTOMATED: CPT

## 2021-06-26 PROCEDURE — 81001 URINALYSIS AUTO W/SCOPE: CPT

## 2021-06-26 PROCEDURE — 1200000000 HC SEMI PRIVATE

## 2021-06-26 PROCEDURE — 2500000003 HC RX 250 WO HCPCS: Performed by: STUDENT IN AN ORGANIZED HEALTH CARE EDUCATION/TRAINING PROGRAM

## 2021-06-26 PROCEDURE — 93971 EXTREMITY STUDY: CPT

## 2021-06-26 PROCEDURE — 2580000003 HC RX 258: Performed by: STUDENT IN AN ORGANIZED HEALTH CARE EDUCATION/TRAINING PROGRAM

## 2021-06-26 PROCEDURE — 6370000000 HC RX 637 (ALT 250 FOR IP): Performed by: STUDENT IN AN ORGANIZED HEALTH CARE EDUCATION/TRAINING PROGRAM

## 2021-06-26 PROCEDURE — 80076 HEPATIC FUNCTION PANEL: CPT

## 2021-06-26 PROCEDURE — 02H633Z INSERTION OF INFUSION DEVICE INTO RIGHT ATRIUM, PERCUTANEOUS APPROACH: ICD-10-PCS | Performed by: INTERNAL MEDICINE

## 2021-06-26 PROCEDURE — 96365 THER/PROPH/DIAG IV INF INIT: CPT

## 2021-06-26 PROCEDURE — 5A1D70Z PERFORMANCE OF URINARY FILTRATION, INTERMITTENT, LESS THAN 6 HOURS PER DAY: ICD-10-PCS | Performed by: INTERNAL MEDICINE

## 2021-06-26 PROCEDURE — 85025 COMPLETE CBC W/AUTO DIFF WBC: CPT

## 2021-06-26 PROCEDURE — 80048 BASIC METABOLIC PNL TOTAL CA: CPT

## 2021-06-26 PROCEDURE — 86140 C-REACTIVE PROTEIN: CPT

## 2021-06-26 PROCEDURE — 99285 EMERGENCY DEPT VISIT HI MDM: CPT

## 2021-06-26 PROCEDURE — 0JH63XZ INSERTION OF TUNNELED VASCULAR ACCESS DEVICE INTO CHEST SUBCUTANEOUS TISSUE AND FASCIA, PERCUTANEOUS APPROACH: ICD-10-PCS | Performed by: INTERNAL MEDICINE

## 2021-06-26 PROCEDURE — 76770 US EXAM ABDO BACK WALL COMP: CPT

## 2021-06-26 PROCEDURE — 73610 X-RAY EXAM OF ANKLE: CPT

## 2021-06-26 PROCEDURE — 84550 ASSAY OF BLOOD/URIC ACID: CPT

## 2021-06-26 PROCEDURE — 05HM33Z INSERTION OF INFUSION DEVICE INTO RIGHT INTERNAL JUGULAR VEIN, PERCUTANEOUS APPROACH: ICD-10-PCS | Performed by: INTERNAL MEDICINE

## 2021-06-26 PROCEDURE — 99222 1ST HOSP IP/OBS MODERATE 55: CPT | Performed by: FAMILY MEDICINE

## 2021-06-26 RX ORDER — HEPARIN SODIUM 5000 [USP'U]/ML
5000 INJECTION, SOLUTION INTRAVENOUS; SUBCUTANEOUS EVERY 8 HOURS SCHEDULED
Status: DISCONTINUED | OUTPATIENT
Start: 2021-06-26 | End: 2021-07-01 | Stop reason: HOSPADM

## 2021-06-26 RX ORDER — SODIUM CHLORIDE 0.9 % (FLUSH) 0.9 %
5-40 SYRINGE (ML) INJECTION PRN
Status: DISCONTINUED | OUTPATIENT
Start: 2021-06-26 | End: 2021-07-01 | Stop reason: HOSPADM

## 2021-06-26 RX ORDER — ONDANSETRON 4 MG/1
4 TABLET, ORALLY DISINTEGRATING ORAL EVERY 8 HOURS PRN
Status: DISCONTINUED | OUTPATIENT
Start: 2021-06-26 | End: 2021-07-01 | Stop reason: HOSPADM

## 2021-06-26 RX ORDER — ISOSORBIDE MONONITRATE 30 MG/1
30 TABLET, EXTENDED RELEASE ORAL DAILY
Status: DISCONTINUED | OUTPATIENT
Start: 2021-06-26 | End: 2021-07-01 | Stop reason: HOSPADM

## 2021-06-26 RX ORDER — AMLODIPINE BESYLATE 10 MG/1
10 TABLET ORAL DAILY
Status: DISCONTINUED | OUTPATIENT
Start: 2021-06-26 | End: 2021-07-01 | Stop reason: HOSPADM

## 2021-06-26 RX ORDER — GABAPENTIN 100 MG/1
100 CAPSULE ORAL 3 TIMES DAILY
Status: DISCONTINUED | OUTPATIENT
Start: 2021-06-26 | End: 2021-07-01 | Stop reason: HOSPADM

## 2021-06-26 RX ORDER — RANOLAZINE 500 MG/1
500 TABLET, EXTENDED RELEASE ORAL 2 TIMES DAILY
Status: CANCELLED | OUTPATIENT
Start: 2021-06-26

## 2021-06-26 RX ORDER — POLYETHYLENE GLYCOL 3350 17 G/17G
17 POWDER, FOR SOLUTION ORAL DAILY PRN
Status: DISCONTINUED | OUTPATIENT
Start: 2021-06-26 | End: 2021-07-01 | Stop reason: HOSPADM

## 2021-06-26 RX ORDER — INSULIN GLARGINE 100 [IU]/ML
15 INJECTION, SOLUTION SUBCUTANEOUS NIGHTLY
Status: DISCONTINUED | OUTPATIENT
Start: 2021-06-26 | End: 2021-06-29

## 2021-06-26 RX ORDER — METOPROLOL TARTRATE 50 MG/1
50 TABLET, FILM COATED ORAL 2 TIMES DAILY
Status: DISCONTINUED | OUTPATIENT
Start: 2021-06-26 | End: 2021-07-01 | Stop reason: HOSPADM

## 2021-06-26 RX ORDER — ACETAMINOPHEN 650 MG/1
650 SUPPOSITORY RECTAL EVERY 6 HOURS PRN
Status: DISCONTINUED | OUTPATIENT
Start: 2021-06-26 | End: 2021-07-01 | Stop reason: HOSPADM

## 2021-06-26 RX ORDER — DEXTROSE MONOHYDRATE 50 MG/ML
100 INJECTION, SOLUTION INTRAVENOUS PRN
Status: DISCONTINUED | OUTPATIENT
Start: 2021-06-26 | End: 2021-07-01 | Stop reason: HOSPADM

## 2021-06-26 RX ORDER — SODIUM CHLORIDE 0.9 % (FLUSH) 0.9 %
5-40 SYRINGE (ML) INJECTION EVERY 12 HOURS SCHEDULED
Status: DISCONTINUED | OUTPATIENT
Start: 2021-06-26 | End: 2021-07-01 | Stop reason: HOSPADM

## 2021-06-26 RX ORDER — SODIUM CHLORIDE 9 MG/ML
INJECTION, SOLUTION INTRAVENOUS CONTINUOUS
Status: DISCONTINUED | OUTPATIENT
Start: 2021-06-26 | End: 2021-06-27

## 2021-06-26 RX ORDER — 0.9 % SODIUM CHLORIDE 0.9 %
1000 INTRAVENOUS SOLUTION INTRAVENOUS ONCE
Status: COMPLETED | OUTPATIENT
Start: 2021-06-26 | End: 2021-06-26

## 2021-06-26 RX ORDER — ASPIRIN 81 MG/1
81 TABLET, CHEWABLE ORAL DAILY
Status: DISCONTINUED | OUTPATIENT
Start: 2021-06-26 | End: 2021-07-01 | Stop reason: HOSPADM

## 2021-06-26 RX ORDER — SODIUM CHLORIDE 9 MG/ML
25 INJECTION, SOLUTION INTRAVENOUS PRN
Status: DISCONTINUED | OUTPATIENT
Start: 2021-06-26 | End: 2021-07-01 | Stop reason: HOSPADM

## 2021-06-26 RX ORDER — ONDANSETRON 2 MG/ML
4 INJECTION INTRAMUSCULAR; INTRAVENOUS EVERY 6 HOURS PRN
Status: DISCONTINUED | OUTPATIENT
Start: 2021-06-26 | End: 2021-07-01 | Stop reason: HOSPADM

## 2021-06-26 RX ORDER — ATORVASTATIN CALCIUM 20 MG/1
20 TABLET, FILM COATED ORAL DAILY
Status: DISCONTINUED | OUTPATIENT
Start: 2021-06-26 | End: 2021-07-01 | Stop reason: HOSPADM

## 2021-06-26 RX ORDER — ACETAMINOPHEN 500 MG
1000 TABLET ORAL ONCE
Status: COMPLETED | OUTPATIENT
Start: 2021-06-26 | End: 2021-06-26

## 2021-06-26 RX ORDER — CALCIUM GLUCONATE 20 MG/ML
1000 INJECTION, SOLUTION INTRAVENOUS ONCE
Status: COMPLETED | OUTPATIENT
Start: 2021-06-26 | End: 2021-06-26

## 2021-06-26 RX ORDER — DEXTROSE MONOHYDRATE 25 G/50ML
12.5 INJECTION, SOLUTION INTRAVENOUS PRN
Status: DISCONTINUED | OUTPATIENT
Start: 2021-06-26 | End: 2021-07-01 | Stop reason: HOSPADM

## 2021-06-26 RX ORDER — NICOTINE POLACRILEX 4 MG
15 LOZENGE BUCCAL PRN
Status: DISCONTINUED | OUTPATIENT
Start: 2021-06-26 | End: 2021-07-01 | Stop reason: HOSPADM

## 2021-06-26 RX ORDER — OXYBUTYNIN CHLORIDE 10 MG/1
10 TABLET, EXTENDED RELEASE ORAL DAILY
Status: DISCONTINUED | OUTPATIENT
Start: 2021-06-26 | End: 2021-07-01 | Stop reason: HOSPADM

## 2021-06-26 RX ORDER — ACETAMINOPHEN 325 MG/1
650 TABLET ORAL EVERY 6 HOURS PRN
Status: DISCONTINUED | OUTPATIENT
Start: 2021-06-26 | End: 2021-07-01 | Stop reason: HOSPADM

## 2021-06-26 RX ORDER — HYDROCODONE BITARTRATE AND ACETAMINOPHEN 5; 325 MG/1; MG/1
1 TABLET ORAL EVERY 6 HOURS PRN
Status: DISCONTINUED | OUTPATIENT
Start: 2021-06-26 | End: 2021-06-27

## 2021-06-26 RX ORDER — BENZONATATE 100 MG/1
100 CAPSULE ORAL 3 TIMES DAILY PRN
Status: DISCONTINUED | OUTPATIENT
Start: 2021-06-26 | End: 2021-07-01 | Stop reason: HOSPADM

## 2021-06-26 RX ADMIN — HYDROCODONE BITARTRATE AND ACETAMINOPHEN 1 TABLET: 5; 325 TABLET ORAL at 21:43

## 2021-06-26 RX ADMIN — OXYBUTYNIN CHLORIDE 10 MG: 10 TABLET, EXTENDED RELEASE ORAL at 19:25

## 2021-06-26 RX ADMIN — AMLODIPINE BESYLATE 10 MG: 10 TABLET ORAL at 19:23

## 2021-06-26 RX ADMIN — ATORVASTATIN CALCIUM 20 MG: 20 TABLET, FILM COATED ORAL at 19:25

## 2021-06-26 RX ADMIN — SODIUM CHLORIDE: 9 INJECTION, SOLUTION INTRAVENOUS at 19:26

## 2021-06-26 RX ADMIN — INSULIN GLARGINE 15 UNITS: 100 INJECTION, SOLUTION SUBCUTANEOUS at 23:35

## 2021-06-26 RX ADMIN — SODIUM CHLORIDE 1000 ML: 9 INJECTION, SOLUTION INTRAVENOUS at 14:44

## 2021-06-26 RX ADMIN — FAMOTIDINE 20 MG: 10 INJECTION INTRAVENOUS at 23:35

## 2021-06-26 RX ADMIN — ASPIRIN 81 MG: 81 TABLET, CHEWABLE ORAL at 19:25

## 2021-06-26 RX ADMIN — METOPROLOL TARTRATE 50 MG: 50 TABLET, FILM COATED ORAL at 23:34

## 2021-06-26 RX ADMIN — ISOSORBIDE MONONITRATE 30 MG: 30 TABLET ORAL at 19:25

## 2021-06-26 RX ADMIN — CALCIUM GLUCONATE 1000 MG: 20 INJECTION, SOLUTION INTRAVENOUS at 14:44

## 2021-06-26 RX ADMIN — ACETAMINOPHEN 1000 MG: 500 TABLET ORAL at 12:36

## 2021-06-26 RX ADMIN — INSULIN LISPRO 1 UNITS: 100 INJECTION, SOLUTION INTRAVENOUS; SUBCUTANEOUS at 23:34

## 2021-06-26 RX ADMIN — BENZONATATE 100 MG: 100 CAPSULE ORAL at 23:34

## 2021-06-26 ASSESSMENT — PAIN DESCRIPTION - PROGRESSION
CLINICAL_PROGRESSION: GRADUALLY WORSENING
CLINICAL_PROGRESSION: GRADUALLY WORSENING

## 2021-06-26 ASSESSMENT — ENCOUNTER SYMPTOMS
APNEA: 0
CHEST TIGHTNESS: 0
DIARRHEA: 0
COLOR CHANGE: 0
NAUSEA: 0
BLOOD IN STOOL: 0
RHINORRHEA: 0
SHORTNESS OF BREATH: 0
COUGH: 0
SORE THROAT: 0
ABDOMINAL PAIN: 0
CONSTIPATION: 0
VOMITING: 0
ABDOMINAL DISTENTION: 0

## 2021-06-26 ASSESSMENT — PAIN DESCRIPTION - FREQUENCY
FREQUENCY: CONTINUOUS
FREQUENCY: CONTINUOUS

## 2021-06-26 ASSESSMENT — PAIN DESCRIPTION - LOCATION: LOCATION: LEG

## 2021-06-26 ASSESSMENT — PAIN DESCRIPTION - ONSET
ONSET: ON-GOING
ONSET: ON-GOING

## 2021-06-26 ASSESSMENT — PAIN SCALES - GENERAL
PAINLEVEL_OUTOF10: 10

## 2021-06-26 ASSESSMENT — PAIN DESCRIPTION - PAIN TYPE
TYPE: ACUTE PAIN
TYPE: ACUTE PAIN

## 2021-06-26 ASSESSMENT — PAIN DESCRIPTION - DIRECTION: RADIATING_TOWARDS: KNEE

## 2021-06-26 ASSESSMENT — PAIN DESCRIPTION - DESCRIPTORS
DESCRIPTORS: ACHING
DESCRIPTORS: PRESSURE

## 2021-06-26 ASSESSMENT — PAIN DESCRIPTION - ORIENTATION
ORIENTATION: LEFT
ORIENTATION: LEFT

## 2021-06-26 NOTE — ED PROVIDER NOTES
[address]  ED  Emergency Department Encounter  EmergencyMedicine Resident     Pt Name:Marques Shea  MRN: [id number]  [place] 1960  Date of evaluation: 6/26/21  PCP:  Thom Michaels MD    This patient was evaluated in the Emergency Department for symptoms described in the history of present illness. The patient was evaluated in the context of the global COVID-19 pandemic, which necessitated consideration that the patient might be at risk for infection with the SARS-CoV-2 virus that causes COVID-19. Institutional protocols and algorithms that pertain to the evaluation of patients at risk for COVID-19 are in a state of rapid change based on information released by regulatory bodies including the CDC and federal and state organizations. These policies and algorithms were followed during the patient's care in the ED. CHIEF COMPLAINT       Chief Complaint   Patient presents with    Leg Pain     left leg    Leg Swelling    Nasal Congestion    Flank Pain     left sided       HISTORY OF PRESENT ILLNESS  (Location/Symptom, Timing/Onset, Context/Setting, Quality, Duration, Modifying Factors, Severity.)      Tash Connor is a 61 y.o. male who presents with leg pain. Patient presents with 6 days of left lower extremity pain, mainly in the ankle, sharp, radiating up into his calf, some in his medial thigh, no associated symptoms, constant, worse with walking, better with rest.  Patient denies fevers, chills, cough, congestion, chest pain, shortness of breath, abdominal pain, nausea, vomiting, diarrhea. Patient has history of DM, peripheral neuropathy, denies history of DVT/PE. Patient is not on any anticoagulation medication. Patient denies any trauma or injury.     Of note, patient does not initially report left flank pain to me, did to the nursing staff, on reassessment, patient does report left flank pain for the past 3 days, sharp, nonradiating, mild severity, no associated symptoms. PAST MEDICAL / SURGICAL / SOCIAL / FAMILY HISTORY      has a past medical history of ADHD (attention deficit hyperactivity disorder), Depression, DM2 (diabetes mellitus, type 2) ([address] Erectile dysfunction, Gastroesophageal reflux disease, Hyperlipidemia, Hypertension, Kidney stone, Low back pain of thoracolumbar region with sciatica, and Neuropathy. has a past surgical history that includes Tonsillectomy; Cystoscopy (2018); pr cystoscopy,insert ureteral stent (Bilateral, 2018); Cystocopy (2018); pr cysto/uretero/pyeloscopy, calculus tx (Bilateral, 8/3/2018); Upper gastrointestinal endoscopy (N/A, 2020); and Colonoscopy (N/A, 2020). Social History     Socioeconomic History    Marital status: Single     Spouse name: Not on file    Number of children: Not on file    Years of education: Not on file    Highest education level: Not on file   Occupational History    Not on file   Tobacco Use    Smoking status: Former Smoker     Packs/day: 0.50     Years: 30.00     Pack years: 15.00     Types: Cigarettes     Quit date: 2018     Years since quittin.6    Smokeless tobacco: Never Used   Substance and Sexual Activity    Alcohol use:  Yes     Alcohol/week: 8.0 standard drinks     Types: 8 Cans of beer per week     Comment: drinks \"a few\" beers a day    Drug use: No     Types: Marijuana     Comment:  last use one month    Sexual activity: Never     Partners: Female   Other Topics Concern    Not on file   Social History Narrative    Not on file     Social Determinants of Health     Financial Resource Strain: Unknown    Difficulty of Paying Living Expenses: Patient refused   Food Insecurity: Unknown    Worried About Running Out of Food in the Last Year: Patient refused    Ran Out of Food in the Last Year: Patient refused   Transportation Needs: Unknown    Lack of Transportation (Medical): Patient refused    Lack of Transportation (Non-Medical): Patient for chest pain and palpitations. Gastrointestinal: Negative for abdominal pain, blood in stool, constipation, diarrhea, nausea and vomiting. Genitourinary: Negative for decreased urine volume, dysuria, frequency, hematuria and urgency. Musculoskeletal: Positive for arthralgias. Negative for neck pain and neck stiffness. Skin: Negative for pallor and rash. Neurological: Negative for dizziness, syncope, weakness, light-headedness and headaches. Psychiatric/Behavioral: Negative for confusion. PHYSICAL EXAM   (up to 7 for level 4, 8 or more for level 5)      INITIAL VITALS:   BP (!) 145/75   Pulse 90   Temp 99.1 °F (37.3 °C)   Resp 16   Ht 5' 11\" (1.803 m)   Wt 190 lb (86.2 kg)   SpO2 98%   BMI 26.50 kg/m²     Physical Exam  Constitutional:       General: He is not in acute distress. Appearance: He is well-developed. He is not ill-appearing, toxic-appearing or diaphoretic. HENT:      Head: Normocephalic and atraumatic. Eyes:      General:         Right eye: No discharge. Left eye: No discharge. Extraocular Movements: Extraocular movements intact. Pupils: Pupils are equal, round, and reactive to light. Neck:      Vascular: No JVD. Trachea: No tracheal deviation. Cardiovascular:      Rate and Rhythm: Normal rate and regular rhythm. Heart sounds: Normal heart sounds. No murmur heard. No friction rub. No gallop. Pulmonary:      Effort: Pulmonary effort is normal. No respiratory distress. Breath sounds: Normal breath sounds. No stridor. No wheezing, rhonchi or rales. Chest:      Chest wall: No tenderness. Abdominal:      General: There is no distension. Palpations: Abdomen is soft. There is no mass. Tenderness: There is no abdominal tenderness. There is no right CVA tenderness, left CVA tenderness or guarding.       Comments: Patient has no abdominal pain on exam, soft, nondistended, no flank pain, no CVA tenderness bilaterally, no overlying skin changes. Musculoskeletal:         General: Tenderness present. Normal range of motion. Cervical back: Normal range of motion and neck supple. Right lower leg: No edema. Left lower leg: Edema present. Comments: Patient has left lower extremity swelling, most notably over the left ankle, most tender over the left ankle, some tenderness over the left calf, some tenderness in the left medial thigh, no erythema, no warmth, no purulence, no deformity, distal pulses intact and equal bilaterally, sensation light touch intact, full range of motion. Skin:     General: Skin is warm. Capillary Refill: Capillary refill takes less than 2 seconds. Neurological:      Mental Status: He is alert and oriented to person, place, and time.    Psychiatric:         Mood and Affect: Mood normal.         Behavior: Behavior normal.         DIFFERENTIAL  DIAGNOSIS     PLAN (LABS / IMAGING / EKG):  Orders Placed This Encounter   Procedures    XR ANKLE LEFT (MIN 3 VIEWS)    VL DUP LOWER EXTREMITY VENOUS LEFT    US RETROPERITONEAL COMPLETE    CBC Auto Differential    Basic Metabolic Panel w/ Reflex to MG    Sedimentation Rate    C-Reactive Protein    Hepatic Function Panel    Urinalysis Reflex to Culture    Microscopic Urinalysis    Inpatient consult to [facility] Inpatient consult to Nephrology       MEDICATIONS ORDERED:  Orders Placed This Encounter   Medications    acetaminophen (TYLENOL) tablet 1,000 mg    calcium gluconate 1000 mg in sodium chloride 50 mL    0.9 % sodium chloride bolus       DDX:     DIAGNOSTIC RESULTS / EMERGENCY DEPARTMENT COURSE / MDM   LAB RESULTS:  Results for orders placed or performed during the hospital encounter of 06/26/21   CBC Auto Differential   Result Value Ref Range    WBC 7.8 3.5 - 11.3 k/uL    RBC 2.55 (L) 4.21 - 5.77 m/uL    Hemoglobin 7.5 (L) 13.0 - 17.0 g/dL    Hematocrit 24.0 (L) 40.7 - 50.3 %    MCV 94.1 82.6 - 102.9 fL    MCH 29.4 25.2 - 33.5 pg    MCHC 31.3 28.4 - 34.8 g/dL    RDW 13.2 11.8 - 14.4 %    Platelets 857 826 - 756 k/uL    MPV 9.5 8.1 - 13.5 fL    NRBC Automated 0.0 0.0 per 100 WBC    Differential Type NOT REPORTED     WBC Morphology NOT REPORTED     RBC Morphology NOT REPORTED     Platelet Estimate NOT REPORTED     Seg Neutrophils 68 (H) 36 - 65 %    Lymphocytes 18 (L) 24 - 43 %    Monocytes 12 3 - 12 %    Eosinophils % 2 1 - 4 %    Basophils 0 0 - 2 %    Immature Granulocytes 0 0 %    Segs Absolute 5.28 1.50 - 8.10 k/uL    Absolute Lymph # 1.39 1.10 - 3.70 k/uL    Absolute Mono # 0.89 0.10 - 1.20 k/uL    Absolute Eos # 0.14 0.00 - 0.44 k/uL    Basophils Absolute <0.03 0.00 - 0.20 k/uL    Absolute Immature Granulocyte 0.03 0.00 - 0.30 k/uL   Basic Metabolic Panel w/ Reflex to MG   Result Value Ref Range    Glucose 193 (H) 70 - 99 mg/dL    BUN 63 (H) 8 - 23 mg/dL    CREATININE 6.85 (HH) 0.70 - 1.20 mg/dL    Bun/Cre Ratio NOT REPORTED 9 - 20    Calcium 7.0 (L) 8.6 - 10.4 mg/dL    Sodium 134 (L) 135 - 144 mmol/L    Potassium 4.9 3.7 - 5.3 mmol/L    Chloride 101 98 - 107 mmol/L    CO2 16 (L) 20 - 31 mmol/L    Anion Gap 17 9 - 17 mmol/L    GFR Non-African American 8 (L) >60 mL/min    GFR  10 (L) >60 mL/min    GFR Comment          GFR Staging NOT REPORTED    Sedimentation Rate   Result Value Ref Range    Sed Rate 31 (H) 0 - 20 mm   C-Reactive Protein   Result Value Ref Range    CRP 14.9 (H) 0.0 - 5.0 mg/L   Hepatic Function Panel   Result Value Ref Range    Albumin 3.0 (L) 3.5 - 5.2 g/dL    Alkaline Phosphatase 97 40 - 129 U/L    ALT 10 5 - 41 U/L    AST 15 <40 U/L    Total Bilirubin 0.26 (L) 0.3 - 1.2 mg/dL    Bilirubin, Direct 0.08 <0.31 mg/dL    Bilirubin, Indirect 0.18 0.00 - 1.00 mg/dL    Total Protein 6.7 6.4 - 8.3 g/dL    Globulin NOT REPORTED 1.5 - 3.8 g/dL    Albumin/Globulin Ratio 0.8 (L) 1.0 - 2.5   Urinalysis Reflex to Culture    Specimen: Urine, clean catch   Result Value Ref Range    Color, UA YELLOW YELLOW Turbidity UA CLEAR CLEAR    Glucose, Ur TRACE (A) NEGATIVE    Bilirubin Urine NEGATIVE NEGATIVE    Ketones, Urine NEGATIVE NEGATIVE    Specific Gravity, UA 1.010 1.005 - 1.030    Urine Hgb NEGATIVE NEGATIVE    pH, UA 5.5 5.0 - 8.0    Protein, UA 3+ (A) NEGATIVE    Urobilinogen, Urine Normal Normal    Nitrite, Urine NEGATIVE NEGATIVE    Leukocyte Esterase, Urine NEGATIVE NEGATIVE    Urinalysis Comments NOT REPORTED    Microscopic Urinalysis   Result Value Ref Range    -          WBC, UA 2 TO 5 0 - 5 /HPF    RBC, UA 2 TO 5 0 - 4 /HPF    Casts UA  0 - 8 /LPF     0 TO 2 HYALINE Reference range defined for non-centrifuged specimen. Crystals, UA NOT REPORTED None /HPF    Epithelial Cells UA 0 TO 2 0 - 5 /HPF    Renal Epithelial, UA NOT REPORTED 0 /HPF    Bacteria, UA NOT REPORTED None    Mucus, UA NOT REPORTED None    Trichomonas, UA NOT REPORTED None    Amorphous, UA NOT REPORTED None    Other Observations UA NOT REPORTED NOT REQ. Yeast, UA NOT REPORTED None       IMPRESSION: 71-year-old male with history of DM, peripheral neuropathy, presenting with left lower extremity swelling, pain, will obtain infectious labs, of the low concern for cellulitis at this time. Will obtain DVT ultrasound to evaluate for DVT. Will treat symptoms with Tylenol. RADIOLOGY:  XR ANKLE LEFT (MIN 3 VIEWS)    Result Date: 6/26/2021  EXAMINATION: THREE XRAY VIEWS OF THE LEFT ANKLE 6/26/2021 12:45 pm COMPARISON: None. HISTORY: ORDERING SYSTEM PROVIDED HISTORY: left ankle pain TECHNOLOGIST PROVIDED HISTORY: left ankle pain Reason for Exam: pain FINDINGS: Diffuse soft tissue swelling is noted. The ankle mortise is maintained. There is no fracture identified. Mute your ossification is noted in the distal interosseous space. No significant arthropathic features. Small plantar calcaneal spur. Diffuse soft tissue swelling without acute osseous abnormality identified.        EKG      All EKG's are interpreted by the Emergency Department Physician who either signs or Co-signs this chart in the absence of a cardiologist.    EMERGENCY DEPARTMENT COURSE:  Patient came to emergency department, HPI and physical exam were conducted. All nursing notes were reviewed. Patient's creatinine significantly elevated above baseline, will obtain renal ultrasound, urinalysis, admit patient to family medicine for further management. Infectious labs just mildly elevated, no leukocytosis, no concern for cellulitis at this time. X-ray found no evidence of acute fracture. Consulted with nephrology who recommended continuation of maintenance fluids, reports that patient will need dialysis during this admission, but not urgently. PROCEDURES:      CONSULTS:  IP CONSULT TO FAMILY MEDICINE  IP CONSULT TO NEPHROLOGY    CRITICAL CARE:      FINAL IMPRESSION      1. JEFFREY (acute kidney injury) ([facility] [address]    2. Hypocalcemia    3. Anemia, unspecified type          DISPOSITION / PLAN     DISPOSITION Decision To Admit 06/26/2021 03:11:11 PM      PATIENT REFERRED TO:  No follow-up provider specified.     DISCHARGE MEDICATIONS:  New Prescriptions    No medications on file       Sam Lew MD  Emergency Medicine Resident    (Please note that portions of thisnote were completed with a voice recognition program.  Efforts were made to edit the dictations but occasionally words are mis-transcribed.)        Sam Lew MD  Resident  06/26/21 [id number]

## 2021-06-26 NOTE — Clinical Note
Patient Class: Inpatient [101]   REQUIRED: Diagnosis: Acute kidney injury superimposed on CKD [facility]) [[id number]]   Estimated Length of Stay: Estimated stay of less than 2 midnights   Admitting Provider: Conrad Portillo [[id number]]   Preferred Department: med/surg   Telemetry/Cardiac Monitoring Required?: No

## 2021-06-26 NOTE — ED PROVIDER NOTES
[facility]     Emergency Department     Faculty Note/ Attestation      Pt Name: Kathy Arambula                                       MRN: [id number]  [place] 1960  Date of evaluation: 6/26/2021    Patients PCP:    Anabel Dyer MD      Attestation  I performed a history and physical examination of the patient and discussed management with the resident. I reviewed the residents note and agree with the documented findings and plan of care. Any areas of disagreement are noted on the chart. I was personally present for the key portions of any procedures. I have documented in the chart those procedures where I was not present during the key portions. I have reviewed the emergency nurses triage note. I agree with the chief complaint, past medical history, past surgical history, allergies, medications, social and family history as documented unless otherwise noted below. For Physician Assistant/ Nurse Practitioner cases/documentation I have personally evaluated this patient and have completed at least one if not all key elements of the E/M (history, physical exam, and MDM). Additional findings are as noted. Initial Screens:        Melia Coma Scale  Eye Opening: Spontaneous  Best Verbal Response: Oriented  Best Motor Response: Obeys commands  Melia Coma Scale Score: 15    Vitals:    Vitals:    06/26/21 1148 06/26/21 1228   BP: (!) 145/75    Pulse: 90    Resp:  16   Temp: 99.1 °F (37.3 °C)    SpO2: 98%    Weight: 190 lb (86.2 kg)    Height: 5' 11\" (1.803 m)        [facility]       Chief Complaint   Patient presents with    Leg Pain     left leg    Leg Swelling    Nasal Congestion    Flank Pain     left sided             DIAGNOSTIC RESULTS             RADIOLOGY:   XR ANKLE LEFT (MIN 3 VIEWS)   Final Result   Diffuse soft tissue swelling without acute osseous abnormality identified.          VL DUP LOWER EXTREMITY VENOUS LEFT    (Results Pending)         LABS:  Labs Reviewed   CBC WITH AUTO DIFFERENTIAL - Abnormal; Notable for the following components:       Result Value    RBC 2.55 (*)     Hemoglobin 7.5 (*)     Hematocrit 24.0 (*)     Seg Neutrophils 68 (*)     Lymphocytes 18 (*)     All other components within normal limits   BASIC METABOLIC PANEL W/ REFLEX TO MG FOR LOW K - Abnormal; Notable for the following components:    Glucose 193 (*)     BUN 63 (*)     CREATININE 6.85 (*)     Calcium 7.0 (*)     Sodium 134 (*)     CO2 16 (*)     GFR Non- 8 (*)     GFR  10 (*)     All other components within normal limits   SEDIMENTATION RATE - Abnormal; Notable for the following components:    Sed Rate 31 (*)     All other components within normal limits   C-REACTIVE PROTEIN - Abnormal; Notable for the following components:    CRP 14.9 (*)     All other components within normal limits         EMERGENCY DEPARTMENT COURSE:     -------------------------  BP: (!) 145/75, Temp: 99.1 °F (37.3 °C), Pulse: 90, Resp: 16      Comments    CP/SOB  LLE swelling, mid calf to foot  Pain at ankle  Labs, CRP, doppler  Ankle XR    EF 60% in December    Reassess    2:54 PM EDT  Labs show new JEFFREY on CKD with a creatinine over 6, leg is tender from the calf down to the ankle, patient denies history of RA or gout but does have OA.     Also has a hemoglobin of 7, will get rectal exam, renal involvement, Doppler, admit    (Please note that portions of this note were completed with a voice recognition program.  Efforts were made to edit the dictations but occasionally words are mis-transcribed.)      Samir Quan MD,, MD  Attending Emergency Physician         Samir Quan MD  06/26/21 [id number]

## 2021-06-26 NOTE — PROGRESS NOTES
Pharmacy Note     Renal Dose Adjustment    Darrell Bueno is a 61 y.o. male. Pharmacist assessment of renally cleared medications. Recent Labs     06/26/21  1311   BUN 63*       Recent Labs     06/26/21  1311   CREATININE 6.85*       Estimated Creatinine Clearance: 12 mL/min (A) (based on SCr of 6.85 mg/dL [facility])). Height:   Ht Readings from Last 1 Encounters:   06/26/21 5' 11\" (1.803 m)     Weight:  Wt Readings from Last 1 Encounters:   06/26/21 190 lb (86.2 kg)       The following medication dose has been adjusted based upon renal function per P&T Guidelines:             Famotidine dose reduced to 20 mg daily for CrCl below 50 ml/min.     Delvin Corea PharmD, BCPS  6/26/2021  6:11 PM

## 2021-06-26 NOTE — ED TRIAGE NOTES
Pt brought to 28 by wheelchair. Pt co left leg swelling and pain in ankle x 2 weeks, 10/10, pressure  Quality, radiates to knee. Hx of diabetes. Pain worse with ambulation. Pt also co left flank pain 5/10 and cough. Pt respirations are even and unlabored, pt is oriented X 4, speaking in complete sentences, bed is in the lowest position, call light is within reach. Will continue to monitor.

## 2021-06-26 NOTE — H&P
[address] [facility]    History & Physical Examination Note              Date:   6/26/2021  Patient name:  Lupe Spangler  Date of admission:  6/26/2021 12:04 PM  MRN:   [id number]  YOB: 1960    CHIEF COMPLAINT:     Chief Complaint   Patient presents with    Leg Pain     left leg    Leg Swelling    Nasal Congestion    Flank Pain     left sided       History Obtained From:  Patient and chart review. HPI:     Patient is a 66-year-old male with significant past medical history of GERD, depression, type 2 diabetes, erectile dysfunction, hypertension, kidney stones, diabetic neuropathy who presented to the ED today 6/26 with complaints of left leg pain and swelling-year-old with left-sided flank pain. Patient stated the symptoms began about 6 days ago, his leg pain does not get better or worse with anything. Denies any chest pain, shortness of breath, nausea, vomiting, fever, chills, headaches, vision changes. Denies any trauma to his legs. States he has had a ureteral stent placed a couple of years ago due to kidney stones which was then taken out. States he does not follow with a nephrologist.    In the ED, left ankle x-ray shows diffuse soft tissue swelling without any other abnormality identified, CBC is remarkable for hemoglobin 7.5, white blood cell count normal, creatinine 6.51 baseline is around 2-4, patient does have history of CKD. Sodium 134, ESR 31, CRP  14.9. Urinalysis largely unremarkable, no evidence of UTI. Hepatic function, unremarkable. Patient received 1 L fluid bolus in ER, decision made to admit the patient to the hospital for further evaluation of leg and flank pain and management of JEFFREY on CKD. Nephrology consulted in ED.   PAST MEDICAL HISTORY:        has a past medical history of ADHD (attention deficit hyperactivity disorder), Depression, DM2 (diabetes mellitus, type 2) ([facility] [address] Erectile dysfunction, Gastroesophageal reflux disease, Hyperlipidemia, Hypertension, Kidney stone, Low back pain of thoracolumbar region with sciatica, and Neuropathy. PAST SURGICAL HISTORY:      has a past surgical history that includes Tonsillectomy; Cystoscopy (07/20/2018); pr cystoscopy,insert ureteral stent (Bilateral, 7/20/2018); Cystocopy (08/03/2018); pr cysto/uretero/pyeloscopy, calculus tx (Bilateral, 8/3/2018); Upper gastrointestinal endoscopy (N/A, 12/7/2020); and Colonoscopy (N/A, 12/7/2020). FAMILY HISTORY:     family history includes Diabetes in his mother; High Blood Pressure in his father. HOME MEDICATIONS:     Prior to Admission medications    Medication Sig Start Date End Date Taking? Authorizing Provider   amLODIPine (NORVASC) 10 MG tablet TAKE 1 TABLET BY MOUTH ONCE DAILY.  5/25/21   Patrick Angel MD   gabapentin (NEURONTIN) 100 MG capsule TAKE 1 CAPSULE BY MOUTH 3 TIMES A DAY. 3/11/21 9/11/21  Riccardo Jeronimo MD   acetaminophen (TYLENOL) 325 MG tablet TAKE 2 TABLET BY MOUTH EVERY 6 HOURS AS NEEDED FOR PAIN 3/11/21   Romero Carrillo MD   isosorbide mononitrate (IMDUR) 30 MG extended release tablet Take 1 tablet by mouth daily 12/30/20   Lola Gil MD   ranolazine (RANEXA) 500 MG extended release tablet Take 1 tablet by mouth 2 times daily 12/30/20   Lola Gil MD   insulin glargine (LANTUS SOLOSTAR) 100 UNIT/ML injection pen Inject 15 Units into the skin nightly 12/14/20   Lola Gil MD   Insulin Pen Needle 32G X 4 MM MISC 1 each by Does not apply route daily 12/14/20   Lola Gil MD   bismuth-metronidazole-tetracycline [facility]) [phone] MG per capsule Take 3 capsules by mouth 4 times daily (before meals and nightly) for 10 days 12/9/20 12/19/20  Sung Najera MD   metFORMIN (GLUCOPHAGE) 500 MG tablet Take 2 tablets by mouth 2 times daily (with meals) 12/8/20   Romero Carrillo MD   pantoprazole (PROTONIX) 40 MG tablet Take 1 tablet by mouth 2 times daily (before meals) 12/7/20   Romero Carrillo MD atorvastatin (LIPITOR) 20 MG tablet Take 1 tablet by mouth daily 12/7/20   Luis Rojas MD   metoprolol tartrate (LOPRESSOR) 50 MG tablet Take 1 tablet by mouth 2 times daily 12/7/20   Riccardo Jeornimo MD   pantoprazole (PROTONIX) 40 MG tablet Take 1 tablet by mouth 2 times daily (before meals) 12/7/20   Luis Rojas MD   metoprolol tartrate (LOPRESSOR) 50 MG tablet Take 1 tablet by mouth 2 times daily 8/28/20   Leanna Guillory MD   SENNA-PLUS 8.6-50 MG per tablet Take 1 tablet by mouth daily 1/14/20   Jim Maria MD   aspirin 81 MG chewable tablet Take 1 tablet by mouth daily 1/14/20   Jim Maria MD   atorvastatin (LIPITOR) 20 MG tablet Take 1 tablet by mouth daily 1/14/20   Jim Maria MD   docusate sodium (COLACE) 100 MG capsule Take 1 capsule by mouth 2 times daily as needed for Constipation 1/14/20   Jim Maria MD   hydrochlorothiazide (HYDRODIURIL) 25 MG tablet Take 1 tablet by mouth daily 1/14/20 1/8/21  Jim Maria MD   oxybutynin (DITROPAN-XL) 10 MG extended release tablet Take 1 tablet by mouth daily 1/14/20 2/13/20  Jim Maria MD   blood glucose monitor kit and supplies Test 2 times a day & as needed for symptoms of irregular blood glucose. 1/14/20   Jim Maria MD   Lancets MISC 1 each by Does not apply route 2 times daily 1/14/20   Jim Maria MD   blood glucose monitor strips Test 2 times a day & as needed for symptoms of irregular blood glucose. 1/14/20   Jim Maria MD       ALLERGIES:      Patient has no known allergies. SOCIAL HISTORY:      reports that he quit smoking about 2 years ago. His smoking use included cigarettes. He has a 15.00 pack-year smoking history. He has never used smokeless tobacco. He reports current alcohol use of about 8.0 standard drinks of alcohol per week. He reports that he does not use drugs.       REVIEW OF SYSTEMS:     Review of Systems   Constitutional: Negative for activity change, appetite change, chills, fatigue and Laboratory Testing:    Recent Results (from the past 24 hour(s))   CBC Auto Differential    Collection Time: 06/26/21  1:11 PM   Result Value Ref Range    WBC 7.8 3.5 - 11.3 k/uL    RBC 2.55 (L) 4.21 - 5.77 m/uL    Hemoglobin 7.5 (L) 13.0 - 17.0 g/dL    Hematocrit 24.0 (L) 40.7 - 50.3 %    MCV 94.1 82.6 - 102.9 fL    MCH 29.4 25.2 - 33.5 pg    MCHC 31.3 28.4 - 34.8 g/dL    RDW 13.2 11.8 - 14.4 %    Platelets 330 702 - 082 k/uL    MPV 9.5 8.1 - 13.5 fL    NRBC Automated 0.0 0.0 per 100 WBC    Differential Type NOT REPORTED     WBC Morphology NOT REPORTED     RBC Morphology NOT REPORTED     Platelet Estimate NOT REPORTED     Seg Neutrophils 68 (H) 36 - 65 %    Lymphocytes 18 (L) 24 - 43 %    Monocytes 12 3 - 12 %    Eosinophils % 2 1 - 4 %    Basophils 0 0 - 2 %    Immature Granulocytes 0 0 %    Segs Absolute 5.28 1.50 - 8.10 k/uL    Absolute Lymph # 1.39 1.10 - 3.70 k/uL    Absolute Mono # 0.89 0.10 - 1.20 k/uL    Absolute Eos # 0.14 0.00 - 0.44 k/uL    Basophils Absolute <0.03 0.00 - 0.20 k/uL    Absolute Immature Granulocyte 0.03 0.00 - 0.30 k/uL   Basic Metabolic Panel w/ Reflex to MG    Collection Time: 06/26/21  1:11 PM   Result Value Ref Range    Glucose 193 (H) 70 - 99 mg/dL    BUN 63 (H) 8 - 23 mg/dL    CREATININE 6.85 (HH) 0.70 - 1.20 mg/dL    Bun/Cre Ratio NOT REPORTED 9 - 20    Calcium 7.0 (L) 8.6 - 10.4 mg/dL    Sodium 134 (L) 135 - 144 mmol/L    Potassium 4.9 3.7 - 5.3 mmol/L    Chloride 101 98 - 107 mmol/L    CO2 16 (L) 20 - 31 mmol/L    Anion Gap 17 9 - 17 mmol/L    GFR Non-African American 8 (L) >60 mL/min    GFR  10 (L) >60 mL/min    GFR Comment          GFR Staging NOT REPORTED    Sedimentation Rate    Collection Time: 06/26/21  1:11 PM   Result Value Ref Range    Sed Rate 31 (H) 0 - 20 mm   C-Reactive Protein    Collection Time: 06/26/21  1:11 PM   Result Value Ref Range    CRP 14.9 (H) 0.0 - 5.0 mg/L   Urinalysis Reflex to Culture    Collection Time: 06/26/21  2:38 PM Specimen: Urine, clean catch   Result Value Ref Range    Color, UA YELLOW YELLOW    Turbidity UA CLEAR CLEAR    Glucose, Ur TRACE (A) NEGATIVE    Bilirubin Urine NEGATIVE NEGATIVE    Ketones, Urine NEGATIVE NEGATIVE    Specific Gravity, UA 1.010 1.005 - 1.030    Urine Hgb NEGATIVE NEGATIVE    pH, UA 5.5 5.0 - 8.0    Protein, UA 3+ (A) NEGATIVE    Urobilinogen, Urine Normal Normal    Nitrite, Urine NEGATIVE NEGATIVE    Leukocyte Esterase, Urine NEGATIVE NEGATIVE    Urinalysis Comments NOT REPORTED    Microscopic Urinalysis    Collection Time: 06/26/21  2:38 PM   Result Value Ref Range    -          WBC, UA 2 TO 5 0 - 5 /HPF    RBC, UA 2 TO 5 0 - 4 /HPF    Casts UA  0 - 8 /LPF     0 TO 2 HYALINE Reference range defined for non-centrifuged specimen. Crystals, UA NOT REPORTED None /HPF    Epithelial Cells UA 0 TO 2 0 - 5 /HPF    Renal Epithelial, UA NOT REPORTED 0 /HPF    Bacteria, UA NOT REPORTED None    Mucus, UA NOT REPORTED None    Trichomonas, UA NOT REPORTED None    Amorphous, UA NOT REPORTED None    Other Observations UA NOT REPORTED NOT REQ. Yeast, UA NOT REPORTED None   Hepatic Function Panel    Collection Time: 06/26/21  2:48 PM   Result Value Ref Range    Albumin 3.0 (L) 3.5 - 5.2 g/dL    Alkaline Phosphatase 97 40 - 129 U/L    ALT 10 5 - 41 U/L    AST 15 <40 U/L    Total Bilirubin 0.26 (L) 0.3 - 1.2 mg/dL    Bilirubin, Direct 0.08 <0.31 mg/dL    Bilirubin, Indirect 0.18 0.00 - 1.00 mg/dL    Total Protein 6.7 6.4 - 8.3 g/dL    Globulin NOT REPORTED 1.5 - 3.8 g/dL    Albumin/Globulin Ratio 0.8 (L) 1.0 - 2.5         Imaging/[employer]:  XR ANKLE LEFT (MIN 3 VIEWS)    Result Date: 6/26/2021  EXAMINATION: THREE XRAY VIEWS OF THE LEFT ANKLE 6/26/2021 12:45 pm COMPARISON: None. HISTORY: ORDERING SYSTEM PROVIDED HISTORY: left ankle pain TECHNOLOGIST PROVIDED HISTORY: left ankle pain Reason for Exam: pain FINDINGS: Diffuse soft tissue swelling is noted. The ankle mortise is maintained.  There is no fracture identified. Mute your ossification is noted in the distal interosseous space. No significant arthropathic features. Small plantar calcaneal spur. Diffuse soft tissue swelling without acute osseous abnormality identified. ASSESSMENT:       Active Problems:    Acute kidney injury superimposed on CKD ([address]  Resolved Problems:    * No resolved hospital problems.  *      PLAN:     Admit patient as inpatient in [facility] 5 unit    Orders Placed This Encounter   Procedures    XR ANKLE LEFT (MIN 3 VIEWS)    VL DUP LOWER EXTREMITY VENOUS LEFT    US RETROPERITONEAL COMPLETE    CBC Auto Differential    Basic Metabolic Panel w/ Reflex to MG    Sedimentation Rate    C-Reactive Protein    Hepatic Function Panel    Urinalysis Reflex to Culture    Microscopic Urinalysis    Inpatient consult to [facility]    Inpatient consult to Nephrology    PATIENT STATUS (FROM ED OR OR/PROCEDURAL) Inpatient     New onset left ankle and leg pain, rule out traumatic versus infective cause  -White blood cell count unremarkable, hemoglobin 7.5  -X-ray left ankle showing diffuse soft tissue swelling without acute osseous abnormality  -Venous Dopplers completed, results pending  -Tylenol for pain control  -Uric acid levels pending    JEFFREY superimposed on CKD stage III, no dialysis likely secondary to dehydration  -Creatinine 6.85, baseline 2.9-4.2  -Nephrology consulted, appreciate recommendations  -Renal ultrasound pending  -1 L fluid bolus given in ED  -IV fluids normal saline 125 cc an hour  -Renal diet  -CBC, BMP daily    Diabetes mellitus type 2  -Low-dose sliding scale in place  -Hypoglycemia protocol in place  -Lantus 15 units nightly    New onset left-sided flank pain  -Renal ultrasound pending  Urinalysis unremarkable    Essential hypertension  -Norvasc 10 mg p.o. daily  -Lopressor 30 mg p.o. twice daily  -Hctz held     Atypical chest pain  -Ranexa 500 mg p.o. twice daily- HELD DUE TO CrCl<30       GI prophylaxis: Pepcid 20 mg IV twice daily  DVT prophylaxis: Heparin 5000 units subcu 3 times daily      Plan will be discussed with the attending, Dr. Horacio Pulido MD  Family Medicine Resident  6/26/2021 4:07 PM

## 2021-06-26 NOTE — CARE COORDINATION
Case Management Initial Discharge Plan  Darrell Bueno,             Met with:patient to discuss discharge plans. Information verified: address, contacts, phone number, , insurance Yes  Insurance Provider: [facility]    Emergency Contact/Next of Kin name & number: Mary cherry as per chart  Who are involved in patient's support system? saul    PCP: Denae Arias MD  Date of last visit: less than one  year      Discharge Planning    Living Arrangements:    lives with saul    Home has 2 stories  3-4 stairs to climb to get into front door, flight of stairs to climb to reach second floor  Location of bedroom/bathroom in home upper    Patient able to perform ADL's:Independent    Current Services (outpatient & in home) DME  DME equipment: glucometer-doesn't use  DME provider:     Is patient receiving oral anticoagulation therapy? No    If indicated:   Physician managing anticoagulation treatment: none  Where does patient obtain lab work for ATC treatment? na      Potential Assistance Needed:       Patient agreeable to home care: Yes  Freedom of choice provided:  if needed    Prior SNF/Rehab Placement and Facility: none  Agreeable to SNF/Rehab: No  [place] of choice provided: n/a     Evaluation: no    Expected Discharge date:       Patient expects to be discharged to: If home: is the family and/or caregiver wiling & able to provide support at home? yes  Who will be providing this support? Jamee-lives with    Follow Up Appointment: Best Day/ Time:      Transportation provider: will need  Transportation arrangements needed for discharge: No    Readmission Risk              Risk of Unplanned Readmission:  14             Does patient have a readmission risk score greater than 14?: No  If yes, follow-up appointment must be made within 7 days of discharge.      Goals of Care: self care      Educated pt on transitional options, provided freedom of choice and are agreeable with

## 2021-06-27 LAB
ABSOLUTE EOS #: 0.17 K/UL (ref 0–0.44)
ABSOLUTE IMMATURE GRANULOCYTE: <0.03 K/UL (ref 0–0.3)
ABSOLUTE LYMPH #: 1.53 K/UL (ref 1.1–3.7)
ABSOLUTE MONO #: 0.76 K/UL (ref 0.1–1.2)
ANION GAP SERPL CALCULATED.3IONS-SCNC: 11 MMOL/L (ref 9–17)
BASOPHILS # BLD: 1 % (ref 0–2)
BASOPHILS ABSOLUTE: 0.03 K/UL (ref 0–0.2)
BUN BLDV-MCNC: 56 MG/DL (ref 8–23)
BUN/CREAT BLD: ABNORMAL (ref 9–20)
CALCIUM SERPL-MCNC: 7.7 MG/DL (ref 8.6–10.4)
CHLORIDE BLD-SCNC: 107 MMOL/L (ref 98–107)
CO2: 19 MMOL/L (ref 20–31)
COMPLEMENT C3: 145 MG/DL (ref 90–180)
COMPLEMENT C4: 39 MG/DL (ref 10–40)
CREAT SERPL-MCNC: 6.1 MG/DL (ref 0.7–1.2)
CREATININE URINE: 32.9 MG/DL (ref 39–259)
DIFFERENTIAL TYPE: ABNORMAL
EOSINOPHIL,URINE: NORMAL
EOSINOPHILS RELATIVE PERCENT: 3 % (ref 1–4)
GFR AFRICAN AMERICAN: 11 ML/MIN
GFR NON-AFRICAN AMERICAN: 9 ML/MIN
GFR SERPL CREATININE-BSD FRML MDRD: ABNORMAL ML/MIN/{1.73_M2}
GFR SERPL CREATININE-BSD FRML MDRD: ABNORMAL ML/MIN/{1.73_M2}
GLUCOSE BLD-MCNC: 120 MG/DL (ref 70–99)
GLUCOSE BLD-MCNC: 253 MG/DL (ref 75–110)
GLUCOSE BLD-MCNC: 320 MG/DL (ref 75–110)
GLUCOSE BLD-MCNC: 393 MG/DL (ref 75–110)
GLUCOSE BLD-MCNC: 88 MG/DL (ref 75–110)
HAV IGM SER IA-ACNC: NONREACTIVE
HCT VFR BLD CALC: 26.6 % (ref 40.7–50.3)
HEMOGLOBIN: 8.2 G/DL (ref 13–17)
HEPATITIS B CORE IGM ANTIBODY: NONREACTIVE
HEPATITIS B SURFACE ANTIGEN: NONREACTIVE
HEPATITIS C ANTIBODY: NONREACTIVE
IMMATURE GRANULOCYTES: 0 %
LYMPHOCYTES # BLD: 27 % (ref 24–43)
MCH RBC QN AUTO: 29.5 PG (ref 25.2–33.5)
MCHC RBC AUTO-ENTMCNC: 30.8 G/DL (ref 28.4–34.8)
MCV RBC AUTO: 95.7 FL (ref 82.6–102.9)
MONOCYTES # BLD: 13 % (ref 3–12)
NRBC AUTOMATED: 0 PER 100 WBC
PDW BLD-RTO: 13 % (ref 11.8–14.4)
PLATELET # BLD: 263 K/UL (ref 138–453)
PLATELET ESTIMATE: ABNORMAL
PMV BLD AUTO: 9.9 FL (ref 8.1–13.5)
POTASSIUM SERPL-SCNC: 4.6 MMOL/L (ref 3.7–5.3)
RBC # BLD: 2.78 M/UL (ref 4.21–5.77)
RBC # BLD: ABNORMAL 10*6/UL
SEG NEUTROPHILS: 56 % (ref 36–65)
SEGMENTED NEUTROPHILS ABSOLUTE COUNT: 3.27 K/UL (ref 1.5–8.1)
SODIUM BLD-SCNC: 137 MMOL/L (ref 135–144)
SODIUM,UR: 97 MMOL/L
UREA NITROGEN, UR: 184 MG/DL
WBC # BLD: 5.8 K/UL (ref 3.5–11.3)
WBC # BLD: ABNORMAL 10*3/UL

## 2021-06-27 PROCEDURE — 36415 COLL VENOUS BLD VENIPUNCTURE: CPT

## 2021-06-27 PROCEDURE — 80048 BASIC METABOLIC PNL TOTAL CA: CPT

## 2021-06-27 PROCEDURE — 87205 SMEAR GRAM STAIN: CPT

## 2021-06-27 PROCEDURE — 80074 ACUTE HEPATITIS PANEL: CPT

## 2021-06-27 PROCEDURE — 6370000000 HC RX 637 (ALT 250 FOR IP): Performed by: STUDENT IN AN ORGANIZED HEALTH CARE EDUCATION/TRAINING PROGRAM

## 2021-06-27 PROCEDURE — 84165 PROTEIN E-PHORESIS SERUM: CPT

## 2021-06-27 PROCEDURE — 2580000003 HC RX 258: Performed by: STUDENT IN AN ORGANIZED HEALTH CARE EDUCATION/TRAINING PROGRAM

## 2021-06-27 PROCEDURE — 84156 ASSAY OF PROTEIN URINE: CPT

## 2021-06-27 PROCEDURE — 82570 ASSAY OF URINE CREATININE: CPT

## 2021-06-27 PROCEDURE — 86317 IMMUNOASSAY INFECTIOUS AGENT: CPT

## 2021-06-27 PROCEDURE — 86160 COMPLEMENT ANTIGEN: CPT

## 2021-06-27 PROCEDURE — 84166 PROTEIN E-PHORESIS/URINE/CSF: CPT

## 2021-06-27 PROCEDURE — 86225 DNA ANTIBODY NATIVE: CPT

## 2021-06-27 PROCEDURE — 84300 ASSAY OF URINE SODIUM: CPT

## 2021-06-27 PROCEDURE — 85025 COMPLETE CBC W/AUTO DIFF WBC: CPT

## 2021-06-27 PROCEDURE — 99232 SBSQ HOSP IP/OBS MODERATE 35: CPT | Performed by: FAMILY MEDICINE

## 2021-06-27 PROCEDURE — 84155 ASSAY OF PROTEIN SERUM: CPT

## 2021-06-27 PROCEDURE — 84540 ASSAY OF URINE/UREA-N: CPT

## 2021-06-27 PROCEDURE — 6370000000 HC RX 637 (ALT 250 FOR IP): Performed by: INTERNAL MEDICINE

## 2021-06-27 PROCEDURE — 99222 1ST HOSP IP/OBS MODERATE 55: CPT | Performed by: INTERNAL MEDICINE

## 2021-06-27 PROCEDURE — 86038 ANTINUCLEAR ANTIBODIES: CPT

## 2021-06-27 PROCEDURE — 6360000002 HC RX W HCPCS: Performed by: STUDENT IN AN ORGANIZED HEALTH CARE EDUCATION/TRAINING PROGRAM

## 2021-06-27 PROCEDURE — 83883 ASSAY NEPHELOMETRY NOT SPEC: CPT

## 2021-06-27 PROCEDURE — 2580000003 HC RX 258: Performed by: NURSE PRACTITIONER

## 2021-06-27 PROCEDURE — 82947 ASSAY GLUCOSE BLOOD QUANT: CPT

## 2021-06-27 PROCEDURE — 1200000000 HC SEMI PRIVATE

## 2021-06-27 PROCEDURE — 97161 PT EVAL LOW COMPLEX 20 MIN: CPT

## 2021-06-27 RX ORDER — SODIUM CHLORIDE 9 MG/ML
INJECTION, SOLUTION INTRAVENOUS CONTINUOUS
Status: DISCONTINUED | OUTPATIENT
Start: 2021-06-27 | End: 2021-06-27

## 2021-06-27 RX ORDER — SODIUM BICARBONATE 650 MG/1
1300 TABLET ORAL 2 TIMES DAILY
Status: DISCONTINUED | OUTPATIENT
Start: 2021-06-27 | End: 2021-06-30

## 2021-06-27 RX ORDER — PREDNISONE 20 MG/1
40 TABLET ORAL DAILY
Status: COMPLETED | OUTPATIENT
Start: 2021-06-27 | End: 2021-07-01

## 2021-06-27 RX ORDER — HYDRALAZINE HYDROCHLORIDE 25 MG/1
25 TABLET, FILM COATED ORAL EVERY 8 HOURS SCHEDULED
Status: DISCONTINUED | OUTPATIENT
Start: 2021-06-27 | End: 2021-07-01

## 2021-06-27 RX ORDER — PANTOPRAZOLE SODIUM 40 MG/1
40 TABLET, DELAYED RELEASE ORAL
Status: DISCONTINUED | OUTPATIENT
Start: 2021-06-27 | End: 2021-07-01 | Stop reason: HOSPADM

## 2021-06-27 RX ADMIN — HEPARIN SODIUM 5000 UNITS: 5000 INJECTION INTRAVENOUS; SUBCUTANEOUS at 21:11

## 2021-06-27 RX ADMIN — HYDRALAZINE HYDROCHLORIDE 25 MG: 25 TABLET, FILM COATED ORAL at 09:08

## 2021-06-27 RX ADMIN — PANTOPRAZOLE SODIUM 40 MG: 40 TABLET, DELAYED RELEASE ORAL at 09:08

## 2021-06-27 RX ADMIN — SODIUM CHLORIDE: 9 INJECTION, SOLUTION INTRAVENOUS at 10:07

## 2021-06-27 RX ADMIN — SODIUM BICARBONATE 1300 MG: 648 TABLET ORAL at 21:10

## 2021-06-27 RX ADMIN — ATORVASTATIN CALCIUM 20 MG: 20 TABLET, FILM COATED ORAL at 08:47

## 2021-06-27 RX ADMIN — METOPROLOL TARTRATE 50 MG: 50 TABLET, FILM COATED ORAL at 08:47

## 2021-06-27 RX ADMIN — ACETAMINOPHEN 650 MG: 325 TABLET ORAL at 13:41

## 2021-06-27 RX ADMIN — INSULIN LISPRO 3 UNITS: 100 INJECTION, SOLUTION INTRAVENOUS; SUBCUTANEOUS at 21:11

## 2021-06-27 RX ADMIN — AMLODIPINE BESYLATE 10 MG: 10 TABLET ORAL at 08:48

## 2021-06-27 RX ADMIN — GABAPENTIN 100 MG: 100 CAPSULE ORAL at 08:47

## 2021-06-27 RX ADMIN — BENZONATATE 100 MG: 100 CAPSULE ORAL at 21:19

## 2021-06-27 RX ADMIN — HEPARIN SODIUM 5000 UNITS: 5000 INJECTION INTRAVENOUS; SUBCUTANEOUS at 05:36

## 2021-06-27 RX ADMIN — HEPARIN SODIUM 5000 UNITS: 5000 INJECTION INTRAVENOUS; SUBCUTANEOUS at 13:44

## 2021-06-27 RX ADMIN — SODIUM CHLORIDE, PRESERVATIVE FREE 10 ML: 5 INJECTION INTRAVENOUS at 08:48

## 2021-06-27 RX ADMIN — ASPIRIN 81 MG: 81 TABLET, CHEWABLE ORAL at 08:47

## 2021-06-27 RX ADMIN — INSULIN LISPRO 3 UNITS: 100 INJECTION, SOLUTION INTRAVENOUS; SUBCUTANEOUS at 11:59

## 2021-06-27 RX ADMIN — OXYBUTYNIN CHLORIDE 10 MG: 10 TABLET, EXTENDED RELEASE ORAL at 08:47

## 2021-06-27 RX ADMIN — GABAPENTIN 100 MG: 100 CAPSULE ORAL at 00:28

## 2021-06-27 RX ADMIN — INSULIN LISPRO 4 UNITS: 100 INJECTION, SOLUTION INTRAVENOUS; SUBCUTANEOUS at 16:56

## 2021-06-27 RX ADMIN — BENZONATATE 100 MG: 100 CAPSULE ORAL at 08:47

## 2021-06-27 RX ADMIN — ISOSORBIDE MONONITRATE 30 MG: 30 TABLET ORAL at 08:47

## 2021-06-27 RX ADMIN — METOPROLOL TARTRATE 50 MG: 50 TABLET, FILM COATED ORAL at 21:10

## 2021-06-27 RX ADMIN — GABAPENTIN 100 MG: 100 CAPSULE ORAL at 13:44

## 2021-06-27 RX ADMIN — INSULIN GLARGINE 15 UNITS: 100 INJECTION, SOLUTION SUBCUTANEOUS at 21:10

## 2021-06-27 RX ADMIN — GABAPENTIN 100 MG: 100 CAPSULE ORAL at 21:10

## 2021-06-27 RX ADMIN — PREDNISONE 40 MG: 20 TABLET ORAL at 09:08

## 2021-06-27 ASSESSMENT — PAIN DESCRIPTION - PAIN TYPE: TYPE: ACUTE PAIN

## 2021-06-27 ASSESSMENT — ENCOUNTER SYMPTOMS
DIARRHEA: 0
BACK PAIN: 0
COUGH: 0
SINUS PRESSURE: 0
BLOOD IN STOOL: 0
COLOR CHANGE: 0
CONSTIPATION: 0
WHEEZING: 0
ABDOMINAL PAIN: 0
NAUSEA: 0
SINUS PAIN: 0
VOMITING: 0
SHORTNESS OF BREATH: 0

## 2021-06-27 ASSESSMENT — PAIN SCALES - GENERAL
PAINLEVEL_OUTOF10: 8
PAINLEVEL_OUTOF10: 7
PAINLEVEL_OUTOF10: 10
PAINLEVEL_OUTOF10: 9

## 2021-06-27 ASSESSMENT — PAIN DESCRIPTION - FREQUENCY: FREQUENCY: CONTINUOUS

## 2021-06-27 ASSESSMENT — PAIN DESCRIPTION - ONSET: ONSET: ON-GOING

## 2021-06-27 ASSESSMENT — PAIN - FUNCTIONAL ASSESSMENT: PAIN_FUNCTIONAL_ASSESSMENT: PREVENTS OR INTERFERES SOME ACTIVE ACTIVITIES AND ADLS

## 2021-06-27 ASSESSMENT — PAIN DESCRIPTION - ORIENTATION: ORIENTATION: LEFT

## 2021-06-27 ASSESSMENT — PAIN DESCRIPTION - DESCRIPTORS: DESCRIPTORS: ACHING;SHARP

## 2021-06-27 ASSESSMENT — PAIN DESCRIPTION - PROGRESSION: CLINICAL_PROGRESSION: GRADUALLY IMPROVING

## 2021-06-27 ASSESSMENT — PAIN DESCRIPTION - LOCATION: LOCATION: ANKLE;KNEE

## 2021-06-27 NOTE — PLAN OF CARE
Problem: Skin Integrity:  Goal: Will show no infection signs and symptoms  Description: Will show no infection signs and symptoms  6/27/2021 1701 by Romina Booker RN  Outcome: Ongoing  6/27/2021 0404 by Irene Salas RN  Outcome: Ongoing  Goal: Absence of new skin breakdown  Description: Absence of new skin breakdown  6/27/2021 1701 by Romina Booker RN  Outcome: Ongoing  6/27/2021 0404 by Irene Salas RN  Outcome: Ongoing     Problem: Falls - Risk of:  Goal: Will remain free from falls  Description: Will remain free from falls  6/27/2021 1701 by Romina Booker RN  Outcome: Ongoing  6/27/2021 0404 by Irene Salas RN  Outcome: Ongoing  Goal: Absence of physical injury  Description: Absence of physical injury  6/27/2021 1701 by Romina Booker RN  Outcome: Ongoing  6/27/2021 0404 by Irene Salas RN  Outcome: Ongoing     Problem: Pain:  Description: Pain management should include both nonpharmacologic and pharmacologic interventions.   Goal: Pain level will decrease  Description: Pain level will decrease  Outcome: Ongoing  Goal: Control of acute pain  Description: Control of acute pain  Outcome: Ongoing  Goal: Control of chronic pain  Description: Control of chronic pain  Outcome: Ongoing     Problem: Musculor/Skeletal Functional Status  Goal: Highest potential functional level  Outcome: Ongoing  Goal: Absence of falls  Outcome: Ongoing

## 2021-06-27 NOTE — PROGRESS NOTES
Physical Therapy    Facility/Department: [facility]MED SURG  Initial Assessment    NAME: Virginia Brothers  : 1960  MRN: [id number]    Date of Service: 2021  Patient is a 61-year-old male with significant past medical history of GERD, depression, type 2 diabetes, erectile dysfunction, hypertension, kidney stones, diabetic neuropathy who presented to the ED today  with complaints of left leg pain and swelling-year-old with left-sided flank pain. Patient stated the symptoms began about 6 days ago, his leg pain does not get better or worse with anything. Denies any chest pain, shortness of breath, nausea, vomiting, fever, chills, headaches, vision changes. Denies any trauma to his legs. States he has had a ureteral stent placed a couple of years ago due to kidney stones which was then taken out. States he does not follow with a nephrologist.     In the ED, left ankle x-ray shows diffuse soft tissue swelling without any other abnormality identified. Possible gout. Discharge Recommendations:  Further therapy recommended at discharge. PT Equipment Recommendations  Equipment Needed: Yes (pt may need rwalker at DC, depending on pain level/ability to amb; will continue to assess)    Assessment    Pt cooperative, willing to ambulate, very noticeable limp LLE when ambulating without a device; improved with rwalker. Body structures, Functions, Activity limitations: Decreased functional mobility ; Decreased balance; Increased pain;Decreased posture  Prognosis: Good  Decision Making: Low Complexity  PT Education: Goals;PT Role;Plan of Care  REQUIRES PT FOLLOW UP: Yes  Activity Tolerance  Activity Tolerance: Patient limited by pain       Patient Diagnosis(es): The primary encounter diagnosis was JEFFREY (acute kidney injury) ([facility] [address] Diagnoses of Hypocalcemia and Anemia, unspecified type were also pertinent to this visit.      has a past medical history of ADHD (attention deficit hyperactivity disorder), Depression, DM2 (diabetes mellitus, type 2) ([facility] [address] Erectile dysfunction, Gastroesophageal reflux disease, Hyperlipidemia, Hypertension, Kidney stone, Low back pain of thoracolumbar region with sciatica, and Neuropathy. has a past surgical history that includes Tonsillectomy; Cystoscopy (07/20/2018); pr cystoscopy,insert ureteral stent (Bilateral, 7/20/2018); Cystocopy (08/03/2018); pr cysto/uretero/pyeloscopy, calculus tx (Bilateral, 8/3/2018); Upper gastrointestinal endoscopy (N/A, 12/7/2020); and Colonoscopy (N/A, 12/7/2020). Restrictions  Restrictions/Precautions  Restrictions/Precautions: General Precautions, Fall Risk, Up as Tolerated  Required Braces or Orthoses?: No  Vision/Hearing  Vision: Within Functional Limits  Hearing: Within functional limits     Subjective  General  Patient assessed for rehabilitation services?: Yes  Response To Previous Treatment: Not applicable  Family / Caregiver Present: No  Follows Commands: Within Functional Limits  Pain Screening  Patient Currently in Pain: Yes  Pain Assessment  Pain Assessment: 0-10  Pain Level: 8  Patient's Stated Pain Goal: No pain  Pain Type: Acute pain  Pain Location: Ankle;Knee  Pain Orientation: Left  Pain Descriptors: Aching; Sharp  Pain Frequency: Continuous  Pain Onset: On-going  Clinical Progression: Gradually improving (except with mobility)  Functional Pain Assessment: Prevents or interferes some active activities and ADLs  Vital Signs  Patient Currently in Pain: Yes  Pre Treatment Pain Screening  Intervention List: Patient able to continue with treatment    Orientation  Orientation  Overall Orientation Status: Within Normal Limits  Social/Functional History  Social/Functional History  Lives With: Family (niece and her 2 kids (1 and 15 yrs old))  Type of Home: House  Home Layout: Two level, Bed/Bath upstairs  Home Access: Stairs to enter with rails  Entrance Stairs - Number of Steps: 5-6  Entrance Stairs - Rails: Left  ADL Assistance: Independent  Ambulation Assistance: Independent  Transfer Assistance: Independent  Active : No (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, 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; bus)  Mode of Transportation: Bus    Objective     Observation/Palpation  Posture: Fair    AROM RLE (degrees)  RLE AROM: WFL  AROM LLE (degrees)  LLE AROM : WFL  AROM RUE (degrees)  RUE AROM : WFL  AROM LUE (degrees)  LUE AROM : WFL  Strength RLE  Strength RLE: WFL  Strength LLE  Strength LLE: WFL  Strength RUE  Strength RUE: WFL  Strength LUE  Strength LUE: WFL  Tone RLE  RLE Tone: Normotonic  Tone LLE  LLE Tone: Normotonic  Motor Control  Gross Motor?: WFL  Sensation  Overall Sensation Status: WFL  Bed mobility  Rolling to Left: Independent  Rolling to Right: Independent  Supine to Sit: Independent  Sit to Supine: Independent  Scooting: Independent  Transfers  Sit to Stand: Supervision  Stand to sit: Supervision  Bed to Chair: Supervision  Stand Pivot Transfers: Supervision  Ambulation  Ambulation?: Yes  More Ambulation?: Yes  Ambulation 1  Surface: level tile  Device: No Device  Assistance: Supervision  Quality of Gait: notable limp LLE, LLE abducted, keeps knee \"stiff\"; unsteady gait  Gait Deviations: Slow Paola;Staggers  Distance: 10'x1  Ambulation 2  Surface - 2: level tile  Device 2: Rolling Walker  Assistance 2: Supervision  Gait Deviations: Slow Paola; Increased SHEILA; Decreased step length;Decreased step height  Distance: 30'x1  Stairs/Curb  Stairs?: No     Balance  Posture: Fair  Sitting - Static: Good  Sitting - Dynamic: Good  Standing - Static: Good  Standing - Dynamic: [address]  Times per week: 5-6 visits weekly  Times per day: Daily  Current Treatment Recommendations: Strengthening, ROM, Balance Training, Functional Mobility Training, Transfer Training, Endurance Training, Gait Training, Stair training  Safety Devices  Type of devices: Call light within reach, Gait belt, Patient at risk for falls, Left in bed  Restraints  Initially in place:  No    AM-PAC Score  AM-PAC Inpatient Mobility Raw Score : 18 (06/27/21 1438)  AM-PAC Inpatient T-Scale Score : 43.63 (06/27/21 1438)  Mobility Inpatient CMS 0-100% Score: 46.58 (06/27/21 1438)  Mobility Inpatient CMS G-Code Modifier : CK (06/27/21 1438)          Goals  Short term goals  Time Frame for Short term goals: 6 visits  Short term goal 1: independent transfers  Short term goal 2: independent gait with appropriate device vs none x 200'  Short term goal 3: independent stair ambulation x 1 flight with 1 HR  Patient Goals   Patient goals : decrease pain LLE       Therapy Time   Individual Concurrent Group Co-treatment   Time In 1313         Time Out 1335         Minutes 22                 Sarita Montes De Oca, PT

## 2021-06-27 NOTE — CONSULTS
Renal Consult Note    Patient :  Eliz Coto; 61 y.o. MRN# [id number]  Location:  0596/9183-98  Attending:  Barbara Mcknight DO  Admit Date:  6/26/2021   Hospital Day: 1    Reason for Consult:     Asked by Dr Barbara Mcknight DO to see for JEFFREY/Elevated Creatinine. History Obtained From:     patient, electronic medical record    History of Present Illness:     Eliz Coto; 61 y.o. male with past medical history as mentioned below. Known history of chronic kidney disease stage IV approaching 5 secondary to diabetic nephrosclerosis baseline creatinine was in the 2.5-2.8 range in August and December it was in the 4 range and now its been in the 6-6.5 range. Proteinuria has been quantified at about 5 g. Last UPC has been around 5-5.5. Ultrasound shows bilateral echogenic kidneys. Does not follow-up with nephrology. He presented to the emergency department on 6/26 with a 1 week history of worsening left lower extremity pain and swelling, with associated left-sided flank pain. He endorses no trauma to the left lower extremity. He has a history of previous bilateral stone/stent placement approximately 2 years ago but has not had issues since then he had a cystoscopy/ureteral/pyeloscopy at that time as well. Patient states he was in his usual health prior to admission without fever, chest pain, shortness of breath, nausea, vomiting or chills. He states he has never seen a nephrologist in the past.  JEFFREY inducing medications at home include Ditropan and hydrochlorothiazide. He does not report any changes in his urinary symptoms denies urinary frequency/urgency/burning. States he drinks soda, beer and water at home but he is trying to cut back on his alcohol intake and is been supplementing with increased soda at this time  He denies any nausea or vomiting. Does admit to shortness of breath and fatigue on walking.   Tells me he walks a few yards and then has to rest.  Cannot tell me if he has any chest pain or shortness of breath or surgery. No PND orthopnea. Left lower extremity has been more swollen recently. Left ankle area has been painful as described above. Uric acid on presentation was 10. X-ray of the ankle did not show any acute osseous abnormality did show some edema. He is responded well to oral prednisone. UOP has been 2.6 L since admission    IVF started on 6/27 1 L fluid bolus    No Lo    No Vanco use, endorses increased NSAID use over the last week with the increased ankle pain, although when I questioned him he tells me he has not taken NSAIDs in more than 6 months. UA showed trace glucose; 3+ protein    Last kidney imaging on 6/26 demonstrated echogenic renal parenchyma assistant with medical renal disease without evidence of hydro or calculus    No history of recent contrast exposure  No h/o prolonged NSAIDs use in the past,   + bilateral nephrolithiasis in 2018, No recent skin rashes  + left lower extremity arthralgia  No hematuria or pyuria noticed in the recent past.   Doesn't report any reduction in the urine output recently. Non report of any obstructive urinary symptoms (urgency, frequency, weak stream, straining while urination). No h/o recurrent UTIs in the past.  Urine studies 2-5 RBCs, 2-5 WBCs. Hepatitis B and C serologies negative complements level normal.  Past History/Allergies? Social History:     Past Medical History:   Diagnosis Date    ADHD (attention deficit hyperactivity disorder)     Depression     [facility]    Erectile dysfunction     Gastroesophageal reflux disease 8/23/2019    Hyperlipidemia     Hypertension     Kidney stone 7/19/2018    Low back pain of thoracolumbar region with sciatica     Neuropathy        No Known Allergies    Social History     Socioeconomic History    Marital status: Single     Spouse name: Not on file    Number of children: Not on file    Years of education: Not on file  Highest education level: Not on file   Occupational History    Not on file   Tobacco Use    Smoking status: Former Smoker     Packs/day: 0.50     Years: 30.00     Pack years: 15.00     Types: Cigarettes     Quit date: 2018     Years since quittin.6    Smokeless tobacco: Never Used   Substance and Sexual Activity    Alcohol use: Yes     Alcohol/week: 8.0 standard drinks     Types: 8 Cans of beer per week     Comment: drinks \"a few\" beers a day    Drug use: No     Types: Marijuana     Comment:  last use one month    Sexual activity: Never     Partners: Female   Other Topics Concern    Not on file   Social History Narrative    Not on file     Social Determinants of Health     Financial Resource Strain: Unknown    Difficulty of Paying Living Expenses: Patient refused   Food Insecurity: Unknown    Worried About Running Out of Food in the Last Year: Patient refused    Ran Out of Food in the Last Year: Patient refused   Transportation Needs: Unknown    Lack of Transportation (Medical): Patient refused    Lack of Transportation (Non-Medical): Patient refused   Physical Activity:     Days of Exercise per Week:     Minutes of Exercise per Session:    Stress:     Feeling of Stress :    Social Connections:     Frequency of Communication with Friends and Family:     Frequency of Social Gatherings with Friends and Family:     Attends [employer] Services:     Active Member of Clubs or Organizations:     Attends Club or Organization Meetings:     Marital Status:    Intimate Partner Violence:     Fear of Current or Ex-Partner:     Emotionally Abused:     Physically Abused:     Sexually Abused:        Family History:        Family History   Problem Relation Age of Onset    Diabetes Mother     High Blood Pressure Father        Outpatient Medications:     Medications Prior to Admission: amLODIPine (NORVASC) 10 MG tablet, TAKE 1 TABLET BY MOUTH ONCE DAILY.   gabapentin (NEURONTIN) 100 MG capsule, TAKE 1 CAPSULE BY MOUTH 3 TIMES A DAY. acetaminophen (TYLENOL) 325 MG tablet, TAKE 2 TABLET BY MOUTH EVERY 6 HOURS AS NEEDED FOR PAIN  isosorbide mononitrate (IMDUR) 30 MG extended release tablet, Take 1 tablet by mouth daily  ranolazine (RANEXA) 500 MG extended release tablet, Take 1 tablet by mouth 2 times daily  insulin glargine (LANTUS SOLOSTAR) 100 UNIT/ML injection pen, Inject 15 Units into the skin nightly  Insulin Pen Needle 32G X 4 MM MISC, 1 each by Does not apply route daily  bismuth-metronidazole-tetracycline (PLYERA) 140-125-125 MG per capsule, Take 3 capsules by mouth 4 times daily (before meals and nightly) for 10 days  metFORMIN (GLUCOPHAGE) 500 MG tablet, Take 2 tablets by mouth 2 times daily (with meals)  pantoprazole (PROTONIX) 40 MG tablet, Take 1 tablet by mouth 2 times daily (before meals)  atorvastatin (LIPITOR) 20 MG tablet, Take 1 tablet by mouth daily  metoprolol tartrate (LOPRESSOR) 50 MG tablet, Take 1 tablet by mouth 2 times daily  pantoprazole (PROTONIX) 40 MG tablet, Take 1 tablet by mouth 2 times daily (before meals)  metoprolol tartrate (LOPRESSOR) 50 MG tablet, Take 1 tablet by mouth 2 times daily  SENNA-PLUS 8.6-50 MG per tablet, Take 1 tablet by mouth daily  aspirin 81 MG chewable tablet, Take 1 tablet by mouth daily  atorvastatin (LIPITOR) 20 MG tablet, Take 1 tablet by mouth daily  docusate sodium (COLACE) 100 MG capsule, Take 1 capsule by mouth 2 times daily as needed for Constipation  hydrochlorothiazide (HYDRODIURIL) 25 MG tablet, Take 1 tablet by mouth daily  oxybutynin (DITROPAN-XL) 10 MG extended release tablet, Take 1 tablet by mouth daily  blood glucose monitor kit and supplies, Test 2 times a day & as needed for symptoms of irregular blood glucose. Lancets MISC, 1 each by Does not apply route 2 times daily  blood glucose monitor strips, Test 2 times a day & as needed for symptoms of irregular blood glucose.     Current Medications:     Scheduled Meds:    predniSONE 40 mg Oral Daily    [Held by provider] hydrALAZINE  25 mg Oral 3 times per day    pantoprazole  40 mg Oral QAM AC    amLODIPine  10 mg Oral Daily    atorvastatin  20 mg Oral Daily    aspirin  81 mg Oral Daily    gabapentin  100 mg Oral TID    insulin glargine  15 Units Subcutaneous Nightly    isosorbide mononitrate  30 mg Oral Daily    metoprolol tartrate  50 mg Oral BID    [Held by provider] oxybutynin  10 mg Oral Daily    sodium chloride flush  5-40 mL Intravenous 2 times per day    heparin (porcine)  5,000 Units Subcutaneous 3 times per day    insulin lispro  0-6 Units Subcutaneous TID WC    insulin lispro  0-3 Units Subcutaneous Nightly     Continuous Infusions:    sodium chloride      dextrose       PRN Meds:  sodium chloride flush, sodium chloride, ondansetron **OR** ondansetron, polyethylene glycol, acetaminophen **OR** acetaminophen, glucose, dextrose, glucagon (rDNA), dextrose, benzonatate    Review of Systems:     Constitutional: No fever, no chills, no lethargy, no weakness. HEENT:  No headache, otalgia, itchy eyes, nasal discharge or sore throat. Cardiac:  No chest pain, dyspnea, orthopnea or PND. Chest:  No cough, phlegm or wheezing. Abdomen:  No abdominal pain, nausea or vomiting. Neuro:  No focal weakness, abnormal movements or seizure like activity. Skin:   No rashes, no itching. :   No hematuria, no pyuria, no dysuria, improving left flank pain  Extremities:  +1 left lower extremity ankle swelling with pain but full range of motion appreciated  ROS was otherwise negative except as mentioned in the [employer].      Input/Output:       I/O last 3 completed shifts:  In: -   Out: 2600 [Urine:2600]  Patient Vitals for the past 96 hrs (Last 3 readings):   Weight   21 1148 190 lb (86.2 kg)     Vital Signs:   Temperature:  Temp: 98.1 °F (36.7 °C)  TMax:   Temp (24hrs), Av.5 °F (36.9 °C), Min:98.1 °F (36.7 °C), Max:99.1 °F (37.3 °C)    Respirations:  Resp: 17  Pulse:   Pulse: 78  BP:    BP: (!) 146/43  BP Range: Systolic (06ARV), IFEOMA:211 , Min:145 , OPB:297       Diastolic (27AOR), GOT:90, Min:43, Max:83      Physical Examination:     General:  AAO x 3, speaking in full sentences, no accessory muscle use. HEENT: Atraumatic, normocephalic, no throat congestion, moist mucosa. Eyes:   Pupils equal, round and reactive to light, EOMI. Neck:   No JVD, no thyromegaly, no lymphadenopathy. Chest:   Bilateral vesicular breath sounds, no rales or wheezes. Cardiac:  S1 S2 RR, no murmurs, gallops or rubs, JVP not raised. Abdomen: Soft, non-tender, non distended, BS audible. :   No suprapubic or flank tenderness. Neuro:  AAO x 3, No FND. SKIN:  No rashes, good skin turgor. Extremities:  Trace edema, tenderness in the left ankle at the calcaneal area  Acute no clubbing, No cyanosis    Labs:       Recent Labs     06/26/21  1311 06/27/21  0542   WBC 7.8 5.8   RBC 2.55* 2.78*   HGB 7.5* 8.2*   HCT 24.0* 26.6*   MCV 94.1 95.7   MCH 29.4 29.5   MCHC 31.3 30.8   RDW 13.2 13.0    263   MPV 9.5 9.9      BMP:   Recent Labs     06/26/21  1311 06/27/21  0542   * 137   K 4.9 4.6    107   CO2 16* 19*   BUN 63* 56*   CREATININE 6.85* 6.10*   GLUCOSE 193* 120*   CALCIUM 7.0* 7.7*      Phosphorus:   No results for input(s): PHOS in the last 72 hours. Magnesium:  No results for input(s): MG in the last 72 hours.   Albumin:    Recent Labs     06/26/21  1448   LABALBU 3.0*     BNP:    No results found for: BNP  AMBRSOE:    No results found for: AMBROSE  SPEP:  Lab Results   Component Value Date    PROT 6.7 06/26/2021     UPEP:   No results found for: LABPE  C3:   No results found for: C3  C4:   No results found for: C4  MPO ANCA:   No results found for: MPO  PR3 ANCA:   No results found for: PR3  Anti-GBM:   No results found for: GBMABIGG  Hep BsAg:       No results found for: HEPBSAG  Hep C AB:          Lab Results   Component Value Date    HEPCAB NONREACTIVE 08/23/2019 Urinalysis/Chemistries:      Lab Results   Component Value Date    NITRU NEGATIVE 06/26/2021    COLORU YELLOW 06/26/2021    PHUR 5.5 06/26/2021    WBCUA 2 TO 5 06/26/2021    RBCUA 2 TO 5 06/26/2021    MUCUS NOT REPORTED 06/26/2021    TRICHOMONAS NOT REPORTED 06/26/2021    YEAST NOT REPORTED 06/26/2021    BACTERIA NOT REPORTED 06/26/2021    SPECGRAV 1.010 06/26/2021    LEUKOCYTESUR NEGATIVE 06/26/2021    UROBILINOGEN Normal 06/26/2021    BILIRUBINUR NEGATIVE 06/26/2021    BILIRUBINUR NEGATIVE 06/02/2012    GLUCOSEU TRACE 06/26/2021    GLUCOSEU NEGATIVE 06/02/2012    KETUA NEGATIVE 06/26/2021    AMORPHOUS NOT REPORTED 06/26/2021     Urine Sodium:     Lab Results   Component Value Date    SELVIN 49 12/04/2020     Urine Potassium:  No results found for: KUR  Urine Chloride:  No results found for: CLUR  Urine Osmolarity: No results found for: OSMOU  Urine Protein:   No results found for: TPU  Urine Creatinine:     Lab Results   Component Value Date    LABCREA 96.4 12/14/2020     UPC:     Urine Eosinophils:  No components found for: UEOS    Radiology:     XR ANKLE LEFT (MIN 3 VIEWS)    Result Date: 6/26/2021  Diffuse soft tissue swelling without acute osseous abnormality identified. US RETROPERITONEAL COMPLETE    Result Date: 6/26/2021  1. Echogenic renal parenchyma again noted bilaterally, consistent with medical renal disease 2. No evidence of hydronephrosis, or intrarenal calculi   Assessment:     1. CKD stage 5: From progression of underlying kidney disease. Creatinine used to be in the 2.5-2.8 range in August, in the 4 range in December and now is up to 6.5. Proteinuria about 5 g echogenic kidneys bilaterally  2. Suspected diabetic retinopathy. Tells me he has been to an eye doctor and is been told he needs procedure to save his vision which she is not been able to do as present  3.   Nephrotic range proteinuria likely secondary to diabetic nephrosclerosis proteinuria has progressed from 3 to 6 g in the last 6 months  4. Essential hypertension- moderately controlled  5. Diabetes mellitus type 2-A1c 8.5-10 on admission  6. Gouty versus pseudogout versus plantar fasciitis flareup-right ankle, uric acid elevated  7. Early uremia based on symptoms of fatigue on exertion    Plan:   1. Discontinue IV fluids  2. Strict I/O  3. Will Check Renal Ultrasound to r/o element of obstruction and to assess the kidney size/echotexture. 4.  UPC reviewed  5. Keep systolic more than 922  6. Avoid NSAIDs   7. Hold hydrochlorothiazide, hydralazine and oxybutynin  8. Discussed need to start renal replacement therapy. He will let me know his decision in the next day or so      Nutrition   Please ensure that patient is on a renal diet/TF. Avoid nephrotoxic drugs/contrast exposure. Thank you for the consultation. Please do not hesitate to contact us for any further questions/concerns. We will continue to follow along with you. JEOVANNY Bermudez  [facility]      70-year-old -American gentleman with known history of type 2 diabetes diagnosed about 10 years ago with suspected retinopathy. Has followed up with an eye doctor in the past has been told he needs a procedure to save his vision and has been unable to to get it checked. He is never seen a nephrologist before. Decline in renal function has progressed over the last year or so. Creatinine is to be in the 2.5 range in August 2020, thereafter its been in the 4 range in December and now it is up to 6.9.  UPC quantified at 5 g. Ultrasound shows echogenic kidneys bilaterally. He presented with complains of pain in the left ankle area and swelling in the left leg. There is question of using NSAIDs. He admitted to that to the nursing staff. He denied that to me. On presentation x-ray showed presence of soft tissue edema. He has been treated for gout with prednisone. Uric acid was somewhat elevated.   He admits to fatigue on exertion, tells me he has to rest after he walks a few yards. Denies any loss of appetite. No nausea or vomiting. Denies any loss of energy. No sleep disturbances. Vital signs stable. Clinical exam unremarkable no asterixis or clonus  Plan  1. Discontinue IV fluids  2. Discussed with him about initiating renal replacement therapy. He is reluctant but will let me know his decision in the next day or so  3. Agree with prednisone for treatment of right ankle inflammation  4. Discontinue Metformin as he was taking that at home  5. Discontinue hydrochlorothiazide as will be ineffective with the current GFR  6. No NSAIDs  7. Intake of record elevation  8. We will follow    Attending Physician Statement  I have discussed the care of Virginia Brothers, including pertinent history and exam findings with the resident/fellow. I have reviewed the key elements of all parts of the encounter with the resident/fellow. I have seen and examined the patient with the resident/fellow. I agree with the assessment and plan and status of the problem list as documented.       .  Electronically signed by Vee Flores MD on 6/27/2021 at 4:35 PM

## 2021-06-27 NOTE — PROGRESS NOTES
[facility]  Progress Note    Date:   6/27/2021  Patient name:  Katlyn Valdivia  Date of admission:  6/26/2021 12:04 PM  MRN:   [id number]  YOB: 1960    SUBJECTIVE/Last 24 hours update:     Day 2 Hospitalization:     Patient was seen and examined by the bedside. Patient remained afebrile with high blood pressure. Patient says overall the pain is better as compared to yesterday. Labs this morning showed downtrending creatinine today to 6.10, patient uric acid is 10.1, CRP: 14.9, ESR: 31, x-ray of the left ankle shows diffuse soft tissue swelling without any acute process, Doppler scan did not shows any superficial and deep DVTs, renal ultrasound is also unremarkable. REVIEW OF SYSTEMS:      Review of Systems   Constitutional: Negative for chills and fever. HENT: Negative for congestion, sinus pressure and sinus pain. Respiratory: Negative for cough, shortness of breath and wheezing. Cardiovascular: Negative for chest pain, palpitations and leg swelling. Gastrointestinal: Negative for abdominal pain, blood in stool, constipation, diarrhea, nausea and vomiting. Genitourinary: Negative for difficulty urinating, flank pain and hematuria. Musculoskeletal: Negative for arthralgias, back pain and myalgias. Skin: Negative for color change and wound. Neurological: Negative for dizziness, light-headedness and headaches. Psychiatric/Behavioral: Negative for agitation and confusion. PAST MEDICAL HISTORY:      has a past medical history of ADHD (attention deficit hyperactivity disorder), Depression, DM2 (diabetes mellitus, type 2) ([facility] [address] Erectile dysfunction, Gastroesophageal reflux disease, Hyperlipidemia, Hypertension, Kidney stone, Low back pain of thoracolumbar region with sciatica, and Neuropathy. PAST SURGICAL HISTORY:      has a past surgical history that includes Tonsillectomy;  Cystoscopy (07/20/2018); pr cystoscopy,insert ureteral stent (Bilateral, 7/20/2018); Cystocopy (08/03/2018); pr cysto/uretero/pyeloscopy, calculus tx (Bilateral, 8/3/2018); Upper gastrointestinal endoscopy (N/A, 12/7/2020); and Colonoscopy (N/A, 12/7/2020). SOCIALHISTORY:      reports that he quit smoking about 2 years ago. His smoking use included cigarettes. He has a 15.00 pack-year smoking history. He has never used smokeless tobacco. He reports current alcohol use of about 8.0 standard drinks of alcohol per week. He reports that he does not use drugs. FAMILY HISTORY:      family history includes Diabetes in his mother; High Blood Pressure in his father. HOME MEDICATIONS:      Prior to Admission medications    Medication Sig Start Date End Date Taking? Authorizing Provider   amLODIPine (NORVASC) 10 MG tablet TAKE 1 TABLET BY MOUTH ONCE DAILY.  5/25/21   Patrick Angel MD   gabapentin (NEURONTIN) 100 MG capsule TAKE 1 CAPSULE BY MOUTH 3 TIMES A DAY. 3/11/21 9/11/21  Riccardo Jeronimo MD   acetaminophen (TYLENOL) 325 MG tablet TAKE 2 TABLET BY MOUTH EVERY 6 HOURS AS NEEDED FOR PAIN 3/11/21   Romero Carrillo MD   isosorbide mononitrate (IMDUR) 30 MG extended release tablet Take 1 tablet by mouth daily 12/30/20   Lola Gil MD   ranolazine (RANEXA) 500 MG extended release tablet Take 1 tablet by mouth 2 times daily 12/30/20   Lola Gil MD   insulin glargine (LANTUS SOLOSTAR) 100 UNIT/ML injection pen Inject 15 Units into the skin nightly 12/14/20   Lola Gil MD   Insulin Pen Needle 32G X 4 MM MISC 1 each by Does not apply route daily 12/14/20   Lola Gil MD   bismuth-metronidazole-tetracycline [facility]) [phone] MG per capsule Take 3 capsules by mouth 4 times daily (before meals and nightly) for 10 days 12/9/20 12/19/20  Sung Najera MD   metFORMIN (GLUCOPHAGE) 500 MG tablet Take 2 tablets by mouth 2 times daily (with meals) 12/8/20   Riccardo Jeronimo MD   pantoprazole (PROTONIX) 40 MG tablet Take 1 tablet by mouth 2 times daily (before meals) 12/7/20   Carey Meckel, MD   atorvastatin (LIPITOR) 20 MG tablet Take 1 tablet by mouth daily 12/7/20   Carey Meckel, MD   metoprolol tartrate (LOPRESSOR) 50 MG tablet Take 1 tablet by mouth 2 times daily 12/7/20   Riccardo Jeronimo MD   pantoprazole (PROTONIX) 40 MG tablet Take 1 tablet by mouth 2 times daily (before meals) 12/7/20   Carey Meckel, MD   metoprolol tartrate (LOPRESSOR) 50 MG tablet Take 1 tablet by mouth 2 times daily 8/28/20   Fred Reese MD   SENNA-PLUS 8.6-50 MG per tablet Take 1 tablet by mouth daily 1/14/20   Essie Sanchez MD   aspirin 81 MG chewable tablet Take 1 tablet by mouth daily 1/14/20   Essie Sanchez MD   atorvastatin (LIPITOR) 20 MG tablet Take 1 tablet by mouth daily 1/14/20   Essie Sanchez MD   docusate sodium (COLACE) 100 MG capsule Take 1 capsule by mouth 2 times daily as needed for Constipation 1/14/20   Essie Sanchez MD   hydrochlorothiazide (HYDRODIURIL) 25 MG tablet Take 1 tablet by mouth daily 1/14/20 1/8/21  Essie Sanchez MD   oxybutynin (DITROPAN-XL) 10 MG extended release tablet Take 1 tablet by mouth daily 1/14/20 2/13/20  Essie Sanchez MD   blood glucose monitor kit and supplies Test 2 times a day & as needed for symptoms of irregular blood glucose. 1/14/20   Essie Sanchez MD   Lancets MISC 1 each by Does not apply route 2 times daily 1/14/20   Essie Sanchez MD   blood glucose monitor strips Test 2 times a day & as needed for symptoms of irregular blood glucose. 1/14/20   Essie Sanchez MD       ALLERGIES:     Patient has no known allergies.     OBJECTIVE:       Vitals:    06/26/21 1228 06/26/21 1923 06/26/21 2017 06/26/21 2132   BP:  (!) 169/83 (!) 161/73 (!) 163/74   Pulse:    114   Resp: 16   16   Temp:    98.3 °F (36.8 °C)   TempSrc:    Oral   SpO2:   96% 98%   Weight:       Height:             Intake/Output Summary (Last 24 hours) at 6/27/2021 0757  Last data filed at 6/27/2021 0536  Gross per 24 hour   Intake --   Output 2600 ml Net -2600 ml       PHYSICAL EXAM:    Physical Exam  Vitals and nursing note reviewed. Constitutional:       Appearance: Normal appearance. HENT:      Head: Normocephalic and atraumatic. Mouth/Throat:      Mouth: Mucous membranes are moist.   Eyes:      Conjunctiva/sclera: Conjunctivae normal.   Cardiovascular:      Rate and Rhythm: Normal rate and regular rhythm. Pulmonary:      Effort: Pulmonary effort is normal.      Breath sounds: Normal breath sounds. Abdominal:      General: Bowel sounds are normal. There is no distension. Palpations: Abdomen is soft. There is no mass. Tenderness: There is no abdominal tenderness. There is no guarding. Musculoskeletal:      Right lower leg: No edema. Left lower leg: No edema. Comments: Minor swelling around left ankle, without any redness and laceration   Neurological:      General: No focal deficit present. Mental Status: He is alert and oriented to person, place, and time. Psychiatric:         Mood and Affect: Mood normal.         Behavior: Behavior normal.         ASSESSMENT:      Principal Problem:    Acute kidney injury superimposed on CKD ([address]  Active Problems:    DM[facility]di[facility] mellitus[facility] type 2[facility]    Essential hypertension    BMI 31.0-31.9,adult  Resolved Problems:    * No resolved hospital problems. *      PLAN:     1. Left leg pain and swelling likely secondary to gout likely secondary to HCTZ:  - WBC: WNL, x-ray shows diffuse soft tissue swelling without any acute process, Doppler scan did not show any superficial or deep DVTs. - Uric acid: 10.2  - HCTZ held, will start patient on prednisone. 2.  JEFFREY on CKD stage III, nondialysis:  - Cr: 6.85 ---> 6.10 (Baseline 2.9-4.2)  - IVF @ 125 cc/hr, received 1 L bolus  - Renal US unremarkable. - Will work up for paraproteinemia, Nephrology on board, appreciate their recs.     3. DM2:   - Lantus 15 units at night  - LDISS with hypoglycemic protocol in.    4. Essential Hypertension:  -Continue Norvasc 10 mg p.o. daily, Lopressor 50 mg twice daily, HCTZ held due to concerns of elevated creatinine with gout, Ranexa 500 mg twice daily held due to creatinine clearance. -DVT prophylaxis: Heparin 5000 unit 3 times daily. -GI prophylaxis: Protonix 40 mg.        Plan will be discussed with the attending, Dr. Leonora Todd MD  Family Medicine Resident  6/27/2021 7:58 AM

## 2021-06-28 ENCOUNTER — APPOINTMENT (OUTPATIENT)
Dept: GENERAL RADIOLOGY | Age: 61
DRG: 469 | End: 2021-06-28
Payer: MEDICAID

## 2021-06-28 ENCOUNTER — APPOINTMENT (OUTPATIENT)
Dept: INTERVENTIONAL RADIOLOGY/VASCULAR | Age: 61
DRG: 469 | End: 2021-06-28
Payer: MEDICAID

## 2021-06-28 LAB
ABSOLUTE EOS #: <0.03 K/UL (ref 0–0.44)
ABSOLUTE IMMATURE GRANULOCYTE: 0.04 K/UL (ref 0–0.3)
ABSOLUTE LYMPH #: 1.61 K/UL (ref 1.1–3.7)
ABSOLUTE MONO #: 0.83 K/UL (ref 0.1–1.2)
ABSOLUTE RETIC #: 0.04 M/UL (ref 0.03–0.08)
ANION GAP SERPL CALCULATED.3IONS-SCNC: 12 MMOL/L (ref 9–17)
BASOPHILS # BLD: 0 % (ref 0–2)
BASOPHILS ABSOLUTE: <0.03 K/UL (ref 0–0.2)
BUN BLDV-MCNC: 57 MG/DL (ref 8–23)
BUN/CREAT BLD: ABNORMAL (ref 9–20)
CALCIUM SERPL-MCNC: 7.4 MG/DL (ref 8.6–10.4)
CHLORIDE BLD-SCNC: 103 MMOL/L (ref 98–107)
CO2: 16 MMOL/L (ref 20–31)
CREAT SERPL-MCNC: 5.83 MG/DL (ref 0.7–1.2)
DIFFERENTIAL TYPE: ABNORMAL
EOSINOPHILS RELATIVE PERCENT: 0 % (ref 1–4)
FERRITIN: 312 UG/L (ref 30–400)
GFR AFRICAN AMERICAN: 12 ML/MIN
GFR NON-AFRICAN AMERICAN: 10 ML/MIN
GFR SERPL CREATININE-BSD FRML MDRD: ABNORMAL ML/MIN/{1.73_M2}
GFR SERPL CREATININE-BSD FRML MDRD: ABNORMAL ML/MIN/{1.73_M2}
GLUCOSE BLD-MCNC: 149 MG/DL (ref 75–110)
GLUCOSE BLD-MCNC: 300 MG/DL (ref 75–110)
GLUCOSE BLD-MCNC: 304 MG/DL (ref 70–99)
GLUCOSE BLD-MCNC: 428 MG/DL (ref 75–110)
HCT VFR BLD CALC: 24.3 % (ref 40.7–50.3)
HEMOGLOBIN: 7.6 G/DL (ref 13–17)
IMMATURE GRANULOCYTES: 1 %
IMMATURE RETIC FRACT: 19.7 % (ref 2.7–18.3)
INR BLD: 0.9
IRON SATURATION: 23 % (ref 20–55)
IRON: 39 UG/DL (ref 59–158)
LYMPHOCYTES # BLD: 22 % (ref 24–43)
MCH RBC QN AUTO: 29.3 PG (ref 25.2–33.5)
MCHC RBC AUTO-ENTMCNC: 31.3 G/DL (ref 28.4–34.8)
MCV RBC AUTO: 93.8 FL (ref 82.6–102.9)
MONOCYTES # BLD: 11 % (ref 3–12)
NRBC AUTOMATED: 0 PER 100 WBC
PDW BLD-RTO: 12.9 % (ref 11.8–14.4)
PLATELET # BLD: 266 K/UL (ref 138–453)
PLATELET ESTIMATE: ABNORMAL
PMV BLD AUTO: 9.9 FL (ref 8.1–13.5)
POTASSIUM SERPL-SCNC: 4.3 MMOL/L (ref 3.7–5.3)
PROTHROMBIN TIME: 9.8 SEC (ref 9.1–12.3)
PTH INTACT: 283.6 PG/ML (ref 15–65)
RBC # BLD: 2.59 M/UL (ref 4.21–5.77)
RBC # BLD: ABNORMAL 10*6/UL
RETIC %: 1.4 % (ref 0.5–1.9)
RETIC HEMOGLOBIN: 32.8 PG (ref 28.2–35.7)
SEG NEUTROPHILS: 66 % (ref 36–65)
SEGMENTED NEUTROPHILS ABSOLUTE COUNT: 4.9 K/UL (ref 1.5–8.1)
SODIUM BLD-SCNC: 131 MMOL/L (ref 135–144)
TOTAL IRON BINDING CAPACITY: 170 UG/DL (ref 250–450)
UNSATURATED IRON BINDING CAPACITY: 131 UG/DL (ref 112–347)
WBC # BLD: 7.4 K/UL (ref 3.5–11.3)
WBC # BLD: ABNORMAL 10*3/UL

## 2021-06-28 PROCEDURE — 86317 IMMUNOASSAY INFECTIOUS AGENT: CPT

## 2021-06-28 PROCEDURE — 97530 THERAPEUTIC ACTIVITIES: CPT

## 2021-06-28 PROCEDURE — 85045 AUTOMATED RETICULOCYTE COUNT: CPT

## 2021-06-28 PROCEDURE — 6360000002 HC RX W HCPCS: Performed by: STUDENT IN AN ORGANIZED HEALTH CARE EDUCATION/TRAINING PROGRAM

## 2021-06-28 PROCEDURE — 2580000003 HC RX 258: Performed by: RADIOLOGY

## 2021-06-28 PROCEDURE — 6360000002 HC RX W HCPCS: Performed by: RADIOLOGY

## 2021-06-28 PROCEDURE — 85025 COMPLETE CBC W/AUTO DIFF WBC: CPT

## 2021-06-28 PROCEDURE — 6370000000 HC RX 637 (ALT 250 FOR IP): Performed by: STUDENT IN AN ORGANIZED HEALTH CARE EDUCATION/TRAINING PROGRAM

## 2021-06-28 PROCEDURE — 1200000000 HC SEMI PRIVATE

## 2021-06-28 PROCEDURE — 76937 US GUIDE VASCULAR ACCESS: CPT | Performed by: RADIOLOGY

## 2021-06-28 PROCEDURE — 36556 INSERT NON-TUNNEL CV CATH: CPT

## 2021-06-28 PROCEDURE — C1769 GUIDE WIRE: HCPCS

## 2021-06-28 PROCEDURE — 90935 HEMODIALYSIS ONE EVALUATION: CPT

## 2021-06-28 PROCEDURE — 82947 ASSAY GLUCOSE BLOOD QUANT: CPT

## 2021-06-28 PROCEDURE — 77001 FLUOROGUIDE FOR VEIN DEVICE: CPT | Performed by: RADIOLOGY

## 2021-06-28 PROCEDURE — 82728 ASSAY OF FERRITIN: CPT

## 2021-06-28 PROCEDURE — 71045 X-RAY EXAM CHEST 1 VIEW: CPT

## 2021-06-28 PROCEDURE — 80048 BASIC METABOLIC PNL TOTAL CA: CPT

## 2021-06-28 PROCEDURE — 90935 HEMODIALYSIS ONE EVALUATION: CPT | Performed by: INTERNAL MEDICINE

## 2021-06-28 PROCEDURE — 2709999900 HC NON-CHARGEABLE SUPPLY

## 2021-06-28 PROCEDURE — 85610 PROTHROMBIN TIME: CPT

## 2021-06-28 PROCEDURE — 83970 ASSAY OF PARATHORMONE: CPT

## 2021-06-28 PROCEDURE — C1894 INTRO/SHEATH, NON-LASER: HCPCS

## 2021-06-28 PROCEDURE — 99232 SBSQ HOSP IP/OBS MODERATE 35: CPT | Performed by: FAMILY MEDICINE

## 2021-06-28 PROCEDURE — C1750 CATH, HEMODIALYSIS,LONG-TERM: HCPCS

## 2021-06-28 PROCEDURE — 83540 ASSAY OF IRON: CPT

## 2021-06-28 PROCEDURE — 2580000003 HC RX 258: Performed by: STUDENT IN AN ORGANIZED HEALTH CARE EDUCATION/TRAINING PROGRAM

## 2021-06-28 PROCEDURE — 36415 COLL VENOUS BLD VENIPUNCTURE: CPT

## 2021-06-28 PROCEDURE — 36558 INSERT TUNNELED CV CATH: CPT | Performed by: RADIOLOGY

## 2021-06-28 PROCEDURE — 6370000000 HC RX 637 (ALT 250 FOR IP): Performed by: INTERNAL MEDICINE

## 2021-06-28 PROCEDURE — 6360000002 HC RX W HCPCS: Performed by: INTERNAL MEDICINE

## 2021-06-28 PROCEDURE — 83550 IRON BINDING TEST: CPT

## 2021-06-28 RX ORDER — DIPHENHYDRAMINE HCL 25 MG
25 TABLET ORAL NIGHTLY PRN
Status: DISCONTINUED | OUTPATIENT
Start: 2021-06-28 | End: 2021-07-01 | Stop reason: HOSPADM

## 2021-06-28 RX ORDER — GUAIFENESIN DEXTROMETHORPHAN HYDROBROMIDE ORAL SOLUTION 10; 100 MG/5ML; MG/5ML
10 SOLUTION ORAL EVERY 6 HOURS PRN
Status: DISCONTINUED | OUTPATIENT
Start: 2021-06-28 | End: 2021-07-01 | Stop reason: HOSPADM

## 2021-06-28 RX ORDER — HEPARIN SODIUM 1000 [USP'U]/ML
1600 INJECTION, SOLUTION INTRAVENOUS; SUBCUTANEOUS PRN
Status: DISCONTINUED | OUTPATIENT
Start: 2021-06-28 | End: 2021-07-01 | Stop reason: HOSPADM

## 2021-06-28 RX ORDER — DIPHENHYDRAMINE HYDROCHLORIDE 50 MG/ML
25 INJECTION INTRAMUSCULAR; INTRAVENOUS NIGHTLY PRN
Status: DISCONTINUED | OUTPATIENT
Start: 2021-06-28 | End: 2021-06-28

## 2021-06-28 RX ORDER — HEPARIN SODIUM 5000 [USP'U]/ML
INJECTION, SOLUTION INTRAVENOUS; SUBCUTANEOUS
Status: COMPLETED | OUTPATIENT
Start: 2021-06-28 | End: 2021-06-28

## 2021-06-28 RX ADMIN — INSULIN GLARGINE 15 UNITS: 100 INJECTION, SOLUTION SUBCUTANEOUS at 20:33

## 2021-06-28 RX ADMIN — HEPARIN SODIUM 1.6 ML: 5000 INJECTION INTRAVENOUS; SUBCUTANEOUS at 15:00

## 2021-06-28 RX ADMIN — METOPROLOL TARTRATE 50 MG: 50 TABLET, FILM COATED ORAL at 22:18

## 2021-06-28 RX ADMIN — GUAIFENESIN DEXTROMETHORPHAN HYDROBROMIDE ORAL SOLUTION 10 ML: 200; 20 SOLUTION ORAL at 06:30

## 2021-06-28 RX ADMIN — EPOETIN ALFA-EPBX 8000 UNITS: 4000 INJECTION, SOLUTION INTRAVENOUS; SUBCUTANEOUS at 17:44

## 2021-06-28 RX ADMIN — INSULIN LISPRO 1 UNITS: 100 INJECTION, SOLUTION INTRAVENOUS; SUBCUTANEOUS at 18:19

## 2021-06-28 RX ADMIN — INSULIN LISPRO 6 UNITS: 100 INJECTION, SOLUTION INTRAVENOUS; SUBCUTANEOUS at 20:33

## 2021-06-28 RX ADMIN — SODIUM CHLORIDE, PRESERVATIVE FREE 10 ML: 5 INJECTION INTRAVENOUS at 08:53

## 2021-06-28 RX ADMIN — INSULIN LISPRO 4 UNITS: 100 INJECTION, SOLUTION INTRAVENOUS; SUBCUTANEOUS at 08:53

## 2021-06-28 RX ADMIN — ATORVASTATIN CALCIUM 20 MG: 20 TABLET, FILM COATED ORAL at 08:53

## 2021-06-28 RX ADMIN — PREDNISONE 40 MG: 20 TABLET ORAL at 08:52

## 2021-06-28 RX ADMIN — GABAPENTIN 100 MG: 100 CAPSULE ORAL at 19:33

## 2021-06-28 RX ADMIN — SODIUM BICARBONATE 1300 MG: 648 TABLET ORAL at 08:52

## 2021-06-28 RX ADMIN — METOPROLOL TARTRATE 50 MG: 50 TABLET, FILM COATED ORAL at 08:53

## 2021-06-28 RX ADMIN — HEPARIN SODIUM 1600 UNITS: 1000 INJECTION INTRAVENOUS; SUBCUTANEOUS at 17:55

## 2021-06-28 RX ADMIN — HEPARIN SODIUM 5000 UNITS: 5000 INJECTION INTRAVENOUS; SUBCUTANEOUS at 05:47

## 2021-06-28 RX ADMIN — AMLODIPINE BESYLATE 10 MG: 10 TABLET ORAL at 08:53

## 2021-06-28 RX ADMIN — GABAPENTIN 100 MG: 100 CAPSULE ORAL at 08:53

## 2021-06-28 RX ADMIN — HEPARIN SODIUM 1600 UNITS: 1000 INJECTION INTRAVENOUS; SUBCUTANEOUS at 17:54

## 2021-06-28 RX ADMIN — PANTOPRAZOLE SODIUM 40 MG: 40 TABLET, DELAYED RELEASE ORAL at 05:47

## 2021-06-28 RX ADMIN — DEXTROSE MONOHYDRATE 1000 MG: 5 INJECTION INTRAVENOUS at 11:31

## 2021-06-28 RX ADMIN — DIPHENHYDRAMINE HCL 25 MG: 25 TABLET ORAL at 20:32

## 2021-06-28 RX ADMIN — ACETAMINOPHEN 650 MG: 325 TABLET ORAL at 19:32

## 2021-06-28 RX ADMIN — ASPIRIN 81 MG: 81 TABLET, CHEWABLE ORAL at 08:53

## 2021-06-28 RX ADMIN — ISOSORBIDE MONONITRATE 30 MG: 30 TABLET ORAL at 08:53

## 2021-06-28 ASSESSMENT — PAIN SCALES - GENERAL
PAINLEVEL_OUTOF10: 0
PAINLEVEL_OUTOF10: 0
PAINLEVEL_OUTOF10: 9
PAINLEVEL_OUTOF10: 7
PAINLEVEL_OUTOF10: 0

## 2021-06-28 NOTE — CARE COORDINATION
[facility] Flow/Interdisciplinary Rounds Progress Note    Quality Flow Rounds held on June 28, 2021 at [address] Attending:  Bedside Nurse,  and Nursing Unit Leadership    Barriers to Discharge: KM cabrera    Anticipated Discharge Date:  Expected Discharge Date: 06/27/21    Anticipated Discharge Disposition:  Home follow for needs   Readmission Risk              Risk of Unplanned Readmission:  21           Discussed patient goal for the day, patient clinical progression, and barriers to discharge. The following Goal(s- follow afterIR needs Dialysis set up SW on the case.  - the Day/Commitment(s) have been identified:        Katrin Stephenson RN  June 28, 2021

## 2021-06-28 NOTE — PROGRESS NOTES
Physical Therapy  Facility/Department: [facility] RENAL//MED SURG  Daily Treatment Note  NAME: Dawood Prajapati  : 1960  MRN: [id number]    Date of Service: 2021    Discharge Recommendations: No further therapy required at discharge. PT Equipment Recommendations  Equipment Needed: No (pt safe to ambulate without AD)    Assessment   Assessment: Pt ambulated 300ft independently without AD, ascend/descended 1 flight of stairs independently, and completed bed mobility and transfers independently. Pt has no acute PT needs at this time. D/c PT at this date. Prognosis: Good  Decision Making: Medium Complexity  PT Education: Goals; General Safety;PT Role;Plan of Care;Transfer Training;Gait Training;Functional Mobility Training  REQUIRES PT FOLLOW UP: No  Activity Tolerance  Activity Tolerance: Patient Tolerated treatment well     Patient Diagnosis(es): The primary encounter diagnosis was JEFFREY (acute kidney injury) ([facility] [address] Diagnoses of Hypocalcemia and Anemia, unspecified type were also pertinent to this visit. has a past medical history of ADHD (attention deficit hyperactivity disorder), Depression, DM2 (diabetes mellitus, type 2) ([address] Erectile dysfunction, Gastroesophageal reflux disease, Hyperlipidemia, Hypertension, Kidney stone, Low back pain of thoracolumbar region with sciatica, and Neuropathy. has a past surgical history that includes Tonsillectomy; Cystoscopy (2018); pr cystoscopy,insert ureteral stent (Bilateral, 2018); Cystocopy (2018); pr cysto/uretero/pyeloscopy, calculus tx (Bilateral, 8/3/2018); Upper gastrointestinal endoscopy (N/A, 2020); and Colonoscopy (N/A, 2020). Restrictions  Restrictions/Precautions  Restrictions/Precautions: Up as Tolerated, Fall Risk  Required Braces or Orthoses?: No  Subjective   General  Response To Previous Treatment: Patient with no complaints from previous session.   Family / Caregiver Present: No  Subjective  Subjective: Pt and RN x 200'  Short term goal 3: independent stair ambulation x 1 flight with 1 HR  Patient Goals   Patient goals : decrease pain LLE    Plan    Plan  Times per week: D/C PT  Times per day: Daily  Current Treatment Recommendations: Strengthening, ROM, Balance Training, Functional Mobility Training, Transfer Training, Endurance Training, Gait Training, Stair training  Plan Comment: all goals have been met at this date  Safety Devices  Type of devices: All fall risk precautions in place, Call light within reach, Gait belt, Left in bed, Nurse notified (RN in room upon exit)  Restraints  Initially in place: No     Therapy Time   Individual Concurrent Group Co-treatment   Time In 0850         Time Out 0907         Minutes 17         Timed Code Treatment Minutes: [address] performed by Student PT under the supervision of co-signing PT who agrees with all evaluation/treatment and documentation.

## 2021-06-28 NOTE — PROGRESS NOTES
Nephrology Progress Note        Subjective: patient evaluated on dialysis. Pt is stable on dialysis. Access cannulated without problems. No new issues overnite. Stable hemodynamics. This is his first dialysis treatment tolerating it well. Blood flow running at 250 dialysate 500.  2 L of ultrafiltration to be achieved. Blood pressure stable. Outpatient spot being arranged at the [facility]. Referrals have been sent. Objective:  CURRENT TEMPERATURE:  Temp: 97.7 °F (36.5 °C)  MAXIMUM TEMPERATURE OVER 24HRS:  Temp (24hrs), Av.9 °F (36.6 °C), Min:97.7 °F (36.5 °C), Max:98 °F (36.7 °C)    CURRENT RESPIRATORY RATE:  Resp: 17  CURRENT PULSE:  Pulse: 85  CURRENT BLOOD PRESSURE:  BP: (!) 165/90  24HR BLOOD PRESSURE RANGE:  Systolic (00NQW), CRK:189 , Min:131 , WVQ:701   ; Diastolic (07ELF), TWB:65, Min:69, Max:96    24HR INTAKE/OUTPUT:      Intake/Output Summary (Last 24 hours) at 2021 1657  Last data filed at 2021 1118  Gross per 24 hour   Intake 320 ml   Output 700 ml   Net -380 ml     Patient Vitals for the past 96 hrs (Last 3 readings):   Weight   21 1525 220 lb 7.4 oz (100 kg)   21 1148 190 lb (86.2 kg)         Physical Exam:  General appearance:Awake, alert, in no acute distress  Skin: warm and dry, no rash or erythema  Eyes: conjunctivae normal and sclera anicteric  ENT:no thrush no pharyngeal congestion   Neck:  No JVD, No Thyromegaly  Pulmonary: clear to auscultation and no wheezing or rhonchi   Cardiovascular: normal S1 and S2. NO rubs and NO murmur.  No S3 OR S4   Abdomen: soft nontender, bowel sounds present, no organomegaly,  no ascites   Extremities: no cyanosis, clubbing or edema     Access:  previous permacath    Current Medications:    dextromethorphan-guaiFENesin (ROBITUSSIN-DM)  MG/5ML liquid 10 mL, Q6H PRN  predniSONE (DELTASONE) tablet 40 mg, Daily  [Held by provider] hydrALAZINE (APRESOLINE) tablet 25 mg, 3 times per day  pantoprazole (PROTONIX) tablet 40 mg, QAM AC  sodium bicarbonate tablet 1,300 mg, BID  amLODIPine (NORVASC) tablet 10 mg, Daily  atorvastatin (LIPITOR) tablet 20 mg, Daily  aspirin chewable tablet 81 mg, Daily  gabapentin (NEURONTIN) capsule 100 mg, TID  insulin glargine (LANTUS) injection vial 15 Units, Nightly  isosorbide mononitrate (IMDUR) extended release tablet 30 mg, Daily  metoprolol tartrate (LOPRESSOR) tablet 50 mg, BID  [Held by provider] oxybutynin (DITROPAN-XL) extended release tablet 10 mg, Daily  sodium chloride flush 0.9 % injection 5-40 mL, 2 times per day  sodium chloride flush 0.9 % injection 5-40 mL, PRN  0.9 % sodium chloride infusion, PRN  heparin (porcine) injection 5,000 Units, 3 times per day  ondansetron (ZOFRAN-ODT) disintegrating tablet 4 mg, Q8H PRN   Or  ondansetron (ZOFRAN) injection 4 mg, Q6H PRN  polyethylene glycol (GLYCOLAX) packet 17 g, Daily PRN  acetaminophen (TYLENOL) tablet 650 mg, Q6H PRN   Or  acetaminophen (TYLENOL) suppository 650 mg, Q6H PRN  glucose (GLUTOSE) 40 % oral gel 15 g, PRN  dextrose 50 % IV solution, PRN  glucagon (rDNA) injection 1 mg, PRN  dextrose 5 % solution, PRN  insulin lispro (HUMALOG) injection vial 0-6 Units, TID WC  insulin lispro (HUMALOG) injection vial 0-3 Units, Nightly  benzonatate (TESSALON) capsule 100 mg, TID PRN      Labs:   CBC:   Recent Labs     06/26/21  1311 06/27/21  0542 06/28/21  0545   WBC 7.8 5.8 7.4   RBC 2.55* 2.78* 2.59*   HGB 7.5* 8.2* 7.6*   HCT 24.0* 26.6* 24.3*    263 266   MPV 9.5 9.9 9.9      BMP:   Recent Labs     06/26/21  1311 06/27/21  0542 06/28/21  0545   * 137 131*   K 4.9 4.6 4.3    107 103   CO2 16* 19* 16*   BUN 63* 56* 57*   CREATININE 6.85* 6.10* 5.83*   GLUCOSE 193* 120* 304*   CALCIUM 7.0* 7.7* 7.4*        Phosphorus:  No results for input(s): PHOS in the last 72 hours. Magnesium: No results for input(s): MG in the last 72 hours.   Albumin:   Recent Labs     06/26/21  1448   LABALBU 3.0*       Dialysis bath: Dialysis Bath  K+ (Potassium): 3  Ca+ (Calcium): 3  Na+ (Sodium): 138  HCO3 (Bicarb): 40    Radiology:  Reviewed as available. Assessment:  1 ESRD new start on dialysis. First treatment today will have to repeat again tomorrow tunnel catheter works well:dialysis bath reviewed with nurse. 2.HTN: Blood pressures stable on the current regimen expected to improve further with further ultrafiltration  3 DM: With diabetic nephrosclerosis proteinuria about 5 to 5.5 g  4 EDEMA : From nephrotic syndrome  5. ANEMIA OF CHRONIC DISEASE: Hemoglobin low iron studies reviewed, Aranesp to be started  6. SECONDARY HYPERPARATHYROIDISM: PTH to be reviewed    Plan:  1. Wt removal and dialysis orders reviewed. 2.  Next dialysis tomorrow, continue Aranesp, check PTH level  3. Cont pt on aranesp and zemplar per protocol, if needed. 4. Follow up labs ordered. 5.  Outpatient spot at the [facility] under our service      Please do not hesitate to call with questions.     Electronically signed by Presley Lo MD on 6/28/2021 at 4:57 PM

## 2021-06-28 NOTE — CARE COORDINATION
SW met with patient to discuss potential need for OP dialysis arrangements. Patient prefers MWF morning chair if possible. Patient confirmed [employer] and would like to follow [facility]. Patient requests referral to [facility]. Patient required multiple prompts during assessment to remain awake but was cooperative. SW will fax referral to [facility]. SW notified RN charge of updated chest x-ray needed.

## 2021-06-28 NOTE — PROGRESS NOTES
Dialysis Time Out  To be done by RN and tech or 2 RNs  Staff Names Albert Figueroa RN, Jessica Franz RN    [x]  Identity of the patient using 2 patient identifiers  [x]  Consent for treatment  [x]  Equipment-proper machine and dialyzer  [x]  B-Hep B status  [x]  Orders- to include bath, blood flow, dialyzer, time and fluid removal  [x]  Access-Correct site and in working order  [x]  Time for patient to ask questions.

## 2021-06-28 NOTE — PLAN OF CARE
Problem: Skin Integrity:  Goal: Will show no infection signs and symptoms  Description: Will show no infection signs and symptoms  6/28/2021 0324 by Sanchez Araujo RN  Outcome: Ongoing     Problem: Skin Integrity:  Goal: Absence of new skin breakdown  Description: Absence of new skin breakdown  6/28/2021 0324 by Sanchez Araujo RN  Outcome: Ongoing     Problem: Falls - Risk of:  Goal: Will remain free from falls  Description: Will remain free from falls  6/28/2021 0324 by Sanchez Araujo RN  Outcome: Ongoing     Problem: Falls - Risk of:  Goal: Absence of physical injury  Description: Absence of physical injury  6/28/2021 0324 by Sanchez Araujo RN  Outcome: Ongoing     Problem: Pain:  Goal: Pain level will decrease  Description: Pain level will decrease  6/28/2021 0324 by Sanchez Araujo RN  Outcome: Ongoing     Problem: Pain:  Goal: Control of acute pain  Description: Control of acute pain  6/28/2021 0324 by Sanchez Araujo RN  Outcome: Ongoing     Problem: Pain:  Goal: Control of chronic pain  Description: Control of chronic pain  6/28/2021 0324 by Sanchez Araujo RN  Outcome: Ongoing     Problem: Musculor/Skeletal Functional Status  Goal: Highest potential functional level  6/28/2021 0324 by Sanchez Araujo RN  Outcome: Ongoing     Problem: Musculor/Skeletal Functional Status  Goal: Absence of falls  6/28/2021 0324 by Sanchez Araujo RN  Outcome: Ongoing

## 2021-06-28 NOTE — PROGRESS NOTES
Start Date End Date Taking? Authorizing Provider   amLODIPine (NORVASC) 10 MG tablet TAKE 1 TABLET BY MOUTH ONCE DAILY.  5/25/21   Patrick Angel MD   gabapentin (NEURONTIN) 100 MG capsule TAKE 1 CAPSULE BY MOUTH 3 TIMES A DAY. 3/11/21 9/11/21  Riccardo Jeronimo MD   acetaminophen (TYLENOL) 325 MG tablet TAKE 2 TABLET BY MOUTH EVERY 6 HOURS AS NEEDED FOR PAIN 3/11/21   Cynthia Concepcion MD   isosorbide mononitrate (IMDUR) 30 MG extended release tablet Take 1 tablet by mouth daily 12/30/20   Sanaz Marcos MD   ranolazine (RANEXA) 500 MG extended release tablet Take 1 tablet by mouth 2 times daily 12/30/20   Sanaz Marcos MD   insulin glargine (LANTUS SOLOSTAR) 100 UNIT/ML injection pen Inject 15 Units into the skin nightly 12/14/20   Sanaz Marcos MD   Insulin Pen Needle 32G X 4 MM MISC 1 each by Does not apply route daily 12/14/20   Sanaz Marcos MD   bismuth-metronidazole-tetracycline REHABILITATION [facility]) [phone] MG per capsule Take 3 capsules by mouth 4 times daily (before meals and nightly) for 10 days 12/9/20 12/19/20  Duyen Whaley MD   metFORMIN (GLUCOPHAGE) 500 MG tablet Take 2 tablets by mouth 2 times daily (with meals) 12/8/20   Cynthia Concepcion MD   pantoprazole (PROTONIX) 40 MG tablet Take 1 tablet by mouth 2 times daily (before meals) 12/7/20   Cynthia Concepcion MD   atorvastatin (LIPITOR) 20 MG tablet Take 1 tablet by mouth daily 12/7/20   Cynthia Concepcion MD   metoprolol tartrate (LOPRESSOR) 50 MG tablet Take 1 tablet by mouth 2 times daily 12/7/20   Riccardo Jeronimo MD   pantoprazole (PROTONIX) 40 MG tablet Take 1 tablet by mouth 2 times daily (before meals) 12/7/20   Cynthia Concepcion MD   metoprolol tartrate (LOPRESSOR) 50 MG tablet Take 1 tablet by mouth 2 times daily 8/28/20   Carla Castañeda MD   SENNA-PLUS 8.6-50 MG per tablet Take 1 tablet by mouth daily 1/14/20   Aleksandr Page MD   aspirin 81 MG chewable tablet Take 1 tablet by mouth daily 1/14/20   Aleksandr Page MD   atorvastatin (LIPITOR) 20 MG tablet Take 1 tablet by mouth daily 1/14/20   Dulce Maria Cazares MD   docusate sodium (COLACE) 100 MG capsule Take 1 capsule by mouth 2 times daily as needed for Constipation 1/14/20   Dulce Maria Cazares MD   hydrochlorothiazide (HYDRODIURIL) 25 MG tablet Take 1 tablet by mouth daily 1/14/20 1/8/21  Dulce Maria Cazares MD   oxybutynin (DITROPAN-XL) 10 MG extended release tablet Take 1 tablet by mouth daily 1/14/20 2/13/20  Dulce Maria Cazares MD   blood glucose monitor kit and supplies Test 2 times a day & as needed for symptoms of irregular blood glucose. 1/14/20   Dulce Maria Cazares MD   Lancets MISC 1 each by Does not apply route 2 times daily 1/14/20   Dulce Maria Cazares MD   blood glucose monitor strips Test 2 times a day & as needed for symptoms of irregular blood glucose. 1/14/20   Dulce Maria Cazares MD       ALLERGIES:     Patient has no known allergies. OBJECTIVE:       Vitals:    06/26/21 2132 06/27/21 0807 06/27/21 1633 06/27/21 1951   BP: (!) 163/74 (!) 146/43 (!) 146/62 131/72   Pulse: 114 78 84 91   Resp: 16 17 18 18   Temp: 98.3 °F (36.8 °C) 98.1 °F (36.7 °C) 98.1 °F (36.7 °C) 98 °F (36.7 °C)   TempSrc: Oral Oral  Oral   SpO2: 98% 95% 98% 97%   Weight:       Height:             Intake/Output Summary (Last 24 hours) at 6/28/2021 0746  Last data filed at 6/28/2021 0253  Gross per 24 hour   Intake --   Output 1925 ml   Net -1925 ml       PHYSICAL EXAM:    Physical Exam    ASSESSMENT:      Principal Problem:    Acute kidney injury superimposed on CKD ([facility])  Active Problems:    DM2 (diabetes mellitus, type 2) ([facility])    Essential hypertension    BMI 31.0-31.9,adult    JEFFREY (acute kidney injury) ([facility] [address]  Resolved Problems:    * No resolved hospital problems. *      PLAN:     1. Left leg pain and swelling likely secondary to gout likely secondary to HCTZ:  - WBC: WNL, x-ray shows diffuse soft tissue swelling without any acute process, Doppler scan did not show any superficial or deep DVTs.   - Uric acid: 10.2  - HCTZ held, will start patient on prednisone. 2. Low Hb:   - Hb: 7.6   - Iron, TIBC, retic and ferritin  - We will transfuse if hemoglobin is less than 7.       2. JEFFREY on CKD stage III, nondialysis:  - Cr: 6.85 ---> 5.83 (Baseline 2.9-4.2)  - Renal US unremarkable. - Will work up for paraproteinemia, Nephrology on board, appreciate their recs.     3. DM2:   - Lantus 15 units at night  - LDISS with hypoglycemic protocol in.     4. Essential Hypertension:  -Continue Norvasc 10 mg p.o. daily, Lopressor 50 mg twice daily, HCTZ held due to concerns of elevated creatinine with gout, Ranexa 500 mg twice daily held due to creatinine clearance.        -DVT prophylaxis: Heparin 5000 unit 3 times daily. -GI prophylaxis: Protonix 40 mg.      Plan will be discussed with the attending, Dr. Tom Sofia MD  Family Medicine Resident  6/28/2021 7:46 AM

## 2021-06-28 NOTE — PROGRESS NOTES
Dialysis Post Treatment Note  Vitals:    06/28/21 1736   BP: (!) 178/95   Pulse: 91   Resp: 16   Temp: 98 °F (36.7 °C)   SpO2:      Pre-Weight = 100 kg   Post-weight = Weight: 217 lb 9.5 oz (98.7 kg)  Total Liters Processed = Total Liters Processed (l/min): 29.9 l/min  Rinseback Volume (mL) = Rinseback Volume (ml): 300 ml  Net Removal (mL) = NET Removed (ml): 2100 ml  Patient's dry weight=To be determined     Type of access used= Central Line Catheter  Length of qyxjbqlph=670 min

## 2021-06-28 NOTE — PLAN OF CARE
Problem: Skin Integrity:  Goal: Will show no infection signs and symptoms  Description: Will show no infection signs and symptoms  6/28/2021 0905 by Shelia Macedo RN  Outcome: Ongoing  6/28/2021 0324 by Felicity Villagomez RN  Outcome: Ongoing  Goal: Absence of new skin breakdown  Description: Absence of new skin breakdown  6/28/2021 0905 by Shelia Macedo RN  Outcome: Ongoing  6/28/2021 0324 by Felicity Villagomez RN  Outcome: Ongoing     Problem: Falls - Risk of:  Goal: Will remain free from falls  Description: Will remain free from falls  6/28/2021 0905 by Shelia Macedo RN  Outcome: Ongoing  6/28/2021 0324 by Felicity Villagomez RN  Outcome: Ongoing  Goal: Absence of physical injury  Description: Absence of physical injury  6/28/2021 0905 by Shelia Macedo RN  Outcome: Ongoing  6/28/2021 0324 by Felicity Villagomez RN  Outcome: Ongoing     Problem: Pain:  Goal: Pain level will decrease  Description: Pain level will decrease  6/28/2021 0324 by Felicity Villagomez RN  Outcome: Ongoing  Goal: Control of acute pain  Description: Control of acute pain  6/28/2021 0324 by Felicity Villagomez RN  Outcome: Ongoing  Goal: Control of chronic pain  Description: Control of chronic pain  6/28/2021 0324 by Felicity Villagomez RN  Outcome: Ongoing     Problem: Musculor/Skeletal Functional Status  Goal: Highest potential functional level  6/28/2021 0905 by Shelia Macedo RN  Outcome: Ongoing  6/28/2021 0324 by Felicity Villagomez RN  Outcome: Ongoing  Goal: Absence of falls  6/28/2021 0905 by Shelia Macedo RN  Outcome: Ongoing  6/28/2021 0324 by Felicity Villagomez RN  Outcome: Ongoing

## 2021-06-29 LAB
ALBUMIN (CALCULATED): 2.9 G/DL (ref 3.2–5.2)
ALBUMIN PERCENT: 44 % (ref 45–65)
ALPHA 1 PERCENT: 4 % (ref 3–6)
ALPHA 2 PERCENT: 18 % (ref 6–13)
ALPHA-1-GLOBULIN: 0.2 G/DL (ref 0.1–0.4)
ALPHA-2-GLOBULIN: 1.1 G/DL (ref 0.5–0.9)
ANION GAP SERPL CALCULATED.3IONS-SCNC: 18 MMOL/L (ref 9–17)
ANTI DNA DOUBLE STRANDED: 1.2 IU/ML
ANTI-NUCLEAR ANTIBODY (ANA): NEGATIVE
BETA GLOBULIN: 1.1 G/DL (ref 0.5–1.1)
BETA PERCENT: 17 % (ref 11–19)
BUN BLDV-MCNC: 54 MG/DL (ref 8–23)
BUN/CREAT BLD: ABNORMAL (ref 9–20)
CALCIUM SERPL-MCNC: 7.6 MG/DL (ref 8.6–10.4)
CHLORIDE BLD-SCNC: 99 MMOL/L (ref 98–107)
CO2: 19 MMOL/L (ref 20–31)
CREAT SERPL-MCNC: 5.63 MG/DL (ref 0.7–1.2)
ENA ANTIBODIES SCREEN: 0.2 U/ML
GAMMA GLOBULIN %: 18 % (ref 9–20)
GAMMA GLOBULIN: 1.2 G/DL (ref 0.5–1.5)
GFR AFRICAN AMERICAN: 13 ML/MIN
GFR NON-AFRICAN AMERICAN: 10 ML/MIN
GFR SERPL CREATININE-BSD FRML MDRD: ABNORMAL ML/MIN/{1.73_M2}
GFR SERPL CREATININE-BSD FRML MDRD: ABNORMAL ML/MIN/{1.73_M2}
GLUCOSE BLD-MCNC: 173 MG/DL (ref 75–110)
GLUCOSE BLD-MCNC: 192 MG/DL (ref 75–110)
GLUCOSE BLD-MCNC: 284 MG/DL (ref 75–110)
GLUCOSE BLD-MCNC: 359 MG/DL (ref 75–110)
GLUCOSE BLD-MCNC: 372 MG/DL (ref 75–110)
GLUCOSE BLD-MCNC: 393 MG/DL (ref 70–99)
GLUCOSE BLD-MCNC: 401 MG/DL (ref 75–110)
GLUCOSE BLD-MCNC: 476 MG/DL (ref 75–110)
PATHOLOGIST: ABNORMAL
POTASSIUM SERPL-SCNC: 4.3 MMOL/L (ref 3.7–5.3)
PROTEIN ELECTROPHORESIS, SERUM: ABNORMAL
SODIUM BLD-SCNC: 136 MMOL/L (ref 135–144)
TOTAL PROT. SUM,%: 101 % (ref 98–102)
TOTAL PROT. SUM: 6.5 G/DL (ref 6.3–8.2)
TOTAL PROTEIN: 6.5 G/DL (ref 6.4–8.3)

## 2021-06-29 PROCEDURE — 6370000000 HC RX 637 (ALT 250 FOR IP): Performed by: STUDENT IN AN ORGANIZED HEALTH CARE EDUCATION/TRAINING PROGRAM

## 2021-06-29 PROCEDURE — 36415 COLL VENOUS BLD VENIPUNCTURE: CPT

## 2021-06-29 PROCEDURE — 90935 HEMODIALYSIS ONE EVALUATION: CPT

## 2021-06-29 PROCEDURE — 90935 HEMODIALYSIS ONE EVALUATION: CPT | Performed by: INTERNAL MEDICINE

## 2021-06-29 PROCEDURE — 6360000002 HC RX W HCPCS: Performed by: STUDENT IN AN ORGANIZED HEALTH CARE EDUCATION/TRAINING PROGRAM

## 2021-06-29 PROCEDURE — 1200000000 HC SEMI PRIVATE

## 2021-06-29 PROCEDURE — 2580000003 HC RX 258: Performed by: STUDENT IN AN ORGANIZED HEALTH CARE EDUCATION/TRAINING PROGRAM

## 2021-06-29 PROCEDURE — 80048 BASIC METABOLIC PNL TOTAL CA: CPT

## 2021-06-29 PROCEDURE — 6370000000 HC RX 637 (ALT 250 FOR IP): Performed by: INTERNAL MEDICINE

## 2021-06-29 RX ORDER — HYDRALAZINE HYDROCHLORIDE 25 MG/1
25 TABLET, FILM COATED ORAL EVERY 8 HOURS SCHEDULED
Qty: 90 TABLET | Refills: 3 | Status: SHIPPED | OUTPATIENT
Start: 2021-06-29 | End: 2021-11-18 | Stop reason: SDUPTHER

## 2021-06-29 RX ORDER — 0.9 % SODIUM CHLORIDE 0.9 %
150 INTRAVENOUS SOLUTION INTRAVENOUS PRN
Status: DISCONTINUED | OUTPATIENT
Start: 2021-06-29 | End: 2021-07-01 | Stop reason: HOSPADM

## 2021-06-29 RX ORDER — POLYETHYLENE GLYCOL 3350 17 G/17G
17 POWDER, FOR SOLUTION ORAL DAILY PRN
Qty: 527 G | Refills: 1 | Status: SHIPPED | OUTPATIENT
Start: 2021-06-29 | End: 2021-07-29

## 2021-06-29 RX ORDER — 0.9 % SODIUM CHLORIDE 0.9 %
250 INTRAVENOUS SOLUTION INTRAVENOUS PRN
Status: DISCONTINUED | OUTPATIENT
Start: 2021-06-29 | End: 2021-07-01 | Stop reason: HOSPADM

## 2021-06-29 RX ORDER — INSULIN GLARGINE 100 [IU]/ML
30 INJECTION, SOLUTION SUBCUTANEOUS NIGHTLY
Qty: 1 VIAL | Refills: 3 | Status: SHIPPED | OUTPATIENT
Start: 2021-06-29 | End: 2021-06-29 | Stop reason: HOSPADM

## 2021-06-29 RX ORDER — PREDNISONE 20 MG/1
40 TABLET ORAL DAILY
Qty: 20 TABLET | Refills: 0 | Status: SHIPPED | OUTPATIENT
Start: 2021-06-29 | End: 2021-07-09

## 2021-06-29 RX ORDER — INSULIN GLARGINE 100 [IU]/ML
30 INJECTION, SOLUTION SUBCUTANEOUS NIGHTLY
Status: DISCONTINUED | OUTPATIENT
Start: 2021-06-29 | End: 2021-07-01 | Stop reason: HOSPADM

## 2021-06-29 RX ORDER — PEN NEEDLE, DIABETIC 31 G X1/4"
1 NEEDLE, DISPOSABLE MISCELLANEOUS DAILY
Qty: 100 EACH | Refills: 3 | Status: SHIPPED | OUTPATIENT
Start: 2021-06-29 | End: 2021-08-10 | Stop reason: SDUPTHER

## 2021-06-29 RX ORDER — INSULIN GLARGINE 100 [IU]/ML
20 INJECTION, SOLUTION SUBCUTANEOUS NIGHTLY
Status: DISCONTINUED | OUTPATIENT
Start: 2021-06-29 | End: 2021-06-29

## 2021-06-29 RX ORDER — INSULIN GLARGINE 100 [IU]/ML
30 INJECTION, SOLUTION SUBCUTANEOUS NIGHTLY
Qty: 27 ML | Refills: 3 | Status: SHIPPED | OUTPATIENT
Start: 2021-06-29 | End: 2021-08-10 | Stop reason: SDUPTHER

## 2021-06-29 RX ADMIN — INSULIN LISPRO 5 UNITS: 100 INJECTION, SOLUTION INTRAVENOUS; SUBCUTANEOUS at 21:03

## 2021-06-29 RX ADMIN — ATORVASTATIN CALCIUM 20 MG: 20 TABLET, FILM COATED ORAL at 12:43

## 2021-06-29 RX ADMIN — SODIUM BICARBONATE 1300 MG: 648 TABLET ORAL at 21:01

## 2021-06-29 RX ADMIN — HEPARIN SODIUM 5000 UNITS: 5000 INJECTION INTRAVENOUS; SUBCUTANEOUS at 21:02

## 2021-06-29 RX ADMIN — GABAPENTIN 100 MG: 100 CAPSULE ORAL at 14:20

## 2021-06-29 RX ADMIN — GABAPENTIN 100 MG: 100 CAPSULE ORAL at 21:01

## 2021-06-29 RX ADMIN — INSULIN LISPRO 12 UNITS: 100 INJECTION, SOLUTION INTRAVENOUS; SUBCUTANEOUS at 07:58

## 2021-06-29 RX ADMIN — ISOSORBIDE MONONITRATE 30 MG: 30 TABLET ORAL at 12:43

## 2021-06-29 RX ADMIN — SODIUM CHLORIDE, PRESERVATIVE FREE 10 ML: 5 INJECTION INTRAVENOUS at 12:42

## 2021-06-29 RX ADMIN — INSULIN LISPRO 8 UNITS: 100 INJECTION, SOLUTION INTRAVENOUS; SUBCUTANEOUS at 02:20

## 2021-06-29 RX ADMIN — AMLODIPINE BESYLATE 10 MG: 10 TABLET ORAL at 12:43

## 2021-06-29 RX ADMIN — METOPROLOL TARTRATE 50 MG: 50 TABLET, FILM COATED ORAL at 21:02

## 2021-06-29 RX ADMIN — INSULIN LISPRO 6 UNITS: 100 INJECTION, SOLUTION INTRAVENOUS; SUBCUTANEOUS at 16:52

## 2021-06-29 RX ADMIN — INSULIN GLARGINE 30 UNITS: 100 INJECTION, SOLUTION SUBCUTANEOUS at 21:02

## 2021-06-29 RX ADMIN — SODIUM CHLORIDE, PRESERVATIVE FREE 10 ML: 5 INJECTION INTRAVENOUS at 21:03

## 2021-06-29 RX ADMIN — HEPARIN SODIUM 5000 UNITS: 5000 INJECTION INTRAVENOUS; SUBCUTANEOUS at 14:20

## 2021-06-29 RX ADMIN — METOPROLOL TARTRATE 50 MG: 50 TABLET, FILM COATED ORAL at 12:43

## 2021-06-29 RX ADMIN — PREDNISONE 40 MG: 20 TABLET ORAL at 12:43

## 2021-06-29 RX ADMIN — HEPARIN SODIUM 5000 UNITS: 5000 INJECTION INTRAVENOUS; SUBCUTANEOUS at 05:37

## 2021-06-29 RX ADMIN — SODIUM BICARBONATE 1300 MG: 648 TABLET ORAL at 12:42

## 2021-06-29 RX ADMIN — ASPIRIN 81 MG: 81 TABLET, CHEWABLE ORAL at 12:43

## 2021-06-29 RX ADMIN — INSULIN LISPRO 2 UNITS: 100 INJECTION, SOLUTION INTRAVENOUS; SUBCUTANEOUS at 12:43

## 2021-06-29 RX ADMIN — PANTOPRAZOLE SODIUM 40 MG: 40 TABLET, DELAYED RELEASE ORAL at 05:37

## 2021-06-29 ASSESSMENT — ENCOUNTER SYMPTOMS
SHORTNESS OF BREATH: 0
COUGH: 0
APNEA: 0
COLOR CHANGE: 0
CHEST TIGHTNESS: 0
SORE THROAT: 0

## 2021-06-29 ASSESSMENT — PAIN DESCRIPTION - DESCRIPTORS: DESCRIPTORS: ACHING

## 2021-06-29 ASSESSMENT — PAIN DESCRIPTION - ORIENTATION: ORIENTATION: LEFT

## 2021-06-29 ASSESSMENT — PAIN SCALES - GENERAL
PAINLEVEL_OUTOF10: 0
PAINLEVEL_OUTOF10: 7
PAINLEVEL_OUTOF10: 0

## 2021-06-29 ASSESSMENT — PAIN DESCRIPTION - PROGRESSION: CLINICAL_PROGRESSION: NOT CHANGED

## 2021-06-29 ASSESSMENT — PAIN DESCRIPTION - PAIN TYPE: TYPE: ACUTE PAIN

## 2021-06-29 ASSESSMENT — PAIN DESCRIPTION - LOCATION: LOCATION: HIP

## 2021-06-29 ASSESSMENT — PAIN - FUNCTIONAL ASSESSMENT: PAIN_FUNCTIONAL_ASSESSMENT: ACTIVITIES ARE NOT PREVENTED

## 2021-06-29 ASSESSMENT — PAIN DESCRIPTION - ONSET: ONSET: ON-GOING

## 2021-06-29 ASSESSMENT — PAIN DESCRIPTION - FREQUENCY: FREQUENCY: CONTINUOUS

## 2021-06-29 NOTE — PROGRESS NOTES
45 Counts include 234 beds at the [facility]    Progress Note    Date:   6/29/2021  Patient name:  Esperanza Rankin  Date of admission:  6/26/2021 12:04 PM  MRN:   [id number]  YOB: 1960    SUBJECTIVE/Last 24 hours update:     Patient seen and examined at bedside this morning. No acute events overnight, no new complaints. Patient had catheter placed yesterday for first dialysis treatment 6/20 8 PM, 2.1 L removed, patient tolerated procedure well. Next dialysis session scheduled for today after which patient should be ready for discharge home and rest of the follow-up dialysis sessions will be scheduled as outpatient. Patient is currently not in any pain, denies any chest pain, shortness of breath as well. Afebrile, vital signs stable. Hyperglycemic this morning with point-of-care glucose 428, Lantus changed to 30 units subcutaneous nightly and medium dose sliding scale, received 12 units this a.m. Notes from nursing staff and Consults had been reviewed, and the overnight progress had been checked with the nursing staff as well. HPI:   See H&P     REVIEW OF SYSTEMS:      Review of Systems   Constitutional: Negative for activity change, appetite change, chills, fatigue and fever. HENT: Negative for congestion and sore throat. Respiratory: Negative for apnea, cough, chest tightness and shortness of breath. Genitourinary: Negative for difficulty urinating and urgency. Musculoskeletal: Negative for joint swelling. Skin: Negative for color change, pallor, rash and wound. Neurological: Negative for dizziness, tremors, facial asymmetry, light-headedness and headaches. Psychiatric/Behavioral: Negative for agitation, behavioral problems and confusion. The patient is not nervous/anxious.             PAST MEDICAL HISTORY:      has a past medical history of ADHD (attention deficit hyperactivity disorder), Depression, DM2 (diabetes mellitus, type 2) ([facility] [address] Erectile dysfunction, Gastroesophageal reflux disease, Hyperlipidemia, Hypertension, Kidney stone, Low back pain of thoracolumbar region with sciatica, and Neuropathy. PAST SURGICAL HISTORY:      has a past surgical history that includes Tonsillectomy; Cystoscopy (07/20/2018); pr cystoscopy,insert ureteral stent (Bilateral, 7/20/2018); Cystocopy (08/03/2018); pr cysto/uretero/pyeloscopy, calculus tx (Bilateral, 8/3/2018); Upper gastrointestinal endoscopy (N/A, 12/7/2020); and Colonoscopy (N/A, 12/7/2020). SOCIAL HISTORY:      reports that he quit smoking about 2 years ago. His smoking use included cigarettes. He has a 15.00 pack-year smoking history. He has never used smokeless tobacco. He reports current alcohol use of about 8.0 standard drinks of alcohol per week. He reports that he does not use drugs. FAMILY HISTORY:     family history includes Diabetes in his mother; High Blood Pressure in his father. HOME MEDICATIONS:      Prior to Admission medications    Medication Sig Start Date End Date Taking? Authorizing Provider   amLODIPine (NORVASC) 10 MG tablet TAKE 1 TABLET BY MOUTH ONCE DAILY.  5/25/21   Patrick Angel MD   gabapentin (NEURONTIN) 100 MG capsule TAKE 1 CAPSULE BY MOUTH 3 TIMES A DAY. 3/11/21 9/11/21  Riccardo Jeronimo MD   acetaminophen (TYLENOL) 325 MG tablet TAKE 2 TABLET BY MOUTH EVERY 6 HOURS AS NEEDED FOR PAIN 3/11/21   Sapna Roberts MD   isosorbide mononitrate (IMDUR) 30 MG extended release tablet Take 1 tablet by mouth daily 12/30/20   Vara Harada, MD   ranolazine (RANEXA) 500 MG extended release tablet Take 1 tablet by mouth 2 times daily 12/30/20   Vara Harada, MD   insulin glargine (LANTUS SOLOSTAR) 100 UNIT/ML injection pen Inject 15 Units into the skin nightly 12/14/20   Vara Harada, MD   Insulin Pen Needle 32G X 4 MM MISC 1 each by Does not apply route daily 12/14/20   Vara Harada, MD   bismuth-metronidazole-tetracycline REHABILITATION [facility]) [phone] MG per capsule Take 3 capsules by mouth 4 times daily (before meals and nightly) for 10 days 12/9/20 12/19/20  Castro Steward MD   metFORMIN (GLUCOPHAGE) 500 MG tablet Take 2 tablets by mouth 2 times daily (with meals) 12/8/20   Gilles Quinn MD   pantoprazole (PROTONIX) 40 MG tablet Take 1 tablet by mouth 2 times daily (before meals) 12/7/20   Gilles Quinn MD   atorvastatin (LIPITOR) 20 MG tablet Take 1 tablet by mouth daily 12/7/20   Gilles Quinn MD   metoprolol tartrate (LOPRESSOR) 50 MG tablet Take 1 tablet by mouth 2 times daily 12/7/20   Riccardo Jeronimo MD   pantoprazole (PROTONIX) 40 MG tablet Take 1 tablet by mouth 2 times daily (before meals) 12/7/20   Gilles Quinn MD   metoprolol tartrate (LOPRESSOR) 50 MG tablet Take 1 tablet by mouth 2 times daily 8/28/20   Kirby Eldridge MD   SENNA-PLUS 8.6-50 MG per tablet Take 1 tablet by mouth daily 1/14/20   Bess Montalvo MD   aspirin 81 MG chewable tablet Take 1 tablet by mouth daily 1/14/20   Bess Montalvo MD   atorvastatin (LIPITOR) 20 MG tablet Take 1 tablet by mouth daily 1/14/20   Bess Montalvo MD   docusate sodium (COLACE) 100 MG capsule Take 1 capsule by mouth 2 times daily as needed for Constipation 1/14/20   Bess Montalvo MD   hydrochlorothiazide (HYDRODIURIL) 25 MG tablet Take 1 tablet by mouth daily 1/14/20 1/8/21  Bess Montalvo MD   oxybutynin (DITROPAN-XL) 10 MG extended release tablet Take 1 tablet by mouth daily 1/14/20 2/13/20  Bess Montalvo MD   blood glucose monitor kit and supplies Test 2 times a day & as needed for symptoms of irregular blood glucose. 1/14/20   Bess Montalvo MD   Lancets MISC 1 each by Does not apply route 2 times daily 1/14/20   Bess Montalvo MD   blood glucose monitor strips Test 2 times a day & as needed for symptoms of irregular blood glucose. 1/14/20   Bess Montalvo MD       ALLERGIES:     Patient has no known allergies.       OBJECTIVE:       Vitals:    06/28/21 1708 06/28/21 1736 06/28/21 1817 06/28/21 2010   BP: (!) 165/98 (!) Collection Time: 06/29/21  1:34 AM   Result Value Ref Range    POC Glucose 476 (HH) 75 - 110 mg/dL   Basic Metabolic Panel w/ Reflex to MG    Collection Time: 06/29/21  4:46 AM   Result Value Ref Range    Glucose 393 (H) 70 - 99 mg/dL    BUN 54 (H) 8 - 23 mg/dL    CREATININE 5.63 (HH) 0.70 - 1.20 mg/dL    Bun/Cre Ratio NOT REPORTED 9 - 20    Calcium 7.6 (L) 8.6 - 10.4 mg/dL    Sodium 136 135 - 144 mmol/L    Potassium 4.3 3.7 - 5.3 mmol/L    Chloride 99 98 - 107 mmol/L    CO2 19 (L) 20 - 31 mmol/L    Anion Gap 18 (H) 9 - 17 mmol/L    GFR Non-African American 10 (L) >60 mL/min    GFR  13 (L) >60 mL/min    GFR Comment          GFR Staging NOT REPORTED    POC Glucose Fingerstick    Collection Time: 06/29/21  4:46 AM   Result Value Ref Range    POC Glucose 359 (H) 75 - 110 mg/dL   POC Glucose Fingerstick    Collection Time: 06/29/21  7:50 AM   Result Value Ref Range    POC Glucose 401 (HH) 75 - 110 mg/dL         Imaging/Diagonstics:    XR CHEST (SINGLE VIEW FRONTAL)    Result Date: 6/28/2021  EXAMINATION: ONE XRAY VIEW OF THE CHEST 6/28/2021 1:28 pm COMPARISON: 12/04/2020 HISTORY: ORDERING SYSTEM PROVIDED HISTORY: outpatient dialysis TECHNOLOGIST PROVIDED HISTORY: outpatient dialysis Reason for Exam: outpatient dialysis FINDINGS: The lungs are without acute focal process. There is no effusion or pneumothorax. The cardiomediastinal silhouette is stable. The osseous structures are stable. No acute process. XR ANKLE LEFT (MIN 3 VIEWS)    Result Date: 6/26/2021  EXAMINATION: THREE XRAY VIEWS OF THE LEFT ANKLE 6/26/2021 12:45 pm COMPARISON: None. HISTORY: ORDERING SYSTEM PROVIDED HISTORY: left ankle pain TECHNOLOGIST PROVIDED HISTORY: left ankle pain Reason for Exam: pain FINDINGS: Diffuse soft tissue swelling is noted. The ankle mortise is maintained. There is no fracture identified. Mute your ossification is noted in the distal interosseous space. No significant arthropathic features.   Small plantar calcaneal spur. Diffuse soft tissue swelling without acute osseous abnormality identified. IR TUNNELED CVC PLACE WO SQ PORT/PUMP > 5 YEARS    Result Date: 6/28/2021  PROCEDURE: ULTRASOUND GUIDED VASCULAR ACCESS. FLUOROSCOPY GUIDED PLACEMENT OF A TUNNELED CATHETER. 6/28/2021. HISTORY: ORDERING SYSTEM PROVIDED HISTORY: long term HD TECHNOLOGIST PROVIDED HISTORY: long term HD How many lumens are being requested?->2 What side should this line be placed? ->Either What site is the preferred site? ->No preference Acute kidney injury SEDATION: None FLUOROSCOPY DOSE AND TYPE OR TIME AND EXPOSURES: Fluoro time 3.2 minutes DAP 3737 cGy cm 2 TECHNIQUE: This procedure was performed by Tosin Gaviria PA-C under direct supervision of Dr. Israel Flowers. Informed consent was obtained after a detailed explanation of the procedure including risks, benefits, and alternatives. Universal protocol was observed. The right neck and chest were prepped and draped in sterile fashion using maximum sterile barrier technique. Local anesthesia was achieved with lidocaine. A micropuncture needle was used to access the right internal jugular vein using ultrasound guidance. An ultrasound image demonstrating patency of the vein with needle tip located within it. An image was obtained and stored in PACs. A 0.035 guidewire was used to place a peel-away sheath. A subcutaneous tunnel was created to the infraclavicular region and a tunneled 14.5 [place] by 19 cm tip to cuff palindrome hemodialysis catheter was pulled through the subcutaneous tunnel to the venotomy site and advanced through the peel-away sheath under fluoroscopic guidance to the right atrium. The catheter flushed easily and there was a good blood return. The catheter was sutured to the skin using 2-0 Ethilon. Estimated blood loss was 5 mL the catheter was locked with heparinized saline. The patient tolerated the procedure well and there were no immediate complications.  FINDINGS: Fluoroscopic image demonstrates the tip of the catheter in the right atrium. Successful ultrasound and fluoroscopy guided tunneled catheter placement . Okay to use hemodialysis catheter. US RETROPERITONEAL COMPLETE    Result Date: 6/26/2021  EXAMINATION: RETROPERITONEAL ULTRASOUND OF THE KIDNEYS AND URINARY BLADDER 6/26/2021 COMPARISON: December 4, 2020 HISTORY: ORDERING SYSTEM PROVIDED HISTORY: New acute on chronic kidney disease TECHNOLOGIST PROVIDED HISTORY: New acute on chronic kidney disease FINDINGS: Kidneys: The right kidney measures 10.1 x 5.5 x 4.6 cm  and the left kidney measures 12.2 x 5.7 x 4.7 cm . Increased echogenicity of the renal parenchyma is noted, consistent with underlying medical renal disease. Right renal cortex measures 1.8 cm. Left renal cortex measures 2.1 cm. No hydronephrosis, or intrarenal calculi are present. Bladder: Urinary bladder is nondistended. 1. Echogenic renal parenchyma again noted bilaterally, consistent with medical renal disease 2. No evidence of hydronephrosis, or intrarenal calculi     VL DUP LOWER EXTREMITY VENOUS LEFT    Result Date: 6/27/2021    [facility]  Vascular Lower Extremities DVT Study Procedure   Patient Name  [facility]      Date of Study           06/26/2021                Luis Villalobos   Date of Birth 1960  Gender                  Male   Age           61 year(s)  Race                    Black   Room Number   8191   Corporate ID  [id number]    Weight:                 190 pounds, 86.2 kg  #   Patient Jesse  [de-identified]  #   MR #          [id number]     [address]   Accession #   [id number]  Interpreting Physician  Dulce Ashford   Referring                 Referring Physician     Marisol Sánchez MD  Nurse  Practitioner  Procedure Type of Study:   Veins: Lower Extremities DVT Study, Venous Scan Lower Left. Indications for Study:Pain, leg. Patient Status:ER.   Conclusions   Summary   No evidence of superficial or deep venous thrombosis in the left lower  extremity. Signature   ----------------------------------------------------------------  Electronically signed by Collette Maizes, RVT(Sonographer) on  06/26/2021 01:32 PM  ----------------------------------------------------------------   ----------------------------------------------------------------  Electronically signed by Jaya Garcia(Interpreting physician)  on 06/27/2021 12:23 AM  ----------------------------------------------------------------    Left Impression:   The common femoral, femoral, popliteal and tibial veins demonstrate   normal compressibility and augmentation. Normal compressibility of the great saphenous vein. Normal compressibility of the small saphenous vein. Velocities are measured in cm/s ; Diameters are measured in cm Right Lower Extremities DVT Study Measurements Right Doppler Measurements +----------------------------+------+------+-------------------------------+ ! Location                    ! Signal!Reflux! Reflux (msec)                  ! +----------------------------+------+------+-------------------------------+ ! Common Femoral              !Phasic!      !                               ! +----------------------------+------+------+-------------------------------+ Left Lower Extremities DVT Study Measurements Left 2D Measurements +------------------------------------+----------+---------------+----------+ ! Location                            ! Visualized! Compressibility! Thrombosis! +------------------------------------+----------+---------------+----------+ ! Common Femoral                      !Yes       ! Yes            ! None      ! +------------------------------------+----------+---------------+----------+ ! Prox Femoral                        !Yes       ! Yes            ! None      ! +------------------------------------+----------+---------------+----------+ ! Mid Femoral                         !Yes       ! Yes            ! None ! +------------------------------------+----------+---------------+----------+ ! Dist Femoral                        !Yes       ! Yes            ! None      ! +------------------------------------+----------+---------------+----------+ ! Popliteal                           !Yes       ! Yes            ! None      ! +------------------------------------+----------+---------------+----------+ ! Sapheno Femoral Junction            ! Yes       ! Yes            ! None      ! +------------------------------------+----------+---------------+----------+ ! PTV                                 ! Yes       ! Yes            ! None      ! +------------------------------------+----------+---------------+----------+ ! Peroneal                            !Yes       ! Yes            ! None      ! +------------------------------------+----------+---------------+----------+ ! Gastroc                             ! Yes       ! Yes            ! None      ! +------------------------------------+----------+---------------+----------+ ! GSV Thigh                           ! Yes       ! Yes            ! None      ! +------------------------------------+----------+---------------+----------+ ! GSV Knee                            ! Yes       ! Yes            ! None      ! +------------------------------------+----------+---------------+----------+ ! GSV Ankle                           ! Yes       ! Yes            ! None      ! +------------------------------------+----------+---------------+----------+ ! SSV                                 ! Yes       ! Yes            ! None      ! +------------------------------------+----------+---------------+----------+ Left Doppler Measurements +---------------------------+------+------+--------------------------------+ ! Location                   ! Signal!Reflux! Reflux (msec)                   ! +---------------------------+------+------+--------------------------------+ ! Common Femoral             !Phasic!      ! WNL, x-ray shows diffuse soft tissue swelling without any acute process, Doppler scan did not show any superficial or deep DVTs. - Uric acid: 10.2  - HCTZ held, currently on prednisone 40 mg PO - Day 3         2. Low Hb:   - Hb: 7.6 6/28  -Ferritin 312, Retic count 1.4%, Fe 39, TIBC 170   - We will transfuse if hemoglobin is less than 7.        2.  JEFFREY on CKD stage III, nondialysis:  - Cr: 6.85 on admission, 5.63 today s/p dialysis 6/28  (Baseline 2.9-4.2)  - Renal US unremarkable. - Will work up for paraproteinemia, Nephrology on board, appreciate their recs.     3. DM2:   - Lantus 30 units at night  - MDISS with hypoglycemic protocol in place      4. Essential Hypertension:  -Continue Norvasc 10 mg p.o. daily, Lopressor 50 mg twice daily, HCTZ held due to concerns of elevated creatinine with gout, Ranexa 500 mg twice daily held due to creatinine clearance.     -DVT prophylaxis: Heparin 5000 unit 3 times daily. -GI prophylaxis: Protonix 40 mg.     Discussed care plan with nurse after getting her input.     Above plan discussed with the patient in room, who agreed to the above plan     Plan will be discussed with the attending, Dr. Jackeline Vazquez MD  PGY 2 Family Medicine Resident  6/29/2021 8:01 AM

## 2021-06-29 NOTE — PLAN OF CARE
Problem: Skin Integrity:  Goal: Will show no infection signs and symptoms  Description: Will show no infection signs and symptoms  Outcome: Ongoing  Goal: Absence of new skin breakdown  Description: Absence of new skin breakdown  Outcome: Ongoing     Problem: Falls - Risk of:  Goal: Will remain free from falls  Description: Will remain free from falls  Outcome: Ongoing  Goal: Absence of physical injury  Description: Absence of physical injury  Outcome: Ongoing     Problem: Pain:  Description: Pain management should include both nonpharmacologic and pharmacologic interventions.   Goal: Pain level will decrease  Description: Pain level will decrease  Outcome: Ongoing  Goal: Control of acute pain  Description: Control of acute pain  Outcome: Ongoing  Goal: Control of chronic pain  Description: Control of chronic pain  Outcome: Ongoing     Problem: Musculor/Skeletal Functional Status  Goal: Highest potential functional level  Outcome: Ongoing  Goal: Absence of falls  Outcome: Ongoing

## 2021-06-29 NOTE — CARE COORDINATION
NOE left message for Elan at [facility] [employer] to follow up regarding referral. Await return call. 1003  NOE received call from [facility], referral still under review. Will follow up as information is received. 12  NOE spoke with Elan at [employer] [facility] [employer] who reports referral is still under review. Await financial/medical clearance. NOE left voicemail for CM regarding information. [facility] received call from Joanne at [facility]. Joanne requested updated treatment sheet, cath placement, and confirmation of ESRD vs. JEFFREY. NOE faxed updated documents.

## 2021-06-29 NOTE — CARE COORDINATION
Transitional planning. [facility] pharmacy called, pt needs prior auth for Retacrit, KEY [id number]. Request sent, waiting for response. LES Agee to inform CM that she is still waiting for confirmation on HD placement. [phone] confirmed with pt that plans are to go home independently w/ niece when dc'd. He states he will walk from here to [employer] and take a bus home from there. Informed pt that we can help with transportation plans if needed.

## 2021-06-29 NOTE — PROGRESS NOTES
Renal Progress Note    Patient :  Kathy Arambula; 61 y.o. MRN# [id number]  Location:  0318/0318-01  Attending:  Cuauhtemoc Davenport DO  Admit Date:  6/26/2021   Hospital Day: 3      Subjective:     Patient was seen and examined. No new issues reported overnight. Currently running today is day 2 of hemodialysis. Patient did get tunneled catheter placed in yesterday 6/28/2021.  working toward setting up outpatient hemodialysis spot likely at [facility]. Outpatient Medications:     Medications Prior to Admission: amLODIPine (NORVASC) 10 MG tablet, TAKE 1 TABLET BY MOUTH ONCE DAILY. gabapentin (NEURONTIN) 100 MG capsule, TAKE 1 CAPSULE BY MOUTH 3 TIMES A DAY.   acetaminophen (TYLENOL) 325 MG tablet, TAKE 2 TABLET BY MOUTH EVERY 6 HOURS AS NEEDED FOR PAIN  isosorbide mononitrate (IMDUR) 30 MG extended release tablet, Take 1 tablet by mouth daily  [DISCONTINUED] ranolazine (RANEXA) 500 MG extended release tablet, Take 1 tablet by mouth 2 times daily  Insulin Pen Needle 32G X 4 MM MISC, 1 each by Does not apply route daily  [DISCONTINUED] insulin glargine (LANTUS SOLOSTAR) 100 UNIT/ML injection pen, Inject 15 Units into the skin nightly  [DISCONTINUED] bismuth-metronidazole-tetracycline (PLYERA) 140-125-125 MG per capsule, Take 3 capsules by mouth 4 times daily (before meals and nightly) for 10 days  [DISCONTINUED] metFORMIN (GLUCOPHAGE) 500 MG tablet, Take 2 tablets by mouth 2 times daily (with meals)  pantoprazole (PROTONIX) 40 MG tablet, Take 1 tablet by mouth 2 times daily (before meals)  metoprolol tartrate (LOPRESSOR) 50 MG tablet, Take 1 tablet by mouth 2 times daily  [DISCONTINUED] atorvastatin (LIPITOR) 20 MG tablet, Take 1 tablet by mouth daily  [DISCONTINUED] pantoprazole (PROTONIX) 40 MG tablet, Take 1 tablet by mouth 2 times daily (before meals)  metoprolol tartrate (LOPRESSOR) 50 MG tablet, Take 1 tablet by mouth 2 times daily  SENNA-PLUS 8.6-50 MG per tablet, Take 1 tablet by mouth daily  aspirin 81 MG chewable tablet, Take 1 tablet by mouth daily  atorvastatin (LIPITOR) 20 MG tablet, Take 1 tablet by mouth daily  docusate sodium (COLACE) 100 MG capsule, Take 1 capsule by mouth 2 times daily as needed for Constipation  blood glucose monitor kit and supplies, Test 2 times a day & as needed for symptoms of irregular blood glucose. Lancets MISC, 1 each by Does not apply route 2 times daily  blood glucose monitor strips, Test 2 times a day & as needed for symptoms of irregular blood glucose.   [DISCONTINUED] hydrochlorothiazide (HYDRODIURIL) 25 MG tablet, Take 1 tablet by mouth daily  [DISCONTINUED] oxybutynin (DITROPAN-XL) 10 MG extended release tablet, Take 1 tablet by mouth daily    Current Medications:     Scheduled Meds:    insulin glargine  30 Units Subcutaneous Nightly    epoetin louisa-epbx  8,000 Units Intravenous Once per day on Mon Wed Fri    insulin lispro  0-12 Units Subcutaneous TID WC    insulin lispro  0-6 Units Subcutaneous Nightly    predniSONE  40 mg Oral Daily    [Held by provider] hydrALAZINE  25 mg Oral 3 times per day    pantoprazole  40 mg Oral QAM AC    sodium bicarbonate  1,300 mg Oral BID    amLODIPine  10 mg Oral Daily    atorvastatin  20 mg Oral Daily    aspirin  81 mg Oral Daily    gabapentin  100 mg Oral TID    isosorbide mononitrate  30 mg Oral Daily    metoprolol tartrate  50 mg Oral BID    [Held by provider] oxybutynin  10 mg Oral Daily    sodium chloride flush  5-40 mL Intravenous 2 times per day    heparin (porcine)  5,000 Units Subcutaneous 3 times per day     Continuous Infusions:    sodium chloride      dextrose       PRN Meds:  sodium chloride, sodium chloride, dextromethorphan-guaiFENesin, heparin (porcine), heparin (porcine), diphenhydrAMINE, sodium chloride flush, sodium chloride, ondansetron **OR** ondansetron, polyethylene glycol, acetaminophen **OR** acetaminophen, glucose, dextrose, glucagon (rDNA), dextrose, benzonatate    Input/Output:       I/O last 3 completed shifts: In: 420 [P.O.:320]  Out: 2500 . Patient Vitals for the past 96 hrs (Last 3 readings):   Weight   21 1215 216 lb 0.8 oz (98 kg)   21 0945 216 lb 7.9 oz (98.2 kg)   21 1736 217 lb 9.5 oz (98.7 kg)       Vital Signs:   Temperature:  Temp: 96.8 °F (36 °C)  TMax:   Temp (24hrs), Av.3 °F (36.3 °C), Min:96.8 °F (36 °C), Max:98.3 °F (36.8 °C)    Respirations:  Resp: 18  Pulse:   Pulse: 89  BP:    BP: (!) 165/85  BP Range: Systolic (15SQX), LKW:705 , Min:144 , ZWV:320       Diastolic (53BZH), ORLY:24, Min:68, Max:99      Physical Examination:     General:  AAO x 3, speaking in full sentences, no accessory muscle use. HEENT: Atraumatic, normocephalic, no throat congestion, moist mucosa. Eyes:   Pupils equal, round and reactive to light, EOMI. Neck:   Supple  Chest:   Bilateral vesicular breath sounds, no rales or wheezes. Cardiac:  S1 S2 RR, no murmurs, gallops or rubs. Abdomen: Soft, non-tender, no masses or organomegaly, BS audible. :   No suprapubic or flank tenderness. Neuro:  AAO x 3, No FND. SKIN:  No rashes, good skin turgor. Extremities:  Trace edema.     Labs:       Recent Labs     21  1311 21  0542 21  0545   WBC 7.8 5.8 7.4   RBC 2.55* 2.78* 2.59*   HGB 7.5* 8.2* 7.6*   HCT 24.0* 26.6* 24.3*   MCV 94.1 95.7 93.8   MCH 29.4 29.5 29.3   MCHC 31.3 30.8 31.3   RDW 13.2 13.0 12.9    263 266   MPV 9.5 9.9 9.9      BMP:   Recent Labs     21  0542 21  0545 21  0446    131* 136   K 4.6 4.3 4.3    103 99   CO2 19* 16* 19*   BUN 56* 57* 54*   CREATININE 6.10* 5.83* 5.63*   GLUCOSE 120* 304* 393*   CALCIUM 7.7* 7.4* 7.6*      Albumin:    Recent Labs     21  1448   LABALBU 3.0*     AMBROSE:      Lab Results   Component Value Date    AMBROSE NEGATIVE 2021     SPEP:  Lab Results   Component Value Date    PROT 6.5 2021    ALBCAL PENDING 2021    ALBPCT PENDING 06/27/2021    LABALPH PENDING 06/27/2021    LABALPH PENDING 06/27/2021    A1PCT PENDING 06/27/2021    A2PCT PENDING 06/27/2021    LABBETA PENDING 06/27/2021    BETAPCT PENDING 06/27/2021    GAMGLOB PENDING 06/27/2021    GGPCT PENDING 06/27/2021    PATH PENDING 06/27/2021     UPEP:     Lab Results   Component Value Date    LABPE PENDING 06/27/2021     C3:     Lab Results   Component Value Date    C3 145 06/27/2021     C4:     Lab Results   Component Value Date    C4 39 06/27/2021     Hep BsAg:         Lab Results   Component Value Date    HEPBSAG NONREACTIVE 06/27/2021     Hep C AB:          Lab Results   Component Value Date    HEPCAB NONREACTIVE 06/27/2021       Urinalysis/Chemistries:      Lab Results   Component Value Date    NITRU NEGATIVE 06/26/2021    COLORU YELLOW 06/26/2021    PHUR 5.5 06/26/2021    WBCUA 2 TO 5 06/26/2021    RBCUA 2 TO 5 06/26/2021    MUCUS NOT REPORTED 06/26/2021    TRICHOMONAS NOT REPORTED 06/26/2021    YEAST NOT REPORTED 06/26/2021    BACTERIA NOT REPORTED 06/26/2021    SPECGRAV 1.010 06/26/2021    LEUKOCYTESUR NEGATIVE 06/26/2021    UROBILINOGEN Normal 06/26/2021    BILIRUBINUR NEGATIVE 06/26/2021    BILIRUBINUR NEGATIVE 06/02/2012    GLUCOSEU TRACE 06/26/2021    GLUCOSEU NEGATIVE 06/02/2012    KETUA NEGATIVE 06/26/2021    AMORPHOUS NOT REPORTED 06/26/2021     Urine Sodium:     Lab Results   Component Value Date    SELVIN 97 06/27/2021      Urine Creatinine:     Lab Results   Component Value Date    LABCREA 32.9 06/27/2021     Radiology:     Reviewed. Assessment:     1. ESRD new start on dialysis, hemodialysis initiated on 6/28/2021. Has a tunneled catheter in place (6/28/2021). 2.  Diabetes type 2.  3.  Hypertension. 4.  Nephrotic range proteinuria. 5.  Lower extremity edema. 6.  Anemia of chronic disease. 7.  Gout. Plan:   1. Patient was seen and examined on HD at bedside. Orders were confirmed with the HD nurse. 2.  Running today is day 2.   Will get regular dialysis in a.m. as per day 3.  3.   working toward setting up outpatient hemodialysis spot. 4.  Stop hydrochlorothiazide. 5.  BMP in AM.  6.  Will follow. Nutrition   Please ensure that patient is on a renal diet/TF. Avoid nephrotoxic drugs/contrast exposure. We will continue to follow along with you. Jean Paul Goldman MD  [facility]     This note is created with the assistance of a speech-recognition program. While intending to generate a document that actually reflects the content of the visit, no guarantees can be provided that every mistake has been identified and corrected by editing.

## 2021-06-29 NOTE — PROGRESS NOTES
Dialysis Post Treatment Note              Vitals:    06/29/21 1240   BP: (!) 165/85   Pulse: 89   Resp:    Temp:    SpO2:      Pre-Weight =  Post-weight = Weight: 216 lb 0.8 oz (98 kg)  Total Liters Processed = Total Liters Processed (l/min): 36.9 l/min  Rinseback Volume (mL) = Rinseback Volume (ml): 300 ml  Net Removal (mL) = 500  Length of treatment=2.5  Access:  Right IJ  Dry weight:  TBD    Patient tolerated treatment well.  Complaint

## 2021-06-30 LAB
ANION GAP SERPL CALCULATED.3IONS-SCNC: 14 MMOL/L (ref 9–17)
BUN BLDV-MCNC: 49 MG/DL (ref 8–23)
BUN/CREAT BLD: ABNORMAL (ref 9–20)
CALCIUM SERPL-MCNC: 7.8 MG/DL (ref 8.6–10.4)
CHLORIDE BLD-SCNC: 96 MMOL/L (ref 98–107)
CO2: 26 MMOL/L (ref 20–31)
CREAT SERPL-MCNC: 4.53 MG/DL (ref 0.7–1.2)
FREE KAPPA/LAMBDA RATIO: 1.47 (ref 0.26–1.65)
GFR AFRICAN AMERICAN: 16 ML/MIN
GFR NON-AFRICAN AMERICAN: 13 ML/MIN
GFR SERPL CREATININE-BSD FRML MDRD: ABNORMAL ML/MIN/{1.73_M2}
GFR SERPL CREATININE-BSD FRML MDRD: ABNORMAL ML/MIN/{1.73_M2}
GLUCOSE BLD-MCNC: 212 MG/DL (ref 75–110)
GLUCOSE BLD-MCNC: 273 MG/DL (ref 70–99)
GLUCOSE BLD-MCNC: 282 MG/DL (ref 75–110)
GLUCOSE BLD-MCNC: 323 MG/DL (ref 75–110)
GLUCOSE BLD-MCNC: 387 MG/DL (ref 75–110)
HBV SURFACE AB TITR SER: >1000 MIU/ML
KAPPA FREE LIGHT CHAINS QNT: 17.62 MG/DL (ref 0.37–1.94)
LAMBDA FREE LIGHT CHAINS QNT: 11.99 MG/DL (ref 0.57–2.63)
P E INTERPRETATION, U: NORMAL
PATHOLOGIST: NORMAL
POTASSIUM SERPL-SCNC: 3.7 MMOL/L (ref 3.7–5.3)
SODIUM BLD-SCNC: 136 MMOL/L (ref 135–144)
SPECIMEN TYPE: NORMAL
URINE TOTAL PROTEIN: 158 MG/DL

## 2021-06-30 PROCEDURE — 6370000000 HC RX 637 (ALT 250 FOR IP): Performed by: STUDENT IN AN ORGANIZED HEALTH CARE EDUCATION/TRAINING PROGRAM

## 2021-06-30 PROCEDURE — 80048 BASIC METABOLIC PNL TOTAL CA: CPT

## 2021-06-30 PROCEDURE — 2580000003 HC RX 258: Performed by: STUDENT IN AN ORGANIZED HEALTH CARE EDUCATION/TRAINING PROGRAM

## 2021-06-30 PROCEDURE — 36415 COLL VENOUS BLD VENIPUNCTURE: CPT

## 2021-06-30 PROCEDURE — 6370000000 HC RX 637 (ALT 250 FOR IP): Performed by: INTERNAL MEDICINE

## 2021-06-30 PROCEDURE — 82947 ASSAY GLUCOSE BLOOD QUANT: CPT

## 2021-06-30 PROCEDURE — 6360000002 HC RX W HCPCS: Performed by: STUDENT IN AN ORGANIZED HEALTH CARE EDUCATION/TRAINING PROGRAM

## 2021-06-30 PROCEDURE — 99239 HOSP IP/OBS DSCHRG MGMT >30: CPT | Performed by: FAMILY MEDICINE

## 2021-06-30 PROCEDURE — 99232 SBSQ HOSP IP/OBS MODERATE 35: CPT | Performed by: INTERNAL MEDICINE

## 2021-06-30 PROCEDURE — 1200000000 HC SEMI PRIVATE

## 2021-06-30 RX ADMIN — ATORVASTATIN CALCIUM 20 MG: 20 TABLET, FILM COATED ORAL at 09:36

## 2021-06-30 RX ADMIN — METOPROLOL TARTRATE 50 MG: 50 TABLET, FILM COATED ORAL at 09:36

## 2021-06-30 RX ADMIN — HEPARIN SODIUM 5000 UNITS: 5000 INJECTION INTRAVENOUS; SUBCUTANEOUS at 15:00

## 2021-06-30 RX ADMIN — INSULIN LISPRO 5 UNITS: 100 INJECTION, SOLUTION INTRAVENOUS; SUBCUTANEOUS at 21:03

## 2021-06-30 RX ADMIN — INSULIN LISPRO 4 UNITS: 100 INJECTION, SOLUTION INTRAVENOUS; SUBCUTANEOUS at 10:30

## 2021-06-30 RX ADMIN — ISOSORBIDE MONONITRATE 30 MG: 30 TABLET ORAL at 09:36

## 2021-06-30 RX ADMIN — AMLODIPINE BESYLATE 10 MG: 10 TABLET ORAL at 09:36

## 2021-06-30 RX ADMIN — SODIUM CHLORIDE, PRESERVATIVE FREE 10 ML: 5 INJECTION INTRAVENOUS at 09:36

## 2021-06-30 RX ADMIN — GABAPENTIN 100 MG: 100 CAPSULE ORAL at 21:02

## 2021-06-30 RX ADMIN — SODIUM BICARBONATE 1300 MG: 648 TABLET ORAL at 09:34

## 2021-06-30 RX ADMIN — METOPROLOL TARTRATE 50 MG: 50 TABLET, FILM COATED ORAL at 21:01

## 2021-06-30 RX ADMIN — DIPHENHYDRAMINE HCL 25 MG: 25 TABLET ORAL at 21:01

## 2021-06-30 RX ADMIN — HEPARIN SODIUM 5000 UNITS: 5000 INJECTION INTRAVENOUS; SUBCUTANEOUS at 21:03

## 2021-06-30 RX ADMIN — SODIUM CHLORIDE, PRESERVATIVE FREE 10 ML: 5 INJECTION INTRAVENOUS at 21:02

## 2021-06-30 RX ADMIN — HEPARIN SODIUM 5000 UNITS: 5000 INJECTION INTRAVENOUS; SUBCUTANEOUS at 05:28

## 2021-06-30 RX ADMIN — INSULIN LISPRO 8 UNITS: 100 INJECTION, SOLUTION INTRAVENOUS; SUBCUTANEOUS at 17:34

## 2021-06-30 RX ADMIN — PREDNISONE 40 MG: 20 TABLET ORAL at 09:35

## 2021-06-30 RX ADMIN — INSULIN GLARGINE 30 UNITS: 100 INJECTION, SOLUTION SUBCUTANEOUS at 21:03

## 2021-06-30 RX ADMIN — PANTOPRAZOLE SODIUM 40 MG: 40 TABLET, DELAYED RELEASE ORAL at 05:28

## 2021-06-30 RX ADMIN — GABAPENTIN 100 MG: 100 CAPSULE ORAL at 09:36

## 2021-06-30 RX ADMIN — INSULIN LISPRO 6 UNITS: 100 INJECTION, SOLUTION INTRAVENOUS; SUBCUTANEOUS at 13:10

## 2021-06-30 RX ADMIN — GABAPENTIN 100 MG: 100 CAPSULE ORAL at 15:00

## 2021-06-30 RX ADMIN — ASPIRIN 81 MG: 81 TABLET, CHEWABLE ORAL at 09:36

## 2021-06-30 ASSESSMENT — ENCOUNTER SYMPTOMS
COLOR CHANGE: 0
DIARRHEA: 0
CONSTIPATION: 0
SINUS PAIN: 0
SINUS PRESSURE: 0
ABDOMINAL PAIN: 0
WHEEZING: 0
SHORTNESS OF BREATH: 0
VOMITING: 0
BLOOD IN STOOL: 0
COUGH: 0
NAUSEA: 0
BACK PAIN: 0

## 2021-06-30 ASSESSMENT — PAIN SCALES - GENERAL
PAINLEVEL_OUTOF10: 0
PAINLEVEL_OUTOF10: 0

## 2021-06-30 NOTE — FLOWSHEET NOTE
SPIRITUAL CARE DEPARTMENT - [address]  PROGRESS NOTE    Shift date: 6.29.2021  Shift day: Tuesday   Shift # 2    Room # 0318/0318-01   Name: Justin Ortiz            Age: 61 y.o. Gender: male          Zoroastrianism: [address] [place]: unknown    Referral: Routine Visit    Admit Date & Time: 6/26/2021 12:04 PM    PATIENT/EVENT DESCRIPTION:  Justin Ortiz is a 61 y.o. male who has kidney issues      SPIRITUAL ASSESSMENT/INTERVENTION:  Patient seems to be somewhat concerned and wrestling with his recent diagnosis, treatment, and condition. Patient questions whether or not he is truly in need of dialysis but then acknowledges that he has health issues and believes it to be related. Patient suggests that initially he thought dialysis would be short-term but now thinks it will be a permanent thing. Patient seems to be associating his condition as a sign of him dying. Patient states that he has several family member who undergo dialysis. Patient is attempting to lean upon his jhonathan to cope with the situation and was receptive to prayer.  provided space for patient to express feelings, thoughts, and concerns. Discussed jhonathan in the midst of suffering. Prayed with patient. SPIRITUAL CARE FOLLOW-UP PLAN:  Chaplains will remain available to offer spiritual and emotional support as needed. Electronically signed by Fidencio Chávez on 6/30/2021 at 12:44 AM.  Sonido Strickland  [phone]       06/29/21 2300   Encounter Summary   Services provided to: Patient   Referral/Consult From: [facility] Family members   Continue Visiting   ([phone])   Complexity of Encounter Moderate   Length of Encounter 30 minutes   Spiritual Assessment Completed Yes   Routine   Type Initial   Assessment Approachable; Anxious   Intervention Active listening;Explored feelings, thoughts, concerns;Explored coping resources; Discussed illness/injury and it's impact;Prayer;Discussed belief system/Rastafarian practices/jhonathan;Sustaining presence/ Ministry of presence   Outcome Expressed gratitude;Expressed feelings/needs/concerns;Engaged in conversation     Electronically signed by Anam Hutchins on 6/30/2021 at 12:44 AM

## 2021-06-30 NOTE — PLAN OF CARE
Problem: Skin Integrity:  Goal: Will show no infection signs and symptoms  Description: Will show no infection signs and symptoms  6/30/2021 1751 by Karime Laura  Outcome: Ongoing  6/30/2021 0432 by Tim Manrique RN  Outcome: Ongoing  Goal: Absence of new skin breakdown  Description: Absence of new skin breakdown  6/30/2021 1751 by Karime Laura  Outcome: Ongoing  6/30/2021 0432 by Tim Manrique RN  Outcome: Ongoing     Problem: Falls - Risk of:  Goal: Will remain free from falls  Description: Will remain free from falls  6/30/2021 1751 by Karime Laura  Outcome: Ongoing  6/30/2021 0432 by Tim Manrique RN  Outcome: Ongoing  Goal: Absence of physical injury  Description: Absence of physical injury  6/30/2021 1751 by Karime Laura  Outcome: Ongoing  6/30/2021 0432 by Tim Manrique RN  Outcome: Ongoing     Problem: Pain:  Goal: Pain level will decrease  Description: Pain level will decrease  6/30/2021 1751 by Karime Laura  Outcome: Ongoing  6/30/2021 0432 by Tim Manrique RN  Outcome: Ongoing  Goal: Control of acute pain  Description: Control of acute pain  6/30/2021 1751 by Karime Laura  Outcome: Ongoing  6/30/2021 0432 by Tim Manrique RN  Outcome: Ongoing  Goal: Control of chronic pain  Description: Control of chronic pain  6/30/2021 1751 by Karime Laura  Outcome: Ongoing  6/30/2021 0432 by Tim Manrique RN  Outcome: Ongoing     Problem: Musculor/Skeletal Functional Status  Goal: Highest potential functional level  6/30/2021 1751 by Karime Laura  Outcome: Ongoing  6/30/2021 0432 by Tim Manrique RN  Outcome: Ongoing  Goal: Absence of falls  6/30/2021 1751 by Karime Laura  Outcome: Ongoing  6/30/2021 0432 by Tim Manrique RN  Outcome: Ongoing

## 2021-06-30 NOTE — PROGRESS NOTES
[facility]  Progress Note    Date:   6/30/2021  Patient name:  Esperanza Rankin  Date of admission:  6/26/2021 12:04 PM  MRN:   [id number]  YOB: 1960    SUBJECTIVE/Last 24 hours update:     Day 4 Hospitalization:     Patient was seen and examined by the bedside. Patient was eating breakfast, no complaints mentioned by the patient. Patient says that the pain in the left side of the ankle is completely gone. Patient remained afebrile and vitally stable, patient is hypertensive, normotensive when evaluated in the morning. Patient will have another dialysis session today, once cleared by the nephrology patient most likely will be discharged today. REVIEW OF SYSTEMS:      Review of Systems   Constitutional: Negative for chills and fever. HENT: Negative for congestion, sinus pressure and sinus pain. Respiratory: Negative for cough, shortness of breath and wheezing. Cardiovascular: Negative for chest pain, palpitations and leg swelling. Gastrointestinal: Negative for abdominal pain, blood in stool, constipation, diarrhea, nausea and vomiting. Genitourinary: Negative for difficulty urinating, flank pain and hematuria. Musculoskeletal: Negative for arthralgias, back pain and myalgias. Skin: Negative for color change and wound. Neurological: Negative for dizziness, light-headedness and headaches. Psychiatric/Behavioral: Negative for agitation and confusion. PAST MEDICAL HISTORY:      has a past medical history of ADHD (attention deficit hyperactivity disorder), Depression, DM2 (diabetes mellitus, type 2) ([facility] [address] Erectile dysfunction, Gastroesophageal reflux disease, Hyperlipidemia, Hypertension, Kidney stone, Low back pain of thoracolumbar region with sciatica, and Neuropathy. PAST SURGICAL HISTORY:      has a past surgical history that includes Tonsillectomy;  Cystoscopy (07/20/2018); pr cystoscopy,insert ureteral stent (Bilateral, 7/20/2018); Cystocopy (08/03/2018); pr cysto/uretero/pyeloscopy, calculus tx (Bilateral, 8/3/2018); Upper gastrointestinal endoscopy (N/A, 12/7/2020); Colonoscopy (N/A, 12/7/2020); and IR TUNNELED CVC PLACE WO SQ PORT/PUMP > 5 YEARS (6/28/2021). SOCIALHISTORY:      reports that he quit smoking about 2 years ago. His smoking use included cigarettes. He has a 15.00 pack-year smoking history. He has never used smokeless tobacco. He reports current alcohol use of about 8.0 standard drinks of alcohol per week. He reports that he does not use drugs. FAMILY HISTORY:      family history includes Diabetes in his mother; High Blood Pressure in his father. HOME MEDICATIONS:      Prior to Admission medications    Medication Sig Start Date End Date Taking? Authorizing Provider   predniSONE (DELTASONE) 20 MG tablet Take 2 tablets by mouth daily for 10 days 6/29/21 7/9/21 Yes Arsenio Torres MD   polyethylene glycol (GLYCOLAX) 17 g packet Take 17 g by mouth daily as needed for Constipation 6/29/21 7/29/21 Yes Arsenio Torres MD   epoetin louisa-epbx (RETACRIT) 4000 UNIT/ML SOLN injection Inject 2 mLs into the skin three times a week 6/30/21  Yes Arsenio Torres MD   hydrALAZINE (APRESOLINE) 25 MG tablet Take 1 tablet by mouth every 8 hours 6/29/21  Yes Arsenio Torres MD   insulin glargine (LANTUS SOLOSTAR) 100 UNIT/ML injection pen Inject 30 Units into the skin nightly 6/29/21 6/24/22 Yes Arsenio Torres MD   Insulin Pen Needle ([employer] PEN NEEDLES) 31G X 6 MM MISC 1 each by Does not apply route daily 6/29/21  Yes Arsenio Torres MD   amLODIPine (NORVASC) 10 MG tablet TAKE 1 TABLET BY MOUTH ONCE DAILY.  5/25/21   Patrick Angel MD   gabapentin (NEURONTIN) 100 MG capsule TAKE 1 CAPSULE BY MOUTH 3 TIMES A DAY. 3/11/21 9/11/21  Riccardo Datt, MD   acetaminophen (TYLENOL) 325 MG tablet TAKE 2 TABLET BY MOUTH EVERY 6 HOURS AS NEEDED FOR PAIN 3/11/21   Suzan MedicMD ariadna   isosorbide mononitrate (IMDUR) 30 MG extended release tablet Take 1 tablet by mouth daily 12/30/20   Cris Ravi MD   Insulin Pen Needle 32G X 4 MM MISC 1 each by Does not apply route daily 12/14/20   Cris Ravi MD   pantoprazole (PROTONIX) 40 MG tablet Take 1 tablet by mouth 2 times daily (before meals) 12/7/20   General Wally MD   metoprolol tartrate (LOPRESSOR) 50 MG tablet Take 1 tablet by mouth 2 times daily 12/7/20   General Wally MD   metoprolol tartrate (LOPRESSOR) 50 MG tablet Take 1 tablet by mouth 2 times daily 8/28/20   Yasemin Fonseca MD   SENNA-PLUS 8.6-50 MG per tablet Take 1 tablet by mouth daily 1/14/20   Asia Albert MD   aspirin 81 MG chewable tablet Take 1 tablet by mouth daily 1/14/20   Asia Albert MD   atorvastatin (LIPITOR) 20 MG tablet Take 1 tablet by mouth daily 1/14/20   Asia Albert MD   docusate sodium (COLACE) 100 MG capsule Take 1 capsule by mouth 2 times daily as needed for Constipation 1/14/20   Asia Albert MD   blood glucose monitor kit and supplies Test 2 times a day & as needed for symptoms of irregular blood glucose. 1/14/20   Asia Albert MD   Lancets MISC 1 each by Does not apply route 2 times daily 1/14/20   Asia Albert MD   blood glucose monitor strips Test 2 times a day & as needed for symptoms of irregular blood glucose. 1/14/20   Asia Albert MD       ALLERGIES:     Patient has no known allergies. OBJECTIVE:       Vitals:    06/29/21 1212 06/29/21 1215 06/29/21 1240 06/29/21 1948   BP: (!) 166/99 (!) 160/91 (!) 165/85 130/68   Pulse: 103 107 89 82   Resp:  18  18   Temp: 96.8 °F (36 °C) 96.8 °F (36 °C)  98.1 °F (36.7 °C)   TempSrc:    Oral   SpO2:    95%   Weight:  216 lb 0.8 oz (98 kg)     Height:             Intake/Output Summary (Last 24 hours) at 6/30/2021 0824  Last data filed at 6/30/2021 2877  Gross per 24 hour   Intake 780 ml   Output 2825 ml   Net -2045 ml       PHYSICAL EXAM:    Physical Exam  Vitals and nursing note reviewed.    Constitutional: Appearance: Normal appearance. HENT:      Head: Normocephalic and atraumatic. Mouth/Throat:      Mouth: Mucous membranes are moist.   Eyes:      Conjunctiva/sclera: Conjunctivae normal.   Cardiovascular:      Rate and Rhythm: Normal rate and regular rhythm. Pulmonary:      Effort: Pulmonary effort is normal.      Breath sounds: Normal breath sounds. Abdominal:      General: Bowel sounds are normal. There is no distension. Palpations: Abdomen is soft. There is no mass. Tenderness: There is no abdominal tenderness. There is no guarding. Musculoskeletal:      Right lower leg: No edema. Left lower leg: No edema. Neurological:      General: No focal deficit present. Mental Status: He is alert and oriented to person, place, and time. Psychiatric:         Mood and Affect: Mood normal.         Behavior: Behavior normal.         ASSESSMENT:      Principal Problem:    Acute kidney injury superimposed on CKD ([facility] [address]  Active Problems:    DM2 (diabetes mellitus, type 2) ([facility])    Essential hypertension    BMI 31.0-31.9,adult    JEFFREY (acute kidney injury) ([facility] [address]  Resolved Problems:    * No resolved hospital problems. *      PLAN:     1.  Left leg pain and swelling likely secondary to gout likely secondary to HCTZ:  - WBC: WNL, x-ray shows diffuse soft tissue swelling without any acute process, Doppler scan did not show any superficial or deep DVTs. - Uric acid: 10.2  - HCTZ held, currently on prednisone 40 mg PO - Day 4       2. Low Hb:   - Hb: 7.6 6/28  -Ferritin 312, Retic count 1.4%, Fe 39, TIBC 170   - We will transfuse if hemoglobin is less than 7.     3.  JEFFREY on CKD stage III, nondialysis:  - Cr: 6.85 ---> 4.53 (T)   - Renal US unremarkable. -Patient received 2 dialysis while in the hospital, dialysis schedule on 6/30/2021.   Nephrology on board patient will discharge once cleared by the nephrologist.     3. DM2:   -HbA1c: 8.5  - Lantus 30 units at night  - NILDA with hypoglycemic protocol in place   -To decrease creatinine clearance will discontinue Metformin and start patient on Lantus as an outpatient.     4. Essential Hypertension:  -Continue Norvasc 10 mg p.o. daily, Lopressor 50 mg twice daily, HCTZ held due to concerns of elevated creatinine with gout, Ranexa 500 mg twice daily held due to creatinine clearance.     -DVT prophylaxis: Heparin 5000 unit 3 times daily.   -GI prophylaxis: Protonix 40 mg.      Discussed care plan with nurse after getting her input.     Above plan discussed with the patient in room, who agreed to the above plan      Plan will be discussed with the attending, Dr. Ethan Varma MD  Family Medicine Resident  6/30/2021 8:24 AM

## 2021-06-30 NOTE — PROGRESS NOTES
Renal Progress Note    Patient :  Patrick Arango; 61 y.o. MRN# [id number]  Location:  0318/0318-01  Attending:  Mariana Jones DO  Admit Date:  6/26/2021   Hospital Day: 4      Subjective:     Patient was seen and examined. No new issues reported overnight. Had 2 sessions of hemodialysis so far. Has tunneled catheter in place. Overall feels fine. No chest pain shortness of breath. Appetite decent.  working toward setting up outpatient hemodialysis spot likely at [email or web address]. Outpatient Medications:     Medications Prior to Admission: amLODIPine (NORVASC) 10 MG tablet, TAKE 1 TABLET BY MOUTH ONCE DAILY. gabapentin (NEURONTIN) 100 MG capsule, TAKE 1 CAPSULE BY MOUTH 3 TIMES A DAY.   acetaminophen (TYLENOL) 325 MG tablet, TAKE 2 TABLET BY MOUTH EVERY 6 HOURS AS NEEDED FOR PAIN  isosorbide mononitrate (IMDUR) 30 MG extended release tablet, Take 1 tablet by mouth daily  [DISCONTINUED] ranolazine (RANEXA) 500 MG extended release tablet, Take 1 tablet by mouth 2 times daily  Insulin Pen Needle 32G X 4 MM MISC, 1 each by Does not apply route daily  [DISCONTINUED] insulin glargine (LANTUS SOLOSTAR) 100 UNIT/ML injection pen, Inject 15 Units into the skin nightly  [DISCONTINUED] bismuth-metronidazole-tetracycline (PLYERA) 140-125-125 MG per capsule, Take 3 capsules by mouth 4 times daily (before meals and nightly) for 10 days  [DISCONTINUED] metFORMIN (GLUCOPHAGE) 500 MG tablet, Take 2 tablets by mouth 2 times daily (with meals)  pantoprazole (PROTONIX) 40 MG tablet, Take 1 tablet by mouth 2 times daily (before meals)  metoprolol tartrate (LOPRESSOR) 50 MG tablet, Take 1 tablet by mouth 2 times daily  [DISCONTINUED] atorvastatin (LIPITOR) 20 MG tablet, Take 1 tablet by mouth daily  [DISCONTINUED] pantoprazole (PROTONIX) 40 MG tablet, Take 1 tablet by mouth 2 times daily (before meals)  metoprolol tartrate (LOPRESSOR) 50 MG tablet, Take 1 tablet by mouth 2 times daily  SENNA-PLUS 8.6-50 MG per tablet, Take 1 tablet by mouth daily  aspirin 81 MG chewable tablet, Take 1 tablet by mouth daily  atorvastatin (LIPITOR) 20 MG tablet, Take 1 tablet by mouth daily  docusate sodium (COLACE) 100 MG capsule, Take 1 capsule by mouth 2 times daily as needed for Constipation  blood glucose monitor kit and supplies, Test 2 times a day & as needed for symptoms of irregular blood glucose. Lancets MISC, 1 each by Does not apply route 2 times daily  blood glucose monitor strips, Test 2 times a day & as needed for symptoms of irregular blood glucose.   [DISCONTINUED] hydrochlorothiazide (HYDRODIURIL) 25 MG tablet, Take 1 tablet by mouth daily  [DISCONTINUED] oxybutynin (DITROPAN-XL) 10 MG extended release tablet, Take 1 tablet by mouth daily    Current Medications:     Scheduled Meds:    insulin glargine  30 Units Subcutaneous Nightly    epoetin louisa-epbx  8,000 Units Intravenous Once per day on Mon Wed Fri    insulin lispro  0-12 Units Subcutaneous TID WC    insulin lispro  0-6 Units Subcutaneous Nightly    predniSONE  40 mg Oral Daily    [Held by provider] hydrALAZINE  25 mg Oral 3 times per day    pantoprazole  40 mg Oral QAM AC    amLODIPine  10 mg Oral Daily    atorvastatin  20 mg Oral Daily    aspirin  81 mg Oral Daily    gabapentin  100 mg Oral TID    isosorbide mononitrate  30 mg Oral Daily    metoprolol tartrate  50 mg Oral BID    [Held by provider] oxybutynin  10 mg Oral Daily    sodium chloride flush  5-40 mL Intravenous 2 times per day    heparin (porcine)  5,000 Units Subcutaneous 3 times per day     Continuous Infusions:    sodium chloride      dextrose       PRN Meds:  sodium chloride, sodium chloride, dextromethorphan-guaiFENesin, heparin (porcine), heparin (porcine), diphenhydrAMINE, sodium chloride flush, sodium chloride, ondansetron **OR** ondansetron, polyethylene glycol, acetaminophen **OR** acetaminophen, glucose, dextrose, glucagon (rDNA), dextrose, benzonatate    Input/Output: I/O last 3 completed shifts: In: 780 [P.O.:480]  Out: 2825 [Urine:2325]. Patient Vitals for the past 96 hrs (Last 3 readings):   Weight   21 1215 216 lb 0.8 oz (98 kg)   21 0945 216 lb 7.9 oz (98.2 kg)   21 1736 217 lb 9.5 oz (98.7 kg)       Vital Signs:   Temperature:  Temp: 97.9 °F (36.6 °C)  TMax:   Temp (24hrs), Av.2 °F (36.2 °C), Min:96.8 °F (36 °C), Max:98.1 °F (36.7 °C)    Respirations:  Resp: 18  Pulse:   Pulse: 78  BP:    BP: 137/63  BP Range: Systolic (53AFO), EFO:863 , Min:130 , FGA:370       Diastolic (28IRL), YFX:73, Min:63, Max:99      Physical Examination:     General:  AAO x 3, speaking in full sentences, no accessory muscle use. HEENT: Atraumatic, normocephalic, no throat congestion, moist mucosa. Eyes:   Pupils equal, round and reactive to light, EOMI. Neck:   Supple  Chest:   Bilateral vesicular breath sounds, no rales or wheezes. Cardiac:  S1 S2 RR, no murmurs, gallops or rubs. Abdomen: Soft, non-tender, no masses or organomegaly, BS audible. :   No suprapubic or flank tenderness. Neuro:  AAO x 3, No FND. SKIN:  No rashes, good skin turgor. Extremities:  Trace edema. Labs:       Recent Labs     21  0545   WBC 7.4   RBC 2.59*   HGB 7.6*   HCT 24.3*   MCV 93.8   MCH 29.3   MCHC 31.3   RDW 12.9      MPV 9.9      BMP:   Recent Labs     21  0545 21  0446 21  0534   * 136 136   K 4.3 4.3 3.7    99 96*   CO2 16* 19* 26   BUN 57* 54* 49*   CREATININE 5.83* 5.63* 4.53*   GLUCOSE 304* 393* 273*   CALCIUM 7.4* 7.6* 7.8*      Albumin:    No results for input(s): LABALBU in the last 72 hours.   AMBROSE:      Lab Results   Component Value Date    AMBROSE NEGATIVE 2021     SPEP:  Lab Results   Component Value Date    PROT 6.5 2021    ALBCAL 2.9 2021    ALBPCT 44 2021    LABALPH 0.2 2021    LABALPH 1.1 2021    A1PCT 4 2021    A2PCT 18 2021    LABBETA 1.1 2021    BETAPCT 17 continue to follow along with you. Radha Kamara MD MD, [facility] Audra Vo, [facility]   6/30/2021 11:16 AM        This note is created with the assistance of a speech-recognition program. While intending to generate a document that actually reflects the content of the visit, no guarantees can be provided that every mistake has been identified and corrected by editing.

## 2021-06-30 NOTE — CARE COORDINATION
NOE left voicemail for Elan at [facility] to inquire regarding referral status. Await return call. Lit Torres 134 placed follow up call to [facility] Admissions. Patient referral still pending financial and medical clearance. [facility] coordinator will follow up with an update.

## 2021-06-30 NOTE — CARE COORDINATION
NOE received call from Elan at [employer] [facility] [employer]. Patient confirmed MWF at 1:00pm beginning 7/2/21 at [facility] [place]. SW notified Uriel Esteban of information as well as dialysis charge RN. NOE contacted [facility]. Dialysis transportation scheduled beginning 7/2/21. Confirmation #[id number]. Patient has rides scheduled until 7/26/21. AVS updated. [address] provided letter with OP dialysis information for patient. Patient agreeable to time and schedule, denies concerns at this time.

## 2021-07-01 VITALS
HEART RATE: 76 BPM | WEIGHT: 214.51 LBS | DIASTOLIC BLOOD PRESSURE: 76 MMHG | HEIGHT: 71 IN | TEMPERATURE: 98.8 F | OXYGEN SATURATION: 100 % | BODY MASS INDEX: 30.03 KG/M2 | RESPIRATION RATE: 19 BRPM | SYSTOLIC BLOOD PRESSURE: 138 MMHG

## 2021-07-01 LAB
ANION GAP SERPL CALCULATED.3IONS-SCNC: 20 MMOL/L (ref 9–17)
BUN BLDV-MCNC: 63 MG/DL (ref 8–23)
BUN/CREAT BLD: ABNORMAL (ref 9–20)
CALCIUM SERPL-MCNC: 8.5 MG/DL (ref 8.6–10.4)
CHLORIDE BLD-SCNC: 101 MMOL/L (ref 98–107)
CO2: 21 MMOL/L (ref 20–31)
CREAT SERPL-MCNC: 5.34 MG/DL (ref 0.7–1.2)
GFR AFRICAN AMERICAN: 13 ML/MIN
GFR NON-AFRICAN AMERICAN: 11 ML/MIN
GFR SERPL CREATININE-BSD FRML MDRD: ABNORMAL ML/MIN/{1.73_M2}
GFR SERPL CREATININE-BSD FRML MDRD: ABNORMAL ML/MIN/{1.73_M2}
GLUCOSE BLD-MCNC: 188 MG/DL (ref 75–110)
GLUCOSE BLD-MCNC: 210 MG/DL (ref 70–99)
POTASSIUM SERPL-SCNC: 4.2 MMOL/L (ref 3.7–5.3)
SODIUM BLD-SCNC: 142 MMOL/L (ref 135–144)

## 2021-07-01 PROCEDURE — 6370000000 HC RX 637 (ALT 250 FOR IP): Performed by: STUDENT IN AN ORGANIZED HEALTH CARE EDUCATION/TRAINING PROGRAM

## 2021-07-01 PROCEDURE — 90935 HEMODIALYSIS ONE EVALUATION: CPT

## 2021-07-01 PROCEDURE — G0108 DIAB MANAGE TRN  PER INDIV: HCPCS

## 2021-07-01 PROCEDURE — 90935 HEMODIALYSIS ONE EVALUATION: CPT | Performed by: INTERNAL MEDICINE

## 2021-07-01 PROCEDURE — 2580000003 HC RX 258: Performed by: STUDENT IN AN ORGANIZED HEALTH CARE EDUCATION/TRAINING PROGRAM

## 2021-07-01 PROCEDURE — 80048 BASIC METABOLIC PNL TOTAL CA: CPT

## 2021-07-01 PROCEDURE — 82947 ASSAY GLUCOSE BLOOD QUANT: CPT

## 2021-07-01 PROCEDURE — 6360000002 HC RX W HCPCS: Performed by: INTERNAL MEDICINE

## 2021-07-01 PROCEDURE — 36415 COLL VENOUS BLD VENIPUNCTURE: CPT

## 2021-07-01 PROCEDURE — 6360000002 HC RX W HCPCS: Performed by: STUDENT IN AN ORGANIZED HEALTH CARE EDUCATION/TRAINING PROGRAM

## 2021-07-01 RX ORDER — LISINOPRIL 10 MG/1
10 TABLET ORAL DAILY
Qty: 30 TABLET | Refills: 0 | Status: SHIPPED | OUTPATIENT
Start: 2021-07-02 | End: 2021-07-27

## 2021-07-01 RX ORDER — INSULIN GLARGINE 100 [IU]/ML
10 INJECTION, SOLUTION SUBCUTANEOUS ONCE
Status: COMPLETED | OUTPATIENT
Start: 2021-07-01 | End: 2021-07-01

## 2021-07-01 RX ORDER — LISINOPRIL 10 MG/1
10 TABLET ORAL DAILY
Status: DISCONTINUED | OUTPATIENT
Start: 2021-07-01 | End: 2021-07-01 | Stop reason: HOSPADM

## 2021-07-01 RX ADMIN — ASPIRIN 81 MG: 81 TABLET, CHEWABLE ORAL at 08:19

## 2021-07-01 RX ADMIN — HEPARIN SODIUM 1600 UNITS: 1000 INJECTION INTRAVENOUS; SUBCUTANEOUS at 13:48

## 2021-07-01 RX ADMIN — PREDNISONE 40 MG: 20 TABLET ORAL at 08:19

## 2021-07-01 RX ADMIN — GABAPENTIN 100 MG: 100 CAPSULE ORAL at 08:20

## 2021-07-01 RX ADMIN — PANTOPRAZOLE SODIUM 40 MG: 40 TABLET, DELAYED RELEASE ORAL at 06:09

## 2021-07-01 RX ADMIN — LISINOPRIL 10 MG: 10 TABLET ORAL at 08:19

## 2021-07-01 RX ADMIN — SODIUM CHLORIDE, PRESERVATIVE FREE 10 ML: 5 INJECTION INTRAVENOUS at 08:19

## 2021-07-01 RX ADMIN — METOPROLOL TARTRATE 50 MG: 50 TABLET, FILM COATED ORAL at 08:19

## 2021-07-01 RX ADMIN — AMLODIPINE BESYLATE 10 MG: 10 TABLET ORAL at 08:19

## 2021-07-01 RX ADMIN — ISOSORBIDE MONONITRATE 30 MG: 30 TABLET ORAL at 08:19

## 2021-07-01 RX ADMIN — ATORVASTATIN CALCIUM 20 MG: 20 TABLET, FILM COATED ORAL at 08:20

## 2021-07-01 RX ADMIN — INSULIN LISPRO 2 UNITS: 100 INJECTION, SOLUTION INTRAVENOUS; SUBCUTANEOUS at 08:21

## 2021-07-01 RX ADMIN — HEPARIN SODIUM 5000 UNITS: 5000 INJECTION INTRAVENOUS; SUBCUTANEOUS at 06:09

## 2021-07-01 RX ADMIN — INSULIN GLARGINE 10 UNITS: 100 INJECTION, SOLUTION SUBCUTANEOUS at 09:41

## 2021-07-01 ASSESSMENT — ENCOUNTER SYMPTOMS
SINUS PRESSURE: 0
SHORTNESS OF BREATH: 0
WHEEZING: 0
DIARRHEA: 0
SINUS PAIN: 0
NAUSEA: 0
BACK PAIN: 0
COUGH: 0
COLOR CHANGE: 0
VOMITING: 0
BLOOD IN STOOL: 0
CONSTIPATION: 0
ABDOMINAL PAIN: 0

## 2021-07-01 ASSESSMENT — PAIN SCALES - GENERAL
PAINLEVEL_OUTOF10: 0
PAINLEVEL_OUTOF10: 0

## 2021-07-01 NOTE — CARE COORDINATION
Transitional planning. SW has confirmed pt has HD placement at [facility] [address], M-W-F    [address] to [employer], HD nurse at [facility] [address], he states they will take over anemia management once pt starts their center. 1320Informed Dr. Ben Ho through PS, he will dc Retacrit and complete med rec again. 1430 [facility] he wants to think about it. Pt is new to Insulin Pen administration. 1500 Diabetic educator saw pt, instructed him on use of insulin pen    1515 pt asked for [facility]. to help reinforce diabetic education, discussed [address] with pt. Referrals sent to [facility] and [address] per pt's freedom of Choice. [phone] to Romi Arboleda with [facility], they do not have staffing for weekend, wound not be able to see pt until next Tuesday. [address] to Rene Lepe with [facility]. They will need auth from pt's insurance, she will call back in about a 1/2 hour. 1542 pt refuses to wait any longer. He asked CM to call him if home care company accepts him. He states he thinks he can give his own insulin injections and refuses help with transportation home, stating he has a cousin that lives a couple of blocks away and he plans on walking to his home. [address] with [facility] called, they are not able to take pt at this time. 1631 LM for pt asking him to call CM @ [phone]. Will inform him of [facility]. denial and ask if he would like additional referrals sent when he returns call. [address]- they do not take [facility], called [facility] they do not take pt's insurance    [phone] to Jay Hyde with [facility] at home, she will ask admissions if they take [employer] and have them call CM back. Number for PhyllisAllyn Yunior [facility] is not in service. [address] with [facility] to ask if they would take pt, even though they can not see him this weekend. She will check staffing and call CM back.  Pt still has not

## 2021-07-01 NOTE — PROGRESS NOTES
Pt states he's hoping for visiting nurse for doing his insulin. Currently anxious to leave. Lives alone but says he has people around him that help him. Writer taught him use of insulin pen with supplies he received from meds to beds and demo materials. Told nurse he had done vial/syringe in past. Pt has poor eyesight and dexterity not 100%. He says he has readers at home he can use. Showed procedure for pen, sites to inject and reiterated insulin dose and how it works. Had pt teachback to writer but he needed reminders. Reported such to nurse and  and encouraged vns. Pt given writer name and number to call should questions arise.  Rupal Orourke, RD, LD

## 2021-07-01 NOTE — PROGRESS NOTES
CLINICAL PHARMACY NOTE: MEDS TO BEDS    Total # of Prescriptions Filled: 7   The following medications were delivered to the patient:  · Lisinopril 10 mg   · Prednisone 20 mg tab  · Polyethylene glycol 17 gr powder  · Hydralazine 25 mg tab  · lantus solo star insulin pen  · Pen needles 31 g / 6mm    Additional Documentation:Medications delivered to the patient in room 318.

## 2021-07-01 NOTE — PROGRESS NOTES
Renal Progress Note    Patient :  Mich Ordoñez; 61 y.o. MRN# [id number]  Location:  0318/0318-01  Attending:  Olayinka Deal DO  Admit Date:  6/26/2021   Hospital Day: 5      Subjective:     Patient was seen and examined on HD. Tolerating the procedure well. No new issues reported overnight. Patient is running today as day 3. Patient did get tunneled catheter placed in on 6/28/2021.  did set up patient at [facility] hemodialysis unit on MWF schedule. Outpatient Medications:     Medications Prior to Admission: amLODIPine (NORVASC) 10 MG tablet, TAKE 1 TABLET BY MOUTH ONCE DAILY. gabapentin (NEURONTIN) 100 MG capsule, TAKE 1 CAPSULE BY MOUTH 3 TIMES A DAY.   acetaminophen (TYLENOL) 325 MG tablet, TAKE 2 TABLET BY MOUTH EVERY 6 HOURS AS NEEDED FOR PAIN  isosorbide mononitrate (IMDUR) 30 MG extended release tablet, Take 1 tablet by mouth daily  [DISCONTINUED] ranolazine (RANEXA) 500 MG extended release tablet, Take 1 tablet by mouth 2 times daily  Insulin Pen Needle 32G X 4 MM MISC, 1 each by Does not apply route daily  [DISCONTINUED] insulin glargine (LANTUS SOLOSTAR) 100 UNIT/ML injection pen, Inject 15 Units into the skin nightly  [DISCONTINUED] bismuth-metronidazole-tetracycline (PLYERA) 140-125-125 MG per capsule, Take 3 capsules by mouth 4 times daily (before meals and nightly) for 10 days  [DISCONTINUED] metFORMIN (GLUCOPHAGE) 500 MG tablet, Take 2 tablets by mouth 2 times daily (with meals)  pantoprazole (PROTONIX) 40 MG tablet, Take 1 tablet by mouth 2 times daily (before meals)  metoprolol tartrate (LOPRESSOR) 50 MG tablet, Take 1 tablet by mouth 2 times daily  [DISCONTINUED] atorvastatin (LIPITOR) 20 MG tablet, Take 1 tablet by mouth daily  [DISCONTINUED] pantoprazole (PROTONIX) 40 MG tablet, Take 1 tablet by mouth 2 times daily (before meals)  metoprolol tartrate (LOPRESSOR) 50 MG tablet, Take 1 tablet by mouth 2 times daily  SENNA-PLUS 8.6-50 MG per tablet, Take 1 tablet by mouth daily  aspirin 81 MG chewable tablet, Take 1 tablet by mouth daily  atorvastatin (LIPITOR) 20 MG tablet, Take 1 tablet by mouth daily  docusate sodium (COLACE) 100 MG capsule, Take 1 capsule by mouth 2 times daily as needed for Constipation  blood glucose monitor kit and supplies, Test 2 times a day & as needed for symptoms of irregular blood glucose. Lancets MISC, 1 each by Does not apply route 2 times daily  blood glucose monitor strips, Test 2 times a day & as needed for symptoms of irregular blood glucose. [DISCONTINUED] hydrochlorothiazide (HYDRODIURIL) 25 MG tablet, Take 1 tablet by mouth daily  [DISCONTINUED] oxybutynin (DITROPAN-XL) 10 MG extended release tablet, Take 1 tablet by mouth daily    Current Medications:     Scheduled Meds:    lisinopril  10 mg Oral Daily    insulin glargine  30 Units Subcutaneous Nightly    epoetin louisa-epbx  8,000 Units Intravenous Once per day on Mon Wed Fri    insulin lispro  0-12 Units Subcutaneous TID WC    insulin lispro  0-6 Units Subcutaneous Nightly    pantoprazole  40 mg Oral QAM AC    amLODIPine  10 mg Oral Daily    atorvastatin  20 mg Oral Daily    aspirin  81 mg Oral Daily    gabapentin  100 mg Oral TID    isosorbide mononitrate  30 mg Oral Daily    metoprolol tartrate  50 mg Oral BID    [Held by provider] oxybutynin  10 mg Oral Daily    sodium chloride flush  5-40 mL Intravenous 2 times per day    heparin (porcine)  5,000 Units Subcutaneous 3 times per day     Continuous Infusions:    sodium chloride      dextrose       PRN Meds:  sodium chloride, sodium chloride, dextromethorphan-guaiFENesin, heparin (porcine), heparin (porcine), diphenhydrAMINE, sodium chloride flush, sodium chloride, ondansetron **OR** ondansetron, polyethylene glycol, acetaminophen **OR** acetaminophen, glucose, dextrose, glucagon (rDNA), dextrose, benzonatate    Input/Output:       I/O last 3 completed shifts: In: 480 [P.O.:480]  Out: 1700 [Urine:1700].       Patient Vitals for the past 96 hrs (Last 3 readings):   Weight   21 1043 220 lb 7.4 oz (100 kg)   21 1215 216 lb 0.8 oz (98 kg)   21 0945 216 lb 7.9 oz (98.2 kg)       Vital Signs:   Temperature:  Temp: 97.7 °F (36.5 °C)  TMax:   Temp (24hrs), Av °F (36.7 °C), Min:97.7 °F (36.5 °C), Max:98.6 °F (37 °C)    Respirations:  Resp: 13  Pulse:   Pulse: 86  BP:    BP: 124/78  BP Range: Systolic (05PGI), YANIRA:195 , Min:124 , YAV:837       Diastolic (18LJP), BWE:63, Min:64, Max:86      Physical Examination:     General:  AAO x 3, speaking in full sentences, no accessory muscle use. HEENT: Atraumatic, normocephalic, no throat congestion, moist mucosa. Eyes:   Pupils equal, round and reactive to light, EOMI. Neck:   Supple  Chest:   Bilateral vesicular breath sounds, no rales or wheezes. Cardiac:  S1 S2 RR, no murmurs, gallops or rubs. Abdomen: Soft, non-tender, no masses or organomegaly, BS audible. :   No suprapubic or flank tenderness. Neuro:  AAO x 3, No FND. SKIN:  No rashes, good skin turgor. Extremities:  Trace edema. Labs:       No results for input(s): WBC, RBC, HGB, HCT, MCV, MCH, MCHC, RDW, PLT, MPV in the last 72 hours. BMP:   Recent Labs     21  0446 21  0534 21  0509    136 142   K 4.3 3.7 4.2   CL 99 96* 101   CO2 19* 26 21   BUN 54* 49* 63*   CREATININE 5.63* 4.53* 5.34*   GLUCOSE 393* 273* 210*   CALCIUM 7.6* 7.8* 8.5*      AMBROSE:      Lab Results   Component Value Date    AMBROSE NEGATIVE 2021     SPEP:  Lab Results   Component Value Date    PROT 6.5 2021    ALBCAL 2.9 2021    ALBPCT 44 2021    LABALPH 0.2 2021    LABALPH 1.1 2021    A1PCT 4 2021    A2PCT 18 2021    LABBETA 1.1 2021    BETAPCT 17 2021    GAMGLOB 1.2 2021    GGPCT 18 2021    PATH ELECTRONICALLY SIGNED.  Ted Silverio M.D. 2021     UPEP:     Lab Results   Component Value Date    LABPE  2021     ELEVATED PROTEIN CONCENTRATION. MOST SERUM PROTEINS ARE DETECTED IN THIS URINE. USUALLY OBSERVED WITH MARKEDLY INCREASED NON-SELECTIVE GLOMERULAR PERMEABILITY (i.e. SEVERE GLOMERULAR DISEASE), CONTAMINATION OF     C3:     Lab Results   Component Value Date    C3 145 06/27/2021     C4:     Lab Results   Component Value Date    C4 39 06/27/2021     Hep BsAg:         Lab Results   Component Value Date    HEPBSAG NONREACTIVE 06/27/2021     Hep C AB:          Lab Results   Component Value Date    HEPCAB NONREACTIVE 06/27/2021       Urinalysis/Chemistries:      Lab Results   Component Value Date    NITRU NEGATIVE 06/26/2021    COLORU YELLOW 06/26/2021    PHUR 5.5 06/26/2021    WBCUA 2 TO 5 06/26/2021    RBCUA 2 TO 5 06/26/2021    MUCUS NOT REPORTED 06/26/2021    TRICHOMONAS NOT REPORTED 06/26/2021    YEAST NOT REPORTED 06/26/2021    BACTERIA NOT REPORTED 06/26/2021    SPECGRAV 1.010 06/26/2021    LEUKOCYTESUR NEGATIVE 06/26/2021    UROBILINOGEN Normal 06/26/2021    BILIRUBINUR NEGATIVE 06/26/2021    BILIRUBINUR NEGATIVE 06/02/2012    GLUCOSEU TRACE 06/26/2021    GLUCOSEU NEGATIVE 06/02/2012    KETUA NEGATIVE 06/26/2021    AMORPHOUS NOT REPORTED 06/26/2021     Urine Sodium:     Lab Results   Component Value Date    SELVIN 97 06/27/2021      Urine Creatinine:     Lab Results   Component Value Date    LABCREA 32.9 06/27/2021     Radiology:     Reviewed. Assessment:     1. ESRD new start on dialysis, hemodialysis initiated on 6/28/2021. Has a tunneled catheter in place (6/28/2021). 2.  Diabetes type 2.  3.  Hypertension. 4.  Nephrotic range proteinuria. 5.  Lower extremity edema. 6.  Anemia of chronic disease. 7.  Gout. Plan: 1. Patient was seen and examined on HD at bedside. Orders were confirmed with the HD nurse. 2.  Keep renal diet. 3.   did set up patient's outpatient dialysis spot at [facility] hemodialysis unit on [facility] schedule.   3.  Okay to discharge from nephrology standpoint postdialysis today.    Nutrition   Please ensure that patient is on a renal diet/TF. Avoid nephrotoxic drugs/contrast exposure. We will continue to follow along with you. Jean Paul Ho MD  [facility]     This note is created with the assistance of a speech-recognition program. While intending to generate a document that actually reflects the content of the visit, no guarantees can be provided that every mistake has been identified and corrected by editing.

## 2021-07-01 NOTE — PROGRESS NOTES
Dialysis Post Treatment Note  Vitals:    07/01/21 1352   BP: 138/76   Pulse: 76   Resp: 19   Temp: 98.8 °F (37.1 °C)   SpO2: 100%     Pre-Weight = 100 kg  Post-weight = Weight: 214 lb 8.1 oz (97.3 kg)  Total Liters Processed = Total Liters Processed (l/min): 60.9 l/min  Rinseback Volume (mL) = Rinseback Volume (ml): 300 ml  Net Removal (mL) = NET Removed (ml): 2500 ml  Patient's dry weight=tbe  Type of access used= Rt Tunneled Cath  Length of treatment= 3 hours    Pt tolerated HD tx, cath worked well, cath dressing changed, pt seen by Nephrologist

## 2021-07-01 NOTE — DISCHARGE SUMMARY
on: 07/01/21 1412   Medication Information                      acetaminophen (TYLENOL) 325 MG tablet  TAKE 2 TABLET BY MOUTH EVERY 6 HOURS AS NEEDED FOR PAIN             amLODIPine (NORVASC) 10 MG tablet  TAKE 1 TABLET BY MOUTH ONCE DAILY. aspirin 81 MG chewable tablet  Take 1 tablet by mouth daily             atorvastatin (LIPITOR) 20 MG tablet  Take 1 tablet by mouth daily             blood glucose monitor kit and supplies  Test 2 times a day & as needed for symptoms of irregular blood glucose. blood glucose monitor strips  Test 2 times a day & as needed for symptoms of irregular blood glucose. docusate sodium (COLACE) 100 MG capsule  Take 1 capsule by mouth 2 times daily as needed for Constipation             gabapentin (NEURONTIN) 100 MG capsule  TAKE 1 CAPSULE BY MOUTH 3 TIMES A DAY.              hydrALAZINE (APRESOLINE) 25 MG tablet  Take 1 tablet by mouth every 8 hours             insulin glargine (LANTUS SOLOSTAR) 100 UNIT/ML injection pen  Inject 30 Units into the skin nightly             Insulin Pen Needle ([employer] PEN NEEDLES) 31G X 6 MM MISC  1 each by Does not apply route daily             Insulin Pen Needle 32G X 4 MM MISC  1 each by Does not apply route daily             isosorbide mononitrate (IMDUR) 30 MG extended release tablet  Take 1 tablet by mouth daily             Lancets MISC  1 each by Does not apply route 2 times daily             lisinopril (PRINIVIL;ZESTRIL) 10 MG tablet  Take 1 tablet by mouth daily             metoprolol tartrate (LOPRESSOR) 50 MG tablet  Take 1 tablet by mouth 2 times daily             metoprolol tartrate (LOPRESSOR) 50 MG tablet  Take 1 tablet by mouth 2 times daily             pantoprazole (PROTONIX) 40 MG tablet  Take 1 tablet by mouth 2 times daily (before meals)             polyethylene glycol (GLYCOLAX) 17 g packet  Take 17 g by mouth daily as needed for Constipation             predniSONE (DELTASONE) 20 MG tablet  Take 2 tablets

## 2021-07-01 NOTE — DISCHARGE INSTR - COC
Continuity of Care Form    Patient Name: Kathy Arambula   :  1960  MRN:  [id number]    [facility] date:  2021  Discharge date:  2021    Code Status Order: Full Code   Advance Directives:   Advance Care Flowsheet Documentation       Date/Time Healthcare Directive Type of Healthcare Directive Copy in [address] Agent's Name Healthcare Agent's Phone Number    [phone]  No, patient does not have an advance directive for healthcare treatment -- -- -- -- --            Admitting Physician:  Cuauhtemoc Davenport DO  PCP: Anabel Dyer MD    Discharging Nurse: [facility] Unit/Room#: 8234/9549-58  Discharging Unit Phone Number:     Emergency Contact:   Extended Emergency Contact Information  Primary Emergency Contact: Yin Cullen [address] Phone: [phone]  Work Phone: [phone]  Mobile Phone: [phone]  Relation: Niece/Nephew    Past Surgical History:  Past Surgical History:   Procedure Laterality Date    COLONOSCOPY N/A 2020    COLONOSCOPY DIAGNOSTIC performed by Tammy Chin MD at [address] [place] [address]  2018    bilat stent replacement    CYSTOSCOPY  2018    b/l ureteroscopy, b/l stent, HLL    IR TUNNELED CATHETER PLACEMENT GREATER THAN 5 YEARS  2021    IR TUNNELED CATHETER PLACEMENT GREATER THAN 5 YEARS 2021 Jonathan Alfaro MD [facility] SPECIAL PROCEDURES    AK CYSTO/URETERO/PYELOSCOPY, CALCULUS TX Bilateral 8/3/2018    FORTEC HOLMIUM - CYSTO, URETEROSCOPY LITHOTRIPSY, STENT EXCHANGE Dru Harper #[id number] - ESPERANZA) performed by Keven Stevenson MD at [facility] Bilateral 2018    CYSTOSCOPY URETERAL STENT INSERTION BILATERAL performed by Keven Stevenson MD at [facility]      as a child    UPPER GASTROINTESTINAL ENDOSCOPY N/A 2020    EGD BIOPSY performed by Tammy Chin MD at [facility]       Immunization History:   Immunization Mobility/ADLs:  Walking   Independent  Transfer  Independent  Bathing  Independent  Dressing  Independent  Toileting  Independent  Feeding  Independent  Med Admin  Independent  Med Delivery   none and whole    Wound Care Documentation and Therapy:        Elimination:  Continence: Bowel: Yes  Bladder: Yes  Urinary Catheter: None   Colostomy/Ileostomy/Ileal Conduit: No       Date of Last BM: ***    Intake/Output Summary (Last 24 hours) at 7/1/2021 1300  Last data filed at 7/1/2021 0634  Gross per 24 hour   Intake 480 ml   Output 1700 ml   Net -1220 ml     I/O last 3 completed shifts: In: 480 [P.O.:480]  Out: 1700 [Urine:1700]    Safety Concerns: At Risk for Falls    Impairments/Disabilities:      None    Nutrition Therapy:  Current Nutrition Therapy:   - Oral Diet:  Renal    Routes of Feeding: oral  Liquids: Thin Liquids  Daily Fluid Restriction: yes - amount 1500  Last Modified Barium Swallow with Video (Video Swallowing Test): not done    Treatments at the Time of Hospital Discharge:   Respiratory Treatments: none  Oxygen Therapy:  is not on home oxygen therapy.   Ventilator:    - No ventilator support    Rehab Therapies: none  Weight Bearing Status/Restrictions: No weight bearing restirctions  Other Medical Equipment (for information only, NOT a DME order):  none  Other Treatments: skilled nursing care and assessment , diabetic education use of pen checking blood sugars new dialysis patient    Patient's personal belongings (please select all that are sent with patient):  None    RN SIGNATURE:  Electronically signed by Woo Schwartz RN on 7/1/21 at 3:03 PM EDT    CASE MANAGEMENT/SOCIAL WORK SECTION    Inpatient Status Date: ***    Readmission Risk Assessment Score:  Readmission Risk              Risk of Unplanned Readmission:  22           Discharging to Facility/ Agency   Name:   Address:  Phone:  Fax:    Dialysis Facility (if applicable)   Name:  Address:  Dialysis Schedule:  Phone:  Fax:    Case Manager/ signature: {Esignature:[id number]}    PHYSICIAN SECTION    Prognosis: Good    Condition at Discharge: Stable    Rehab Potential (if transferring to Rehab): Good    Recommended Labs or Other Treatments After Discharge: Home Care - 2-4 weeks. Continue hemodialysis sessions as per [facility] care plan. PCP follow up 3-7 days. Physician Certification: I certify the above information and transfer of Shahida De La O  is necessary for the continuing treatment of the diagnosis listed and that he requires [address] for greater 30 days.      Update Admission H&P: No change in H&P    PHYSICIAN SIGNATURE:  Electronically signed by Dillon Lopez DO on 7/1/21 at 3:09 PM EDT

## 2021-07-01 NOTE — PROGRESS NOTES
Dialysis Time Out  To be done by RN and tech or 2 RNs  Staff Names Ele Harrington RN / Clerance Kayser  [x]  Identity of the patient using 2 patient identifiers  [x]  Consent for treatment  [x]  Equipment-proper machine and dialyzer  [x]  B-Hep B status  [x]  Orders- to include bath, blood flow, dialyzer, time and fluid removal  [x]  Access-Correct site and in working order  [x]  Time for patient to ask questions.

## 2021-07-01 NOTE — CARE COORDINATION
Spoke with [facility] - she stated they may be switching pt's unit to their [place] unit, MWF, as they no longer have room to accommodate pt at their unit MWF. Stated she will call SW back with confirmation. Received c/b from [facility]  She stated pt will be going to their [place] unit, MWF at 12:10p, needs to be there at 11:30a for first tx. They can start pt tomorrow  Pt updated and agreeable  AVS updated    Called [facility] and spoke with [facility]  Cab service rescheduled for [place] [facility] MWF for a 12:10p start, beginning 7/2/21 (will have an 11:30a start time for first tx tomorrow). Rides scheduled thru 7/26.   Confirmation # remains [id number]    6020 - Addendum  Faxed today's dialysis tx note, nephro note and dialysis orders to  Heena Wolfe - Addendum  Updated confirmation letter given to pt

## 2021-07-01 NOTE — PROGRESS NOTES
cysto/uretero/pyeloscopy, calculus tx (Bilateral, 8/3/2018); Upper gastrointestinal endoscopy (N/A, 12/7/2020); Colonoscopy (N/A, 12/7/2020); and IR TUNNELED CVC PLACE WO SQ PORT/PUMP > 5 YEARS (6/28/2021). SOCIALHISTORY:      reports that he quit smoking about 2 years ago. His smoking use included cigarettes. He has a 15.00 pack-year smoking history. He has never used smokeless tobacco. He reports current alcohol use of about 8.0 standard drinks of alcohol per week. He reports that he does not use drugs. FAMILY HISTORY:      family history includes Diabetes in his mother; High Blood Pressure in his father. HOME MEDICATIONS:      Prior to Admission medications    Medication Sig Start Date End Date Taking? Authorizing Provider   predniSONE (DELTASONE) 20 MG tablet Take 2 tablets by mouth daily for 10 days 6/29/21 7/9/21 Yes Arsenio Cooper MD   polyethylene glycol (GLYCOLAX) 17 g packet Take 17 g by mouth daily as needed for Constipation 6/29/21 7/29/21 Yes Arsenio Cooper MD   epoetin louisa-epbx (RETACRIT) 4000 UNIT/ML SOLN injection Inject 2 mLs into the skin three times a week 6/30/21  Yes Arsenio Cooper MD   hydrALAZINE (APRESOLINE) 25 MG tablet Take 1 tablet by mouth every 8 hours 6/29/21  Yes Arsenio Cooper MD   insulin glargine (LANTUS SOLOSTAR) 100 UNIT/ML injection pen Inject 30 Units into the skin nightly 6/29/21 6/24/22 Yes Arsenio Cooper MD   Insulin Pen Needle ([employer] PEN NEEDLES) 31G X 6 MM MISC 1 each by Does not apply route daily 6/29/21  Yes Arsenio Cooper MD   amLODIPine (NORVASC) 10 MG tablet TAKE 1 TABLET BY MOUTH ONCE DAILY.  5/25/21   Patrick Angel MD   gabapentin (NEURONTIN) 100 MG capsule TAKE 1 CAPSULE BY MOUTH 3 TIMES A DAY. 3/11/21 9/11/21  Riccardo Jeronimo MD   acetaminophen (TYLENOL) 325 MG tablet TAKE 2 TABLET BY MOUTH EVERY 6 HOURS AS NEEDED FOR PAIN 3/11/21   Romero Carrillo MD   isosorbide mononitrate (IMDUR) 30 MG extended release tablet Take 1 tablet by mouth daily 12/30/20   Alban Pulido MD   Insulin Pen Needle 32G X 4 MM MISC 1 each by Does not apply route daily 12/14/20   Alban Pulido MD   pantoprazole (PROTONIX) 40 MG tablet Take 1 tablet by mouth 2 times daily (before meals) 12/7/20   Padmini Medina MD   metoprolol tartrate (LOPRESSOR) 50 MG tablet Take 1 tablet by mouth 2 times daily 12/7/20   Padmini Medina MD   metoprolol tartrate (LOPRESSOR) 50 MG tablet Take 1 tablet by mouth 2 times daily 8/28/20   Franco Barnett MD   SENNA-PLUS 8.6-50 MG per tablet Take 1 tablet by mouth daily 1/14/20   Pranav Diamond MD   aspirin 81 MG chewable tablet Take 1 tablet by mouth daily 1/14/20   Pranav Diamond MD   atorvastatin (LIPITOR) 20 MG tablet Take 1 tablet by mouth daily 1/14/20   Pranav Diamond MD   docusate sodium (COLACE) 100 MG capsule Take 1 capsule by mouth 2 times daily as needed for Constipation 1/14/20   Pranav Diamond MD   blood glucose monitor kit and supplies Test 2 times a day & as needed for symptoms of irregular blood glucose. 1/14/20   Pranav Diamond MD   Lancets MISC 1 each by Does not apply route 2 times daily 1/14/20   Pranav Diamond MD   blood glucose monitor strips Test 2 times a day & as needed for symptoms of irregular blood glucose. 1/14/20   Pranav Diamond MD       ALLERGIES:     Patient has no known allergies. OBJECTIVE:       Vitals:    06/29/21 1240 06/29/21 1948 06/30/21 0845 06/30/21 2010   BP: (!) 165/85 130/68 137/63 (!) 146/64   Pulse: 89 82 78 80   Resp:  18 18 18   Temp:  98.1 °F (36.7 °C) 97.9 °F (36.6 °C) 98.6 °F (37 °C)   TempSrc:  Oral Temporal Oral   SpO2:  95% 96% 95%   Weight:       Height:             Intake/Output Summary (Last 24 hours) at 7/1/2021 0902  Last data filed at 7/1/2021 2148  Gross per 24 hour   Intake 480 ml   Output 1700 ml   Net -1220 ml       PHYSICAL EXAM:    Physical Exam  Vitals and nursing note reviewed. Constitutional:       Appearance: Normal appearance.    HENT:      Head: Normocephalic and atraumatic. Mouth/Throat:      Mouth: Mucous membranes are moist.   Eyes:      Conjunctiva/sclera: Conjunctivae normal.   Cardiovascular:      Rate and Rhythm: Normal rate and regular rhythm. Pulmonary:      Effort: Pulmonary effort is normal.      Breath sounds: Normal breath sounds. Abdominal:      General: Bowel sounds are normal. There is no distension. Palpations: Abdomen is soft. There is no mass. Tenderness: There is no abdominal tenderness. There is no guarding. Musculoskeletal:      Right lower leg: No edema. Left lower leg: No edema. Neurological:      General: No focal deficit present. Mental Status: He is alert and oriented to person, place, and time. Psychiatric:         Mood and Affect: Mood normal.         Behavior: Behavior normal.         ASSESSMENT:      Principal Problem:    Acute kidney injury superimposed on CKD ([facility] [address]  Active Problems:    DM2 (diabetes mellitus, type 2) ([facility])    Essential hypertension    BMI 31.0-31.9,adult    JEFFREY (acute kidney injury) ([facility] [address]  Resolved Problems:    * No resolved hospital problems. *      PLAN:     1.  Left leg pain and swelling likely secondary to gout likely secondary to HCTZ (improved)  - WBC: WNL, x-ray shows diffuse soft tissue swelling without any acute process, Doppler scan did not show any superficial or deep DVTs. - Uric acid: 10.2  - HCTZ held, currently on prednisone 40 mg PO - Day 4       2. Low Hb:   - Hb: 7.6 6/28  -Ferritin 312, Retic count 1.4%, Fe 39, TIBC 170   - We will transfuse if hemoglobin is less than 7.     3.  JEFFREY on CKD stage III new start on dialysis 6/28/2021  -Tunneled cath placed 6/28  - Renal US unremarkable.    -Patient received 2 dialysis while in the hospital  - Nephrology on board patient will discharge once cleared by the nephrologist.     3. DM2:   -HbA1c: 8.5  - Lantus 30 units at night  - NILDA with hypoglycemic protocol in place   -To decrease creatinine clearance will discontinue Metformin and start patient on Lantus as an outpatient.     4. Essential Hypertension:  -Continue Norvasc, Lopressor,Lisinopril    -Ranexa 500 mg twice daily held due to creatinine clearance. -HCTZ will be dc due to gout      -DVT prophylaxis: Heparin 5000 unit 3 times daily.   -GI prophylaxis: Protonix 40 mg.      Discussed care plan with nurse after getting her input.     Above plan discussed with the patient in room, who agreed to the above plan      Plan will be discussed with the attending, Dr. Jojo Chavez MD  Family Medicine Resident  7/1/2021 9:02 AM

## 2021-07-01 NOTE — PLAN OF CARE
Problem: Skin Integrity:  Goal: Will show no infection signs and symptoms  Description: Will show no infection signs and symptoms  7/1/2021 0254 by Dread Erwin RN  Outcome: Ongoing  6/30/2021 1751 by Huy Rowland  Outcome: Ongoing  Goal: Absence of new skin breakdown  Description: Absence of new skin breakdown  7/1/2021 0254 by Dread Erwin RN  Outcome: Ongoing  6/30/2021 1751 by Huy Rowland  Outcome: Ongoing     Problem: Falls - Risk of:  Goal: Will remain free from falls  Description: Will remain free from falls  7/1/2021 0254 by Dread Erwin RN  Outcome: Ongoing  6/30/2021 1751 by Huy Rowland  Outcome: Ongoing  Goal: Absence of physical injury  Description: Absence of physical injury  7/1/2021 0254 by Dread Erwin RN  Outcome: Ongoing  6/30/2021 1751 by Huy Rowland  Outcome: Ongoing     Problem: Pain:  Goal: Pain level will decrease  Description: Pain level will decrease  7/1/2021 0254 by Dread Erwin RN  Outcome: Ongoing  6/30/2021 1751 by Huy Rowland  Outcome: Ongoing  Goal: Control of acute pain  Description: Control of acute pain  7/1/2021 0254 by Dread Erwin RN  Outcome: Ongoing  6/30/2021 1751 by Huy Rowland  Outcome: Ongoing  Goal: Control of chronic pain  Description: Control of chronic pain  7/1/2021 0254 by Dread Erwin RN  Outcome: Ongoing  6/30/2021 1751 by Huy Rowland  Outcome: Ongoing     Problem: Musculor/Skeletal Functional Status  Goal: Highest potential functional level  7/1/2021 0254 by Dread Erwin RN  Outcome: Ongoing  6/30/2021 1751 by Huy Rowland  Outcome: Ongoing  Goal: Absence of falls  7/1/2021 0254 by Dread Erwin RN  Outcome: Ongoing  6/30/2021 1751 by Huy Rowland  Outcome: Ongoing

## 2021-07-27 DIAGNOSIS — G89.29 CHRONIC BILATERAL LOW BACK PAIN: ICD-10-CM

## 2021-07-27 DIAGNOSIS — M54.50 CHRONIC BILATERAL LOW BACK PAIN: ICD-10-CM

## 2021-07-27 RX ORDER — LISINOPRIL 10 MG/1
TABLET ORAL
Qty: 30 TABLET | Refills: 0 | Status: SHIPPED | OUTPATIENT
Start: 2021-07-27 | End: 2021-08-10

## 2021-07-27 RX ORDER — GABAPENTIN 100 MG/1
CAPSULE ORAL
Qty: 90 CAPSULE | Refills: 2 | Status: SHIPPED | OUTPATIENT
Start: 2021-07-27 | End: 2022-03-24 | Stop reason: SDUPTHER

## 2021-07-27 NOTE — TELEPHONE ENCOUNTER
Gabapentin pending for refill     Health Maintenance   Topic Date Due    Diabetic retinal exam  Never done    COVID-19 Vaccine (1) Never done    Shingles Vaccine (1 of 2) Never done    Flu vaccine (1) 09/01/2021    Diabetic foot exam  12/14/2021    Lipid screen  12/14/2021    A1C test (Diabetic or Prediabetic)  01/18/2022    Potassium monitoring  07/01/2022    Creatinine monitoring  07/01/2022    Pneumococcal 0-64 years Vaccine (2 of 4 - PPSV23) 08/23/2024    DTaP/Tdap/Td vaccine (2 - Td or Tdap) 08/23/2029    Colon cancer screen colonoscopy  12/07/2030    Hepatitis C screen  Completed    HIV screen  Completed    Hepatitis A vaccine  Aged Out    Hib vaccine  Aged Out    Meningococcal (ACWY) vaccine  Aged Out             (applicable per patient's age: Cancer Screenings, Depression Screening, Fall Risk Screening, Immunizations)    Hemoglobin A1C (%)   Date Value   01/18/2021 8.5 (H)   12/04/2020 10.4 (H)   08/26/2020 7.4 (H)     Microalb/Crt.  Ratio (mcg/mg creat)   Date Value   12/14/2020 3,134 (H)     LDL Cholesterol (mg/dL)   Date Value   12/14/2020 33     AST (U/L)   Date Value   06/26/2021 15     ALT (U/L)   Date Value   06/26/2021 10     BUN (mg/dL)   Date Value   07/01/2021 63 (H)      (goal A1C is < 7)   (goal LDL is <100) need 30-50% reduction from baseline     BP Readings from Last 3 Encounters:   07/01/21 138/76   01/18/21 136/76   12/14/20 133/77    (goal /80)      All Future Testing planned in [facility]:  Lab Frequency Next Occurrence   Basic Metabolic Panel Once 42/91/7952   Hemoglobin and Hematocrit, Blood, Post Transfusion POST TRANSFUSION    Hemoglobin and Hematocrit, Blood, Post Transfusion POST TRANSFUSION        Next Visit Date:  Future Appointments   Date Time Provider Armin Rocha   9/3/2021  9:15 AM Scott Davis MD heartvasc [facility]            Patient Active Problem List:     DM2 (diabetes mellitus, type 2) ([address]     Sciatica of right side     Atypical chest pain     Low back pain of thoracolumbar region with sciatica     Kidney stone     Chest pain     Need for prophylactic vaccination against diphtheria-tetanus-pertussis (DTP)     Gastroesophageal reflux disease     Tachycardia     Essential hypertension     Stage 3 chronic kidney disease     BMI 31.0-31.9,adult     Acute kidney injury superimposed on CKD (HCC)     Type 2 MI (myocardial infarction) (HCC)     Gastrointestinal hemorrhage     Acute posthemorrhagic anemia     Neuropathy     NSAID long-term use     Family history of malignant neoplasm of colon     Positive colorectal cancer screening using Cologuard test     JEFFREY (acute kidney injury) ([facility] [address]

## 2021-07-27 NOTE — TELEPHONE ENCOUNTER
Lisinopril pending for refill         Health Maintenance   Topic Date Due    Diabetic retinal exam  Never done    COVID-19 Vaccine (1) Never done    Shingles Vaccine (1 of 2) Never done    Flu vaccine (1) 09/01/2021    Diabetic foot exam  12/14/2021    Lipid screen  12/14/2021    A1C test (Diabetic or Prediabetic)  01/18/2022    Potassium monitoring  07/01/2022    Creatinine monitoring  07/01/2022    Pneumococcal 0-64 years Vaccine (2 of 4 - PPSV23) 08/23/2024    DTaP/Tdap/Td vaccine (2 - Td or Tdap) 08/23/2029    Colon cancer screen colonoscopy  12/07/2030    Hepatitis C screen  Completed    HIV screen  Completed    Hepatitis A vaccine  Aged Out    Hib vaccine  Aged Out    Meningococcal (ACWY) vaccine  Aged Out             (applicable per patient's age: Cancer Screenings, Depression Screening, Fall Risk Screening, Immunizations)    Hemoglobin A1C (%)   Date Value   01/18/2021 8.5 (H)   12/04/2020 10.4 (H)   08/26/2020 7.4 (H)     Microalb/Crt.  Ratio (mcg/mg creat)   Date Value   12/14/2020 3,134 (H)     LDL Cholesterol (mg/dL)   Date Value   12/14/2020 33     AST (U/L)   Date Value   06/26/2021 15     ALT (U/L)   Date Value   06/26/2021 10     BUN (mg/dL)   Date Value   07/01/2021 63 (H)      (goal A1C is < 7)   (goal LDL is <100) need 30-50% reduction from baseline     BP Readings from Last 3 Encounters:   07/01/21 138/76   01/18/21 136/76   12/14/20 133/77    (goal /80)      All Future Testing planned in [facility]:  Lab Frequency Next Occurrence   Basic Metabolic Panel Once 42/20/4671   Hemoglobin and Hematocrit, Blood, Post Transfusion POST TRANSFUSION    Hemoglobin and Hematocrit, Blood, Post Transfusion POST TRANSFUSION        Next Visit Date:  Future Appointments   Date Time Provider Armin Rocha   9/3/2021  9:15 AM Eileen Granger MD heartvasc [facility]            Patient Active Problem List:     DM2 (diabetes mellitus, type 2) ([address]     Sciatica of right side     Atypical chest pain     Low back pain of thoracolumbar region with sciatica     Kidney stone     Chest pain     Need for prophylactic vaccination against diphtheria-tetanus-pertussis (DTP)     Gastroesophageal reflux disease     Tachycardia     Essential hypertension     Stage 3 chronic kidney disease     BMI 31.0-31.9,adult     Acute kidney injury superimposed on CKD (HCC)     Type 2 MI (myocardial infarction) (HCC)     Gastrointestinal hemorrhage     Acute posthemorrhagic anemia     Neuropathy     NSAID long-term use     Family history of malignant neoplasm of colon     Positive colorectal cancer screening using Cologuard test     JEFFREY (acute kidney injury) ([facility] [address]

## 2021-07-29 RX ORDER — PREDNISONE 20 MG/1
40 TABLET ORAL DAILY
Qty: 20 TABLET | Refills: 0 | OUTPATIENT
Start: 2021-07-29 | End: 2021-08-08

## 2021-07-29 NOTE — TELEPHONE ENCOUNTER
Patient Is gerardo for 8-3-2021        Please address the medication refill and close the encounter. If I can be of assistance, please route to the applicable pool. Thank you. Last visit: 1/18/2021  Last Med refill: 6-  Does patient have enough medication for 72 hours: No:     Next Visit Date:  Future Appointments   Date Time Provider Armin Josefina   9/3/2021  9:15 AM Scott Faust MD heartvasc [address] Maintenance   Topic Date Due    Diabetic retinal exam  Never done    COVID-19 Vaccine (1) Never done    Shingles Vaccine (1 of 2) Never done    Flu vaccine (1) 09/01/2021    Diabetic foot exam  12/14/2021    Lipid screen  12/14/2021    A1C test (Diabetic or Prediabetic)  01/18/2022    Potassium monitoring  07/01/2022    Creatinine monitoring  07/01/2022    Pneumococcal 0-64 years Vaccine (2 of 4 - PPSV23) 08/23/2024    DTaP/Tdap/Td vaccine (2 - Td or Tdap) 08/23/2029    Colon cancer screen colonoscopy  12/07/2030    Hepatitis C screen  Completed    HIV screen  Completed    Hepatitis A vaccine  Aged Out    Hib vaccine  Aged Out    Meningococcal (ACWY) vaccine  Aged Out       Hemoglobin A1C (%)   Date Value   01/18/2021 8.5 (H)   12/04/2020 10.4 (H)   08/26/2020 7.4 (H)             ( goal A1C is < 7)   Microalb/Crt.  Ratio (mcg/mg creat)   Date Value   12/14/2020 3,134 (H)     LDL Cholesterol (mg/dL)   Date Value   12/14/2020 33   08/23/2019 46       (goal LDL is <100)   AST (U/L)   Date Value   06/26/2021 15     ALT (U/L)   Date Value   06/26/2021 10     BUN (mg/dL)   Date Value   07/01/2021 63 (H)     BP Readings from Last 3 Encounters:   07/01/21 138/76   01/18/21 136/76   12/14/20 133/77          (goal 120/80)    All Future Testing planned in [facility]  Lab Frequency Next Occurrence   Basic Metabolic Panel Once 36/60/0902   Hemoglobin and Hematocrit, Blood, Post Transfusion POST TRANSFUSION    Hemoglobin and Hematocrit, Blood, Post Transfusion POST

## 2021-08-09 DIAGNOSIS — N18.6 ESRD (END STAGE RENAL DISEASE) (HCC): Primary | ICD-10-CM

## 2021-08-10 ENCOUNTER — OFFICE VISIT (OUTPATIENT)
Dept: FAMILY MEDICINE CLINIC | Age: 61
End: 2021-08-10
Payer: MEDICAID

## 2021-08-10 VITALS
HEART RATE: 82 BPM | WEIGHT: 212 LBS | SYSTOLIC BLOOD PRESSURE: 137 MMHG | BODY MASS INDEX: 29.57 KG/M2 | DIASTOLIC BLOOD PRESSURE: 73 MMHG

## 2021-08-10 DIAGNOSIS — E11.43 TYPE 2 DIABETES MELLITUS WITH DIABETIC AUTONOMIC NEUROPATHY, WITHOUT LONG-TERM CURRENT USE OF INSULIN (HCC): Primary | ICD-10-CM

## 2021-08-10 DIAGNOSIS — I10 ESSENTIAL HYPERTENSION: ICD-10-CM

## 2021-08-10 LAB — HBA1C MFR BLD: 8 %

## 2021-08-10 PROCEDURE — 83036 HEMOGLOBIN GLYCOSYLATED A1C: CPT | Performed by: STUDENT IN AN ORGANIZED HEALTH CARE EDUCATION/TRAINING PROGRAM

## 2021-08-10 PROCEDURE — 2022F DILAT RTA XM EVC RTNOPTHY: CPT | Performed by: STUDENT IN AN ORGANIZED HEALTH CARE EDUCATION/TRAINING PROGRAM

## 2021-08-10 PROCEDURE — G8417 CALC BMI ABV UP PARAM F/U: HCPCS | Performed by: STUDENT IN AN ORGANIZED HEALTH CARE EDUCATION/TRAINING PROGRAM

## 2021-08-10 PROCEDURE — G8427 DOCREV CUR MEDS BY ELIG CLIN: HCPCS | Performed by: STUDENT IN AN ORGANIZED HEALTH CARE EDUCATION/TRAINING PROGRAM

## 2021-08-10 PROCEDURE — 1036F TOBACCO NON-USER: CPT | Performed by: STUDENT IN AN ORGANIZED HEALTH CARE EDUCATION/TRAINING PROGRAM

## 2021-08-10 PROCEDURE — 99213 OFFICE O/P EST LOW 20 MIN: CPT | Performed by: STUDENT IN AN ORGANIZED HEALTH CARE EDUCATION/TRAINING PROGRAM

## 2021-08-10 PROCEDURE — 3052F HG A1C>EQUAL 8.0%<EQUAL 9.0%: CPT | Performed by: STUDENT IN AN ORGANIZED HEALTH CARE EDUCATION/TRAINING PROGRAM

## 2021-08-10 PROCEDURE — 3017F COLORECTAL CA SCREEN DOC REV: CPT | Performed by: STUDENT IN AN ORGANIZED HEALTH CARE EDUCATION/TRAINING PROGRAM

## 2021-08-10 RX ORDER — GLUCOSAMINE HCL/CHONDROITIN SU 500-400 MG
CAPSULE ORAL
Qty: 100 STRIP | Refills: 3 | Status: SHIPPED | OUTPATIENT
Start: 2021-08-10 | End: 2022-03-24

## 2021-08-10 RX ORDER — INSULIN GLARGINE 100 [IU]/ML
10 INJECTION, SOLUTION SUBCUTANEOUS NIGHTLY
Qty: 27 ML | Refills: 0 | Status: SHIPPED | OUTPATIENT
Start: 2021-08-10 | End: 2022-03-24

## 2021-08-10 RX ORDER — LANCETS 30 GAUGE
1 EACH MISCELLANEOUS 2 TIMES DAILY
Qty: 100 EACH | Refills: 5 | Status: SHIPPED | OUTPATIENT
Start: 2021-08-10 | End: 2022-03-24

## 2021-08-10 RX ORDER — PEN NEEDLE, DIABETIC 31 G X1/4"
1 NEEDLE, DISPOSABLE MISCELLANEOUS DAILY
Qty: 100 EACH | Refills: 3 | Status: SHIPPED | OUTPATIENT
Start: 2021-08-10 | End: 2022-03-24

## 2021-08-10 ASSESSMENT — ENCOUNTER SYMPTOMS
SHORTNESS OF BREATH: 0
CONSTIPATION: 0
DIARRHEA: 0
WHEEZING: 0
ABDOMINAL PAIN: 0

## 2021-08-10 NOTE — PROGRESS NOTES
DIABETES and HYPERTENSION visit    BP Readings from Last 3 Encounters:   07/01/21 138/76   01/18/21 136/76   12/14/20 133/77        Hemoglobin A1C (%)   Date Value   01/18/2021 8.5 (H)   12/04/2020 10.4 (H)   08/26/2020 7.4 (H)     Microalb/Crt. Ratio (mcg/mg creat)   Date Value   12/14/2020 3,134 (H)     LDL Cholesterol (mg/dL)   Date Value   12/14/2020 33     HDL (mg/dL)   Date Value   12/14/2020 58     BUN (mg/dL)   Date Value   07/01/2021 63 (H)     CREATININE (mg/dL)   Date Value   07/01/2021 5.34 (HH)     Glucose (mg/dL)   Date Value   07/01/2021 210 (H)            Have you changed or started any medications since your last visit including any over-the-counter medicines, vitamins, or herbal medicines? no   Have you stopped taking any of your medications? Is so, why? -  no  Are you having any side effects from any of your medications? - no    Have you seen any other physician or provider since your last visit?  no   Have you had any other diagnostic tests since your last visit?  no   Have you been seen in the emergency room and/or had an admission in a hospital since we last saw you?  no   Have you had your routine dental cleaning in the past 6 months?  no     Have you had your annual diabetic retinal (eye) exam? Yes   (ensure copy of exam is in the chart)    Do you have an active MyChart account? If no, what is the barrier?   No    Patient Care Team:  Anabel Dyer MD as PCP - General (Family Medicine)  Stevo Balderas DO as Consulting Physician (General Surgery)    Medical History Review  Past Medical, Family, and Social History reviewed and does not contribute to the patient presenting condition    Health Maintenance   Topic Date Due    Diabetic retinal exam  Never done    COVID-19 Vaccine (1) Never done    Shingles Vaccine (1 of 2) Never done    Flu vaccine (1) 09/01/2021    Diabetic foot exam  12/14/2021    Lipid screen  12/14/2021    A1C test (Diabetic or Prediabetic)  01/18/2022    Potassium monitoring  07/01/2022    Creatinine monitoring  07/01/2022    Pneumococcal 0-64 years Vaccine (2 of 4 - PPSV23) 08/23/2024    DTaP/Tdap/Td vaccine (2 - Td or Tdap) 08/23/2029    Colon cancer screen colonoscopy  12/07/2030    Hepatitis C screen  Completed    HIV screen  Completed    Hepatitis A vaccine  Aged Out    Hib vaccine  Aged Out    Meningococcal (ACWY) vaccine  Aged Out

## 2021-08-10 NOTE — PROGRESS NOTES
Subjective:    Darcie Anthony is a 64 y.o. male with  has a past medical history of ADHD (attention deficit hyperactivity disorder), Depression, DM2 (diabetes mellitus, type 2) ([address] Erectile dysfunction, Gastroesophageal reflux disease, Hyperlipidemia, Hypertension, Kidney stone, Low back pain of thoracolumbar region with sciatica, and Neuropathy. Presented to the office today for:  Chief Complaint   Patient presents with    Diabetes    Hypertension       HPI     Patient is a 70-year-old male presenting for diabetes follow-up  Last A1c 8.5  Today 8  Patient reports noncompliance with his insulin. Patient states for the last month he has not been using any insulin  Patient was referred to diabetes education in the past however patient states he does not need it. Denies any fatigue, nocturia, polyphagia, polydipsia  Hepatic eye exam patient was given a list of ophthalmologist that he can schedule an appointment    Hypertension : Reviewed patient's medication list with him however states he does not remember what medication he takes. Patient denies any chest pain, lightheadedness, blurry vision, or exertional shortness of breath. Patient states next time we will bring his medications in. Recent hospitalization. Patient's leg pain secondary to gout likely secondary to taking hydrochlorothiazide intake. Hydrochlorothiazide was discontinued and started on prednisone and improved. Patient had JEFFREY on CKD. Nephrology was consulted tunneled cath placed 6/28 new start to dialysis received 3 days consecutive of dialysis. Patient is now set up for outpatient dialysis at [facility]. Patient is now following up with nephrology for dialysis. Review of Systems   Constitutional: Negative for activity change, appetite change, chills and fever. Respiratory: Negative for shortness of breath and wheezing. Cardiovascular: Negative for chest pain, palpitations and leg swelling.    Gastrointestinal: Negative for abdominal pain, constipation and diarrhea. Genitourinary: Negative for difficulty urinating. Neurological: Negative for dizziness, weakness, numbness and headaches. The patient has a   Family History   Problem Relation Age of Onset    Diabetes Mother     High Blood Pressure Father        Objective:    /73   Pulse 82   Wt 212 lb (96.2 kg)   BMI 29.57 kg/m²    BP Readings from Last 3 Encounters:   08/10/21 137/73   07/01/21 138/76   01/18/21 136/76       Physical Exam  Vitals reviewed. Constitutional:       Appearance: Normal appearance. Cardiovascular:      Rate and Rhythm: Normal rate and regular rhythm. Pulses: Normal pulses. Heart sounds: Normal heart sounds. Pulmonary:      Effort: Pulmonary effort is normal.      Breath sounds: Normal breath sounds. No wheezing. Skin:     General: Skin is warm. Neurological:      Mental Status: He is alert. Lab Results   Component Value Date    WBC 7.4 06/28/2021    HGB 7.6 (L) 06/28/2021    HCT 24.3 (L) 06/28/2021     06/28/2021    CHOL 116 12/14/2020    TRIG 126 12/14/2020    HDL 58 12/14/2020    ALT 10 06/26/2021    AST 15 06/26/2021     07/01/2021    K 4.2 07/01/2021     07/01/2021    CREATININE 5.34 (HH) 07/01/2021    BUN 63 (H) 07/01/2021    CO2 21 07/01/2021    TSH 1.08 01/14/2020    INR 0.9 06/28/2021    LABA1C 8.0 08/10/2021    LABMICR 3,134 (H) 12/14/2020     Lab Results   Component Value Date    CALCIUM 8.5 (L) 07/01/2021     Lab Results   Component Value Date    LDLCHOLESTEROL 33 12/14/2020       Assessment and Plan:    1. Type 2 diabetes mellitus with diabetic autonomic neuropathy, without long-term current use of insulin (HCC)  hbA1c decreased from 8.5-->8  Patient educated the importance of medication compliance Lantus 10 units nightly.   Patient educated on diet and exercise  If diabetes continues to remain controlled consider changing to oral as patient is adamant to discontinuing insulin if he continues to work on his diet and exercise  Patient given phone number to schedule diabetic eye exam  Diabetic foot exam was already completed this year  - POCT glycosylated hemoglobin (Hb A1C)  - Lancets MISC; 1 each by Does not apply route 2 times daily  Dispense: 100 each; Refill: 5    2. Essential hypertension  Blood pressure stable today 137/73  Patient educated to bring all his medications at his next appointment. Reviewed DASH diet with patient. Requested Prescriptions     Signed Prescriptions Disp Refills    Insulin Pen Needle ([employer] PEN NEEDLES) 31G X 6 MM MISC 100 each 3     Si each by Does not apply route daily    insulin glargine (LANTUS SOLOSTAR) 100 UNIT/ML injection pen 27 mL 0     Sig: Inject 10 Units into the skin nightly    blood glucose monitor strips 100 strip 3     Sig: Test 2 times a day & as needed for symptoms of irregular blood glucose.  Lancets MISC 100 each 5     Si each by Does not apply route 2 times daily       Medications Discontinued During This Encounter   Medication Reason    metoprolol tartrate (LOPRESSOR) 50 MG tablet LIST CLEANUP    lisinopril (PRINIVIL;ZESTRIL) 10 MG tablet LIST CLEANUP    blood glucose monitor strips REORDER    insulin glargine (LANTUS SOLOSTAR) 100 UNIT/ML injection pen REORDER    Insulin Pen Needle ([employer] PEN NEEDLES) 31G X 6 MM MISC REORDER    Lancets MISC REORDER       Marques received counseling on the following healthy behaviors: nutrition, exercise and medication adherence    Discussed use,benefit, and side effects of prescribed medications. Barriers to medication compliance addressed. All patient questions answered. Pt voiced understanding. Return in about 3 months (around 11/10/2021), or follow up, for BP missing a number 137/7 :). Disclaimer: Some orall of this note was transcribed using voice-recognition software. This may cause typographical errors occasionally.  Although all effort is made to fix these errors, please do not hesitate to contact our office if there Jeanine Art concern with the understanding of this note.

## 2021-08-10 NOTE — PROGRESS NOTES
Attending Physician Statement  I have discussed the care of Willie Young 64 y.o. male, including pertinent history and exam findings, with the resident Dr. Darren Park MD.    History and Exam:   Chief Complaint   Patient presents with    Diabetes    Hypertension       Past Medical History:   Diagnosis Date    ADHD (attention deficit hyperactivity disorder)     Depression     DM2 (diabetes mellitus, type 2) ([email or web address] 10/15/2013    Erectile dysfunction     Gastroesophageal reflux disease 8/23/2019    Hyperlipidemia     Hypertension     Kidney stone 7/19/2018    Low back pain of thoracolumbar region with sciatica     Neuropathy      No Known Allergies   I have seen and examined the patient and the key elements of the encounter have been performed by me. BP Readings from Last 3 Encounters:   08/10/21 137/73   07/01/21 138/76   01/18/21 136/76     /73   Pulse 82   Wt 212 lb (96.2 kg)   BMI 29.57 kg/m²   Lab Results   Component Value Date    WBC 7.4 06/28/2021    HGB 7.6 (L) 06/28/2021    HCT 24.3 (L) 06/28/2021     06/28/2021    CHOL 116 12/14/2020    TRIG 126 12/14/2020    HDL 58 12/14/2020    ALT 10 06/26/2021    AST 15 06/26/2021     07/01/2021    K 4.2 07/01/2021     07/01/2021    CREATININE 5.34 (HH) 07/01/2021    BUN 63 (H) 07/01/2021    CO2 21 07/01/2021    TSH 1.08 01/14/2020    INR 0.9 06/28/2021    LABA1C 8.0 08/10/2021    LABMICR 3,134 (H) 12/14/2020     Lab Results   Component Value Date    LABALBU 3.0 (L) 06/26/2021     Lab Results   Component Value Date    IRON 39 (L) 06/28/2021    TIBC 170 (L) 06/28/2021    FERRITIN 312 06/28/2021     Lab Results   Component Value Date    LDLCHOLESTEROL 33 12/14/2020     I agree with the assessment, plan and the diagnosis of    Diagnosis Orders   1. Type 2 diabetes mellitus with diabetic autonomic neuropathy, without long-term current use of insulin (HCC)  POCT glycosylated hemoglobin (Hb A1C)    Lancets MISC   2.  Essential hypertension      . I agree with orders as documented by the resident. More than 25 minutes spent  in face to face encounter with the patient and more than half in counseling. Patient's questions were answered. Patient Voiced understanding to the counseling. Return in about 3 months (around 11/10/2021), or follow up, for BP missing a number 137/7 :).    (GC Modifier)-Dr. Radha Abraham MD  \

## 2021-09-01 ENCOUNTER — HOSPITAL ENCOUNTER (OUTPATIENT)
Dept: VASCULAR LAB | Age: 61
Discharge: HOME | End: 2021-09-01
Payer: MEDICAID

## 2021-09-01 DIAGNOSIS — N18.6 ESRD (END STAGE RENAL DISEASE) (HCC): ICD-10-CM

## 2021-09-01 PROCEDURE — 93985 DUP-SCAN HEMO COMPL BI STD: CPT

## 2021-09-03 NOTE — TELEPHONE ENCOUNTER
----- Message from Gomez Oswald sent at 9/3/2021  2:19 PM EDT -----  Subject: Refill Request    QUESTIONS  Name of Medication? Other - lisinopril  Patient-reported dosage and instructions? unsure, 1-2 daily  How many days do you have left? 0  Preferred Pharmacy? [address] [place] [address]  Pharmacy phone number (if available)? [phone]  Additional Information for Provider? Patient has had none since last week   or the week before. He is unsure the dosage of this medication, but   believes it is lisinopril.   ---------------------------------------------------------------------------  --------------  CALL BACK INFO  What is the best way for the office to contact you? OK to leave message on   voicemail  Preferred Call Back Phone Number?  [phone]

## 2021-09-13 ENCOUNTER — HOSPITAL ENCOUNTER (EMERGENCY)
Age: 61
Discharge: HOME | DRG: 177 | End: 2021-09-13
Attending: STUDENT IN AN ORGANIZED HEALTH CARE EDUCATION/TRAINING PROGRAM
Payer: COMMERCIAL

## 2021-09-13 ENCOUNTER — APPOINTMENT (OUTPATIENT)
Dept: GENERAL RADIOLOGY | Age: 61
DRG: 177 | End: 2021-09-13
Payer: COMMERCIAL

## 2021-09-13 VITALS
WEIGHT: 212 LBS | TEMPERATURE: 100 F | RESPIRATION RATE: 18 BRPM | OXYGEN SATURATION: 96 % | SYSTOLIC BLOOD PRESSURE: 121 MMHG | HEIGHT: 71 IN | HEART RATE: 74 BPM | BODY MASS INDEX: 29.68 KG/M2 | DIASTOLIC BLOOD PRESSURE: 65 MMHG

## 2021-09-13 DIAGNOSIS — R50.9 FEVER, UNSPECIFIED FEVER CAUSE: Primary | ICD-10-CM

## 2021-09-13 LAB
ABSOLUTE EOS #: <0.03 K/UL (ref 0–0.44)
ABSOLUTE IMMATURE GRANULOCYTE: 0.02 K/UL (ref 0–0.3)
ABSOLUTE LYMPH #: 1.07 K/UL (ref 1.1–3.7)
ABSOLUTE MONO #: 0.44 K/UL (ref 0.1–1.2)
ALBUMIN SERPL-MCNC: 3.8 G/DL (ref 3.5–5.2)
ALBUMIN/GLOBULIN RATIO: ABNORMAL (ref 1–2.5)
ALP BLD-CCNC: 101 U/L (ref 40–129)
ALT SERPL-CCNC: 22 U/L (ref 5–41)
ANION GAP SERPL CALCULATED.3IONS-SCNC: 14 MMOL/L (ref 9–17)
AST SERPL-CCNC: 32 U/L
BASOPHILS # BLD: 1 % (ref 0–2)
BASOPHILS ABSOLUTE: <0.03 K/UL (ref 0–0.2)
BILIRUB SERPL-MCNC: 0.58 MG/DL (ref 0.3–1.2)
BUN BLDV-MCNC: 19 MG/DL (ref 8–23)
BUN/CREAT BLD: 7 (ref 9–20)
CALCIUM SERPL-MCNC: 8.5 MG/DL (ref 8.6–10.4)
CHLORIDE BLD-SCNC: 95 MMOL/L (ref 98–107)
CO2: 23 MMOL/L (ref 20–31)
CREAT SERPL-MCNC: 2.77 MG/DL (ref 0.7–1.2)
DIFFERENTIAL TYPE: ABNORMAL
EOSINOPHILS RELATIVE PERCENT: 0 % (ref 1–4)
GFR AFRICAN AMERICAN: 28 ML/MIN
GFR NON-AFRICAN AMERICAN: 23 ML/MIN
GFR SERPL CREATININE-BSD FRML MDRD: ABNORMAL ML/MIN/{1.73_M2}
GFR SERPL CREATININE-BSD FRML MDRD: ABNORMAL ML/MIN/{1.73_M2}
GLUCOSE BLD-MCNC: 165 MG/DL (ref 70–99)
HCT VFR BLD CALC: 31.9 % (ref 40.7–50.3)
HEMOGLOBIN: 10.6 G/DL (ref 13–17)
IMMATURE GRANULOCYTES: 1 %
INR BLD: 0.9
LACTIC ACID, SEPSIS WHOLE BLOOD: NORMAL MMOL/L (ref 0.5–1.9)
LACTIC ACID, SEPSIS: 1.4 MMOL/L (ref 0.5–1.9)
LYMPHOCYTES # BLD: 26 % (ref 24–43)
MCH RBC QN AUTO: 31 PG (ref 25.2–33.5)
MCHC RBC AUTO-ENTMCNC: 33.2 G/DL (ref 28.4–34.8)
MCV RBC AUTO: 93.3 FL (ref 82.6–102.9)
MONOCYTES # BLD: 11 % (ref 3–12)
NRBC AUTOMATED: 0 PER 100 WBC
PARTIAL THROMBOPLASTIN TIME: 34 SEC (ref 23.9–33.8)
PDW BLD-RTO: 14.2 % (ref 11.8–14.4)
PLATELET # BLD: 159 K/UL (ref 138–453)
PLATELET ESTIMATE: ABNORMAL
PMV BLD AUTO: 9.9 FL (ref 8.1–13.5)
POTASSIUM SERPL-SCNC: 3.4 MMOL/L (ref 3.7–5.3)
PROTHROMBIN TIME: 12.5 SEC (ref 11.5–14.2)
RBC # BLD: 3.42 M/UL (ref 4.21–5.77)
RBC # BLD: ABNORMAL 10*6/UL
SEG NEUTROPHILS: 61 % (ref 36–65)
SEGMENTED NEUTROPHILS ABSOLUTE COUNT: 2.61 K/UL (ref 1.5–8.1)
SODIUM BLD-SCNC: 132 MMOL/L (ref 135–144)
TOTAL PROTEIN: 7.5 G/DL (ref 6.4–8.3)
WBC # BLD: 4.2 K/UL (ref 3.5–11.3)
WBC # BLD: ABNORMAL 10*3/UL

## 2021-09-13 PROCEDURE — 85730 THROMBOPLASTIN TIME PARTIAL: CPT

## 2021-09-13 PROCEDURE — 99283 EMERGENCY DEPT VISIT LOW MDM: CPT

## 2021-09-13 PROCEDURE — 87635 SARS-COV-2 COVID-19 AMP PRB: CPT

## 2021-09-13 PROCEDURE — 83605 ASSAY OF LACTIC ACID: CPT

## 2021-09-13 PROCEDURE — 93005 ELECTROCARDIOGRAM TRACING: CPT | Performed by: PHYSICIAN ASSISTANT

## 2021-09-13 PROCEDURE — 71045 X-RAY EXAM CHEST 1 VIEW: CPT

## 2021-09-13 PROCEDURE — 85610 PROTHROMBIN TIME: CPT

## 2021-09-13 PROCEDURE — 85025 COMPLETE CBC W/AUTO DIFF WBC: CPT

## 2021-09-13 PROCEDURE — 80053 COMPREHEN METABOLIC PANEL: CPT

## 2021-09-13 PROCEDURE — 87040 BLOOD CULTURE FOR BACTERIA: CPT

## 2021-09-13 RX ORDER — SODIUM CHLORIDE 0.9 % (FLUSH) 0.9 %
5-40 SYRINGE (ML) INJECTION PRN
Status: DISCONTINUED | OUTPATIENT
Start: 2021-09-13 | End: 2021-09-13 | Stop reason: HOSPADM

## 2021-09-13 RX ORDER — SODIUM CHLORIDE 9 MG/ML
25 INJECTION, SOLUTION INTRAVENOUS PRN
Status: DISCONTINUED | OUTPATIENT
Start: 2021-09-13 | End: 2021-09-13 | Stop reason: HOSPADM

## 2021-09-13 RX ORDER — SODIUM CHLORIDE 0.9 % (FLUSH) 0.9 %
5-40 SYRINGE (ML) INJECTION EVERY 12 HOURS SCHEDULED
Status: DISCONTINUED | OUTPATIENT
Start: 2021-09-13 | End: 2021-09-13 | Stop reason: HOSPADM

## 2021-09-13 RX ORDER — ACETAMINOPHEN 500 MG
1000 TABLET ORAL ONCE
Status: DISCONTINUED | OUTPATIENT
Start: 2021-09-13 | End: 2021-09-13 | Stop reason: HOSPADM

## 2021-09-13 NOTE — ED NOTES
Bed: 09  Expected date: 9/13/21  Expected time: 4:38 PM  Means of arrival:   Comments:  Hannah Rivera RN  09/13/21 [id number]

## 2021-09-13 NOTE — ED TRIAGE NOTES
Pt came in via LS from Dialysis pt was having fevers at dialysis. Dialysis was concerned pt has covid. Pt denies any complaints and states he would not have come to ER if it wasn't for dialysis suggestion. Pt states he has had a non protective cough for a week. Betr completed. PA at bedside.

## 2021-09-13 NOTE — ED PROVIDER NOTES
[address] [place] [address] ED  [facility]      Pt Name: Dominic Mukherjee  MRN: [id number]  [place] 1960  Date ofevaluation: 9/13/2021  Provider: Jena Freitas Dr       Chief Complaint   Patient presents with    Concern For COVID-19     fever of 100.4 at dialysis was given tylenol          HISTORY OF PRESENT ILLNESS  (Location/Symptom, Timing/Onset, Context/Setting, Quality, Duration, Modifying Factors, Severity.)   Dominic Mukherjee is a 64 y.o. male who presents to the emergency department with fever. Patient had a fall while at dialysis and was found to have a temperature at dialysis today. Patient is dialysis on Monday, Wednesday, Friday. He still makes urine. Denies any chest pain shortness of breath. Reports slight cough 14 also yesterday. No definitely very aggravating factors. No complaints. Nursing Notes were reviewed. ALLERGIES     Patient has no known allergies. CURRENT MEDICATIONS       Previous Medications    ACETAMINOPHEN (TYLENOL) 325 MG TABLET    TAKE 2 TABLET BY MOUTH EVERY 6 HOURS AS NEEDED FOR PAIN    AMLODIPINE (NORVASC) 10 MG TABLET    TAKE 1 TABLET BY MOUTH ONCE DAILY. ASPIRIN 81 MG CHEWABLE TABLET    Take 1 tablet by mouth daily    ATORVASTATIN (LIPITOR) 20 MG TABLET    Take 1 tablet by mouth daily    BLOOD GLUCOSE MONITOR KIT AND SUPPLIES    Test 2 times a day & as needed for symptoms of irregular blood glucose. BLOOD GLUCOSE MONITOR STRIPS    Test 2 times a day & as needed for symptoms of irregular blood glucose.     DOCUSATE SODIUM (COLACE) 100 MG CAPSULE    Take 1 capsule by mouth 2 times daily as needed for Constipation    GABAPENTIN (NEURONTIN) 100 MG CAPSULE    TAKE 1 CAPSULE BY MOUTH 3 TIMES A DAY    HYDRALAZINE (APRESOLINE) 25 MG TABLET    Take 1 tablet by mouth every 8 hours    INSULIN GLARGINE (LANTUS SOLOSTAR) 100 UNIT/ML INJECTION PEN    Inject 10 Units into the skin nightly    INSULIN PEN NEEDLE ([employer]qual[employer] [place] PEN NEEDLES) 31G X 6 MM MISC    1 each by Does not apply route daily    INSULIN PEN NEEDLE 32G X 4 MM MISC    1 each by Does not apply route daily    ISOSORBIDE MONONITRATE (IMDUR) 30 MG EXTENDED RELEASE TABLET    Take 1 tablet by mouth daily    LANCETS MISC    1 each by Does not apply route 2 times daily    METOPROLOL TARTRATE (LOPRESSOR) 50 MG TABLET    Take 1 tablet by mouth 2 times daily    PANTOPRAZOLE (PROTONIX) 40 MG TABLET    Take 1 tablet by mouth 2 times daily (before meals)    SENNA-PLUS 8.6-50 MG PER TABLET    Take 1 tablet by mouth daily       PAST MEDICAL HISTORY         Diagnosis Date    ADHD (attention deficit hyperactivity disorder)     Depression     [facility]2013    Erectile dysfunction     Gastroesophageal reflux disease 8/23/2019    Hyperlipidemia     Hypertension     Kidney stone 7/19/2018    Low back pain of thoracolumbar region with sciatica     Neuropathy        SURGICAL HISTORY           Procedure Laterality Date    COLONOSCOPY N/A 12/7/2020    COLONOSCOPY DIAGNOSTIC performed by Adelaide Edwards MD at [address] [place] [address]  07/20/2018    bilat stent replacement    CYSTOSCOPY  08/03/2018    b/l ureteroscopy, b/l stent, HLL    IR TUNNELED CATHETER PLACEMENT GREATER THAN 5 YEARS  6/28/2021    IR TUNNELED CATHETER PLACEMENT GREATER THAN 5 YEARS 6/28/2021 Juana Chou MD [facility]    NC CYSTO/URETERO/PYELOSCOPY, CALCULUS TX Bilateral 8/3/2018    [facility] HOLMIUM - CYSTO, URETEROSCOPY LITHOTRIPSY, STENT EXCHANGE Lindsay Hardy #[id number] - ESPERANZA) performed by Ángel Hernandez MD at [address] Bilateral 7/20/2018    CYSTOSCOPY URETERAL STENT INSERTION BILATERAL performed by Ángel Hernandez MD at [address] [place] [address]      as a child    UPPER GASTROINTESTINAL ENDOSCOPY N/A 12/7/2020    EGD BIOPSY performed by Adelaide Edwards MD at [facility]         FAMILY HISTORY           Problem Relation Age of Onset    Diabetes Mother     High Blood Pressure Father      Family Status   Relation Name Status    Mother      Father          SOCIAL HISTORY      reports that he quit smoking about 2 years ago. His smoking use included cigarettes. He has a 15.00 pack-year smoking history. He has never used smokeless tobacco. He reports current alcohol use of about 8.0 standard drinks of alcohol per week. He reports that he does not use drugs. REVIEW OFSYSTEMS    (2-9 systems for level 4, 10 or more for level 5)   Review of Systems    Except as noted above the remainder of the review of systems was reviewed and negative. PHYSICAL EXAM    (up to 7 for level 4, 8 or more for level 5)     ED Triage Vitals [21 1645]   BP Temp Temp src Pulse Resp SpO2 Height Weight   121/65 100 °F (37.8 °C) -- 74 18 96 % 5' 11\" (1.803 m) 212 lb (96.2 kg)      Physical Exam  Constitutional:       Appearance: He is well-developed. HENT:      Head: Normocephalic and atraumatic. Cardiovascular:      Rate and Rhythm: Normal rate and regular rhythm. Pulmonary:      Effort: Pulmonary effort is normal.      Breath sounds: Normal breath sounds. Chest:       Abdominal:      General: There is no distension. Palpations: Abdomen is soft. Musculoskeletal:         General: Normal range of motion. Cervical back: Normal range of motion and neck supple. Skin:     General: Skin is warm. Neurological:      Mental Status: He is alert and oriented to person, place, and time.    Psychiatric:         Behavior: Behavior normal.                 DIAGNOSTIC RESULTS     EKG: All EKG's are interpreted by the Emergency Department Physician who either signs or Co-signs this chart in the absence of a cardiologist.        RADIOLOGY:   Non-plain film images such as CT, Ultrasound and MRI are read by the radiologist. Plain radiographic images arevisualized and preliminarily interpreted by the emergency physician with the below findings:        Interpretation per the Radiologist below, if available at thetime of this note:          ED BEDSIDE ULTRASOUND:   Performed by ED Physician - none    LABS:  Labs Reviewed   CBC WITH AUTO DIFFERENTIAL - Abnormal; Notable for the following components:       Result Value    RBC 3.42 (*)     Hemoglobin 10.6 (*)     Hematocrit 31.9 (*)     Eosinophils % 0 (*)     Immature Granulocytes 1 (*)     Absolute Lymph # 1.07 (*)     All other components within normal limits   APTT - Abnormal; Notable for the following components:    PTT 34.0 (*)     All other components within normal limits   COMPREHENSIVE METABOLIC PANEL - Abnormal; Notable for the following components:    Glucose 165 (*)     CREATININE 2.77 (*)     Bun/Cre Ratio 7 (*)     Calcium 8.5 (*)     Sodium 132 (*)     Potassium 3.4 (*)     Chloride 95 (*)     GFR Non- 23 (*)     GFR  28 (*)     All other components within normal limits   COVID-19, RAPID   CULTURE, BLOOD 1   CULTURE, BLOOD 2   LACTATE, SEPSIS   PROTIME-INR   URINE RT REFLEX TO CULTURE   LACTATE, SEPSIS       All other labs were within normal range or not returned as of this dictation. EMERGENCY DEPARTMENT COURSE and DIFFERENTIAL DIAGNOSIS/MDM:   Vitals:    Vitals:    09/13/21 1645   BP: 121/65   Pulse: 74   Resp: 18   Temp: 100 °F (37.8 °C)   SpO2: 96%   Weight: 212 lb (96.2 kg)   Height: 5' 11\" (1.803 m)     Patient requesting discharge. He has not been swabbed for covid. He is demanding discharge. Patient made aware that the cause of his fever possibly missed due to incomplete work-up and this may lead to death and loss of quality of life. He also makes urine and did not provide a sample    CONSULTS:  None    PROCEDURES:  Procedures        FINAL IMPRESSION      1.  Fever, unspecified fever cause          DISPOSITION/PLAN   DISPOSITION Decision To Discharge 09/13/2021 07:01:41 PM      PATIENTREFERRED TO:   Mo Mckeon MD  [address] [place] [address] [address] [address]  [phone]    In 3 days        DISCHARGE MEDICATIONS:     New Prescriptions    No medications on file           (Please note that portions of this note were completed with a voice recognition program.  Efforts were made to edit thedictations but occasionally words are mis-transcribed.)    ESTRADA Loya PA-C  09/13/21 [address] PA-C  09/13/21 [id number]

## 2021-09-14 ENCOUNTER — CARE COORDINATION (OUTPATIENT)
Dept: CARE COORDINATION | Age: 61
End: 2021-09-14

## 2021-09-14 LAB
EKG ATRIAL RATE: 91 BPM
EKG P AXIS: 0 DEGREES
EKG P-R INTERVAL: 144 MS
EKG Q-T INTERVAL: 392 MS
EKG QRS DURATION: 76 MS
EKG QTC CALCULATION (BAZETT): 482 MS
EKG R AXIS: 14 DEGREES
EKG T AXIS: 71 DEGREES
EKG VENTRICULAR RATE: 91 BPM

## 2021-09-14 NOTE — CARE COORDINATION
1st attempt to reach patient as a follow up to ED visit. Left VM requesting a return call to AC.   Candice Gonzales RN, ambulatory care manager

## 2021-09-15 ENCOUNTER — HOSPITAL ENCOUNTER (EMERGENCY)
Age: 61
Discharge: HOME | End: 2021-09-15
Attending: EMERGENCY MEDICINE
Payer: MEDICARE

## 2021-09-15 ENCOUNTER — HOSPITAL ENCOUNTER (OUTPATIENT)
Dept: ONCOLOGY | Age: 61
Discharge: HOME | End: 2021-09-15
Attending: INTERNAL MEDICINE | Admitting: INTERNAL MEDICINE
Payer: MEDICARE

## 2021-09-15 VITALS
DIASTOLIC BLOOD PRESSURE: 76 MMHG | HEART RATE: 110 BPM | OXYGEN SATURATION: 96 % | SYSTOLIC BLOOD PRESSURE: 126 MMHG | RESPIRATION RATE: 15 BRPM

## 2021-09-15 VITALS
HEIGHT: 70 IN | TEMPERATURE: 98.4 F | DIASTOLIC BLOOD PRESSURE: 58 MMHG | RESPIRATION RATE: 18 BRPM | HEART RATE: 116 BPM | OXYGEN SATURATION: 97 % | BODY MASS INDEX: 30.35 KG/M2 | WEIGHT: 212 LBS | SYSTOLIC BLOOD PRESSURE: 103 MMHG

## 2021-09-15 DIAGNOSIS — U07.1 COVID-19: ICD-10-CM

## 2021-09-15 DIAGNOSIS — U07.1 COVID-19: Primary | ICD-10-CM

## 2021-09-15 LAB
SARS-COV-2, RAPID: DETECTED
SPECIMEN DESCRIPTION: ABNORMAL

## 2021-09-15 PROCEDURE — 2580000003 HC RX 258: Performed by: EMERGENCY MEDICINE

## 2021-09-15 PROCEDURE — 99282 EMERGENCY DEPT VISIT SF MDM: CPT

## 2021-09-15 PROCEDURE — 87635 SARS-COV-2 COVID-19 AMP PRB: CPT

## 2021-09-15 PROCEDURE — 96365 THER/PROPH/DIAG IV INF INIT: CPT

## 2021-09-15 PROCEDURE — 2500000003 HC RX 250 WO HCPCS: Performed by: EMERGENCY MEDICINE

## 2021-09-15 RX ORDER — DIPHENHYDRAMINE HYDROCHLORIDE 50 MG/ML
50 INJECTION INTRAMUSCULAR; INTRAVENOUS ONCE
OUTPATIENT
Start: 2021-09-15 | End: 2021-09-15

## 2021-09-15 RX ORDER — HEPARIN SODIUM (PORCINE) LOCK FLUSH IV SOLN 100 UNIT/ML 100 UNIT/ML
500 SOLUTION INTRAVENOUS PRN
OUTPATIENT
Start: 2021-09-15

## 2021-09-15 RX ORDER — DIPHENHYDRAMINE HYDROCHLORIDE 50 MG/ML
50 INJECTION INTRAMUSCULAR; INTRAVENOUS
Status: DISCONTINUED | OUTPATIENT
Start: 2021-09-15 | End: 2021-09-15 | Stop reason: HOSPADM

## 2021-09-15 RX ORDER — SODIUM CHLORIDE 9 MG/ML
25 INJECTION, SOLUTION INTRAVENOUS PRN
OUTPATIENT
Start: 2021-09-15

## 2021-09-15 RX ORDER — SODIUM CHLORIDE 0.9 % (FLUSH) 0.9 %
5-40 SYRINGE (ML) INJECTION PRN
Status: CANCELLED | OUTPATIENT
Start: 2021-09-15

## 2021-09-15 RX ORDER — SODIUM CHLORIDE 0.9 % (FLUSH) 0.9 %
5-40 SYRINGE (ML) INJECTION PRN
OUTPATIENT
Start: 2021-09-15

## 2021-09-15 RX ORDER — SODIUM CHLORIDE 9 MG/ML
25 INJECTION, SOLUTION INTRAVENOUS PRN
Status: CANCELLED | OUTPATIENT
Start: 2021-09-15

## 2021-09-15 RX ORDER — EPINEPHRINE 1 MG/ML
0.3 INJECTION, SOLUTION, CONCENTRATE INTRAVENOUS PRN
Status: CANCELLED | OUTPATIENT
Start: 2021-09-15

## 2021-09-15 RX ORDER — METHYLPREDNISOLONE SODIUM SUCCINATE 125 MG/2ML
125 INJECTION, POWDER, LYOPHILIZED, FOR SOLUTION INTRAMUSCULAR; INTRAVENOUS ONCE
Status: CANCELLED | OUTPATIENT
Start: 2021-09-15 | End: 2021-09-15

## 2021-09-15 RX ORDER — EPINEPHRINE 1 MG/ML
0.3 INJECTION, SOLUTION, CONCENTRATE INTRAVENOUS PRN
OUTPATIENT
Start: 2021-09-15

## 2021-09-15 RX ORDER — METHYLPREDNISOLONE SODIUM SUCCINATE 125 MG/2ML
125 INJECTION, POWDER, LYOPHILIZED, FOR SOLUTION INTRAMUSCULAR; INTRAVENOUS ONCE
OUTPATIENT
Start: 2021-09-15 | End: 2021-09-15

## 2021-09-15 RX ORDER — DIPHENHYDRAMINE HYDROCHLORIDE 50 MG/ML
50 INJECTION INTRAMUSCULAR; INTRAVENOUS ONCE
Status: CANCELLED | OUTPATIENT
Start: 2021-09-15 | End: 2021-09-15

## 2021-09-15 RX ORDER — HEPARIN SODIUM (PORCINE) LOCK FLUSH IV SOLN 100 UNIT/ML 100 UNIT/ML
500 SOLUTION INTRAVENOUS PRN
Status: CANCELLED | OUTPATIENT
Start: 2021-09-15

## 2021-09-15 RX ORDER — SODIUM CHLORIDE 9 MG/ML
INJECTION, SOLUTION INTRAVENOUS CONTINUOUS
OUTPATIENT
Start: 2021-09-15

## 2021-09-15 RX ORDER — SODIUM CHLORIDE 9 MG/ML
INJECTION, SOLUTION INTRAVENOUS CONTINUOUS
Status: CANCELLED | OUTPATIENT
Start: 2021-09-15

## 2021-09-15 RX ORDER — SODIUM CHLORIDE 9 MG/ML
INJECTION, SOLUTION INTRAVENOUS CONTINUOUS PRN
Status: DISCONTINUED | OUTPATIENT
Start: 2021-09-15 | End: 2021-09-15 | Stop reason: HOSPADM

## 2021-09-15 RX ORDER — SODIUM CHLORIDE 9 MG/ML
100 INJECTION, SOLUTION INTRAVENOUS CONTINUOUS PRN
Status: DISCONTINUED | OUTPATIENT
Start: 2021-09-15 | End: 2021-09-15 | Stop reason: HOSPADM

## 2021-09-15 RX ORDER — METHYLPREDNISOLONE SODIUM SUCCINATE 125 MG/2ML
125 INJECTION, POWDER, LYOPHILIZED, FOR SOLUTION INTRAMUSCULAR; INTRAVENOUS
Status: DISCONTINUED | OUTPATIENT
Start: 2021-09-15 | End: 2021-09-15 | Stop reason: HOSPADM

## 2021-09-15 RX ADMIN — SODIUM CHLORIDE: 9 INJECTION, SOLUTION INTRAVENOUS at 13:25

## 2021-09-15 ASSESSMENT — ENCOUNTER SYMPTOMS
SHORTNESS OF BREATH: 0
COUGH: 0
ABDOMINAL PAIN: 0
DIARRHEA: 0
COLOR CHANGE: 0
VOMITING: 0
BACK PAIN: 0
SORE THROAT: 0
EYE PAIN: 0
EYE DISCHARGE: 0
NAUSEA: 0

## 2021-09-15 NOTE — ED NOTES
Pt states he has no complaint however, dialysis is requiring him to get a covid text to return     Lise Monteiro, [facility]  09/15/21 0770

## 2021-09-15 NOTE — ED PROVIDER NOTES
sciatica, and Neuropathy. SURGICAL HISTORY      has a past surgical history that includes Tonsillectomy; Cystoscopy (07/20/2018); pr cystoscopy,insert ureteral stent (Bilateral, 7/20/2018); Cystocopy (08/03/2018); pr cysto/uretero/pyeloscopy, calculus tx (Bilateral, 8/3/2018); Upper gastrointestinal endoscopy (N/A, 12/7/2020); Colonoscopy (N/A, 12/7/2020); and IR TUNNELED CVC PLACE WO SQ PORT/PUMP > 5 YEARS (6/28/2021). CURRENT MEDICATIONS       Discharge Medication List as of 9/15/2021 12:39 PM      CONTINUE these medications which have NOT CHANGED    Details   !! Insulin Pen Needle ([employer] PEN NEEDLES) 31G X 6 MM MISC DAILY Starting Tue 8/10/2021, Disp-100 each, R-3, Normal      insulin glargine (LANTUS SOLOSTAR) 100 UNIT/ML injection pen Inject 10 Units into the skin nightly, Disp-27 mL, R-0Normal      blood glucose monitor strips Test 2 times a day & as needed for symptoms of irregular blood glucose., Disp-100 strip, R-3, Normal      Lancets MISC 2 TIMES DAILY Starting Tue 8/10/2021, Disp-100 each, R-5, Normal      gabapentin (NEURONTIN) 100 MG capsule TAKE 1 CAPSULE BY MOUTH 3 TIMES A DAY, Disp-90 capsule, R-2Normal      hydrALAZINE (APRESOLINE) 25 MG tablet Take 1 tablet by mouth every 8 hours, Disp-90 tablet, R-3Normal      amLODIPine (NORVASC) 10 MG tablet TAKE 1 TABLET BY MOUTH ONCE DAILY. , Disp-90 tablet, R-1Normal      acetaminophen (TYLENOL) 325 MG tablet TAKE 2 TABLET BY MOUTH EVERY 6 HOURS AS NEEDED FOR PAIN, Disp-120 tablet, R-0Normal      isosorbide mononitrate (IMDUR) 30 MG extended release tablet Take 1 tablet by mouth daily, Disp-30 tablet, R-3Normal      !!  Insulin Pen Needle 32G X 4 MM MISC DAILY Starting Mon 12/14/2020, Disp-100 each, R-3, Normal      pantoprazole (PROTONIX) 40 MG tablet Take 1 tablet by mouth 2 times daily (before meals), Disp-30 tablet,R-3Normal      metoprolol tartrate (LOPRESSOR) 50 MG tablet Take 1 tablet by mouth 2 times daily, Disp-60 tablet,R-3Normal SENNA-PLUS 8.6-50 MG per tablet Take 1 tablet by mouth daily, Disp-30 tablet, R-0, DAWNormal      aspirin 81 MG chewable tablet Take 1 tablet by mouth daily, Disp-30 tablet, R-0Normal      atorvastatin (LIPITOR) 20 MG tablet Take 1 tablet by mouth daily, Disp-30 tablet, R-0Normal      docusate sodium (COLACE) 100 MG capsule Take 1 capsule by mouth 2 times daily as needed for Constipation, Disp-30 capsule, R-0Normal      blood glucose monitor kit and supplies Test 2 times a day & as needed for symptoms of irregular blood glucose., Disp-1 kit, R-0, Normal       !! - Potential duplicate medications found. Please discuss with provider. ALLERGIES     has No Known Allergies. FAMILY HISTORY     He indicated that his mother is . He indicated that his father is . family history includes Diabetes in his mother; High Blood Pressure in his father. SOCIAL HISTORY      reports that he quit smoking about 2 years ago. His smoking use included cigarettes. He has a 15.00 pack-year smoking history. He has never used smokeless tobacco. He reports current alcohol use of about 8.0 standard drinks of alcohol per week. He reports that he does not use drugs. PHYSICAL EXAM     INITIAL VITALS:  height is 5' 10\" (1.778 m) and weight is 212 lb (96.2 kg). His temperature is 98.4 °F (36.9 °C). His blood pressure is 103/58 (abnormal) and his pulse is 116. His respiration is 18 and oxygen saturation is 97%. Physical Exam  Vitals and nursing note reviewed. Constitutional:       Appearance: Normal appearance. Comments: Patient is resting comfortably in the bed and appears well   HENT:      Head: Normocephalic and atraumatic. Eyes:      Extraocular Movements: Extraocular movements intact. Conjunctiva/sclera: Conjunctivae normal.   Cardiovascular:      Rate and Rhythm: Normal rate and regular rhythm. Pulmonary:      Effort: Pulmonary effort is normal. No respiratory distress.       Breath sounds: Normal breath sounds. Abdominal:      Palpations: Abdomen is soft. Tenderness: There is no abdominal tenderness. Skin:     General: Skin is warm and dry. Neurological:      General: No focal deficit present. Mental Status: He is alert and oriented to person, place, and time. Psychiatric:         Mood and Affect: Mood normal.         Behavior: Behavior normal.         Thought Content: Thought content normal.         Judgment: Judgment normal.         DIFFERENTIAL DIAGNOSIS/ MDM:     We will check a rapid Covid. Rapid Covid is positive. Patient is high risk as he is end-stage renal disease with a BMI over 30. I was able to speak with the pharmacist to arrange for monoclonal antibody infusion. Will discharge patient to the old ED for infusion. Patient does have an appointment for dialysis tomorrow.   Patient instructed to keep that appointment but to call ahead to notify them of his positive Covid test.    DIAGNOSTIC RESULTS     EKG: All EKG's are interpreted by the Emergency Department Physician who either signs or Co-signs this chart in the absence of a cardiologist.    Not clinically indicated    RADIOLOGY:   I directly visualized the following  images and reviewed the radiologist interpretations:  Not clinically indicated      ED BEDSIDE ULTRASOUND:   Not clinically indicated    LABS:  Labs Reviewed   COVID-19, RAPID - Abnormal; Notable for the following components:       Result Value    SARS-CoV-2, Rapid DETECTED (*)     All other components within normal limits       Positive for Covid    EMERGENCY DEPARTMENT COURSE:   Vitals:    Vitals:    09/15/21 1106 09/15/21 1109   BP:  (!) 103/58   Pulse: 116    Resp: 18    Temp: 98.4 °F (36.9 °C)    SpO2: 97%    Weight: 212 lb (96.2 kg)    Height: 5' 10\" (1.778 m)      -------------------------  BP: (!) 103/58, Temp: 98.4 °F (36.9 °C), Pulse: 116, Resp: 18    Tachycardic      FINAL IMPRESSION      1. COVID-19          DISPOSITION/PLAN Discharge    PATIENT REFERRED TO:  Ze Price MD  [address]   [address]  [id number]  [phone]    Call today        DISCHARGE MEDICATIONS:  Discharge Medication List as of 9/15/2021 12:39 PM          (Please note that portions of this note were completed with a voice recognition program.  Efforts were made to edit the dictations but occasionally words are mis-transcribed.)    Jarad Jett MD  Attending Emergency Physician                    Jarad Jett MD  09/15/21 [id number]

## 2021-09-15 NOTE — PROGRESS NOTES
Infusion completed, patient tolerated well  No s/s of adverse reactions noted  Will continue to monitor for the hour observation periods  Call light in reach

## 2021-09-16 ENCOUNTER — HOSPITAL ENCOUNTER (INPATIENT)
Age: 61
LOS: 4 days | Discharge: NURSING, REHAB OR LONG-TERM CARE | DRG: 177 | End: 2021-09-20
Attending: EMERGENCY MEDICINE | Admitting: INTERNAL MEDICINE
Payer: COMMERCIAL

## 2021-09-16 ENCOUNTER — APPOINTMENT (OUTPATIENT)
Dept: NUCLEAR MEDICINE | Age: 61
DRG: 177 | End: 2021-09-16
Payer: COMMERCIAL

## 2021-09-16 ENCOUNTER — APPOINTMENT (OUTPATIENT)
Dept: GENERAL RADIOLOGY | Age: 61
DRG: 177 | End: 2021-09-16
Payer: COMMERCIAL

## 2021-09-16 ENCOUNTER — CARE COORDINATION (OUTPATIENT)
Dept: CARE COORDINATION | Age: 61
End: 2021-09-16

## 2021-09-16 DIAGNOSIS — N18.6 ESRD (END STAGE RENAL DISEASE) (HCC): ICD-10-CM

## 2021-09-16 DIAGNOSIS — J18.9 PNEUMONIA DUE TO INFECTIOUS ORGANISM, UNSPECIFIED LATERALITY, UNSPECIFIED PART OF LUNG: ICD-10-CM

## 2021-09-16 DIAGNOSIS — U07.1 COVID-19: Primary | ICD-10-CM

## 2021-09-16 DIAGNOSIS — A41.9 SEPTICEMIA (HCC): ICD-10-CM

## 2021-09-16 DIAGNOSIS — I21.4 NSTEMI (NON-ST ELEVATED MYOCARDIAL INFARCTION) (HCC): ICD-10-CM

## 2021-09-16 PROBLEM — Z99.2 ESRD (END STAGE RENAL DISEASE) ON DIALYSIS (HCC): Status: ACTIVE | Noted: 2021-09-16

## 2021-09-16 PROBLEM — G93.41 ACUTE METABOLIC ENCEPHALOPATHY: Status: ACTIVE | Noted: 2021-09-16

## 2021-09-16 PROBLEM — R09.02 HYPOXIA: Status: ACTIVE | Noted: 2021-09-16

## 2021-09-16 PROBLEM — J12.82 PNEUMONIA DUE TO COVID-19 VIRUS: Status: ACTIVE | Noted: 2021-09-15

## 2021-09-16 PROBLEM — T82.7XXA LINE SEPSIS ASSOCIATED WITH DIALYSIS CATHETER (HCC): Status: ACTIVE | Noted: 2021-09-16

## 2021-09-16 LAB
ABSOLUTE EOS #: 0 K/UL (ref 0–0.44)
ABSOLUTE IMMATURE GRANULOCYTE: 0 K/UL (ref 0–0.3)
ABSOLUTE LYMPH #: 1.18 K/UL (ref 1.1–3.7)
ABSOLUTE MONO #: 0.3 K/UL (ref 0.1–1.2)
ALBUMIN SERPL-MCNC: 3.6 G/DL (ref 3.5–5.2)
ALBUMIN/GLOBULIN RATIO: ABNORMAL (ref 1–2.5)
ALP BLD-CCNC: 86 U/L (ref 40–129)
ALT SERPL-CCNC: 33 U/L (ref 5–41)
ANION GAP SERPL CALCULATED.3IONS-SCNC: 26 MMOL/L (ref 9–17)
AST SERPL-CCNC: 69 U/L
BASOPHILS # BLD: 0 % (ref 0–2)
BASOPHILS ABSOLUTE: 0 K/UL (ref 0–0.2)
BILIRUB SERPL-MCNC: 0.36 MG/DL (ref 0.3–1.2)
BNP INTERPRETATION: ABNORMAL
BUN BLDV-MCNC: 87 MG/DL (ref 8–23)
BUN/CREAT BLD: 8 (ref 9–20)
C-REACTIVE PROTEIN: 44 MG/L (ref 0–5)
CALCIUM SERPL-MCNC: 7.4 MG/DL (ref 8.6–10.4)
CHLORIDE BLD-SCNC: 89 MMOL/L (ref 98–107)
CO2: 14 MMOL/L (ref 20–31)
CREAT SERPL-MCNC: 11.54 MG/DL (ref 0.7–1.2)
D-DIMER QUANTITATIVE: 1.72 MG/L FEU (ref 0–0.59)
DIFFERENTIAL TYPE: ABNORMAL
EOSINOPHILS RELATIVE PERCENT: 0 % (ref 1–4)
FERRITIN: 3391 UG/L (ref 30–400)
GFR AFRICAN AMERICAN: 5 ML/MIN
GFR NON-AFRICAN AMERICAN: 5 ML/MIN
GFR SERPL CREATININE-BSD FRML MDRD: ABNORMAL ML/MIN/{1.73_M2}
GFR SERPL CREATININE-BSD FRML MDRD: ABNORMAL ML/MIN/{1.73_M2}
GLUCOSE BLD-MCNC: 239 MG/DL (ref 70–99)
HCT VFR BLD CALC: 33.6 % (ref 40.7–50.3)
HEMOGLOBIN: 11.1 G/DL (ref 13–17)
IMMATURE GRANULOCYTES: 0 %
LACTATE DEHYDROGENASE: 447 U/L (ref 135–225)
LACTIC ACID, SEPSIS WHOLE BLOOD: NORMAL MMOL/L (ref 0.5–1.9)
LACTIC ACID, SEPSIS WHOLE BLOOD: NORMAL MMOL/L (ref 0.5–1.9)
LACTIC ACID, SEPSIS: 1.4 MMOL/L (ref 0.5–1.9)
LACTIC ACID, SEPSIS: 1.7 MMOL/L (ref 0.5–1.9)
LYMPHOCYTES # BLD: 31 % (ref 24–43)
MCH RBC QN AUTO: 30.8 PG (ref 25.2–33.5)
MCHC RBC AUTO-ENTMCNC: 33 G/DL (ref 28.4–34.8)
MCV RBC AUTO: 93.3 FL (ref 82.6–102.9)
MONOCYTES # BLD: 8 % (ref 3–12)
NRBC AUTOMATED: 0 PER 100 WBC
PDW BLD-RTO: 14 % (ref 11.8–14.4)
PLATELET # BLD: 111 K/UL (ref 138–453)
PLATELET ESTIMATE: ABNORMAL
PMV BLD AUTO: 11.7 FL (ref 8.1–13.5)
POTASSIUM SERPL-SCNC: 4.4 MMOL/L (ref 3.7–5.3)
PRO-BNP: 836 PG/ML
PROCALCITONIN: 3.28 NG/ML
RBC # BLD: 3.6 M/UL (ref 4.21–5.77)
RBC # BLD: ABNORMAL 10*6/UL
SEG NEUTROPHILS: 61 % (ref 36–65)
SEGMENTED NEUTROPHILS ABSOLUTE COUNT: 2.32 K/UL (ref 1.5–8.1)
SODIUM BLD-SCNC: 129 MMOL/L (ref 135–144)
TOTAL PROTEIN: 7.9 G/DL (ref 6.4–8.3)
TROPONIN INTERP: ABNORMAL
TROPONIN T: ABNORMAL NG/ML
TROPONIN, HIGH SENSITIVITY: 103 NG/L (ref 0–22)
WBC # BLD: 3.8 K/UL (ref 3.5–11.3)
WBC # BLD: ABNORMAL 10*3/UL

## 2021-09-16 PROCEDURE — 96366 THER/PROPH/DIAG IV INF ADDON: CPT

## 2021-09-16 PROCEDURE — 99223 1ST HOSP IP/OBS HIGH 75: CPT | Performed by: INTERNAL MEDICINE

## 2021-09-16 PROCEDURE — 96365 THER/PROPH/DIAG IV INF INIT: CPT

## 2021-09-16 PROCEDURE — 84145 PROCALCITONIN (PCT): CPT

## 2021-09-16 PROCEDURE — 3E0DX3Z INTRODUCTION OF ANTI-INFLAMMATORY INTO MOUTH AND PHARYNX, EXTERNAL APPROACH: ICD-10-PCS | Performed by: INTERNAL MEDICINE

## 2021-09-16 PROCEDURE — 6360000002 HC RX W HCPCS: Performed by: INTERNAL MEDICINE

## 2021-09-16 PROCEDURE — 71045 X-RAY EXAM CHEST 1 VIEW: CPT

## 2021-09-16 PROCEDURE — 83605 ASSAY OF LACTIC ACID: CPT

## 2021-09-16 PROCEDURE — 85025 COMPLETE CBC W/AUTO DIFF WBC: CPT

## 2021-09-16 PROCEDURE — 80053 COMPREHEN METABOLIC PANEL: CPT

## 2021-09-16 PROCEDURE — 82728 ASSAY OF FERRITIN: CPT

## 2021-09-16 PROCEDURE — 90935 HEMODIALYSIS ONE EVALUATION: CPT

## 2021-09-16 PROCEDURE — 6370000000 HC RX 637 (ALT 250 FOR IP): Performed by: NURSE PRACTITIONER

## 2021-09-16 PROCEDURE — 99284 EMERGENCY DEPT VISIT MOD MDM: CPT

## 2021-09-16 PROCEDURE — 83880 ASSAY OF NATRIURETIC PEPTIDE: CPT

## 2021-09-16 PROCEDURE — 78580 LUNG PERFUSION IMAGING: CPT

## 2021-09-16 PROCEDURE — 83615 LACTATE (LD) (LDH) ENZYME: CPT

## 2021-09-16 PROCEDURE — 2580000003 HC RX 258: Performed by: INTERNAL MEDICINE

## 2021-09-16 PROCEDURE — 96367 TX/PROPH/DG ADDL SEQ IV INF: CPT

## 2021-09-16 PROCEDURE — G0378 HOSPITAL OBSERVATION PER HR: HCPCS

## 2021-09-16 PROCEDURE — 86140 C-REACTIVE PROTEIN: CPT

## 2021-09-16 PROCEDURE — 6370000000 HC RX 637 (ALT 250 FOR IP): Performed by: EMERGENCY MEDICINE

## 2021-09-16 PROCEDURE — 84484 ASSAY OF TROPONIN QUANT: CPT

## 2021-09-16 PROCEDURE — 8E0ZXY6 ISOLATION: ICD-10-PCS | Performed by: INTERNAL MEDICINE

## 2021-09-16 PROCEDURE — 85379 FIBRIN DEGRADATION QUANT: CPT

## 2021-09-16 PROCEDURE — XW0DXM6 INTRODUCTION OF BARICITINIB INTO MOUTH AND PHARYNX, EXTERNAL APPROACH, NEW TECHNOLOGY GROUP 6: ICD-10-PCS | Performed by: INTERNAL MEDICINE

## 2021-09-16 PROCEDURE — 3430000000 HC RX DIAGNOSTIC RADIOPHARMACEUTICAL: Performed by: EMERGENCY MEDICINE

## 2021-09-16 PROCEDURE — 2060000000 HC ICU INTERMEDIATE R&B

## 2021-09-16 PROCEDURE — 2580000003 HC RX 258: Performed by: EMERGENCY MEDICINE

## 2021-09-16 PROCEDURE — 5A1D70Z PERFORMANCE OF URINARY FILTRATION, INTERMITTENT, LESS THAN 6 HOURS PER DAY: ICD-10-PCS | Performed by: INTERNAL MEDICINE

## 2021-09-16 PROCEDURE — 6360000002 HC RX W HCPCS: Performed by: EMERGENCY MEDICINE

## 2021-09-16 PROCEDURE — A9540 TC99M MAA: HCPCS | Performed by: EMERGENCY MEDICINE

## 2021-09-16 PROCEDURE — 87040 BLOOD CULTURE FOR BACTERIA: CPT

## 2021-09-16 RX ORDER — SODIUM CHLORIDE 0.9 % (FLUSH) 0.9 %
5-40 SYRINGE (ML) INJECTION PRN
Status: DISCONTINUED | OUTPATIENT
Start: 2021-09-16 | End: 2021-09-17 | Stop reason: SDUPTHER

## 2021-09-16 RX ORDER — SODIUM CHLORIDE 0.9 % (FLUSH) 0.9 %
5-40 SYRINGE (ML) INJECTION EVERY 12 HOURS SCHEDULED
Status: DISCONTINUED | OUTPATIENT
Start: 2021-09-16 | End: 2021-09-17 | Stop reason: SDUPTHER

## 2021-09-16 RX ORDER — SODIUM CHLORIDE 9 MG/ML
25 INJECTION, SOLUTION INTRAVENOUS PRN
Status: DISCONTINUED | OUTPATIENT
Start: 2021-09-16 | End: 2021-09-17 | Stop reason: SDUPTHER

## 2021-09-16 RX ORDER — MIDODRINE HYDROCHLORIDE 5 MG/1
5 TABLET ORAL
Status: DISCONTINUED | OUTPATIENT
Start: 2021-09-16 | End: 2021-09-20 | Stop reason: HOSPADM

## 2021-09-16 RX ORDER — 0.9 % SODIUM CHLORIDE 0.9 %
5 INTRAVENOUS SOLUTION INTRAVENOUS ONCE
Status: COMPLETED | OUTPATIENT
Start: 2021-09-16 | End: 2021-09-16

## 2021-09-16 RX ORDER — POTASSIUM CHLORIDE 7.45 MG/ML
10 INJECTION INTRAVENOUS PRN
Status: CANCELLED | OUTPATIENT
Start: 2021-09-16

## 2021-09-16 RX ORDER — GUAIFENESIN 100 MG/5ML
200 SOLUTION ORAL EVERY 4 HOURS PRN
Status: DISCONTINUED | OUTPATIENT
Start: 2021-09-16 | End: 2021-09-20 | Stop reason: HOSPADM

## 2021-09-16 RX ORDER — POTASSIUM CHLORIDE 20 MEQ/1
40 TABLET, EXTENDED RELEASE ORAL PRN
Status: CANCELLED | OUTPATIENT
Start: 2021-09-16

## 2021-09-16 RX ORDER — ACETAMINOPHEN 500 MG
1000 TABLET ORAL ONCE
Status: COMPLETED | OUTPATIENT
Start: 2021-09-16 | End: 2021-09-16

## 2021-09-16 RX ADMIN — GUAIFENESIN 200 MG: 200 SOLUTION ORAL at 16:48

## 2021-09-16 RX ADMIN — AZITHROMYCIN MONOHYDRATE 500 MG: 500 INJECTION, POWDER, LYOPHILIZED, FOR SOLUTION INTRAVENOUS at 17:34

## 2021-09-16 RX ADMIN — MIDODRINE HYDROCHLORIDE 5 MG: 5 TABLET ORAL at 20:15

## 2021-09-16 RX ADMIN — ACETAMINOPHEN 1000 MG: 500 TABLET ORAL at 16:49

## 2021-09-16 RX ADMIN — SODIUM CHLORIDE 409.5 ML: 9 INJECTION, SOLUTION INTRAVENOUS at 17:00

## 2021-09-16 RX ADMIN — Medication 6.5 MILLICURIE: at 17:55

## 2021-09-16 RX ADMIN — CEFTRIAXONE SODIUM 1000 MG: 1 INJECTION, POWDER, FOR SOLUTION INTRAMUSCULAR; INTRAVENOUS at 16:45

## 2021-09-16 RX ADMIN — VANCOMYCIN HYDROCHLORIDE 2500 MG: 5 INJECTION, POWDER, LYOPHILIZED, FOR SOLUTION INTRAVENOUS at 19:27

## 2021-09-16 ASSESSMENT — ENCOUNTER SYMPTOMS
FACIAL SWELLING: 0
SHORTNESS OF BREATH: 1
EYE DISCHARGE: 0
CHEST TIGHTNESS: 0
ABDOMINAL DISTENTION: 0
EYE PAIN: 0
BACK PAIN: 0
ABDOMINAL PAIN: 0

## 2021-09-16 ASSESSMENT — PAIN SCALES - GENERAL
PAINLEVEL_OUTOF10: 0
PAINLEVEL_OUTOF10: 0

## 2021-09-16 NOTE — H&P
[facility]  Office: [address], DO, Mandy Pole, DO, Maryam Jacksonville, DO, David Crowel Blood, DO, Katarzyna Clark MD, Lois Gomez MD, Blayne Mariee MD, Colleen Valentine MD, Brandee Morris MD, Shawanda Holcomb MD, Tiffani Hancock MD, Mitesh Nichole, DO, Bryon Loo, DO, Crystal Servin MD,  Deepali Dean, DO, Gisella Allen MD, Lynnette Kelly MD, Bay Natarajan MD, Kenan Vargas MD, , Jeff Forbes MD, Hernán Velasquez MD, Jonelle Jean-Baptiste MD, Ammon Chamorro [facility], [facility], [facility], Angella Sanchez, [facility], Ileana Solis, CNS, Satya Whitley, CNP, Delfina Wilson, CNP, Faru Lorenzo, CNP, Sheldon Mckeon, CNP, Catrachito Coker, CNP, Toi Baird PA-C, Brandon Prakash, [facility], Rosaura Chung, CNP, Erum Viramontes, CNP, Anthony Rodriguez, CNP, Miky Nieto, CNP, Cornelius Bowen, CNP, Dominic Montoya, CNP, Blaze Watson, CNP, Ricky Orona, CNP         [facility] [address]    HISTORY AND PHYSICAL EXAMINATION            Date:   9/16/2021  Patient name:  Charlette Banerjee  Date of admission:  9/16/2021  2:17 PM  MRN:   [id number]  Account:  [de-identified]  YOB: 1960  PCP:    Maren Bush MD  Room:   [place] [address]  Code Status:    Prior    Chief Complaint:     Chief Complaint   Patient presents with    Fever     went to dialysis and was refused services because covid positive    Shortness of Breath       History Obtained From:     electronic medical record    History of Present Illness:     Charlette Banerjee is a 64 y.o. Non- / non  male who presents with Fever (went to dialysis and was refused services because covid positive) and Shortness of Breath   and is admitted to the hospital for the management of ESRD needing dialysis ([facility] [address] This is a 40-year-old male that presents with a complaint of fever and shortness of breath and unable to receive dialysis due to recent diagnosis of Covid.   His last dialysis treatment was this past Monday and presents this time with ongoing fever and worsening shortness of breath. He is currently seen in the emergency room, sleeping and difficult to arouse. He denied cough or pain but quickly fell asleep again. He was evaluated in the emergency room on the 13th for complaints of fall and fever and was investigated for Covid at that time. Chest x-ray was benign and has now developed infiltrates consistent with Covid. Per the ER note  \"Patient is a 17-year-old male who presented to the emergency room by EMS from dialysis secondary to testing positive for Covid. Patient underwent Covid testing and was alerted that he was positive while at dialysis. Given positive Covid test patient was not able to undergo dialysis. Patient said he last underwent dialysis on Monday. Still makes urine he is unsure of his dry weight. Patient is unvaccinated. Patient is unsure of possible exposure. Patient said he is felt tired, fatigued with shortness of breath. Subjective fevers at home although he did not take his temperature. Patient denied a previous history of COPD or CHF is not on home oxygen. Denied previous history of asthma. \"    Past Medical History:     Past Medical History:   Diagnosis Date    ADHD (attention deficit hyperactivity disorder)     COVID-19 9/15/2021    COVID-19 9/15/2021    Depression     DM2 (diabetes mellitus, type 2) ([facility] [address] 10/15/2013    Erectile dysfunction     Gastroesophageal reflux disease 8/23/2019    Hyperlipidemia     Hypertension     Kidney stone 7/19/2018    Low back pain of thoracolumbar region with sciatica     Neuropathy         Past Surgical History:     Past Surgical History:   Procedure Laterality Date    COLONOSCOPY N/A 12/7/2020    COLONOSCOPY DIAGNOSTIC performed by Adelaide Edwards MD at [address] [place] [address]  07/20/2018    bilat stent replacement    CYSTOSCOPY  08/03/2018    b/l ureteroscopy, b/l stent, HLL    IR TUNNELED CATHETER PLACEMENT GREATER THAN 5 YEARS  6/28/2021 IR TUNNELED CATHETER PLACEMENT GREATER THAN 5 YEARS 6/28/2021 Twin Reyes MD [facility]ED[facility]    OH CYSTO/URETERO/PYELOSCOPY, CALCULUS TX Bilateral 8/3/2018    FORTEC HOLMIUM - CYSTO, URETEROSCOPY LITHOTRIPSY, STENT EXCHANGE Allen Cody #[id number] - ESPERANZA) performed by Joseph Dawn MD at [address] [facility] Bilateral 7/20/2018    CYSTOSCOPY URETERAL STENT INSERTION BILATERAL performed by Joseph Dawn MD at [address] [place] [address]      as a child    UPPER GASTROINTESTINAL ENDOSCOPY N/A 12/7/2020    EGD BIOPSY performed by Dmitry Siddiqui MD at [facility]        Medications Prior to Admission:     Prior to Admission medications    Medication Sig Start Date End Date Taking? Authorizing Provider   Insulin Pen Needle ([employer] PEN NEEDLES) 31G X 6 MM MISC 1 each by Does not apply route daily 8/10/21   Noble Tejeda MD   insulin glargine (LANTUS SOLOSTAR) 100 UNIT/ML injection pen Inject 10 Units into the skin nightly 8/10/21 8/5/22  Noble Tejeda MD   blood glucose monitor strips Test 2 times a day & as needed for symptoms of irregular blood glucose. 8/10/21   Noble Tejeda MD   Lancets MISC 1 each by Does not apply route 2 times daily 8/10/21   Noble Tejeda MD   gabapentin (NEURONTIN) 100 MG capsule TAKE 1 CAPSULE BY MOUTH 3 TIMES A DAY 7/27/21 8/27/21  Mady Kwok MD   hydrALAZINE (APRESOLINE) 25 MG tablet Take 1 tablet by mouth every 8 hours 6/29/21   Arsenio Hodge MD   amLODIPine (NORVASC) 10 MG tablet TAKE 1 TABLET BY MOUTH ONCE DAILY.  5/25/21   Crystal Moran MD   acetaminophen (TYLENOL) 325 MG tablet TAKE 2 TABLET BY MOUTH EVERY 6 HOURS AS NEEDED FOR PAIN 3/11/21   Guille Sagastume MD   isosorbide mononitrate (IMDUR) 30 MG extended release tablet Take 1 tablet by mouth daily 12/30/20   Mady Kwok MD   Insulin Pen Needle 32G X 4 MM MISC 1 each by Does not apply route daily 12/14/20   Mady Kwok MD   pantoprazole (PROTONIX) 40 MG tablet Take 1 tablet by mouth 2 times daily (before meals) 20   Ike Boykin MD   metoprolol tartrate (LOPRESSOR) 50 MG tablet Take 1 tablet by mouth 2 times daily 20   Dustin Enamorado MD   SENNA-PLUS 8.6-50 MG per tablet Take 1 tablet by mouth daily 20   Richard Floyd MD   aspirin 81 MG chewable tablet Take 1 tablet by mouth daily 20   Richard Floyd MD   atorvastatin (LIPITOR) 20 MG tablet Take 1 tablet by mouth daily 20   Richard Floyd MD   docusate sodium (COLACE) 100 MG capsule Take 1 capsule by mouth 2 times daily as needed for Constipation 20   Richard Floyd MD   blood glucose monitor kit and supplies Test 2 times a day & as needed for symptoms of irregular blood glucose. 20   Richard Floyd MD        Allergies:     Patient has no known allergies. Social History:     Tobacco:    reports that he quit smoking about 2 years ago. His smoking use included cigarettes. He has a 15.00 pack-year smoking history. He has never used smokeless tobacco.  Alcohol:      reports current alcohol use of about 8.0 standard drinks of alcohol per week. Drug Use:  reports no history of drug use. Family History:     Family History   Problem Relation Age of Onset    Diabetes Mother     High Blood Pressure Father        Review of Systems:     Limited to the above due to drowsiness    Physical Exam:   BP 83/64   Pulse 112   Temp 100 °F (37.8 °C) (Oral)   Resp 21   Ht 5' 10\" (1.778 m)   Wt 210 lb (95.3 kg)   SpO2 99%   BMI 30.13 kg/m²   Temp (24hrs), Av °F (37.8 °C), Min:100 °F (37.8 °C), Max:100 °F (37.8 °C)    No results for input(s): POCGLU in the last 72 hours.   No intake or output data in the 24 hours ending 21    General Appearance: Drowsy, chronically ill appearing, and in no acute distress  Mental status: oriented to person  Head:  normocephalic, atraumatic  Eye: no icterus, redness, pupils equal and reactive, extraocular eye movements intact, conjunctiva clear  Ear: normal external ear, no discharge, hearing intact  Nose:  no drainage noted  Mouth: mucous membranes moist  Neck: supple, no carotid bruits, thyroid not palpable  Lungs: Bilateral equal air entry, clear to auscultation, no wheezing, rales or rhonchi, normal effort, diminished throughout  Cardiovascular: normal rate, regular rhythm, no murmur, gallop, rub.   Abdomen: Soft, nontender, nondistended, normal bowel sounds, no hepatomegaly or splenomegaly  Neurologic: There are no new focal motor or sensory deficits, normal muscle tone and bulk, no abnormal sensation, normal speech, cranial nerves II through XII grossly intact  Skin: No gross lesions, rashes, bruising or bleeding on exposed skin area  Extremities:  peripheral pulses palpable, no pedal edema or calf pain with palpation  Psych: normal affect     Investigations:      Laboratory Testing:  Recent Results (from the past 24 hour(s))   Lactate, Sepsis    Collection Time: 09/16/21  2:40 PM   Result Value Ref Range    Lactic Acid, Sepsis 1.7 0.5 - 1.9 mmol/L    Lactic Acid, Sepsis, Whole Blood NOT REPORTED 0.5 - 1.9 mmol/L   CBC Auto Differential    Collection Time: 09/16/21  2:40 PM   Result Value Ref Range    WBC 3.8 3.5 - 11.3 k/uL    RBC 3.60 (L) 4.21 - 5.77 m/uL    Hemoglobin 11.1 (L) 13.0 - 17.0 g/dL    Hematocrit 33.6 (L) 40.7 - 50.3 %    MCV 93.3 82.6 - 102.9 fL    MCH 30.8 25.2 - 33.5 pg    MCHC 33.0 28.4 - 34.8 g/dL    RDW 14.0 11.8 - 14.4 %    Platelets 539 (L) 900 - 453 k/uL    MPV 11.7 8.1 - 13.5 fL    NRBC Automated 0.0 0.0 per 100 WBC    Differential Type NOT REPORTED     WBC Morphology NOT REPORTED     RBC Morphology NOT REPORTED     Platelet Estimate NOT REPORTED     Seg Neutrophils 61 36 - 65 %    Lymphocytes 31 24 - 43 %    Monocytes 8 3 - 12 %    Eosinophils % 0 (L) 1 - 4 %    Basophils 0 0 - 2 %    Immature Granulocytes 0 0 %    Segs Absolute 2.32 1.50 - 8.10 k/uL    Absolute Lymph # 1.18 1.10 - 3.70 k/uL    Absolute Mono # 0.30 0.10 - 1.20 k/uL Absolute Eos # 0.00 0.00 - 0.44 k/uL    Basophils Absolute 0.00 0.00 - 0.20 k/uL    Absolute Immature Granulocyte 0.00 0.00 - 0.30 k/uL   Comprehensive Metabolic Panel w/ Reflex to MG    Collection Time: 09/16/21  2:40 PM   Result Value Ref Range    Glucose 239 (H) 70 - 99 mg/dL    BUN 87 (H) 8 - 23 mg/dL    CREATININE 11.54 (HH) 0.70 - 1.20 mg/dL    Bun/Cre Ratio 8 (L) 9 - 20    Calcium 7.4 (L) 8.6 - 10.4 mg/dL    Sodium 129 (L) 135 - 144 mmol/L    Potassium 4.4 3.7 - 5.3 mmol/L    Chloride 89 (L) 98 - 107 mmol/L    CO2 14 (L) 20 - 31 mmol/L    Anion Gap 26 (H) 9 - 17 mmol/L    Alkaline Phosphatase 86 40 - 129 U/L    ALT 33 5 - 41 U/L    AST 69 (H) <40 U/L    Total Bilirubin 0.36 0.3 - 1.2 mg/dL    Total Protein 7.9 6.4 - 8.3 g/dL    Albumin 3.6 3.5 - 5.2 g/dL    Albumin/Globulin Ratio NOT REPORTED 1.0 - 2.5    GFR Non-African American 5 (L) >60 mL/min    GFR African American 5 (L) >60 mL/min    GFR Comment          GFR Staging NOT REPORTED    FERRITIN    Collection Time: 09/16/21  2:40 PM   Result Value Ref Range    Ferritin 3,391 (H) 30 - 400 ug/L   C-Reactive Protein    Collection Time: 09/16/21  2:40 PM   Result Value Ref Range    CRP 44.0 (H) 0.0 - 5.0 mg/L   LACTATE DEHYDROGENASE    Collection Time: 09/16/21  2:40 PM   Result Value Ref Range     (H) 135 - 225 U/L   Procalcitonin    Collection Time: 09/16/21  2:40 PM   Result Value Ref Range    Procalcitonin 3.28 (H) <0.09 ng/mL   Troponin    Collection Time: 09/16/21  2:40 PM   Result Value Ref Range    Troponin, High Sensitivity 103 (HH) 0 - 22 ng/L    Troponin T NOT REPORTED <0.03 ng/mL    Troponin Interp NOT REPORTED    Brain Natriuretic Peptide    Collection Time: 09/16/21  2:40 PM   Result Value Ref Range    Pro- (H) <300 pg/mL    BNP Interpretation Pro-BNP Reference Range:    D-Dimer, Quantitative    Collection Time: 09/16/21  2:40 PM   Result Value Ref Range    D-Dimer, Quant 1.72 (H) 0.00 - 0.59 mg/L FEU   Lactate, Sepsis    Collection Time: 09/16/21  4:42 PM   Result Value Ref Range    Lactic Acid, Sepsis 1.4 0.5 - 1.9 mmol/L    Lactic Acid, Sepsis, Whole Blood NOT REPORTED 0.5 - 1.9 mmol/L       Imaging/Diagnostics:  NM LUNG SCAN PERFUSION ONLY    Result Date: 9/16/2021  Low Probability for Pulmonary Embolus. XR CHEST PORTABLE    Result Date: 9/16/2021  New, nonspecific pulmonary infiltrates, presumably atypical/viral pneumonia (insert COVID-19 pneumonia). XR CHEST PORTABLE    Result Date: 9/13/2021  Satisfactorily positioned support line, no pneumothorax No acute cardiopulmonary findings       Assessment :      Hospital Problems         Last Modified POA    * (Principal) ESRD needing dialysis ([facility] [address] 9/16/2021 Yes    DM2 (diabetes mellitus, type 2) ([facility] [address] 9/16/2021 Yes    Gastroesophageal reflux disease 9/16/2021 Yes    Essential hypertension 9/16/2021 Yes    Pneumonia due to COVID-19 virus 9/16/2021 Yes    Possible line sepsis associated with dialysis catheter ([facility] [address] 9/16/2021 Yes    Acute metabolic encephalopathy 6/25/5464 Yes          Plan:     Patient status inpatient in the Progressive Unit/Step down    1. Inpatient admission  2. Nephrology consultation for acute dialysis treatment  3. Monitor and control blood pressure, continue home antihypertensives  4. Supplemental oxygen and aerosols as needed  5. Decadron 6 mg daily  6. Insulin scale for glycemic control  7. Hypoglycemia order set  8. IV vancomycin pending cultures for possible line sepsis. Tunnel catheter placed in June and site looks okay at this time. 9. Pharmacy to dose baricitinib if appropriate for acute Covid with hypoxia  10. Repeat chest x-ray in the morning to evaluate Covid pneumonia versus pulmonary edema. If x-ray markedly improved after dialysis finds it mostly related to edema  11. GI and DVT prophylaxis  12. Anticipating improvement in his neurologic status after dialysis, encephalopathy likely related to infection and some degree of uremia.   13. PT and OT as needed  14. , will need outpatient dialysis and that can accommodate diagnosis of Covid  15. No need for fluid bolus as patient appears to be volume overloaded at this time, lactic acid within normal limits, and white count is normal.  Procalcitonin mildly elevated, possibly related to chronic kidney disease, unfortunately no baseline value available in the EHR  16. See orders for details    Consultations:   Sarah Szymanski  IP CONSULT TO INTERNAL MEDICINE  IP CONSULT TO PHARMACY  IP CONSULT TO NEPHROLOGY     Patient is admitted as inpatient status because of co-morbidities listed above, severity of signs and symptoms as outlined, requirement for current medical therapies and most importantly because of direct risk to patient if care not provided in a hospital setting. Expected length of stay > 48 hours.     Javier Callahan DO  9/16/2021  7:03 PM    Copy sent to Dr. Argelia Moreno MD

## 2021-09-16 NOTE — CONSULTS
Pharmacy Note  Vancomycin Consult (Dialysis patient) - Initial Note       TODAY'S DATE:  9/16/2021  Patient name, age:  Charlette Banerjee, 64 y.o. Vancomycin order/consult received from: Dr. Maryam Martin . Indication: Possible Line sepsis  Additional antimicrobials:  None      Allergies:  Patient has no known allergies. Actual Weight:    Wt Readings from Last 1 Encounters:   09/16/21 210 lb (95.3 kg)     Labs/Ancillary Data  Recent Labs     09/16/21  1440   BUN 87*     Estimated Creatinine Clearance: 8 mL/min (A) (based on SCr of 11.54 mg/dL [facility])). Recent Labs     09/16/21  1440   CREATININE 11.54*     Recent Labs     09/16/21  1440   WBC 3.8     Procalcitonin   Date Value Ref Range Status   09/16/2021 3.28 (H) <0.09 ng/mL Final     Comment:           Suspected Sepsis:  <0.50 ng/mL     Low likelihood of sepsis. 0.50-2.00 ng/mL     Increased likelihood of sepsis. Antibiotics encouraged. >2.00 ng/mL     High risk of sepsis/shock. Antibiotics strongly encouraged. Suspected Lower Resp Tract Infections:  <0.24 ng/mL     Low likelihood of bacterial infection. >0.24 ng/mL     Increased likelihood of bacterial infection. Antibiotics encouraged. With successful antibiotic therapy, PCT levels should decrease rapidly. (Half-life of 24 to   36 hours.)        Procalcitonin values from samples collected within the first 6 hours of systemic infection   may still be low. Retesting may be indicated. Values from day 1 and day 4 can be entered into the Change in Procalcitonin Calculator   ([email or web address]-[email or web address]-[email or web address] com) to determine the patient's Mortality Risk Prognosis        In healthy neonates, plasma Procalcitonin (PCT) concentrations increase gradually after   birth, reaching peak values at about 24 hours of age then decrease to normal values below   0.5 ng/mL by 48-72 hours of age.        No intake or output data in the 24 hours ending 09/16/21 1909  Temp: 100      Recent vancomycin administrations No vancomycin IV orders with administrations found. Orders not given:            vancomycin (VANCOCIN) 2,500 mg in dextrose 5 % 500 mL IVPB    vancomycin (VANCOCIN) intermittent dosing (placeholder)                    PLAN    Vancomycin level ordered for: Not currently ordered, unknown dialysis schedule. 2.   ONLY for suspected pneumonia or COPD: MRSA Nasal Swab: N/A. Non-respiratory infection. .      Stable HD/PD patients do not require regular renal function monitoring. Intermittent Hemodialysis (HD)*do not use Bayesian software for dosing  Empiric Dosing Regimen:  LD = 25 mg/kg (MAX 2,000mg dose), give dialysis, then         MD  = 10-15 mg/kg after HD sessions (MAX 2,000mg per dose)  Timing and Frequency of Concentration Monitoring  Critically ill à pre-HD concentrations after LD  Stable/stable dialysis à pre-HD concentration after 1st or 2nd MD on dialysis 3x/week  Pre-dialysis level Invasive MRSA Infection or Sepsis Non-Invasive Infection or Non-MRSA Infection   ? 25 mg/L Hold vancomycin dose, ? subsequent dose by 250 mg Hold vancomycin dose, ? subsequent dose by 250-500 mg   21-24 mg/L Continue current dose Decrease dose by 250 mg   15-20 mg/L Increase dose by 250 mg Continue current dose   ? 14 mg/L Increase dose by 250-500 mg Increase dose by 250 mg   Note: Pre-HD concentration monitoring is preferred, if concentrations are drawn after HD, the level must be collected no sooner than 2-4 hours after the end of the session to allow for maximum redistribution and plasma rebound. Thank you for the consult. Pharmacy will continue to follow.   Tata Mayorga, [address] [place] [address], Anai/Carl  9/16/2021  7:09 PM

## 2021-09-16 NOTE — CARE COORDINATION
2nd  left for patient regarding recent ED visits. Requested a return call to [facility].    Cinthia Lopez RN, ambulatory care manager

## 2021-09-16 NOTE — ED NOTES
Verified fluid orders with Dr. Nadja Huynh, per Dr. Nadja Huynh give fluids.       Lizbeth Cantrell RN  09/16/21 1919

## 2021-09-16 NOTE — ED NOTES
Pt to er via ems with c/o sob and fever. Pt states he was dx with covid 2 days ago. Pt states he was at [facility]. Vs yesterday for monoclonal antibodies. Pt states he went to dialysis today and was told they would not do dialysis d/t pt having covid. Pt states he last had dialysis on Monday. Pt a&ox3. Skin warm and dry. Respirations even and non-labored.       Lizbeth Cantrell RN  09/16/21 6615

## 2021-09-16 NOTE — ED PROVIDER NOTES
EMERGENCY DEPARTMENT ENCOUNTER    Pt Name: Nuris Ellison  MRN: [id number]  [place] 1960  Date of evaluation: 9/16/21  CHIEF COMPLAINT       Chief Complaint   Patient presents with    Fever     went to dialysis and was refused services because covid positive    Shortness of Breath     HISTORY OF PRESENT ILLNESS   HPI  Patient is a 26-year-old male who presented to the emergency room by EMS from dialysis secondary to testing positive for Covid. Patient underwent Covid testing and was alerted that he was positive while at dialysis. Given positive Covid test patient was not able to undergo dialysis. Patient said he last underwent dialysis on Monday. He normally undergoes dialysis Monday Wednesday and Friday. Still makes urine he is unsure of his dry weight. Patient is unvaccinated. Patient is unsure of possible exposure. Patient said he is felt tired, fatigued with shortness of breath. Subjective fevers at home although he did not take his temperature. Patient denied a previous history of COPD or CHF is not on home oxygen. Denied previous history of asthma. REVIEW OF SYSTEMS     Review of Systems   Constitutional: Positive for chills and fever. Negative for diaphoresis. HENT: Negative for congestion, ear pain and facial swelling. Eyes: Negative for pain, discharge and visual disturbance. Respiratory: Positive for shortness of breath. Negative for chest tightness. Cardiovascular: Negative for chest pain and palpitations. Gastrointestinal: Negative for abdominal distention and abdominal pain. Genitourinary: Negative for difficulty urinating and flank pain. Musculoskeletal: Negative for back pain. Skin: Negative for wound. Neurological: Positive for weakness. Negative for dizziness, light-headedness and headaches.      PASTMEDICAL HISTORY     Past Medical History:   Diagnosis Date    ADHD (attention deficit hyperactivity disorder)     COVID-19 9/15/2021    COVID-19 9/15/2021    Depression     [facility] 10/15/2013    Erectile dysfunction     Gastroesophageal reflux disease 8/23/2019    Hyperlipidemia     Hypertension     Kidney stone 7/19/2018    Low back pain of thoracolumbar region with sciatica     Neuropathy      SURGICAL HISTORY       Past Surgical History:   Procedure Laterality Date    COLONOSCOPY N/A 12/7/2020    COLONOSCOPY DIAGNOSTIC performed by Siobhan Rodriguez MD at [address] [place] [address]  07/20/2018    bilat stent replacement    CYSTOSCOPY  08/03/2018    b/l ureteroscopy, b/l stent, HLL    IR TUNNELED CATHETER PLACEMENT GREATER THAN 5 YEARS  6/28/2021    IR TUNNELED CATHETER PLACEMENT GREATER THAN 5 YEARS 6/28/2021 Dolly Cui MD [facility] SPECIAL PROCEDURES    ID CYSTO/URETERO/PYELOSCOPY, CALCULUS TX Bilateral 8/3/2018    FORTEC HOLMIUM - CYSTO, URETEROSCOPY LITHOTRIPSY, STENT EXCHANGE [facility] #[id number] - ESPERANZA) performed by Frankey Letters, MD at [facility],8W Bilateral 7/20/2018    CYSTOSCOPY URETERAL STENT INSERTION BILATERAL performed by Frankey Letters, MD at [address] [place] [address]      as a child    UPPER GASTROINTESTINAL ENDOSCOPY N/A 12/7/2020    EGD BIOPSY performed by Siobhan Rodriguez MD at [place] [address]       Current Discharge Medication List      CONTINUE these medications which have NOT CHANGED    Details   hydrALAZINE (APRESOLINE) 25 MG tablet Take 1 tablet by mouth every 8 hours  Qty: 90 tablet, Refills: 3      amLODIPine (NORVASC) 10 MG tablet TAKE 1 TABLET BY MOUTH ONCE DAILY. Qty: 90 tablet, Refills: 1      acetaminophen (TYLENOL) 325 MG tablet TAKE 2 TABLET BY MOUTH EVERY 6 HOURS AS NEEDED FOR PAIN  Qty: 120 tablet, Refills: 0    Associated Diagnoses: Chronic bilateral low back pain      metoprolol tartrate (LOPRESSOR) 50 MG tablet Take 1 tablet by mouth 2 times daily  Qty: 60 tablet, Refills: 3      !!  Insulin Pen Needle ([employer] PEN NEEDLES) 31G X 6 MM MISC 1 each by Does not apply route daily  Qty: 100 each, Refills: 3      insulin glargine (LANTUS SOLOSTAR) 100 UNIT/ML injection pen Inject 10 Units into the skin nightly  Qty: 27 mL, Refills: 0      blood glucose monitor strips Test 2 times a day & as needed for symptoms of irregular blood glucose. Qty: 100 strip, Refills: 3    Comments: Brand per patient preference. May round up to next available package size. Lancets MISC 1 each by Does not apply route 2 times daily  Qty: 100 each, Refills: 5    Associated Diagnoses: Type 2 diabetes mellitus with diabetic autonomic neuropathy, without long-term current use of insulin ([facility])      gabapentin (NEURONTIN) 100 MG capsule TAKE 1 CAPSULE BY MOUTH 3 TIMES A DAY  Qty: 90 capsule, Refills: 2    Associated Diagnoses: Chronic bilateral low back pain      isosorbide mononitrate (IMDUR) 30 MG extended release tablet Take 1 tablet by mouth daily  Qty: 30 tablet, Refills: 3      !! Insulin Pen Needle 32G X 4 MM MISC 1 each by Does not apply route daily  Qty: 100 each, Refills: 3    Associated Diagnoses: Type 2 diabetes mellitus with diabetic autonomic neuropathy, without long-term current use of insulin ([facility])      pantoprazole (PROTONIX) 40 MG tablet Take 1 tablet by mouth 2 times daily (before meals)  Qty: 30 tablet, Refills: 3      SENNA-PLUS 8.6-50 MG per tablet Take 1 tablet by mouth daily  Qty: 30 tablet, Refills: 0      aspirin 81 MG chewable tablet Take 1 tablet by mouth daily  Qty: 30 tablet, Refills: 0      atorvastatin (LIPITOR) 20 MG tablet Take 1 tablet by mouth daily  Qty: 30 tablet, Refills: 0      docusate sodium (COLACE) 100 MG capsule Take 1 capsule by mouth 2 times daily as needed for Constipation  Qty: 30 capsule, Refills: 0    Associated Diagnoses: Kidney stone      blood glucose monitor kit and supplies Test 2 times a day & as needed for symptoms of irregular blood glucose. Qty: 1 kit, Refills: 0    Comments: Brand per patient preference.  May round up to next available package size. !! - Potential duplicate medications found. Please discuss with provider. ALLERGIES     has No Known Allergies. FAMILY HISTORY     He indicated that his mother is . He indicated that his father is . SOCIAL HISTORY       Social History     Tobacco Use    Smoking status: Former Smoker     Packs/day: 0.50     Years: 30.00     Pack years: 15.00     Types: Cigarettes     Quit date: 2018     Years since quittin.8    Smokeless tobacco: Never Used   Substance Use Topics    Alcohol use: Yes     Alcohol/week: 8.0 standard drinks     Types: 8 Cans of beer per week     Comment: drinks \"a few\" beers a day    Drug use: No     Types: Marijuana     Comment:  last use one month     PHYSICAL EXAM     INITIAL VITALS: /62   Pulse 103   Temp 98.8 °F (37.1 °C) (Oral)   Resp 14   Ht 5' 10\" (1.778 m)   Wt 210 lb 8 oz (95.5 kg)   SpO2 98%   BMI 30.20 kg/m²    Physical Exam  Vitals and nursing note reviewed. Constitutional:       General: He is not in acute distress. Appearance: He is well-developed. He is ill-appearing and diaphoretic. Interventions: He is not intubated. HENT:      Head: Normocephalic and atraumatic. Eyes:      Pupils: Pupils are equal, round, and reactive to light. Cardiovascular:      Rate and Rhythm: Normal rate and regular rhythm. Pulmonary:      Effort: Tachypnea and accessory muscle usage present. No bradypnea or respiratory distress. He is not intubated. Breath sounds: Normal breath sounds. No stridor. No wheezing, rhonchi or rales. Abdominal:      General: Bowel sounds are normal.      Palpations: Abdomen is soft. Musculoskeletal:         General: Normal range of motion. Cervical back: Normal range of motion and neck supple. Skin:     General: Skin is warm. Capillary Refill: Capillary refill takes less than 2 seconds.    Neurological:      Mental Status: He is alert and oriented to person, place, and time. MEDICAL DECISION MAKING:   Patient is a 70-year-old male with history of end-stage renal disease recent positive Covid who presented to the emergency department secondary to shortness of breath. On arrival in the emergency department patient tachycardic at 126 however 97% on room air. She placed in droplet precautions labs obtained including chest x-ray, Covid markers, lactic acid and blood cultures x2. Chest x-ray concerning for pneumonia, patient is she on antibiotics with ceftriaxone azithromycin. Covid markers elevated. Nephrology and internal medicine service paged. Discussed with the internal medicine service who agreed to admit for further evaluation and treatment. 1)  Sepsis Identified at Time:      1611       2)  Sepsis Alert Protocol Guidelines:  · Blood Cultures drawn before antibiotics  · Broad Spectrum Antibiotics given stat within one hour  · Lactic Acid Q2 hours times 2 occurrences (if first lactic acid > 2.0)    3)  Fluid Bolus Guidelines:    If lactate > 4.0   OR   MAP less than 65  OR  systolic BP < 90 (two separate readings),            then 30ml/kg crystalloid fluid bolus infused, and may give over 4 hour duration. Is the patient Obese (BMI > 30): Body mass index is 30.2 kg/m². Ideal body weight: 73 kg (160 lb 15 oz)  Adjusted ideal body weight: 82 kg (180 lb 12.2 oz)    If Obese the Ideal Body  (Leticia formula):   Ideal body weight (IBW) (men) = 50 kg + 2.3 kg x (height, inches - 60)    Ideal body weight (IBW) (women) = 45.5 kg + 2.3 kg x (height, inches - 60)    4)  Repeat Sepsis Exam completed during fluid bolus at time:   1800        5)  Vasopressors: For persistent hypotension after completion of 30ml/kg fluid bolus (need to measure BP Q 15 min in the hour after completion of fluid bolus). Discuss risks and benefits of vasopressors and alternative therapies with patient and/or DPOA.       Despite having [hypotension / lactate ?4 / documented septic shock], a standard fluid resuscitation of 30 mL/kg would harm the patient because the patient has End-stage chronic renal disease of stage IV/V . Therefore,  ordering a  10 mL bolus of fluids instead to replace 30 mL/kg. ED Course as of Sep 17 1351   Thu Sep 16, 2021   1641 Discussed with Dr. Salas Moran, the patient's nephrologist.  Will arrange emergent dialysis. Given sepsis, recommended patient receive 50 cc fluid over 6hours given end-stage renal disease. Dialysis nurse paged.    [AS]      ED Course User Index  [AS] Jaciel Stephenson MD       All patient's question's and concerns were answered prior to disposition and patient and/or family expressed understanding and agreement of treatment plan. CRITICAL CARE:              NIH STROKE SCALE:            PROCEDURES:    Procedures    DIAGNOSTIC RESULTS   EKG:All EKG's are interpreted by the Emergency Department Physician who either signs or Co-signs this chart in the absence of a cardiologist.        RADIOLOGY:All plain film, CT, MRI, and formal ultrasound images (except ED bedside ultrasound) are read by the radiologist, see reports below, unless otherwisenoted in MDM or here. XR CHEST PORTABLE   Preliminary Result   Increased patchy bibasilar airspace disease, most likely related to pneumonia. NM LUNG SCAN PERFUSION ONLY   Final Result   Low Probability for Pulmonary Embolus. XR CHEST PORTABLE   Final Result   New, nonspecific pulmonary infiltrates, presumably atypical/viral pneumonia   (insert COVID-19 pneumonia). LABS: All lab results were reviewed by myself, and all abnormals are listed below.   Labs Reviewed   CBC WITH AUTO DIFFERENTIAL - Abnormal; Notable for the following components:       Result Value    RBC 3.60 (*)     Hemoglobin 11.1 (*)     Hematocrit 33.6 (*)     Platelets 032 (*)     Eosinophils % 0 (*)     All other components within normal limits   COMPREHENSIVE 9.8 (*)     Hematocrit 30.3 (*)     Platelets 502 (*)     All other components within normal limits   POC GLUCOSE FINGERSTICK - Abnormal; Notable for the following components:    POC Glucose 179 (*)     All other components within normal limits   POC GLUCOSE FINGERSTICK - Abnormal; Notable for the following components:    POC Glucose 191 (*)     All other components within normal limits   CULTURE, BLOOD 1   CULTURE, BLOOD 2   LACTATE, SEPSIS   LACTATE, SEPSIS   MAGNESIUM   LACTATE, SEPSIS   LACTATE, SEPSIS   POTASSIUM   PROCALCITONIN   POCT GLUCOSE   POCT GLUCOSE   POCT GLUCOSE   POCT GLUCOSE       EMERGENCY DEPARTMENTCOURSE:         Vitals:    Vitals:    09/17/21 0412 09/17/21 0623 09/17/21 0748 09/17/21 1119   BP: 116/65  115/65 101/62   Pulse: 107  105 103   Resp: 18  16 14   Temp: 98.6 °F (37 °C)  98.8 °F (37.1 °C) 98.8 °F (37.1 °C)   TempSrc: Oral  Oral Oral   SpO2: 95%  94% 98%   Weight:  210 lb 8 oz (95.5 kg)     Height:           The patient was given the following medications while in the emergency department:  Orders Placed This Encounter   Medications    DISCONTD: sodium chloride flush 0.9 % injection 5-40 mL    DISCONTD: sodium chloride flush 0.9 % injection 5-40 mL    DISCONTD: 0.9 % sodium chloride infusion    acetaminophen (TYLENOL) tablet 1,000 mg    azithromycin (ZITHROMAX) 500 mg in D5W 250ml addavial     Order Specific Question:   Antimicrobial Indications     Answer:   Pneumonia (VAP)    cefTRIAXone (ROCEPHIN) 1000 mg IVPB in 50 mL D5W minibag     Order Specific Question:   Antimicrobial Indications     Answer:   Pneumonia (VAP)    guaiFENesin (ROBITUSSIN) 100 MG/5ML oral solution 200 mg    DISCONTD: sodium chloride flush 0.9 % injection 5-40 mL    DISCONTD: sodium chloride flush 0.9 % injection 5-40 mL    DISCONTD: 0.9 % sodium chloride infusion    0.9 % sodium chloride IV bolus 409.5 mL    aspirin chewable tablet 81 mg    atorvastatin (LIPITOR) tablet 20 mg    gabapentin (NEURONTIN) capsule 100 mg    hydrALAZINE (APRESOLINE) tablet 25 mg    insulin glargine (LANTUS) injection vial 10 Units    isosorbide mononitrate (IMDUR) extended release tablet 30 mg    metoprolol tartrate (LOPRESSOR) tablet 50 mg    pantoprazole (PROTONIX) tablet 40 mg    sodium chloride flush 0.9 % injection 5-40 mL    sodium chloride flush 0.9 % injection 10 mL    0.9 % sodium chloride infusion    DISCONTD: magnesium sulfate 1000 mg in dextrose 5% 100 mL IVPB    heparin (porcine) injection 5,000 Units    OR Linked Order Group     ondansetron (ZOFRAN-ODT) disintegrating tablet 4 mg     ondansetron (ZOFRAN) injection 4 mg    polyethylene glycol (GLYCOLAX) packet 17 g    OR Linked Order Group     acetaminophen (TYLENOL) tablet 650 mg     acetaminophen (TYLENOL) suppository 650 mg    midodrine (PROAMATINE) tablet 5 mg    technetium albumin aggregated (MAA) solution 6.5 millicurie    insulin lispro (HUMALOG) injection vial 0-12 Units    insulin lispro (HUMALOG) injection vial 0-6 Units    glucose (GLUTOSE) 40 % oral gel 15 g    dextrose 50 % IV solution    glucagon (rDNA) injection 1 mg    dextrose 5 % solution    dexamethasone (DECADRON) tablet 6 mg    vancomycin (VANCOCIN) 2,500 mg in dextrose 5 % 500 mL IVPB     Order Specific Question:   Antimicrobial Indications     Answer:   Sepsis of Unknown Etiology    vancomycin (VANCOCIN) intermittent dosing (placeholder)     Order Specific Question:   Antimicrobial Indications     Answer:   Sepsis of Unknown Etiology    metoprolol (LOPRESSOR) injection 2.5 mg    potassium chloride (KLOR-CON M) extended release tablet 40 mEq     CONSULTS:  IP CONSULT TO NEPHROLOGY  IP CONSULT TO INTERNAL MEDICINE  IP CONSULT TO PHARMACY  IP CONSULT TO NEPHROLOGY  IP CONSULT TO PHARMACY    FINAL IMPRESSION      1. COVID-19    2. Pneumonia due to infectious organism, unspecified laterality, unspecified part of lung    3. Septicemia ([facility]    4.  ESRD (end stage renal disease) ([facility]    5. NSTEMI (non-ST elevated myocardial infarction) [facility])          DISPOSITION/PLAN   DISPOSITION Admitted 09/16/2021 05:28:10 PM      PATIENT REFERRED TO:  No follow-up provider specified. DISCHARGE MEDICATIONS:  Current Discharge Medication List        Stacie Charles MD  Attending Emergency Physician    This note was created with the assistance of a speech-recognition program. While intending to generate a document that actually reflects the content of the visit, no guarantees can be provided that every mistake has been identified and corrected by editing.                     Stacie Charles MD  38/[id number]/76 [id number]

## 2021-09-16 NOTE — PROGRESS NOTES
Writer spoke with Dr. Chiara Bustillos regarding dialysis for patient. Writer informed Dr. Chiraa Bustillos that it is after hours and patient doesn't meet criteria for emergent dialysis and patient will have treatment in am. Writer offered to have manager reach out to Dr. Chiara Bustillos.

## 2021-09-17 ENCOUNTER — APPOINTMENT (OUTPATIENT)
Dept: GENERAL RADIOLOGY | Age: 61
DRG: 177 | End: 2021-09-17
Payer: COMMERCIAL

## 2021-09-17 PROBLEM — D69.6 THROMBOCYTOPENIA (HCC): Status: ACTIVE | Noted: 2021-09-17

## 2021-09-17 LAB
ANION GAP SERPL CALCULATED.3IONS-SCNC: 16 MMOL/L (ref 9–17)
BUN BLDV-MCNC: 52 MG/DL (ref 8–23)
BUN/CREAT BLD: 7 (ref 9–20)
CALCIUM SERPL-MCNC: 7.5 MG/DL (ref 8.6–10.4)
CHLORIDE BLD-SCNC: 93 MMOL/L (ref 98–107)
CO2: 23 MMOL/L (ref 20–31)
CREAT SERPL-MCNC: 7.45 MG/DL (ref 0.7–1.2)
EKG ATRIAL RATE: 129 BPM
EKG P AXIS: -4 DEGREES
EKG P-R INTERVAL: 122 MS
EKG Q-T INTERVAL: 318 MS
EKG QRS DURATION: 92 MS
EKG QTC CALCULATION (BAZETT): 465 MS
EKG R AXIS: 5 DEGREES
EKG T AXIS: 50 DEGREES
EKG VENTRICULAR RATE: 129 BPM
GFR AFRICAN AMERICAN: 9 ML/MIN
GFR NON-AFRICAN AMERICAN: 7 ML/MIN
GFR SERPL CREATININE-BSD FRML MDRD: ABNORMAL ML/MIN/{1.73_M2}
GFR SERPL CREATININE-BSD FRML MDRD: ABNORMAL ML/MIN/{1.73_M2}
GLUCOSE BLD-MCNC: 179 MG/DL (ref 75–110)
GLUCOSE BLD-MCNC: 191 MG/DL (ref 75–110)
GLUCOSE BLD-MCNC: 252 MG/DL (ref 70–99)
GLUCOSE BLD-MCNC: 290 MG/DL (ref 75–110)
GLUCOSE BLD-MCNC: 295 MG/DL (ref 75–110)
HCT VFR BLD CALC: 30.3 % (ref 40.7–50.3)
HEMOGLOBIN: 9.8 G/DL (ref 13–17)
LACTIC ACID, SEPSIS WHOLE BLOOD: NORMAL MMOL/L (ref 0.5–1.9)
LACTIC ACID, SEPSIS WHOLE BLOOD: NORMAL MMOL/L (ref 0.5–1.9)
LACTIC ACID, SEPSIS: 0.9 MMOL/L (ref 0.5–1.9)
LACTIC ACID, SEPSIS: 0.9 MMOL/L (ref 0.5–1.9)
MAGNESIUM: 1.7 MG/DL (ref 1.6–2.6)
MCH RBC QN AUTO: 30.4 PG (ref 25.2–33.5)
MCHC RBC AUTO-ENTMCNC: 32.3 G/DL (ref 28.4–34.8)
MCV RBC AUTO: 94.1 FL (ref 82.6–102.9)
NRBC AUTOMATED: 0 PER 100 WBC
PDW BLD-RTO: 13.9 % (ref 11.8–14.4)
PLATELET # BLD: 103 K/UL (ref 138–453)
PMV BLD AUTO: 12.1 FL (ref 8.1–13.5)
POTASSIUM SERPL-SCNC: 3.1 MMOL/L (ref 3.7–5.3)
POTASSIUM SERPL-SCNC: 3.2 MMOL/L (ref 3.7–5.3)
POTASSIUM SERPL-SCNC: 4.1 MMOL/L (ref 3.7–5.3)
PROCALCITONIN: 2.79 NG/ML
RBC # BLD: 3.22 M/UL (ref 4.21–5.77)
SODIUM BLD-SCNC: 132 MMOL/L (ref 135–144)
WBC # BLD: 2.5 K/UL (ref 3.5–11.3)

## 2021-09-17 PROCEDURE — 93005 ELECTROCARDIOGRAM TRACING: CPT | Performed by: NURSE PRACTITIONER

## 2021-09-17 PROCEDURE — 97530 THERAPEUTIC ACTIVITIES: CPT

## 2021-09-17 PROCEDURE — 83605 ASSAY OF LACTIC ACID: CPT

## 2021-09-17 PROCEDURE — 6370000000 HC RX 637 (ALT 250 FOR IP): Performed by: NURSE PRACTITIONER

## 2021-09-17 PROCEDURE — 97167 OT EVAL HIGH COMPLEX 60 MIN: CPT

## 2021-09-17 PROCEDURE — 6370000000 HC RX 637 (ALT 250 FOR IP): Performed by: INTERNAL MEDICINE

## 2021-09-17 PROCEDURE — 6360000002 HC RX W HCPCS: Performed by: NURSE PRACTITIONER

## 2021-09-17 PROCEDURE — 94761 N-INVAS EAR/PLS OXIMETRY MLT: CPT

## 2021-09-17 PROCEDURE — 84132 ASSAY OF SERUM POTASSIUM: CPT

## 2021-09-17 PROCEDURE — 1200000000 HC SEMI PRIVATE

## 2021-09-17 PROCEDURE — 97535 SELF CARE MNGMENT TRAINING: CPT

## 2021-09-17 PROCEDURE — 80048 BASIC METABOLIC PNL TOTAL CA: CPT

## 2021-09-17 PROCEDURE — G0378 HOSPITAL OBSERVATION PER HR: HCPCS

## 2021-09-17 PROCEDURE — 36415 COLL VENOUS BLD VENIPUNCTURE: CPT

## 2021-09-17 PROCEDURE — 2500000003 HC RX 250 WO HCPCS: Performed by: NURSE PRACTITIONER

## 2021-09-17 PROCEDURE — 71045 X-RAY EXAM CHEST 1 VIEW: CPT

## 2021-09-17 PROCEDURE — 84145 PROCALCITONIN (PCT): CPT

## 2021-09-17 PROCEDURE — 83735 ASSAY OF MAGNESIUM: CPT

## 2021-09-17 PROCEDURE — 2700000000 HC OXYGEN THERAPY PER DAY

## 2021-09-17 PROCEDURE — 82947 ASSAY GLUCOSE BLOOD QUANT: CPT

## 2021-09-17 PROCEDURE — 6360000002 HC RX W HCPCS: Performed by: INTERNAL MEDICINE

## 2021-09-17 PROCEDURE — 6370000000 HC RX 637 (ALT 250 FOR IP): Performed by: EMERGENCY MEDICINE

## 2021-09-17 PROCEDURE — 97162 PT EVAL MOD COMPLEX 30 MIN: CPT

## 2021-09-17 PROCEDURE — 99232 SBSQ HOSP IP/OBS MODERATE 35: CPT | Performed by: INTERNAL MEDICINE

## 2021-09-17 PROCEDURE — 2580000003 HC RX 258: Performed by: NURSE PRACTITIONER

## 2021-09-17 PROCEDURE — 96372 THER/PROPH/DIAG INJ SC/IM: CPT

## 2021-09-17 PROCEDURE — 96375 TX/PRO/DX INJ NEW DRUG ADDON: CPT

## 2021-09-17 PROCEDURE — 85027 COMPLETE CBC AUTOMATED: CPT

## 2021-09-17 RX ORDER — PANTOPRAZOLE SODIUM 40 MG/1
40 TABLET, DELAYED RELEASE ORAL
Status: DISCONTINUED | OUTPATIENT
Start: 2021-09-17 | End: 2021-09-20 | Stop reason: HOSPADM

## 2021-09-17 RX ORDER — INSULIN GLARGINE 100 [IU]/ML
10 INJECTION, SOLUTION SUBCUTANEOUS NIGHTLY
Status: DISCONTINUED | OUTPATIENT
Start: 2021-09-17 | End: 2021-09-20 | Stop reason: HOSPADM

## 2021-09-17 RX ORDER — LANOLIN ALCOHOL/MO/W.PET/CERES
3 CREAM (GRAM) TOPICAL ONCE
Status: COMPLETED | OUTPATIENT
Start: 2021-09-17 | End: 2021-09-17

## 2021-09-17 RX ORDER — METOPROLOL TARTRATE 5 MG/5ML
2.5 INJECTION INTRAVENOUS ONCE
Status: COMPLETED | OUTPATIENT
Start: 2021-09-17 | End: 2021-09-17

## 2021-09-17 RX ORDER — ACETAMINOPHEN 325 MG/1
650 TABLET ORAL EVERY 6 HOURS PRN
Status: DISCONTINUED | OUTPATIENT
Start: 2021-09-17 | End: 2021-09-20 | Stop reason: HOSPADM

## 2021-09-17 RX ORDER — DEXAMETHASONE 4 MG/1
6 TABLET ORAL DAILY
Status: DISCONTINUED | OUTPATIENT
Start: 2021-09-17 | End: 2021-09-20 | Stop reason: HOSPADM

## 2021-09-17 RX ORDER — POTASSIUM CHLORIDE 20 MEQ/1
40 TABLET, EXTENDED RELEASE ORAL ONCE
Status: COMPLETED | OUTPATIENT
Start: 2021-09-17 | End: 2021-09-17

## 2021-09-17 RX ORDER — GABAPENTIN 100 MG/1
100 CAPSULE ORAL 3 TIMES DAILY
Status: DISCONTINUED | OUTPATIENT
Start: 2021-09-17 | End: 2021-09-20 | Stop reason: HOSPADM

## 2021-09-17 RX ORDER — NICOTINE POLACRILEX 4 MG
15 LOZENGE BUCCAL PRN
Status: DISCONTINUED | OUTPATIENT
Start: 2021-09-17 | End: 2021-09-20 | Stop reason: HOSPADM

## 2021-09-17 RX ORDER — ONDANSETRON 2 MG/ML
4 INJECTION INTRAMUSCULAR; INTRAVENOUS EVERY 6 HOURS PRN
Status: DISCONTINUED | OUTPATIENT
Start: 2021-09-17 | End: 2021-09-20 | Stop reason: HOSPADM

## 2021-09-17 RX ORDER — MAGNESIUM SULFATE 1 G/100ML
1000 INJECTION INTRAVENOUS PRN
Status: DISCONTINUED | OUTPATIENT
Start: 2021-09-17 | End: 2021-09-17

## 2021-09-17 RX ORDER — ATORVASTATIN CALCIUM 20 MG/1
20 TABLET, FILM COATED ORAL DAILY
Status: DISCONTINUED | OUTPATIENT
Start: 2021-09-17 | End: 2021-09-20 | Stop reason: HOSPADM

## 2021-09-17 RX ORDER — HEPARIN SODIUM 5000 [USP'U]/ML
5000 INJECTION, SOLUTION INTRAVENOUS; SUBCUTANEOUS EVERY 8 HOURS SCHEDULED
Status: DISCONTINUED | OUTPATIENT
Start: 2021-09-17 | End: 2021-09-20 | Stop reason: HOSPADM

## 2021-09-17 RX ORDER — DEXTROSE MONOHYDRATE 50 MG/ML
100 INJECTION, SOLUTION INTRAVENOUS PRN
Status: DISCONTINUED | OUTPATIENT
Start: 2021-09-17 | End: 2021-09-20 | Stop reason: HOSPADM

## 2021-09-17 RX ORDER — METOPROLOL TARTRATE 50 MG/1
50 TABLET, FILM COATED ORAL 2 TIMES DAILY
Status: DISCONTINUED | OUTPATIENT
Start: 2021-09-17 | End: 2021-09-20 | Stop reason: HOSPADM

## 2021-09-17 RX ORDER — POLYETHYLENE GLYCOL 3350 17 G/17G
17 POWDER, FOR SOLUTION ORAL DAILY PRN
Status: DISCONTINUED | OUTPATIENT
Start: 2021-09-17 | End: 2021-09-20 | Stop reason: HOSPADM

## 2021-09-17 RX ORDER — DEXTROSE MONOHYDRATE 25 G/50ML
12.5 INJECTION, SOLUTION INTRAVENOUS PRN
Status: DISCONTINUED | OUTPATIENT
Start: 2021-09-17 | End: 2021-09-20 | Stop reason: HOSPADM

## 2021-09-17 RX ORDER — ACETAMINOPHEN 650 MG/1
650 SUPPOSITORY RECTAL EVERY 6 HOURS PRN
Status: DISCONTINUED | OUTPATIENT
Start: 2021-09-17 | End: 2021-09-20 | Stop reason: HOSPADM

## 2021-09-17 RX ORDER — ASPIRIN 81 MG/1
81 TABLET, CHEWABLE ORAL DAILY
Status: DISCONTINUED | OUTPATIENT
Start: 2021-09-17 | End: 2021-09-20 | Stop reason: HOSPADM

## 2021-09-17 RX ORDER — ONDANSETRON 4 MG/1
4 TABLET, ORALLY DISINTEGRATING ORAL EVERY 8 HOURS PRN
Status: DISCONTINUED | OUTPATIENT
Start: 2021-09-17 | End: 2021-09-20 | Stop reason: HOSPADM

## 2021-09-17 RX ORDER — HYDRALAZINE HYDROCHLORIDE 25 MG/1
25 TABLET, FILM COATED ORAL EVERY 8 HOURS SCHEDULED
Status: DISCONTINUED | OUTPATIENT
Start: 2021-09-17 | End: 2021-09-19

## 2021-09-17 RX ORDER — SODIUM CHLORIDE 0.9 % (FLUSH) 0.9 %
10 SYRINGE (ML) INJECTION PRN
Status: DISCONTINUED | OUTPATIENT
Start: 2021-09-17 | End: 2021-09-20 | Stop reason: HOSPADM

## 2021-09-17 RX ORDER — ISOSORBIDE MONONITRATE 30 MG/1
30 TABLET, EXTENDED RELEASE ORAL DAILY
Status: DISCONTINUED | OUTPATIENT
Start: 2021-09-17 | End: 2021-09-20 | Stop reason: HOSPADM

## 2021-09-17 RX ORDER — SODIUM CHLORIDE 0.9 % (FLUSH) 0.9 %
5-40 SYRINGE (ML) INJECTION EVERY 12 HOURS SCHEDULED
Status: DISCONTINUED | OUTPATIENT
Start: 2021-09-17 | End: 2021-09-20 | Stop reason: HOSPADM

## 2021-09-17 RX ORDER — SODIUM CHLORIDE 9 MG/ML
25 INJECTION, SOLUTION INTRAVENOUS PRN
Status: DISCONTINUED | OUTPATIENT
Start: 2021-09-17 | End: 2021-09-20 | Stop reason: HOSPADM

## 2021-09-17 RX ADMIN — MIDODRINE HYDROCHLORIDE 5 MG: 5 TABLET ORAL at 13:08

## 2021-09-17 RX ADMIN — INSULIN GLARGINE 10 UNITS: 100 INJECTION, SOLUTION SUBCUTANEOUS at 21:17

## 2021-09-17 RX ADMIN — GABAPENTIN 100 MG: 100 CAPSULE ORAL at 14:16

## 2021-09-17 RX ADMIN — METOPROLOL TARTRATE 50 MG: 50 TABLET, FILM COATED ORAL at 09:50

## 2021-09-17 RX ADMIN — DEXAMETHASONE 6 MG: 4 TABLET ORAL at 09:50

## 2021-09-17 RX ADMIN — GABAPENTIN 100 MG: 100 CAPSULE ORAL at 21:02

## 2021-09-17 RX ADMIN — PANTOPRAZOLE SODIUM 40 MG: 40 TABLET, DELAYED RELEASE ORAL at 07:47

## 2021-09-17 RX ADMIN — HEPARIN SODIUM 5000 UNITS: 5000 INJECTION INTRAVENOUS; SUBCUTANEOUS at 07:52

## 2021-09-17 RX ADMIN — SODIUM CHLORIDE, PRESERVATIVE FREE 5 ML: 5 INJECTION INTRAVENOUS at 10:07

## 2021-09-17 RX ADMIN — INSULIN LISPRO 6 UNITS: 100 INJECTION, SOLUTION INTRAVENOUS; SUBCUTANEOUS at 17:50

## 2021-09-17 RX ADMIN — INSULIN LISPRO 2 UNITS: 100 INJECTION, SOLUTION INTRAVENOUS; SUBCUTANEOUS at 13:09

## 2021-09-17 RX ADMIN — ASPIRIN 81 MG: 81 TABLET, CHEWABLE ORAL at 09:50

## 2021-09-17 RX ADMIN — INSULIN LISPRO 2 UNITS: 100 INJECTION, SOLUTION INTRAVENOUS; SUBCUTANEOUS at 10:05

## 2021-09-17 RX ADMIN — HYDRALAZINE HYDROCHLORIDE 25 MG: 25 TABLET, FILM COATED ORAL at 07:48

## 2021-09-17 RX ADMIN — Medication 3 MG: at 23:41

## 2021-09-17 RX ADMIN — ACETAMINOPHEN 650 MG: 325 TABLET ORAL at 14:16

## 2021-09-17 RX ADMIN — METOPROLOL TARTRATE 2.5 MG: 5 INJECTION INTRAVENOUS at 02:43

## 2021-09-17 RX ADMIN — INSULIN LISPRO 3 UNITS: 100 INJECTION, SOLUTION INTRAVENOUS; SUBCUTANEOUS at 21:17

## 2021-09-17 RX ADMIN — ISOSORBIDE MONONITRATE 30 MG: 30 TABLET, EXTENDED RELEASE ORAL at 09:50

## 2021-09-17 RX ADMIN — METOPROLOL TARTRATE 50 MG: 50 TABLET, FILM COATED ORAL at 21:02

## 2021-09-17 RX ADMIN — GABAPENTIN 100 MG: 100 CAPSULE ORAL at 09:52

## 2021-09-17 RX ADMIN — PANTOPRAZOLE SODIUM 40 MG: 40 TABLET, DELAYED RELEASE ORAL at 17:49

## 2021-09-17 RX ADMIN — POTASSIUM CHLORIDE 40 MEQ: 20 TABLET, EXTENDED RELEASE ORAL at 09:51

## 2021-09-17 RX ADMIN — MIDODRINE HYDROCHLORIDE 5 MG: 5 TABLET ORAL at 09:51

## 2021-09-17 RX ADMIN — SODIUM CHLORIDE, PRESERVATIVE FREE 10 ML: 5 INJECTION INTRAVENOUS at 21:03

## 2021-09-17 RX ADMIN — HEPARIN SODIUM 5000 UNITS: 5000 INJECTION INTRAVENOUS; SUBCUTANEOUS at 14:18

## 2021-09-17 RX ADMIN — GUAIFENESIN 200 MG: 200 SOLUTION ORAL at 01:54

## 2021-09-17 RX ADMIN — MIDODRINE HYDROCHLORIDE 5 MG: 5 TABLET ORAL at 17:50

## 2021-09-17 RX ADMIN — ACETAMINOPHEN 650 MG: 325 TABLET ORAL at 23:41

## 2021-09-17 RX ADMIN — ATORVASTATIN CALCIUM 20 MG: 20 TABLET, FILM COATED ORAL at 21:02

## 2021-09-17 RX ADMIN — HEPARIN SODIUM 5000 UNITS: 5000 INJECTION INTRAVENOUS; SUBCUTANEOUS at 21:18

## 2021-09-17 ASSESSMENT — PAIN SCALES - GENERAL
PAINLEVEL_OUTOF10: 0
PAINLEVEL_OUTOF10: 8
PAINLEVEL_OUTOF10: 0
PAINLEVEL_OUTOF10: 8
PAINLEVEL_OUTOF10: 9
PAINLEVEL_OUTOF10: 8

## 2021-09-17 NOTE — PROGRESS NOTES
[facility]  Office: [address], DO, Mandy Pole, DO, Maryam [place], DO, David Ruffin Blood, DO, Katarzyna Clark MD, Lois Gomez MD, Blayne Mariee MD, Colleen Valentine MD, Brandee Morris MD, Shawanda Holcomb MD, Tiffani Hancock MD, Mitesh Nichole, DO, Bryon Loo, DO, Crystal Servin MD,  Deepali Dean, DO, Gisella Allen MD, Lynnette Kelly MD, Bay Natarajan MD, Kenan Vargas MD, , Jeff Forbes MD, Hernán Velasquez MD, Jonelle Jean-Baptiste MD, Ammon Chamorro [facility], [facility], [facility], Angella Sanchez, [facility], Ileana Solis, CNS, Satya Whitley, CNP, Delfina Wilson, CNP, Faru Lorenzo, CNP, Sheldon Mckeon, CNP, Catrachito Coker, CNP, Toi Baird PA-C, Brandon Prakash, [facility], Rosaura Chung, CNP, Erum Viramontes, CNP, Anthony Rodriguez, CNP, Miky Nieto, CNP, Cornelius Bowen, CNP, Dominic Montoya, CNP, Blaze Watson, CNP, Ricky Orona [facility]    Progress Note    9/17/2021    11:09 AM    Name:   Charlette Banerjee  MRN:     [id number]     Acct:      [de-identified]   Room:   [address] [place] [address] Day:  1  Admit Date:  9/16/2021  2:17 PM    PCP:   Maren Bush MD  Code Status:  Full Code    Subjective:     C/C:   Chief Complaint   Patient presents with    Fever     went to dialysis and was refused services because covid positive    Shortness of Breath     Interval History Status: improved. Patient is resting comfortably, less shortness of breath today. Denies any fevers or chills, cough or sputum production, nausea or vomiting or other acute complaints. Brief History: This is a 70-year-old male that is dialysis dependent that presents with fever and shortness of breath. He was recently diagnosed with COVID-19 and was unable to attend his dialysis appointment Wednesday due to new diagnosis. Subsequently had increasing shortness of breath and recurrent fever and chills and presented to the emergency room for evaluation.   He was found to have elevated BUN and findings concerning for Covid pneumonia and associated encephalopathy. He was admitted to the hospital for urgent dialysis which was completed last evening. Has had improvement in his shortness of breath and improve mentation after his dialysis treatment. Review of Systems:     Constitutional:  negative for chills, fevers, sweats  Respiratory:  negative for cough, dyspnea on exertion, shortness of breath, wheezing  Cardiovascular:  negative for chest pain, chest pressure/discomfort, lower extremity edema, palpitations  Gastrointestinal:  negative for abdominal pain, constipation, diarrhea, nausea, vomiting  Neurological:  negative for dizziness, headache    Medications:      Allergies:  No Known Allergies    Current Meds:   Scheduled Meds:    aspirin  81 mg Oral Daily    atorvastatin  20 mg Oral Daily    gabapentin  100 mg Oral TID    hydrALAZINE  25 mg Oral 3 times per day    insulin glargine  10 Units SubCUTAneous Nightly    isosorbide mononitrate  30 mg Oral Daily    metoprolol tartrate  50 mg Oral BID    pantoprazole  40 mg Oral BID AC    sodium chloride flush  5-40 mL IntraVENous 2 times per day    heparin (porcine)  5,000 Units SubCUTAneous 3 times per day    insulin lispro  0-12 Units SubCUTAneous TID WC    insulin lispro  0-6 Units SubCUTAneous Nightly    dexamethasone  6 mg Oral Daily    midodrine  5 mg Oral TID WC    vancomycin (VANCOCIN) intermittent dosing (placeholder)   Other RX Placeholder     Continuous Infusions:    sodium chloride      dextrose       PRN Meds: sodium chloride flush, sodium chloride, ondansetron **OR** ondansetron, polyethylene glycol, acetaminophen **OR** acetaminophen, glucose, dextrose, glucagon (rDNA), dextrose, guaiFENesin    Data:     Past Medical History:   has a past medical history of ADHD (attention deficit hyperactivity disorder), COVID-19, COVID-19, Depression, DM2 (diabetes mellitus, type 2) ([facility] [address] Erectile dysfunction, Gastroesophageal reflux disease, Hyperlipidemia, Hypertension, Kidney stone, Low back pain of thoracolumbar region with sciatica, and Neuropathy. Social History:   reports that he quit smoking about 2 years ago. His smoking use included cigarettes. He has a 15.00 pack-year smoking history. He has never used smokeless tobacco. He reports current alcohol use of about 8.0 standard drinks of alcohol per week. He reports that he does not use drugs. Family History:   Family History   Problem Relation Age of Onset    Diabetes Mother     High Blood Pressure Father        Vitals:  /65   Pulse 105   Temp 98.8 °F (37.1 °C) (Oral)   Resp 16   Ht 5' 10\" (1.778 m)   Wt 210 lb 8 oz (95.5 kg)   SpO2 94%   BMI 30.20 kg/m²   Temp (24hrs), Av °F (36.7 °C), Min:94.4 °F (34.7 °C), Max:100 °F (37.8 °C)    Recent Labs     21  0751   POCGLU 179*       I/O (24Hr):     Intake/Output Summary (Last 24 hours) at 2021 1109  Last data filed at 2021 0656  Gross per 24 hour   Intake 900 ml   Output 1275 ml   Net -375 ml       Labs:  Hematology:  Recent Labs     21  1440 21  0303   WBC 3.8 2.5*   RBC 3.60* 3.22*   HGB 11.1* 9.8*   HCT 33.6* 30.3*   MCV 93.3 94.1   MCH 30.8 30.4   MCHC 33.0 32.3   RDW 14.0 13.9   * 103*   MPV 11.7 12.1   CRP 44.0*  --    DDIMER 1.72*  --      Chemistry:  Recent Labs     21  1440 21  0137 21  0303   *  --  132*   K 4.4 3.2* 3.1*   CL 89*  --  93*   CO2 14*  --  23   GLUCOSE 239*  --  252*   BUN 87*  --  52*   CREATININE 11.54*  --  7.45*   MG  --  1.7  --    ANIONGAP 26*  --  16   LABGLOM 5*  --  7*   GFRAA 5*  --  9*   CALCIUM 7.4*  --  7.5*   PROBNP 836*  --   --    TROPHS 103*  --   --      Recent Labs     21  1440 21  0751   PROT 7.9  --    LABALBU 3.6  --    AST 69*  --    ALT 33  --    *  --    ALKPHOS 86  --    BILITOT 0.36  --    POCGLU  --  179*     ABG:No results found for: POCPH, PHART, PH, POCPCO2, DLH2VGU, PCO2, POCPO2, PO2ART, PO2, POCHCO3, GAT8FSY, HCO3, NBEA, PBEA, BEART, BE, THGBART, THB, CLN3SYJ, PYQM2ZRO, K5APADDR, O2SAT, FIO2  Lab Results   Component Value Date/Time    SPECIAL NOT REPORTED 09/16/2021 02:51 PM     Lab Results   Component Value Date/Time    CULTURE NO GROWTH 14 HOURS 09/16/2021 02:51 PM       Radiology:  NM LUNG SCAN PERFUSION ONLY    Result Date: 9/16/2021  Low Probability for Pulmonary Embolus. XR CHEST PORTABLE    Result Date: 9/16/2021  New, nonspecific pulmonary infiltrates, presumably atypical/viral pneumonia (insert COVID-19 pneumonia). XR CHEST PORTABLE    Result Date: 9/13/2021  Satisfactorily positioned support line, no pneumothorax No acute cardiopulmonary findings       Physical Examination:        General appearance:  alert, cooperative and no distress  Mental Status:  oriented to person, place and time and normal affect  Lungs:  clear to auscultation bilaterally, normal effort  Heart:  regular rate and rhythm, no murmur  Abdomen:  soft, nontender, nondistended, normal bowel sounds, no masses, hepatomegaly, splenomegaly  Extremities:  no edema, redness, tenderness in the calves  Skin:  no gross lesions, rashes, induration    Assessment:        Hospital Problems         Last Modified POA    * (Principal) ESRD needing dialysis ([facility] [address] 9/16/2021 Yes    Type 2 diabetes mellitus with chronic kidney disease on chronic dialysis, with long-term current use of insulin ([address] 9/16/2021 Yes    Gastroesophageal reflux disease 9/16/2021 Yes    Essential hypertension 9/16/2021 Yes    Pneumonia due to COVID-19 virus 9/16/2021 Yes    Possible line sepsis associated with dialysis catheter ([facility] [address] 9/16/2021 Yes    Acute metabolic encephalopathy 0/75/7984 Yes    Hypoxia 9/16/2021 Yes          Plan:        1. Dialysis per nephrology  2. Continue vancomycin pending culture data  3. Supplement oxygen as needed  4.  Decadron 6 mg daily, if he has worsening hypoxia may require higher dose.  5. Not a candidate for baricitinib due to renal failure. Currently not a candidate for Actemra has only mildly hypoxic and required nasal cannula oxygen. 6. Continue Lantus and insulin scale for glycemic control  7. Activity as tolerated, PT and OT  8. GI and DVT prophylaxis  9. See orders for details  10.  for outpatient dialysis, will need to transfer to Covid unit for 10 to 14 days while quarantine.     Memory Kianna Robles DO  9/17/2021  11:09 AM

## 2021-09-17 NOTE — CONSULTS
Nephrology ESRD Consult Note    Reason for Consult:  End stage renal disease, fluid and electrolyte management, dialysis management  Requesting Physician:  Margaret    Chief Complaint: Weak and tired along with fevers and rigors at dialysis and a missed dialysis  History Obtained From:  patient, electronic medical record, out patient dialysis personnel    History of Present Illness: This is a 64 y.o. male with end stage renal disease on hemodialysis at [facility] hemodialysis under Dr. Adin Osborne who presents with fevers and shaking chills he had dialysis earlier this week and then had a missed dialysis treatment 2 days ago. Patient was feeling poorly at dialysis on Monday, 9/13/2021. The patient was able to complete his dialysis but had significant fever and chills. He continued to feel poorly through the week. He missed his dialysis on 9/15/2021. He came to [place] [address] emergency room. [facility], 9/16/2021 the patient had sodium 129 with potassium 4.4 chloride 89 and CO2 14 with BUN 87 creatinine 11.5 with albumin 3.6. Labs drawn after dialysis at the ER on 9/13/2021 are also reviewed. When the patient came into the hospital yesterday and with the lab data and note of the patient missing his dialysis from 9/15/2021, the patient underwent an urgent dialysis last evening for a 2 hour treatment. He had about 1.5 L removed net. Labs today show sodium 132 with potassium down to 3.1, chloride 93, CO2 23, BUN 52 and creatinine 7.45 with glucose 252. Dialysis access is a right chest tunneled hemodialysis catheter. Of note, chest x-ray yesterday showed scattered pulmonary opacities bilateral mid and lower lung fields likely secondary to pneumonia. Patient is breathing comfortably this morning. He has no orthopnea. He has no paroxysmal nocturnal dyspnea. He denies shortness of breath. He denies any chest pain. He does feel very weak and tired.   He denies any fevers or chills or sweats. No swelling of the extremities. The patient's medical history is significant for ADHD, diabetes mellitus type 2, ESRD, central hypertension, GERD, hyperlipidemia, depression, erectile dysfunction, kidney stones, neuropathy and low back pain of thoracolumbar region with sciatica. Medications are reviewed. No known drug allergies. Patient is currently in COVID-19 isolation. Patient is a former smoker. He has history of alcohol use. He has history of marijuana use. Family history is positive for mother with diabetes mellitus and father with hypertension per the record.     Past Medical History:        Diagnosis Date    ADHD (attention deficit hyperactivity disorder)     COVID-19 9/15/2021    COVID-19 9/15/2021    Depression     DM2 (diabetes mellitus, type 2) ([facility] [address] 10/15/2013    Erectile dysfunction     Gastroesophageal reflux disease 8/23/2019    Hyperlipidemia     Hypertension     Kidney stone 7/19/2018    Low back pain of thoracolumbar region with sciatica     Neuropathy        Access:  previous permacath    Past Surgical History:        Procedure Laterality Date    COLONOSCOPY N/A 12/7/2020    COLONOSCOPY DIAGNOSTIC performed by Aidee Davila MD at [address] [place] [address]  07/20/2018    bilat stent replacement    CYSTOSCOPY  08/03/2018    b/l ureteroscopy, b/l stent, HLL    IR TUNNELED CATHETER PLACEMENT GREATER THAN 5 YEARS  6/28/2021    IR TUNNELED CATHETER PLACEMENT GREATER THAN 5 YEARS 6/28/2021 Nesha Dutta MD [facility] SPECIAL PROCEDURES    [place] CYSTO/URETERO/PYELOSCOPY, CALCULUS TX Bilateral 8/3/2018    FORTEC HOLMIUM - CYSTO, URETEROSCOPY LITHOTRIPSY, STENT EXCHANGE [place] [facility] #[id number] - ESPERANZA) performed by Estelle Alegria MD at [facility], Bilateral 7/20/2018    CYSTOSCOPY URETERAL STENT INSERTION BILATERAL performed by Estelle Alegria MD at [facility]      as a child   [facility] ENDOSCOPY N/A 12/7/2020    EGD BIOPSY performed by Gil Fair MD at [facility]       Current Medications:    aspirin chewable tablet 81 mg, Daily  atorvastatin (LIPITOR) tablet 20 mg, Daily  gabapentin (NEURONTIN) capsule 100 mg, TID  hydrALAZINE (APRESOLINE) tablet 25 mg, 3 times per day  insulin glargine (LANTUS) injection vial 10 Units, Nightly  isosorbide mononitrate (IMDUR) extended release tablet 30 mg, Daily  metoprolol tartrate (LOPRESSOR) tablet 50 mg, BID  pantoprazole (PROTONIX) tablet 40 mg, BID AC  sodium chloride flush 0.9 % injection 5-40 mL, 2 times per day  sodium chloride flush 0.9 % injection 10 mL, PRN  0.9 % sodium chloride infusion, PRN  heparin (porcine) injection 5,000 Units, 3 times per day  ondansetron (ZOFRAN-ODT) disintegrating tablet 4 mg, Q8H PRN   Or  ondansetron (ZOFRAN) injection 4 mg, Q6H PRN  polyethylene glycol (GLYCOLAX) packet 17 g, Daily PRN  acetaminophen (TYLENOL) tablet 650 mg, Q6H PRN   Or  acetaminophen (TYLENOL) suppository 650 mg, Q6H PRN  insulin lispro (HUMALOG) injection vial 0-12 Units, TID WC  insulin lispro (HUMALOG) injection vial 0-6 Units, Nightly  glucose (GLUTOSE) 40 % oral gel 15 g, PRN  dextrose 50 % IV solution, PRN  glucagon (rDNA) injection 1 mg, PRN  dextrose 5 % solution, PRN  dexamethasone (DECADRON) tablet 6 mg, Daily  guaiFENesin (ROBITUSSIN) 100 MG/5ML oral solution 200 mg, Q4H PRN  midodrine (PROAMATINE) tablet 5 mg, TID WC  vancomycin (VANCOCIN) intermittent dosing (placeholder), RX Placeholder        Allergies:  Patient has no known allergies.     Social History:    Social History     Socioeconomic History    Marital status: Single     Spouse name: Not on file    Number of children: Not on file    Years of education: Not on file    Highest education level: Not on file   Occupational History    Not on file   Tobacco Use    Smoking status: Former Smoker     Packs/day: 0.50     Years: 30.00     Pack years: 15.00     Types: Cigarettes Quit date: 2018     Years since quittin.8    Smokeless tobacco: Never Used   Substance and Sexual Activity    Alcohol use: Yes     Alcohol/week: 8.0 standard drinks     Types: 8 Cans of beer per week     Comment: drinks \"a few\" beers a day    Drug use: No     Types: Marijuana     Comment:  last use one month    Sexual activity: Never     Partners: Female   Other Topics Concern    Not on file   Social History Narrative    Not on file     Social Determinants of Health     Financial Resource Strain: Unknown    Difficulty of Paying Living Expenses: Patient refused   Food Insecurity: Unknown    Worried About Running Out of Food in the Last Year: Patient refused    Ran Out of Food in the Last Year: Patient refused   Transportation Needs: Unknown    Lack of Transportation (Medical): Patient refused    Lack of Transportation (Non-Medical): Patient refused   Physical Activity:     Days of Exercise per Week:     Minutes of Exercise per Session:    Stress:     Feeling of Stress :    Social Connections:     Frequency of Communication with Friends and Family:     Frequency of Social Gatherings with Friends and Family:     Attends Jainism Services:     Active Member of Clubs or Organizations:     Attends Club or Organization Meetings:     Marital Status:    Intimate Partner Violence:     Fear of Current or Ex-Partner:     Emotionally Abused:     Physically Abused:     Sexually Abused:        Family History:   Family History   Problem Relation Age of Onset    Diabetes Mother     High Blood Pressure Father        Review of Systems:    Pertinent positives stated above in HPI. All other systems were reviewed and were negative.     Objective:  Constitutional:    CURRENT TEMPERATURE:  Temp: 98.8 °F (37.1 °C)  MAXIMUM TEMPERATURE OVER 24HRS:  Temp (24hrs), Av °F (36.7 °C), Min:94.4 °F (34.7 °C), Max:100 °F (37.8 °C)    CURRENT RESPIRATORY RATE:  Resp: 16  CURRENT PULSE:  Pulse: 105  CURRENT BLOOD PRESSURE:  BP: 115/65  24HR BLOOD PRESSURE RANGE:  Systolic (49YEE), ROK:943 , Min:83 , SKI:910   ; Diastolic (43PCV), YVC:81, Min:50, Max:79    24HR INTAKE/OUTPUT:      Intake/Output Summary (Last 24 hours) at 9/17/2021 1002  Last data filed at 9/17/2021 0656  Gross per 24 hour   Intake 900 ml   Output 1275 ml   Net -375 ml       Physical Exam:    General: Awake and alert and appears somewhat ill, in no acute distress with supplemental oxygen by nasal cannula  Skin: warm and dry, no rash or erythema  Eyes: conjunctivae normal and sclera anicteric  ENT: His membranes are somewhat dry, no obvious oral thrush  Neck: No JVD, midline trachea, no accessory muscle use  Pulmonary: Good bilateral air entry with bilateral rhonchi, no appreciable wheezes  Cardiovascular: Tachycardic rate, regular rhythm, positive ectopy  Abdomen: Soft and not tender not distended with active bowel sounds   Extremities: No pitting peripheral edema appreciated with 2+ DP pulses bilaterally    Labs:  PTH:  No results found for: PTH  abs:   CBC:   Recent Labs     09/16/21  1440 09/17/21  0303   WBC 3.8 2.5*   RBC 3.60* 3.22*   HGB 11.1* 9.8*   HCT 33.6* 30.3*   MCV 93.3 94.1   MCH 30.8 30.4   MCHC 33.0 32.3   RDW 14.0 13.9   * 103*   MPV 11.7 12.1      BMP:   Recent Labs     09/16/21  1440 09/17/21  0137 09/17/21  0303   *  --  132*   K 4.4 3.2* 3.1*   CL 89*  --  93*   CO2 14*  --  23   BUN 87*  --  52*   CREATININE 11.54*  --  7.45*   GLUCOSE 239*  --  252*   CALCIUM 7.4*  --  7.5*        Phosphorus:  No results for input(s): PHOS in the last 72 hours. Magnesium:   Recent Labs     09/17/21  0137   MG 1.7     Albumin:   Recent Labs     09/16/21  1440   LABALBU 3.6     Assessment:  1. ESRD, usually on Monday and Wednesday and Friday dialysis schedule. The patient missed dialysis on 9/15 and had make-up yesterday, 9/16  2. COVID-19 pneumonia with bilateral infiltrates, in isolation  3. Anemia of ESRD  4.   Type 2 diabetes mellitus  5. Hypokalemia following dialysis last evening  6. No significant volume overload on exam today  7. Dialysis access is a right chest permacath      Plan:  1. Dialysis will next occur tomorrow, 9/18/2021 in the next week we will get the patient back on a regular schedule. If he is discharged she likely will need to run on a Tuesday and Thursday and Saturday schedule at [facility] dialysis unit  2. Supplement potassium orally and recheck  3. Follow labs as ordered  4. Monitor hemodynamics    Thank you for the consultation. Please do not hesitate to call with questions.     Electronically signed by Olayinka Sofia MD on 9/17/2021 at 10:02 AM

## 2021-09-17 NOTE — FLOWSHEET NOTE
09/16/21 2038   Vital Signs   /65   Temp 98.1 °F (36.7 °C)   Pulse 112   Resp 22   SpO2 96 %   Weight 210 lb 1.6 oz (95.3 kg)   Weight Method Bed scale   Percent Weight Change 0.05   Treatment   Time On 2047   Treatment Goal 1000mL   Observations & Evaluations   Level of Consciousness Alert (0)   Oriented X 3   Heart Rhythm Regular   Technical Checks   RO Machine Log Sheet Completed Yes   Machine Alarm Self Test Completed   Machine Autotest Completed   Extracorporeal Circuit Tested for Integrity Yes   Bleach Test (Neg) Yes   Treatment Initiation   Treatment  Initiation F180;Lines secured to patient; Connections secured; Hemosafe Device; Dialyzer;Dialysate;Prime given;Venous Parameters set; Saline line double clamped; Arterial Parameters set; Hemo-Safe Applied;[place] Precautions maintained   Dialysis Bath   K+ (Potassium) 2   Ca+ (Calcium) 2.5   Na+ (Sodium) 135   HCO3 (Bicarb) 35   Treatment initiated without difficulty.

## 2021-09-17 NOTE — CARE COORDINATION
Advance Care Planning     Advance Care Planning Activator (Inpatient)  Conversation Note      Date of ACP Conversation: 9/17/2021     Conversation Conducted with: Patient with Decision Making Capacity    ACP Activator: Alex Alas RN        Health Care Decision Maker:     Current Designated Health Care Decision Maker:     Click here to complete Healthcare Decision Makers including section of the Healthcare Decision Maker Relationship (ie \"Primary\")  Today we documented Decision Maker(s) consistent with Legal Next of Kin hierarchy. Care Preferences    Ventilation: \"If you were in your present state of health and suddenly became very ill and were unable to breathe on your own, what would your preference be about the use of a ventilator (breathing machine) if it were available to you? \"      Would the patient desire the use of ventilator (breathing machine)?: yes    \"If your health worsens and it becomes clear that your chance of recovery is unlikely, what would your preference be about the use of a ventilator (breathing machine) if it were available to you? \"     Would the patient desire the use of ventilator (breathing machine)?: Yes      Resuscitation  \"CPR works best to restart the heart when there is a sudden event, like a heart attack, in someone who is otherwise healthy. Unfortunately, CPR does not typically restart the heart for people who have serious health conditions or who are very sick. \"    \"In the event your heart stopped as a result of an underlying serious health condition, would you want attempts to be made to restart your heart (answer \"yes\" for attempt to resuscitate) or would you prefer a natural death (answer \"no\" for do not attempt to resuscitate)? \" yes       [x] Yes   [] No   Educated Patient / Brii Garcia regarding differences between Advance Directives and portable DNR orders.     Length of ACP Conversation in minutes:      Conversation Outcomes:  [x] ACP discussion completed  [] Existing advance directive reviewed with patient; no changes to patient's previously recorded wishes  [] New Advance Directive completed  [] Portable Do Not Rescitate prepared for Provider review and signature  [] POLST/POST/MOLST/MOST prepared for Provider review and signature      Follow-up plan:    [] Schedule follow-up conversation to continue planning  [] Referred individual to Provider for additional questions/concerns   [] Advised patient/agent/surrogate to review completed ACP document and update if needed with changes in condition, patient preferences or care setting    [x] This note routed to one or more involved healthcare providers        States no children, no parents that dilip Olea is his Medical POA

## 2021-09-17 NOTE — PROGRESS NOTES
Patient transferred from ER via stretcher. VS obtained. Head to toe assessment completed. Admission database initiated. Patient falls asleep quickly but is easily arousable. Patient placed on 2L NC. Bed locked, in lowest position, side rails up x2. Call light given to patient and verbalized understanding of use.

## 2021-09-17 NOTE — FLOWSHEET NOTE
09/16/21 2300   Vital Signs   /73   Temp 94.4 °F (34.7 °C)   Pulse 121   Resp 22   SpO2 94 %   Weight 209 lb 3.5 oz (94.9 kg)   Weight Method Bed scale   Percent Weight Change -0.42   Post-Hemodialysis Assessment   Post-Treatment Procedures Blood returned;Catheter capped, clamped and heparinized x 2 ports   Machine Disinfection Process Acid/Vinegar Clean;Heat Disinfect   Rinseback Volume (ml) 300 ml   Total Liters Processed (l/min) 44 l/min   Dialyzer Clearance Clear   Duration of Treatment (minutes) 120 minutes   Hemodialysis Intake (ml) 700 ml   Hemodialysis Output (ml) 1275 ml   NET Removed (ml) 575 ml   Tolerated Treatment Fair   Patient Response to Treatment Pt tolerated treartment fairly well without interruptions.

## 2021-09-17 NOTE — DISCHARGE INSTR - COC
Continuity of Care Form    Patient Name: Davie Carter   :  1960  MRN:  [id number]    [facility] date:  2021  Discharge date:  21    Code Status Order: Full Code   Advance Directives:      Admitting Physician:  Windy Gaming DO  PCP: Kassie Mai MD    Discharging Nurse: Wendij 23 Unit/Room#: 6080/5834-99  Discharging Unit Phone Number: [phone]    Emergency Contact:   Extended Emergency Contact Information  Primary Emergency Contact: Shira Pillai [address] Phone: [phone]  Work Phone: [phone]  Mobile Phone: [phone]  Relation: Niece/Nephew    Past Surgical History:  Past Surgical History:   Procedure Laterality Date    COLONOSCOPY N/A 2020    COLONOSCOPY DIAGNOSTIC performed by Abner Olvera MD at [address] [place] [address]  2018    bilat stent replacement    CYSTOSCOPY  2018    b/l ureteroscopy, b/l stent, HLL    IR TUNNELED CATHETER PLACEMENT GREATER THAN 5 YEARS  2021    IR TUNNELED CATHETER PLACEMENT GREATER THAN 5 YEARS 2021 Maurizio Cruz MD [facility]ED[facility]    [place] CYSTO/URETERO/PYELOSCOPY, CALCULUS TX Bilateral 8/3/2018    FORTEC HOLMIUM - CYSTO, URETEROSCOPY LITHOTRIPSY, STENT EXCHANGE Erick Job #[id number] - ESPERANZA) performed by Kellen Gomez MD at [facility],8W Bilateral 2018    CYSTOSCOPY URETERAL STENT INSERTION BILATERAL performed by Kellen Gomez MD at [address] [place] [address]      as a child    UPPER GASTROINTESTINAL ENDOSCOPY N/A 2020    EGD BIOPSY performed by Abner Olvera MD at [facility]       Immunization History:   Immunization History   Administered Date(s) Administered    Influenza, Quadv, IM, PF (6 mo and older Fluzone, Flulaval, Fluarix, and 3 yrs and older Afluria) 2019, 2020    Pneumococcal Polysaccharide ([id number]) 2019    Tdap (Boostrix, Adacel) 2019       Active Problems:  Patient Active Problem List   Diagnosis Code    Type 2 diabetes mellitus with chronic kidney disease on chronic dialysis, with long-term current use of insulin ([facility] E11.22, N18.6, Z99.2, Z79.4    Sciatica of right side M54.31    Atypical chest pain R07.89    Low back pain of thoracolumbar region with sciatica M54.40    Kidney stone N20.0    Chest pain R07.9    Need for prophylactic vaccination against diphtheria-tetanus-pertussis (DTP) Z23    Gastroesophageal reflux disease K21.9    Tachycardia R00.0    Essential hypertension I10    Stage 3 chronic kidney disease N18.30    BMI 31.0-31.9,adult Z68.31    Acute kidney injury superimposed on CKD (HCC) N17.9, N18.9    Type 2 MI (myocardial infarction) ([facility] I21. A1    Gastrointestinal hemorrhage K92.2    Acute posthemorrhagic anemia D62    Neuropathy G62.9    NSAID long-term use Z79.1    Family history of malignant neoplasm of colon Z80.0    Positive colorectal cancer screening using Cologuard test R19.5    JEFFREY (acute kidney injury) ([facility] N17.9    Pneumonia due to COVID-19 virus U07.1, J12.82    ESRD needing dialysis ([facility] N18.6, Z99.2    Possible line sepsis associated with dialysis catheter ([facility] T82. 7XXA, A41.9    Acute metabolic encephalopathy F03.85    Hypoxia R09.02       Isolation/Infection:   Isolation            Droplet Plus          Patient Infection Status       Infection Onset Added Last Indicated Last Indicated By Review Planned Expiration Resolved Resolved By    COVID-19 09/15/21 09/15/21 09/15/21 COVID-19, Rapid 09/22/21 09/29/21      Resolved    COVID-19 Rule Out 09/13/21 09/13/21 09/15/21 COVID-19, Rapid (Ordered)   09/15/21 Rule-Out Test Resulted    C-diff Rule Out 06/26/21 06/26/21 06/26/21 C DIFF TOXIN/ANTIGEN (Ordered)   06/28/21 Santi Parham RN            Nurse Assessment:  Last Vital Signs: /62   Pulse 103   Temp 98.8 °F (37.1 °C) (Oral)   Resp 14   Ht 5' 10\" (1.778 m)   Wt 210 lb 8 oz (95.5 kg)   SpO2 98% BMI 30.20 kg/m²     Last documented pain score (0-10 scale): Pain Level: 0  Last Weight:   Wt Readings from Last 1 Encounters:   09/17/21 210 lb 8 oz (95.5 kg)     Mental Status:  alert    IV Access:  - Dialysis Catheter  - site  right, insertion date: ***    Nursing Mobility/ADLs:  Walking   Assisted  Transfer  Assisted  Bathing  Independent  Dressing  Independent  Toileting  Assisted  Feeding  Independent  Med Admin  Independent  Med Delivery   whole    Wound Care Documentation and Therapy:        Elimination:  Continence:   · Bowel: No  · Bladder: No  Urinary Catheter: None   Colostomy/Ileostomy/Ileal Conduit: No       Date of Last BM: ***    Intake/Output Summary (Last 24 hours) at 9/17/2021 1313  Last data filed at 9/17/2021 0656  Gross per 24 hour   Intake 900 ml   Output 1275 ml   Net -375 ml     I/O last 3 completed shifts: In: 900 [P.O.:200]  Out: 1275     Safety Concerns: At Risk for Falls    Impairments/Disabilities:      None    Nutrition Therapy:  Current Nutrition Therapy:   - Oral Diet:  Carb Control 4 carbs/meal (1800kcals/day)    Routes of Feeding: Oral  Liquids: No Restrictions  Daily Fluid Restriction: no  Last Modified Barium Swallow with Video (Video Swallowing Test): not done    Treatments at the Time of Hospital Discharge:   Respiratory Treatments: ***  Oxygen Therapy:  is not on home oxygen therapy.   Ventilator:    - No ventilator support    Rehab Therapies: Physical Therapy and Occupational Therapy  Weight Bearing Status/Restrictions: No weight bearing restirctions  Other Medical Equipment (for information only, NOT a DME order):  {EQUIPMENT:[id number]}  Other Treatments: ***    Patient's personal belongings (please select all that are sent with patient):  {[facility] DME Belongings:[id number]}    RN SIGNATURE:  Electronically signed by Marta Ballard RN on 9/20/21 at 2:43 PM EDT    CASE MANAGEMENT/SOCIAL WORK SECTION    Inpatient Status Date: ***    Readmission Risk Assessment Score:  Readmission Risk              Risk of Unplanned Readmission:  27           Discharging to Facility/ Agency   · Name:Natasha Huff   · Geraldo Jeff  T/R/S 11 AM  · Phone:[phone]  · [facility] fax [phone]  Dialysis Facility (if applicable)   · Name:  · Address:  · Dialysis Schedule:  · Phone:  · Fax:    / signature: Electronically signed by ALIDA Marx on 9/17/21 at 1:14 PM EDT    PHYSICIAN SECTION    Prognosis: Fair    Condition at Discharge: Stable    Rehab Potential (if transferring to Rehab): Fair    Recommended Labs or Other Treatments After Discharge: PT & OT eval and treat    Physician Certification: I certify the above information and transfer of Brigido Patterson  is necessary for the continuing treatment of the diagnosis listed and that he requires [place] for less 30 days.      Update Admission H&P: No change in H&P    PHYSICIAN SIGNATURE:  Electronically signed by Kellie Soriano DO on 9/20/21 at 10:21 AM EDT2

## 2021-09-17 NOTE — CARE COORDINATION
Case Management Initial Discharge Plan  Eduar Felipe,         Readmission Risk              Risk of Unplanned Readmission:  27             Met with:patient to discuss discharge plans. Information verified: address, contacts, phone number, , insurance Yes  PCP: Adriane Persaud MD  Date of last visit: 8/10    Insurance Provider: [facility]     Discharge Planning  Current Residence:  Private home   Living Arrangements:  Niece Earlene Gerardo and her children        Home has 2 stories/3 stairs to climb to enter . His bedroom and bathroom are upstairs  Support Systems:  Family Members       Current Services PTA:  Hemo  Agency: [facility] [facility] MWF at [employer]      Patient able to perform ADL's:Independent  DME in home:  glucometer  DME used to aid ambulation prior to admission:   None   DME used during admission:  Mitch jeffreyd     Potential Assistance Needed:  snf vs home care,      Pharmacy: [facility]    Potential Assistance Purchasing Medications:  No  Does patient want to participate in local refill/ meds to beds program?  No    Patient agreeable to home care: Yes  Freedom of choice provided:  yes      Type of Home Care Services:  Nursing, therapy   Patient expects to be discharged to:   snf vs home care     Prior SNF/Rehab Placement and Facility: none   Agreeable to SNF/Rehab:yes  [place] of choice provided: n/a   Evaluation: yes    Expected Discharge date:  21  Follow Up Appointment: Best Day/ Time: Monday AM    Transportation provider: per ambulette  Transportation arrangements needed for discharge: No    Discharge Plan:   Met with patient to follow up on plan of care. Patient currently living with niece and her children. Patient is COVID + and on 2 liters NC> he stated he will have no where to go on discharge because niece did not want him back  Until recovered from [place]    Patient states has been very weak. Looks like he doesn't feel well. Did discuss potential home care vs snf.  He is agreeable to snf if offered. Explained only snf taking covid patient is [facility] and he is ok with that    Updated SS and will watch for therapy evaluation when completed     DIANE in epic     Patient is HD on MWF and will need to change to TRS at [facility] at 1030. Yue Chester Per Nephrology they need to run HD tomorrow. Referral to [facility] sent per SS.      Electronically signed by Katherine Virk RN on 9/17/21 at 1:59 PM EDT

## 2021-09-17 NOTE — PROGRESS NOTES
Dr. Zaynab Josue returned page, updated on pt condition, informed that k is 3.1, new orders recieved

## 2021-09-17 NOTE — PROGRESS NOTES
Physical Therapy    Facility/Department: [facility] PROGRESSIVE CARE  Initial Assessment    NAME: Dalila Kramer  : 1960  MRN: [id number]    Date of Service: 2021   RN reports patient is medically stable for therapy treatment this date. Chart reviewed prior to treatment and patient is agreeable for therapy. All lines intact and patient positioned comfortably at end of treatment. All patient needs addressed prior to ending therapy session. Discharge Recommendations:    Pt currently functioning below baseline. Would suggest additional therapy at time of discharge to maximize long term outcomes and prevent re-admission. Please refer to AM-PAC score for current level of function. PT Equipment Recommendations  Equipment Needed: Yes  Mobility Devices: Rex Rear: Rolling    Assessment   Body structures, Functions, Activity limitations: Decreased functional mobility ; Decreased strength;Decreased safe awareness;Decreased balance;Decreased endurance  Assessment: Pt w/ fair tolerance to PT eval.  Pt w/ significant lethargy throughout, demonstrating moderate difficulty throughout functional mobility. Pt currently requiring skilled 2 person assist for safe functional mobility making pt a HIGH fall risk. Pt would benefit from continued skilled physical therapy to address strength, balance, and mobility deficits in order to return to [facility]. Prognosis: Good  Decision Making: Medium Complexity  PT Education: Goals;PT Role;Plan of Care;General Safety  Patient Education: Pt educated on purpose of acute PT eval and importance of continued mobility throughout admission w/ poor return demo. Pt would benefit from continued reinforcement of education. REQUIRES PT FOLLOW UP: Yes  Activity Tolerance  Activity Tolerance: Patient limited by fatigue       Patient Diagnosis(es): The primary encounter diagnosis was COVID-19.  Diagnoses of Pneumonia due to infectious organism, unspecified laterality, unspecified part of lung, Septicemia ([facility] ESRD (end stage renal disease) ([facility] and NSTEMI (non-ST elevated myocardial infarction) ([facility] were also pertinent to this visit. has a past medical history of ADHD (attention deficit hyperactivity disorder), COVID-19, COVID-19, Depression, DM2 (diabetes mellitus, type 2) ([facility] Erectile dysfunction, Gastroesophageal reflux disease, Hyperlipidemia, Hypertension, Kidney stone, Low back pain of thoracolumbar region with sciatica, and Neuropathy. has a past surgical history that includes Tonsillectomy; Cystoscopy (07/20/2018); pr cystoscopy,insert ureteral stent (Bilateral, 7/20/2018); Cystocopy (08/03/2018); pr cysto/uretero/pyeloscopy, calculus tx (Bilateral, 8/3/2018); Upper gastrointestinal endoscopy (N/A, 12/7/2020); Colonoscopy (N/A, 12/7/2020); and IR TUNNELED CVC PLACE WO SQ PORT/PUMP > 5 YEARS (6/28/2021). Restrictions  Restrictions/Precautions  Restrictions/Precautions: Fall Risk, General Precautions  Required Braces or Orthoses?: No  Position Activity Restriction  Other position/activity restrictions: drop plus iso due to COVID, RUE IV, 2 L O2 per NC, alarms, up with assist, port R side for HD(M, W, F) per RN  Vision/Hearing  Vision: Impaired (Pt states his blurry is vision and changes are new)  Vision Exceptions: Wears glasses for reading  Hearing: Exceptions to [facility]  Hearing Exceptions: Hard of hearing/hearing concerns     Subjective  General  Patient assessed for rehabilitation services?: Yes  Response To Previous Treatment: Not applicable  Family / Caregiver Present: No  Follows Commands: Within Functional Limits  General Comment  Comments: RN and pt agreeable to therapy. Pt supine in bed upon arrival on 2L O2 NC. Pt lethargic and cooperative throughout.   Subjective  Subjective: Pt reporting shoulder, back, and knee pain at time of PT eval.  Pain Screening  Patient Currently in Pain: Yes        Orientation  Orientation  Overall Orientation Status: Impaired  Orientation AROM: WFL  AROM LLE (degrees)  LLE AROM : WFL  AROM RUE (degrees)  RUE AROM : WFL  AROM LUE (degrees)  LUE AROM : WFL  Strength RLE  Strength RLE: WFL  Comment: Grossly 4-/5  Strength LLE  Strength LLE: WFL  Comment: Grossly 4-/5  Strength RUE  Comment: See OT assessment for detail  Strength LUE  Comment: See OT assessment for detail  Tone RLE  RLE Tone: Normotonic  Tone LLE  LLE Tone: Normotonic  Motor Control  Gross Motor?: WFL  Sensation  Overall Sensation Status: Impaired (pt c/o constant numbness in B feet/L hand)  Bed mobility  Supine to Sit: Moderate assistance;2 Person assistance  Sit to Supine: Moderate assistance;2 Person assistance  Scooting: Dependent/Total  Comment: Pt w/ moderate difficulty throughout bed mobility this date. Pt w/ increased lethargy requiring max verbal and tactile cueing from to maintain attention on writer w/ fair return demo. Pt requiring max verbal cueing for use of bedrails, pursed lip breathing, and use scooting hips forward along EOB w/ fair return demo. Assist required for safe line mgmt throughout. Transfers  Sit to Stand: Moderate Assistance;2 Person Assistance  Stand to sit: Moderate Assistance;2 Person Assistance  Comment: Pt w/ poor steadiness throughout transfers this date, pt performed 3 STS transfers throughout session this date. Pt requiring max verbal and tactile cueing for proper hand placement throughout transfers w/ poor return demo. Upon standing, pt demonstrating significant posterior lean requiring MaxA x2 from staff to maintain upright. Ambulation  Ambulation?: Yes  More Ambulation?: No  Ambulation 1  Surface: level tile  Device: Rolling Walker  Other Apparatus: O2  Assistance: Moderate assistance;2 Person assistance  Quality of Gait: unsteady  Gait Deviations: Increased SHEILA; Slow Paola; Shuffles;Decreased step length;Decreased step height;Decreased head and trunk rotation  Distance: 5 steps laterally along EOB  Comments: Pt w/ poor steadiness throughout ambulation, demonstrating wide SHEILA and requiring max verbal and tactile cueing from writer for progression of RW throughout w/ poor return demo. Pt fatiguing quickly. Stairs/Curb  Stairs?: No     Balance  Posture: Fair  Sitting - Static: Fair;+  Sitting - Dynamic: Fair;-  Standing - Static: Poor;+  Standing - Dynamic: Poor  Comments: Standing balance assessed w/ RW        Plan   Plan  Times per week: 1-2x/day, 5-6x/week  Current Treatment Recommendations: Strengthening, ROM, Balance Training, Functional Mobility Training, Stair training, Gait Training, Transfer Training, Endurance Training, Neuromuscular Re-education, Home Exercise Program, Safety Education & Training, Equipment Evaluation, Education, & procurement, Patient/Caregiver Education & Training  Safety Devices  Type of devices: Bed alarm in place, Call light within reach, Gait belt, Nurse notified, Left in bed  Restraints  Initially in place: No    AM-PAC Score  AM-PAC Inpatient Mobility Raw Score : 11 (09/17/21 1225)  AM-PAC Inpatient T-Scale Score : 33.86 (09/17/21 1225)  Mobility Inpatient CMS 0-100% Score: 72.57 (09/17/21 1225)  Mobility Inpatient CMS G-Code Modifier : CL (09/17/21 1225)       Functional Outcome Measure-   Single Leg Stance Test:  0 sec. (<5 sec.= fall risk)    Goals  Short term goals  Time Frame for Short term goals: 14 visits  Short term goal 1: Pt to demonstrate bed mobility mod I  Short term goal 2: Pt to peform STS transfers w/ RW Belle  Short term goal 3: Pt to ambulate at least 150ft w/ RW Belle  Short term goal 4: Pt to ascend/descend 10 stairs w/ handrails Jessenia  Short term goal 5: Pt to actively participate in at least 30 minutes of physical therapy for ther ex, ther act, balance, gait, transfers, and endurance training  Patient Goals   Patient goals :  To feel better, to go home       Therapy Time   Individual Concurrent Group Co-treatment   Time In 0958         Time Out 1028         Minutes 30+10 = 40 total Additional 10 minutes added for chart review   Treatment time: 30 minutes      Co-treatment with OT warranted secondary to decreased safety and independence requiring 2 skilled therapy professionals to address individual discipline's goals. PT addressing pre gait trunk strengthening, weight shifting prior to transfers, transfer training and postural control in sitting.     Paras Beckett, PT

## 2021-09-17 NOTE — PROGRESS NOTES
Pharmacy Note  Vancomycin Consult - Daily note   Vancomycin Therapy Day: 2  Current Dosing: Dialysis dosing. Patient received vancomycin 2500mg IV x 1 yesterday. Will receive Vancomycin 1000mg IV x 1 tomorrow (9/18) after dialysis  Current diagnosis for which MRSA is suspected/confirmed: possible line sepsis  ONLY for suspected pneumonia or COPD: MRSA nasal swab   N/A: Non respiratory infection. .        Last Temp: 98.2  Actual Weight:   Wt Readings from Last 1 Encounters:   09/17/21 210 lb 8 oz (95.5 kg)     Recent Labs     09/16/21  1440 09/17/21  0303   CREATININE 11.54* 7.45*     CrCl:  Patient is on dialysis. Recent Labs     09/16/21  1440 09/17/21  0303   WBC 3.8 2.5*       Intake/Output Summary (Last 24 hours) at 9/17/2021 1633  Last data filed at 9/17/2021 0656  Gross per 24 hour   Intake 900 ml   Output 1275 ml   Net -375 ml       Recent vancomycin administrations                     vancomycin (VANCOCIN) 2,500 mg in dextrose 5 % 500 mL IVPB (mg) 2,500 mg New Bag 09/16/21 1927                  ASSESSMENT/PLAN    Patient received vancomycin 2500mg IV x 1 yesterday, and will receive Vancomycin 1000mg (10mg/kg) tomorrow after his dialysis. Draw a vancomycin trough prior to next dialysis, which should be on Tuesday 9/21 if pt is still here. Pharmacy will Continue to follow. Thank you. Jayant Hassan, [facility], h/PharmD  9/17/2021  4:33 PM    Intermittent Hemodialysis: Not using bayesian software for dosing  Maintenance Dosing = 10-20 mg/kg after HD sessions (max 2,000 mg per dose).   Timing of concentration monitoring:  Critically ill - pre-HD level after the loading dose       Stable/stable dialysis:  pre-HD concentration after 1st or 2nd MD on dialysis 3 times/week  Pre-dialysis level Invasive MRSA Infection or Sepsis Non-Invasive Infection or Non-MRSA Infection   ? 25 mg/L Hold vancomycin dose, ? subsequent dose by 250 mg Hold vancomycin dose, ? subsequent dose by 250-500 mg   21-24 mg/L Continue current

## 2021-09-17 NOTE — CARE COORDINATION
Social Work-Phone call to Brandon Dubose. Patient will be receiving dialysis at [facility] T/R/S at 1030 AM.  Faxed referral to [facility].  [address]

## 2021-09-18 LAB
ANION GAP SERPL CALCULATED.3IONS-SCNC: 20 MMOL/L (ref 9–17)
BUN BLDV-MCNC: 76 MG/DL (ref 8–23)
BUN/CREAT BLD: 8 (ref 9–20)
C-REACTIVE PROTEIN: 26.3 MG/L (ref 0–5)
CALCIUM SERPL-MCNC: 7.4 MG/DL (ref 8.6–10.4)
CHLORIDE BLD-SCNC: 91 MMOL/L (ref 98–107)
CO2: 19 MMOL/L (ref 20–31)
CREAT SERPL-MCNC: 9.69 MG/DL (ref 0.7–1.2)
GFR AFRICAN AMERICAN: 7 ML/MIN
GFR NON-AFRICAN AMERICAN: 6 ML/MIN
GFR SERPL CREATININE-BSD FRML MDRD: ABNORMAL ML/MIN/{1.73_M2}
GFR SERPL CREATININE-BSD FRML MDRD: ABNORMAL ML/MIN/{1.73_M2}
GLUCOSE BLD-MCNC: 135 MG/DL (ref 75–110)
GLUCOSE BLD-MCNC: 236 MG/DL (ref 75–110)
GLUCOSE BLD-MCNC: 257 MG/DL (ref 70–99)
GLUCOSE BLD-MCNC: 275 MG/DL (ref 75–110)
GLUCOSE BLD-MCNC: 318 MG/DL (ref 75–110)
HCT VFR BLD CALC: 29 % (ref 40.7–50.3)
HEMOGLOBIN: 9.7 G/DL (ref 13–17)
INR BLD: 0.9
MAGNESIUM: 2 MG/DL (ref 1.6–2.6)
MCH RBC QN AUTO: 30.5 PG (ref 25.2–33.5)
MCHC RBC AUTO-ENTMCNC: 33.4 G/DL (ref 28.4–34.8)
MCV RBC AUTO: 91.2 FL (ref 82.6–102.9)
NRBC AUTOMATED: 0 PER 100 WBC
PDW BLD-RTO: 13.6 % (ref 11.8–14.4)
PHOSPHORUS: 7.2 MG/DL (ref 2.5–4.5)
PLATELET # BLD: 143 K/UL (ref 138–453)
PMV BLD AUTO: 11.5 FL (ref 8.1–13.5)
POTASSIUM SERPL-SCNC: 4.4 MMOL/L (ref 3.7–5.3)
PROCALCITONIN: 2.22 NG/ML
PROTHROMBIN TIME: 11.9 SEC (ref 11.5–14.2)
RBC # BLD: 3.18 M/UL (ref 4.21–5.77)
SODIUM BLD-SCNC: 130 MMOL/L (ref 135–144)
WBC # BLD: 2.7 K/UL (ref 3.5–11.3)

## 2021-09-18 PROCEDURE — 6370000000 HC RX 637 (ALT 250 FOR IP): Performed by: INTERNAL MEDICINE

## 2021-09-18 PROCEDURE — 82947 ASSAY GLUCOSE BLOOD QUANT: CPT

## 2021-09-18 PROCEDURE — 86140 C-REACTIVE PROTEIN: CPT

## 2021-09-18 PROCEDURE — 6370000000 HC RX 637 (ALT 250 FOR IP): Performed by: NURSE PRACTITIONER

## 2021-09-18 PROCEDURE — 1200000000 HC SEMI PRIVATE

## 2021-09-18 PROCEDURE — 90935 HEMODIALYSIS ONE EVALUATION: CPT

## 2021-09-18 PROCEDURE — 83735 ASSAY OF MAGNESIUM: CPT

## 2021-09-18 PROCEDURE — 84100 ASSAY OF PHOSPHORUS: CPT

## 2021-09-18 PROCEDURE — 36415 COLL VENOUS BLD VENIPUNCTURE: CPT

## 2021-09-18 PROCEDURE — 85027 COMPLETE CBC AUTOMATED: CPT

## 2021-09-18 PROCEDURE — 2700000000 HC OXYGEN THERAPY PER DAY

## 2021-09-18 PROCEDURE — 84145 PROCALCITONIN (PCT): CPT

## 2021-09-18 PROCEDURE — 6360000002 HC RX W HCPCS: Performed by: INTERNAL MEDICINE

## 2021-09-18 PROCEDURE — 99232 SBSQ HOSP IP/OBS MODERATE 35: CPT | Performed by: INTERNAL MEDICINE

## 2021-09-18 PROCEDURE — 6360000002 HC RX W HCPCS: Performed by: NURSE PRACTITIONER

## 2021-09-18 PROCEDURE — G0378 HOSPITAL OBSERVATION PER HR: HCPCS

## 2021-09-18 PROCEDURE — 94761 N-INVAS EAR/PLS OXIMETRY MLT: CPT

## 2021-09-18 PROCEDURE — 2500000003 HC RX 250 WO HCPCS: Performed by: INTERNAL MEDICINE

## 2021-09-18 PROCEDURE — 85610 PROTHROMBIN TIME: CPT

## 2021-09-18 PROCEDURE — 96366 THER/PROPH/DIAG IV INF ADDON: CPT

## 2021-09-18 PROCEDURE — 80048 BASIC METABOLIC PNL TOTAL CA: CPT

## 2021-09-18 PROCEDURE — 96372 THER/PROPH/DIAG INJ SC/IM: CPT

## 2021-09-18 PROCEDURE — 2580000003 HC RX 258: Performed by: INTERNAL MEDICINE

## 2021-09-18 RX ORDER — SODIUM CITRATE 4 % (5 ML)
1.6 SYRINGE (ML) MISCELLANEOUS ONCE
Status: COMPLETED | OUTPATIENT
Start: 2021-09-18 | End: 2021-09-18

## 2021-09-18 RX ADMIN — INSULIN LISPRO 2 UNITS: 100 INJECTION, SOLUTION INTRAVENOUS; SUBCUTANEOUS at 21:19

## 2021-09-18 RX ADMIN — INSULIN LISPRO 8 UNITS: 100 INJECTION, SOLUTION INTRAVENOUS; SUBCUTANEOUS at 16:38

## 2021-09-18 RX ADMIN — INSULIN GLARGINE 10 UNITS: 100 INJECTION, SOLUTION SUBCUTANEOUS at 21:19

## 2021-09-18 RX ADMIN — HYDRALAZINE HYDROCHLORIDE 25 MG: 25 TABLET, FILM COATED ORAL at 20:20

## 2021-09-18 RX ADMIN — VANCOMYCIN HYDROCHLORIDE 1000 MG: 1 INJECTION, POWDER, LYOPHILIZED, FOR SOLUTION INTRAVENOUS at 16:39

## 2021-09-18 RX ADMIN — ATORVASTATIN CALCIUM 20 MG: 20 TABLET, FILM COATED ORAL at 20:20

## 2021-09-18 RX ADMIN — GABAPENTIN 100 MG: 100 CAPSULE ORAL at 20:20

## 2021-09-18 RX ADMIN — PANTOPRAZOLE SODIUM 40 MG: 40 TABLET, DELAYED RELEASE ORAL at 05:53

## 2021-09-18 RX ADMIN — METOPROLOL TARTRATE 50 MG: 50 TABLET, FILM COATED ORAL at 20:20

## 2021-09-18 RX ADMIN — Medication 1.6 ML: at 13:22

## 2021-09-18 RX ADMIN — HEPARIN SODIUM 5000 UNITS: 5000 INJECTION INTRAVENOUS; SUBCUTANEOUS at 20:34

## 2021-09-18 RX ADMIN — HYDRALAZINE HYDROCHLORIDE 25 MG: 25 TABLET, FILM COATED ORAL at 05:53

## 2021-09-18 RX ADMIN — GABAPENTIN 100 MG: 100 CAPSULE ORAL at 16:40

## 2021-09-18 RX ADMIN — PANTOPRAZOLE SODIUM 40 MG: 40 TABLET, DELAYED RELEASE ORAL at 16:39

## 2021-09-18 RX ADMIN — Medication 1.6 ML: at 13:21

## 2021-09-18 RX ADMIN — HEPARIN SODIUM 5000 UNITS: 5000 INJECTION INTRAVENOUS; SUBCUTANEOUS at 06:13

## 2021-09-18 ASSESSMENT — PAIN SCALES - GENERAL
PAINLEVEL_OUTOF10: 0
PAINLEVEL_OUTOF10: 0

## 2021-09-18 NOTE — CARE COORDINATION
Social work: spoke to Shiv Paez from [address] she is full at both buildings and cannot take any new patients this weekend. As pt is positive there are no other snf's accepteing covid patients at this time. Will follow for possible beds Monday. Will need janessa, Rx and discharge order: snf.   Yolanda lew

## 2021-09-18 NOTE — PLAN OF CARE
Problem: Airway Clearance - Ineffective  Goal: Achieve or maintain patent airway  Outcome: Ongoing     Problem: Gas Exchange - Impaired  Goal: Absence of hypoxia  Outcome: Ongoing     Problem: Gas Exchange - Impaired  Goal: Promote optimal lung function  Outcome: Ongoing     Problem: Isolation Precautions - Risk of Spread of Infection  Goal: Prevent transmission of infection  Outcome: Ongoing    Problem: Falls - Risk of:  Goal: Will remain free from falls  Outcome: Ongoing

## 2021-09-18 NOTE — PROGRESS NOTES
HEMODIALYSIS PRE-TREATMENT NOTE    Patient Identifiers prior to treatment: name, birthdate and band    Isolation Required:covid                  Isolation Type: droplet       (please document if patient is being managed as a PUI/COVID-19 patient)        Hepatitis status:                           Date Drawn                             Result  Hepatitis B Surface Antigen 09/13/2021     neg                     Hepatitis B Surface Antibody 07/02/2021 pos     >1000   Hepatitis B Core Antibody 07/02/2021 neg          How was Hepatitis Status verified: labs and chart     Was a copy of the labs you documented provided to facility for the patient's chart: yes    Hemodialysis orders verified: yes    Access Within normal limits ( I.e. s/s of infection,...): yes     Pre-Assessment completed: yes    Pre-dialysis report received from: [address]                      Time: 800

## 2021-09-18 NOTE — PROGRESS NOTES
Nephrology Progress Note        Subjective: patient evaluated on dialysis. Pt is stable on dialysis. Access cannulated without problems. No new issues overnite. Stable hemodynamics. Patient has a PermCath. He has not had any fevers through the night. Blood pressures are stable. He is a touch below his dry weight but tolerating dialysis quite well. He denies any abdominal pain, chest pain. Blood cultures are negative thus far    Objective:  CURRENT TEMPERATURE:  Temp: 96.8 °F (36 °C)  MAXIMUM TEMPERATURE OVER 24HRS:  Temp (24hrs), Av.5 °F (36.4 °C), Min:96.8 °F (36 °C), Max:98.2 °F (36.8 °C)    CURRENT RESPIRATORY RATE:  Resp: 18  CURRENT PULSE:  Pulse: 72  CURRENT BLOOD PRESSURE:  BP: 116/71  24HR BLOOD PRESSURE RANGE:  Systolic (06RXH), QHE:451 , Min:94 , HOD:972   ; Diastolic (80CAV), MVS:68, Min:62, Max:77    24HR INTAKE/OUTPUT:    Intake/Output Summary (Last 24 hours) at 2021 1157  Last data filed at 2021 0610  Gross per 24 hour   Intake 1280 ml   Output --   Net 1280 ml     Patient Vitals for the past 96 hrs (Last 3 readings):   Weight   21 0848 202 lb 2.6 oz (91.7 kg)   21 0613 208 lb 1.6 oz (94.4 kg)   21 0623 210 lb 8 oz (95.5 kg)         Physical Exam:  General appearance:Awake, alert, in no acute distress  Skin: warm and dry, no rash or erythema  Eyes: conjunctivae normal and sclera anicteric  ENT:no thrush no pharyngeal congestion   Neck:  No JVD, No Thyromegaly  Pulmonary: clear to auscultation and no wheezing or rhonchi   Cardiovascular: normal S1 and S2. NO rubs and NO murmur.  No S3 OR S4   Abdomen: soft nontender, bowel sounds present, no organomegaly,  no ascites   Extremities: no cyanosis, clubbing or edema     Access:  previous permacath    Current Medications:    sodium citrate 4 % injection 1.6 mL, Once  sodium citrate 4 % injection 1.6 mL, Once  aspirin chewable tablet 81 mg, Daily  atorvastatin (LIPITOR) tablet 20 mg, Daily  gabapentin (NEURONTIN) capsule 100 mg, TID  hydrALAZINE (APRESOLINE) tablet 25 mg, 3 times per day  insulin glargine (LANTUS) injection vial 10 Units, Nightly  isosorbide mononitrate (IMDUR) extended release tablet 30 mg, Daily  metoprolol tartrate (LOPRESSOR) tablet 50 mg, BID  pantoprazole (PROTONIX) tablet 40 mg, BID AC  sodium chloride flush 0.9 % injection 5-40 mL, 2 times per day  sodium chloride flush 0.9 % injection 10 mL, PRN  0.9 % sodium chloride infusion, PRN  heparin (porcine) injection 5,000 Units, 3 times per day  ondansetron (ZOFRAN-ODT) disintegrating tablet 4 mg, Q8H PRN   Or  ondansetron (ZOFRAN) injection 4 mg, Q6H PRN  polyethylene glycol (GLYCOLAX) packet 17 g, Daily PRN  acetaminophen (TYLENOL) tablet 650 mg, Q6H PRN   Or  acetaminophen (TYLENOL) suppository 650 mg, Q6H PRN  insulin lispro (HUMALOG) injection vial 0-12 Units, TID WC  insulin lispro (HUMALOG) injection vial 0-6 Units, Nightly  glucose (GLUTOSE) 40 % oral gel 15 g, PRN  dextrose 50 % IV solution, PRN  glucagon (rDNA) injection 1 mg, PRN  dextrose 5 % solution, PRN  dexamethasone (DECADRON) tablet 6 mg, Daily  vancomycin 1000 mg IVPB in 250 mL D5W addavial, Once  guaiFENesin (ROBITUSSIN) 100 MG/5ML oral solution 200 mg, Q4H PRN  midodrine (PROAMATINE) tablet 5 mg, TID WC  vancomycin (VANCOCIN) intermittent dosing (placeholder), RX Placeholder      Labs:   CBC:   Recent Labs     09/16/21  1440 09/17/21  0303 09/18/21  0533   WBC 3.8 2.5* 2.7*   RBC 3.60* 3.22* 3.18*   HGB 11.1* 9.8* 9.7*   HCT 33.6* 30.3* 29.0*   * 103* 143   MPV 11.7 12.1 11.5      BMP:   Recent Labs     09/16/21  1440 09/17/21  0137 09/17/21  0303 09/17/21  1302 09/18/21  0533   *  --  132*  --  130*   K 4.4   < > 3.1* 4.1 4.4   CL 89*  --  93*  --  91*   CO2 14*  --  23  --  19*   BUN 87*  --  52*  --  76*   CREATININE 11.54*  --  7.45*  --  9.69*   GLUCOSE 239*  --  252*  --  257*   CALCIUM 7.4*  --  7.5*  --  7.4*    < > = values in this interval not displayed. Phosphorus:    Recent Labs     09/18/21  0533   PHOS 7.2*     Magnesium:   Recent Labs     09/17/21  0137 09/18/21  0533   MG 1.7 2.0     Albumin:   Recent Labs     09/16/21  1440   LABALBU 3.6       Dialysis bath: Dialysis Bath  K+ (Potassium): 3  Ca+ (Calcium): 3  Na+ (Sodium): 180  HCO3 (Bicarb): 36      Assessment:  1 ESRD:dialysis bath reviewed with nurse. 2.HTN:  3 COVID-19 pneumonia:  4 EDEMA : He has no peripheral edema  5. ANEMIA OF CHRONIC DISEASE:   6.SECONDARY HYPERPARATHYROIDISM:     Plan:  1. Wt removal and dialysis orders reviewed. 2.  Okay for discharge from renal standpoint. He will need to run Tuesday Thursday Saturday at [facility] because of his COVID-19 positive status. 3. Cont pt on aranesp and zemplar per protocol. 4. Follow up labs ordered. Please do not hesitate to call with questions.     Electronically signed by Jorje Lugo MD on 9/18/2021 at 11:57 AM

## 2021-09-18 NOTE — PROGRESS NOTES
HD complete and report was received from HD nurse. Tx was given by a tech and no report given to primary RN on status and patient tolerance, however HD nurse gave updates after initiation from [address] [place] [address]. Patient tolerated the tx well, 2 liters were removed, B/P stable at 120/68.

## 2021-09-18 NOTE — CARE COORDINATION
Social work: called and left voicemail message for Michelleromelia Talbot rep for [facility] sn[facility] taking covid patients at this time to see if she can accept when pt is ready for discharge. Will need janessa, Rx, discharge order: [address] and vipin will do hens at discharge.      Phone for Report at [phone]

## 2021-09-18 NOTE — PROGRESS NOTES
Patient currently in with HD nurse to receive tx. He is up to the bathroom and she is still setting up for in room treatment.

## 2021-09-18 NOTE — PROGRESS NOTES
[facility]  Office: [address], DO, Mickey Arevalo, DO, Nupur Ryley, DO, Dylon Daselle Blood, DO, Anne Fernando MD, Aston Wolff MD, Cassia Tuttle MD, Juliana Flannery MD, Teresa Hwang MD, Millie Rock MD, Karla Lora MD, Juliette Nugent, DO, Frank Simmonds, DO, Oscar London MD,  Lilliana Jose DO, Alexandra West MD, Milton Charles MD, Chloe Beauchamp MD, Fatuma Bolden MD, , Marciano Bernheim, MD, Abdiel Finn MD, Miriam Wiley MD, Gloria Ortiz, [facility], [facility], [facility], Maureen Mittal, CNP, Jeet Pierson, CNS, Eduard Joshua, CNP, Theresa Albright, CNP, Zeenat Delacruz, CNP, Harvey Neely, CNP, Joshua Peterson, CNP, Seb Skelton PA-C, Ashlie Morfin, [facility], Carmelina Schuler, CNP, Juwan Palacio, CNP, Woodward Brittle, CNP, Rosa Cummings, CNP, Dharmesh Serrato, CNP, Emilee Anne, CNP, Whitney Crabtree, [facility], [facility]    Progress Note    9/18/2021    10:23 AM    Name:   Priya Quevedo  MRN:     [id number]     Acct:      [de-identified]   Room:   [address] [place] [address] Day:  2  Admit Date:  9/16/2021  2:17 PM    PCP:   Argelia Moreno MD  Code Status:  Full Code    Subjective:     C/C:   Chief Complaint   Patient presents with    Fever     went to dialysis and was refused services because covid positive    Shortness of Breath     Interval History Status: improved. Patient is still improving, decreased shortness of breath and still requires oxygen. Denies any chest pain, nausea or vomiting, fevers or chills. Occasional cough but no sputum production. Brief History: This is a 60-year-old male that is dialysis dependent that presents with fever and shortness of breath. He was recently diagnosed with COVID-19 and was unable to attend his dialysis appointment Wednesday due to new diagnosis. Subsequently had increasing shortness of breath and recurrent fever and chills and presented to the emergency room for evaluation. He was found to have elevated BUN and findings concerning for Covid pneumonia and associated encephalopathy. He was admitted to the hospital for urgent dialysis which was completed last evening. Has had improvement in his shortness of breath and improve mentation after his dialysis treatment. Review of Systems:     Constitutional:  negative for chills, fevers, sweats  Respiratory: Positive for cough, dyspnea on exertion, shortness of breath, negative for wheezing  Cardiovascular:  negative for chest pain, chest pressure/discomfort, lower extremity edema, palpitations  Gastrointestinal:  negative for abdominal pain, constipation, diarrhea, nausea, vomiting  Neurological:  negative for dizziness, headache    Medications:      Allergies:  No Known Allergies    Current Meds:   Scheduled Meds:    sodium citrate  1.6 mL IntraCATHeter Once    sodium citrate  1.6 mL IntraCATHeter Once    aspirin  81 mg Oral Daily    atorvastatin  20 mg Oral Daily    gabapentin  100 mg Oral TID    hydrALAZINE  25 mg Oral 3 times per day    insulin glargine  10 Units SubCUTAneous Nightly    isosorbide mononitrate  30 mg Oral Daily    metoprolol tartrate  50 mg Oral BID    pantoprazole  40 mg Oral BID AC    sodium chloride flush  5-40 mL IntraVENous 2 times per day    heparin (porcine)  5,000 Units SubCUTAneous 3 times per day    insulin lispro  0-12 Units SubCUTAneous TID WC    insulin lispro  0-6 Units SubCUTAneous Nightly    dexamethasone  6 mg Oral Daily    vancomycin  1,000 mg IntraVENous Once    midodrine  5 mg Oral TID WC    vancomycin (VANCOCIN) intermittent dosing (placeholder)   Other RX Placeholder     Continuous Infusions:    sodium chloride      dextrose       PRN Meds: sodium chloride flush, sodium chloride, ondansetron **OR** ondansetron, polyethylene glycol, acetaminophen **OR** acetaminophen, glucose, dextrose, glucagon (rDNA), dextrose, guaiFENesin    Data:     Past Medical History:   has a past medical history of ADHD (attention deficit hyperactivity disorder), COVID-19, COVID-19, Depression, DM2 (diabetes mellitus, type 2) ([address] Erectile dysfunction, Gastroesophageal reflux disease, Hyperlipidemia, Hypertension, Kidney stone, Low back pain of thoracolumbar region with sciatica, and Neuropathy. Social History:   reports that he quit smoking about 2 years ago. His smoking use included cigarettes. He has a 15.00 pack-year smoking history. He has never used smokeless tobacco. He reports current alcohol use of about 8.0 standard drinks of alcohol per week. He reports that he does not use drugs. Family History:   Family History   Problem Relation Age of Onset    Diabetes Mother     High Blood Pressure Father        Vitals:  /68   Pulse 94   Temp 96.8 °F (36 °C)   Resp 18   Ht 5' 10\" (1.778 m)   Wt 202 lb 2.6 oz (91.7 kg)   SpO2 95%   BMI 29.01 kg/m²   Temp (24hrs), Av.7 °F (36.5 °C), Min:96.8 °F (36 °C), Max:98.8 °F (37.1 °C)    Recent Labs     21  1121 21  1628 21  1957 21  0717   POCGLU 191* 290* 295* 275*       I/O (24Hr):     Intake/Output Summary (Last 24 hours) at 2021 1023  Last data filed at 2021 0610  Gross per 24 hour   Intake 1280 ml   Output --   Net 1280 ml       Labs:  Hematology:  Recent Labs     21  1440 21  0303 21  0533   WBC 3.8 2.5* 2.7*   RBC 3.60* 3.22* 3.18*   HGB 11.1* 9.8* 9.7*   HCT 33.6* 30.3* 29.0*   MCV 93.3 94.1 91.2   MCH 30.8 30.4 30.5   MCHC 33.0 32.3 33.4   RDW 14.0 13.9 13.6   * 103* 143   MPV 11.7 12.1 11.5   CRP 44.0*  --   --    INR  --   --  0.9   DDIMER 1.72*  --   --      Chemistry:  Recent Labs     21  1440 21  1440 21  0137 21  0137 21  0303 21  1302 21  0533   *  --   --   --  132*  --  130*   K 4.4   < > 3.2*   < > 3.1* 4.1 4.4   CL 89*  --   --   --  93*  --  91*   CO2 14*  --   --   --  23  --  19*   GLUCOSE 239*  --   --   --  252* --  257*   BUN 87*  --   --   --  52*  --  76*   CREATININE 11.54*  --   --   --  7.45*  --  9.69*   MG  --   --  1.7  --   --   --  2.0   ANIONGAP 26*  --   --   --  16  --  20*   LABGLOM 5*  --   --   --  7*  --  6*   GFRAA 5*  --   --   --  9*  --  7*   CALCIUM 7.4*  --   --   --  7.5*  --  7.4*   PHOS  --   --   --   --   --   --  7.2*   PROBNP 836*  --   --   --   --   --   --    TROPHS 103*  --   --   --   --   --   --     < > = values in this interval not displayed. Recent Labs     09/16/21  1440 09/17/21  0751 09/17/21  1121 09/17/21  1628 09/17/21 1957 09/18/21  0717   PROT 7.9  --   --   --   --   --    LABALBU 3.6  --   --   --   --   --    AST 69*  --   --   --   --   --    ALT 33  --   --   --   --   --    *  --   --   --   --   --    ALKPHOS 86  --   --   --   --   --    BILITOT 0.36  --   --   --   --   --    POCGLU  --  179* 191* 290* 295* 275*     ABG:No results found for: POCPH, PHART, PH, POCPCO2, VGC3VTS, PCO2, POCPO2, PO2ART, PO2, POCHCO3, GKQ2XUL, HCO3, NBEA, PBEA, BEART, BE, THGBART, THB, ZJG5CHM, KNPM8XFB, E0OVXUCN, O2SAT, FIO2  Lab Results   Component Value Date/Time    SPECIAL NOT REPORTED 09/16/2021 02:51 PM     Lab Results   Component Value Date/Time    CULTURE NO GROWTH 2 DAYS 09/16/2021 02:51 PM       Radiology:  NM LUNG SCAN PERFUSION ONLY    Result Date: 9/16/2021  Low Probability for Pulmonary Embolus. XR CHEST PORTABLE    Result Date: 9/16/2021  New, nonspecific pulmonary infiltrates, presumably atypical/viral pneumonia (insert COVID-19 pneumonia).      XR CHEST PORTABLE    Result Date: 9/13/2021  Satisfactorily positioned support line, no pneumothorax No acute cardiopulmonary findings       Physical Examination:        General appearance:  alert, cooperative and no distress  Mental Status:  oriented to person, place and time and normal affect  Lungs:  clear to auscultation bilaterally, normal effort  Heart:  regular rate and rhythm, no murmur  Abdomen:  soft, nontender, nondistended, normal bowel sounds, no masses, hepatomegaly, splenomegaly  Extremities:  no edema, redness, tenderness in the calves  Skin:  no gross lesions, rashes, induration    Assessment:        Hospital Problems         Last Modified POA    * (Principal) ESRD needing dialysis ([facility] 9/16/2021 Yes    Type 2 diabetes mellitus with chronic kidney disease on chronic dialysis, with long-term current use of insulin ([facility] 9/16/2021 Yes    Gastroesophageal reflux disease 9/16/2021 Yes    Essential hypertension 9/16/2021 Yes    Pneumonia due to COVID-19 virus 9/16/2021 Yes    Possible line sepsis associated with dialysis catheter ([facility] 9/16/2021 Yes    Acute metabolic encephalopathy 5/79/1092 Yes    Hypoxia 9/16/2021 Yes    Thrombocytopenia ([facility] 9/17/2021 Yes          Plan:        1. Dialysis today per routine schedule  2. Cultures negative at 48 hours, discontinue vancomycin and monitor off antibiotics  3. Supplemental oxygen as needed  4. Decadron 6 mg daily, adjust dose as needed per Covid protocol   5. Not a candidate for baricitinib due to renal failure. Currently not a candidate for Actemra has only mildly hypoxic and required nasal cannula oxygen. 6. No change in current insulin  7. Activity as tolerated, PT and OT  8. GI and DVT prophylaxis  9.  for discharge planning, likely benefit from skilled nursing placement  10.  Discussed with nephrology service today on rounds    Josh Mccormick DO  9/18/2021  10:23 AM

## 2021-09-18 NOTE — PROGRESS NOTES
HEMODIALYSIS POST TREATMENT NOTE    Treatment time ordered: 210  Actual treatment time: 210    UltraFiltration Goal: 2500  UltraFiltration Removed: 2000      Pre Treatment weight: 91.7kg  Post Treatment weight: 89.7kg  Estimated Dry Weight: na    Access used:     Central Venous Catheter:          Tunneled or Non-tunneled: tunneled           Site: right chest          Access Flow: good      Internal Access:       AV Fistula or AV Graft: na         Site: na       Access Flow: na       Sign and symptoms of infection: no       If YES: na    Medications or blood products given: mo    Chronic outpatient schedule: MW    Chronic outpatient unit: [facility]    Summary of response to treatment: pt did well    Explain if orders NOT met, was physician notified:isidra      ACES flowsheet faxed to patient unit/ placed in patient chart: yes    Post assessment completed: yes                  Report given to: Jeff Londono Rn     1) Access and face visible at all times. 2) All connections and blood lines are secure with no kinks. 3) NVL alarm engaged. 4) Hemosafe device applied (if applicable).

## 2021-09-19 LAB
ANION GAP SERPL CALCULATED.3IONS-SCNC: 17 MMOL/L (ref 9–17)
BUN BLDV-MCNC: 37 MG/DL (ref 8–23)
BUN/CREAT BLD: 6 (ref 9–20)
CALCIUM SERPL-MCNC: 7.8 MG/DL (ref 8.6–10.4)
CHLORIDE BLD-SCNC: 96 MMOL/L (ref 98–107)
CO2: 22 MMOL/L (ref 20–31)
CREAT SERPL-MCNC: 6.29 MG/DL (ref 0.7–1.2)
CULTURE: NORMAL
CULTURE: NORMAL
GFR AFRICAN AMERICAN: 11 ML/MIN
GFR NON-AFRICAN AMERICAN: 9 ML/MIN
GFR SERPL CREATININE-BSD FRML MDRD: ABNORMAL ML/MIN/{1.73_M2}
GFR SERPL CREATININE-BSD FRML MDRD: ABNORMAL ML/MIN/{1.73_M2}
GLUCOSE BLD-MCNC: 182 MG/DL (ref 75–110)
GLUCOSE BLD-MCNC: 279 MG/DL (ref 75–110)
GLUCOSE BLD-MCNC: 287 MG/DL (ref 75–110)
GLUCOSE BLD-MCNC: 300 MG/DL (ref 70–99)
GLUCOSE BLD-MCNC: 316 MG/DL (ref 75–110)
HCT VFR BLD CALC: 28.4 % (ref 40.7–50.3)
HEMOGLOBIN: 9.4 G/DL (ref 13–17)
Lab: NORMAL
Lab: NORMAL
MCH RBC QN AUTO: 30.4 PG (ref 25.2–33.5)
MCHC RBC AUTO-ENTMCNC: 33.1 G/DL (ref 28.4–34.8)
MCV RBC AUTO: 91.9 FL (ref 82.6–102.9)
NRBC AUTOMATED: 0 PER 100 WBC
PDW BLD-RTO: 13.8 % (ref 11.8–14.4)
PLATELET # BLD: 170 K/UL (ref 138–453)
PMV BLD AUTO: 11.3 FL (ref 8.1–13.5)
POTASSIUM SERPL-SCNC: 3.8 MMOL/L (ref 3.7–5.3)
RBC # BLD: 3.09 M/UL (ref 4.21–5.77)
SODIUM BLD-SCNC: 135 MMOL/L (ref 135–144)
SPECIMEN DESCRIPTION: NORMAL
SPECIMEN DESCRIPTION: NORMAL
WBC # BLD: 4.1 K/UL (ref 3.5–11.3)

## 2021-09-19 PROCEDURE — 6370000000 HC RX 637 (ALT 250 FOR IP): Performed by: NURSE PRACTITIONER

## 2021-09-19 PROCEDURE — 2700000000 HC OXYGEN THERAPY PER DAY

## 2021-09-19 PROCEDURE — G0378 HOSPITAL OBSERVATION PER HR: HCPCS

## 2021-09-19 PROCEDURE — 99232 SBSQ HOSP IP/OBS MODERATE 35: CPT | Performed by: INTERNAL MEDICINE

## 2021-09-19 PROCEDURE — 85027 COMPLETE CBC AUTOMATED: CPT

## 2021-09-19 PROCEDURE — 96372 THER/PROPH/DIAG INJ SC/IM: CPT

## 2021-09-19 PROCEDURE — 1200000000 HC SEMI PRIVATE

## 2021-09-19 PROCEDURE — 2580000003 HC RX 258: Performed by: NURSE PRACTITIONER

## 2021-09-19 PROCEDURE — 6360000002 HC RX W HCPCS: Performed by: NURSE PRACTITIONER

## 2021-09-19 PROCEDURE — 6370000000 HC RX 637 (ALT 250 FOR IP): Performed by: INTERNAL MEDICINE

## 2021-09-19 PROCEDURE — 80048 BASIC METABOLIC PNL TOTAL CA: CPT

## 2021-09-19 PROCEDURE — 36415 COLL VENOUS BLD VENIPUNCTURE: CPT

## 2021-09-19 PROCEDURE — 94761 N-INVAS EAR/PLS OXIMETRY MLT: CPT

## 2021-09-19 PROCEDURE — 6360000002 HC RX W HCPCS: Performed by: INTERNAL MEDICINE

## 2021-09-19 PROCEDURE — 82947 ASSAY GLUCOSE BLOOD QUANT: CPT

## 2021-09-19 RX ORDER — HYDRALAZINE HYDROCHLORIDE 25 MG/1
12.5 TABLET, FILM COATED ORAL EVERY 8 HOURS SCHEDULED
Status: DISCONTINUED | OUTPATIENT
Start: 2021-09-19 | End: 2021-09-20 | Stop reason: HOSPADM

## 2021-09-19 RX ADMIN — INSULIN LISPRO 6 UNITS: 100 INJECTION, SOLUTION INTRAVENOUS; SUBCUTANEOUS at 08:54

## 2021-09-19 RX ADMIN — INSULIN GLARGINE 10 UNITS: 100 INJECTION, SOLUTION SUBCUTANEOUS at 21:39

## 2021-09-19 RX ADMIN — SODIUM CHLORIDE, PRESERVATIVE FREE 10 ML: 5 INJECTION INTRAVENOUS at 09:04

## 2021-09-19 RX ADMIN — METOPROLOL TARTRATE 50 MG: 50 TABLET, FILM COATED ORAL at 08:59

## 2021-09-19 RX ADMIN — HEPARIN SODIUM 5000 UNITS: 5000 INJECTION INTRAVENOUS; SUBCUTANEOUS at 06:24

## 2021-09-19 RX ADMIN — GABAPENTIN 100 MG: 100 CAPSULE ORAL at 08:59

## 2021-09-19 RX ADMIN — MIDODRINE HYDROCHLORIDE 5 MG: 5 TABLET ORAL at 13:00

## 2021-09-19 RX ADMIN — PANTOPRAZOLE SODIUM 40 MG: 40 TABLET, DELAYED RELEASE ORAL at 06:28

## 2021-09-19 RX ADMIN — ASPIRIN 81 MG: 81 TABLET, CHEWABLE ORAL at 09:00

## 2021-09-19 RX ADMIN — INSULIN LISPRO 2 UNITS: 100 INJECTION, SOLUTION INTRAVENOUS; SUBCUTANEOUS at 13:00

## 2021-09-19 RX ADMIN — INSULIN LISPRO 6 UNITS: 100 INJECTION, SOLUTION INTRAVENOUS; SUBCUTANEOUS at 17:33

## 2021-09-19 RX ADMIN — GABAPENTIN 100 MG: 100 CAPSULE ORAL at 14:40

## 2021-09-19 RX ADMIN — HYDRALAZINE HYDROCHLORIDE 12.5 MG: 25 TABLET, FILM COATED ORAL at 21:45

## 2021-09-19 RX ADMIN — HEPARIN SODIUM 5000 UNITS: 5000 INJECTION INTRAVENOUS; SUBCUTANEOUS at 14:41

## 2021-09-19 RX ADMIN — METOPROLOL TARTRATE 50 MG: 50 TABLET, FILM COATED ORAL at 21:45

## 2021-09-19 RX ADMIN — HYDRALAZINE HYDROCHLORIDE 25 MG: 25 TABLET, FILM COATED ORAL at 06:28

## 2021-09-19 RX ADMIN — HEPARIN SODIUM 5000 UNITS: 5000 INJECTION INTRAVENOUS; SUBCUTANEOUS at 21:39

## 2021-09-19 RX ADMIN — DEXAMETHASONE 6 MG: 4 TABLET ORAL at 08:57

## 2021-09-19 RX ADMIN — SODIUM CHLORIDE, PRESERVATIVE FREE 10 ML: 5 INJECTION INTRAVENOUS at 21:49

## 2021-09-19 RX ADMIN — GABAPENTIN 100 MG: 100 CAPSULE ORAL at 21:45

## 2021-09-19 RX ADMIN — ISOSORBIDE MONONITRATE 30 MG: 30 TABLET, EXTENDED RELEASE ORAL at 08:59

## 2021-09-19 RX ADMIN — ATORVASTATIN CALCIUM 20 MG: 20 TABLET, FILM COATED ORAL at 21:45

## 2021-09-19 RX ADMIN — INSULIN LISPRO 4 UNITS: 100 INJECTION, SOLUTION INTRAVENOUS; SUBCUTANEOUS at 21:40

## 2021-09-19 RX ADMIN — PANTOPRAZOLE SODIUM 40 MG: 40 TABLET, DELAYED RELEASE ORAL at 17:33

## 2021-09-19 RX ADMIN — MIDODRINE HYDROCHLORIDE 5 MG: 5 TABLET ORAL at 08:59

## 2021-09-19 NOTE — PROGRESS NOTES
emergency room for evaluation. He was found to have elevated BUN and findings concerning for Covid pneumonia and associated encephalopathy. He was admitted to the hospital for urgent dialysis which was completed last evening. Has had improvement in his shortness of breath and improve mentation after his dialysis treatment. Review of Systems:     Constitutional:  negative for chills, fevers, sweats  Respiratory: Positive for cough, dyspnea on exertion, shortness of breath, negative for wheezing  Cardiovascular:  negative for chest pain, chest pressure/discomfort, lower extremity edema, palpitations  Gastrointestinal:  negative for abdominal pain, constipation, diarrhea, nausea, vomiting  Neurological:  negative for dizziness, headache    Medications:      Allergies:  No Known Allergies    Current Meds:   Scheduled Meds:    aspirin  81 mg Oral Daily    atorvastatin  20 mg Oral Daily    gabapentin  100 mg Oral TID    hydrALAZINE  25 mg Oral 3 times per day    insulin glargine  10 Units SubCUTAneous Nightly    isosorbide mononitrate  30 mg Oral Daily    metoprolol tartrate  50 mg Oral BID    pantoprazole  40 mg Oral BID AC    sodium chloride flush  5-40 mL IntraVENous 2 times per day    heparin (porcine)  5,000 Units SubCUTAneous 3 times per day    insulin lispro  0-12 Units SubCUTAneous TID WC    insulin lispro  0-6 Units SubCUTAneous Nightly    dexamethasone  6 mg Oral Daily    midodrine  5 mg Oral TID WC     Continuous Infusions:    sodium chloride      dextrose       PRN Meds: sodium chloride flush, sodium chloride, ondansetron **OR** ondansetron, polyethylene glycol, acetaminophen **OR** acetaminophen, glucose, dextrose, glucagon (rDNA), dextrose, guaiFENesin    Data:     Past Medical History:   has a past medical history of ADHD (attention deficit hyperactivity disorder), COVID-19, COVID-19, Depression, DM2 (diabetes mellitus, type 2) ([facility] Erectile dysfunction, Gastroesophageal reflux 87*   < >  --  52*  --   --  76* 37*   CREATININE 11.54*   < >  --  7.45*  --   --  9.69* 6.29*   MG  --   --  1.7  --   --   --  2.0  --    ANIONGAP 26*   < >  --  16  --   --  20* 17   LABGLOM 5*   < >  --  7*  --   --  6* 9*   GFRAA 5*   < >  --  9*  --   --  7* 11*   CALCIUM 7.4*   < >  --  7.5*  --   --  7.4* 7.8*   PHOS  --   --   --   --   --   --  7.2*  --    PROBNP 836*  --   --   --   --   --   --   --    TROPHS 103*  --   --   --   --   --   --   --     < > = values in this interval not displayed. Recent Labs     09/16/21  1440 09/17/21  0751 09/17/21  1957 09/18/21  0717 09/18/21  1146 09/18/21  1623 09/18/21  2030 09/19/21  0835   PROT 7.9  --   --   --   --   --   --   --    LABALBU 3.6  --   --   --   --   --   --   --    AST 69*  --   --   --   --   --   --   --    ALT 33  --   --   --   --   --   --   --    *  --   --   --   --   --   --   --    ALKPHOS 86  --   --   --   --   --   --   --    BILITOT 0.36  --   --   --   --   --   --   --    POCGLU  --    < > 295* 275* 135* 318* 236* 287*    < > = values in this interval not displayed. ABG:No results found for: POCPH, PHART, PH, POCPCO2, IST4WZR, PCO2, POCPO2, PO2ART, PO2, POCHCO3, RTV0FUX, HCO3, NBEA, PBEA, BEART, BE, THGBART, THB, SSB4YVR, REEG0GEE, S5KHBIRC, O2SAT, FIO2  Lab Results   Component Value Date/Time    SPECIAL NOT REPORTED 09/16/2021 02:51 PM     Lab Results   Component Value Date/Time    CULTURE NO GROWTH 3 DAYS 09/16/2021 02:51 PM       Radiology:  NM LUNG SCAN PERFUSION ONLY    Result Date: 9/16/2021  Low Probability for Pulmonary Embolus. XR CHEST PORTABLE    Result Date: 9/19/2021  Increased patchy bibasilar airspace disease, most likely related to pneumonia. XR CHEST PORTABLE    Result Date: 9/16/2021  New, nonspecific pulmonary infiltrates, presumably atypical/viral pneumonia (insert COVID-19 pneumonia).      XR CHEST PORTABLE    Result Date: 9/13/2021  Satisfactorily positioned support line, no pneumothorax No acute cardiopulmonary findings       Physical Examination:        General appearance:  alert, cooperative and no distress  Mental Status:  oriented to person, place and time and normal affect  Lungs:  clear to auscultation bilaterally, normal effort, diminished throughout  Heart:  regular rate and rhythm, no murmur  Abdomen:  soft, nontender, nondistended, normal bowel sounds, no masses, hepatomegaly, splenomegaly  Extremities:  no edema, redness, tenderness in the calves  Skin:  no gross lesions, rashes, induration    Assessment:        Hospital Problems         Last Modified POA    * (Principal) ESRD needing dialysis ([address] 9/16/2021 Yes    Type 2 diabetes mellitus with chronic kidney disease on chronic dialysis, with long-term current use of insulin ([address] 9/16/2021 Yes    Gastroesophageal reflux disease 9/16/2021 Yes    Essential hypertension 9/16/2021 Yes    Pneumonia due to COVID-19 virus 9/16/2021 Yes    Possible line sepsis associated with dialysis catheter ([address] 9/16/2021 Yes    Acute metabolic encephalopathy 2/65/5610 Yes    Hypoxia 9/16/2021 Yes    Thrombocytopenia ([address] 9/17/2021 Yes          Plan:        1. Dialysis on TTS schedule  2. Monitor off antibiotics, vancomycin discontinued after yesterday's dose, line sepsis ruled out  3. Oxygen as needed, currently sats at 98% on 2 L, wean as able  4. Continue Decadron per Covid protocol  5. Monitor and control blood pressure, no change in current medications  6. Insulins as ordered  7. GI and DVT prophylaxis  8. Discharge planning to skilled facility when bed available.     Rogerio Robles DO  9/19/2021  12:51 PM

## 2021-09-19 NOTE — FLOWSHEET NOTE
Due to isolation,  speaks to patient by phone. States no major needs or prayers. Follow up as needed.      09/19/21 1621   Encounter Summary   Services provided to: Patient   Referral/Consult From: Rounding   Continue Visiting   (9-19-21, spoke via phone)   Complexity of Encounter Low   Length of Encounter 15 minutes   Spiritual Assessment Completed Yes   Routine   Type Initial   Assessment Passive   Intervention Explored feelings, thoughts, concerns   Outcome Refused/declined

## 2021-09-19 NOTE — PLAN OF CARE
Problem: Gas Exchange - Impaired  Goal: Absence of hypoxia  9/19/2021 1138 by Federico Maynard RN  Outcome: Ongoing  Note: Pt at 97% on 2L via NC   Unlabored breathing

## 2021-09-19 NOTE — PROGRESS NOTES
Subjective: patient evaluated . Pt received dialysis yesterday . No Access problems reported . No new issues overnite. Stable hemodynamics. He is currently denying any shortness of breath chest pain or nausea. Has not had any documented fever. Blood pressure little on the low side in the 699 systolic range but patient is without symptoms. Antibiotics (vancomycin) stopped yesterday, blood cultures were negative. Objective:  CURRENT TEMPERATURE:  Temp: 97.9 °F (36.6 °C)  MAXIMUM TEMPERATURE OVER 24HRS:  Temp (24hrs), Av.8 °F (36.6 °C), Min:97.5 °F (36.4 °C), Max:98.1 °F (36.7 °C)    CURRENT RESPIRATORY RATE:  Resp: 16  CURRENT PULSE:  Pulse: 99  CURRENT BLOOD PRESSURE:  BP: 102/68  24HR BLOOD PRESSURE RANGE:  Systolic (57VUW), KPL:331 , Min:102 , GKW:501   ; Diastolic (37VGZ), SQV:17, Min:62, Max:83    24HR INTAKE/OUTPUT:  No intake or output data in the 24 hours ending 21 1413  Patient Vitals for the past 96 hrs (Last 3 readings):   Weight   21 0540 202 lb 6 oz (91.8 kg)   21 1231 197 lb 12 oz (89.7 kg)   21 0848 202 lb 2.6 oz (91.7 kg)         Physical Exam:  General appearance:Awake, alert, in no acute distress  Skin: warm and dry, no rash or erythema  Eyes: conjunctivae normal and sclera anicteric  ENT:no thrush no pharyngeal congestion   Neck:  No JVD, No Thyromegaly  Pulmonary: clear to auscultation and no wheezing or rhonchi   Cardiovascular: normal S1 and S2. NO rubs and NO murmur.  No S3 OR S4   Abdomen: soft nontender, bowel sounds present, no organomegaly,  no ascites   Extremities: no cyanosis, clubbing or edema     Access:  previous permacath    Current Medications:    aspirin chewable tablet 81 mg, Daily  atorvastatin (LIPITOR) tablet 20 mg, Daily  gabapentin (NEURONTIN) capsule 100 mg, TID  hydrALAZINE (APRESOLINE) tablet 25 mg, 3 times per day  insulin glargine (LANTUS) injection vial 10 Units, Nightly  isosorbide mononitrate (IMDUR) extended release tablet 30 mg,

## 2021-09-19 NOTE — PLAN OF CARE
Problem: Airway Clearance - Ineffective  Goal: Achieve or maintain patent airway  9/19/2021 0002 by Magalie Casey RN  Outcome: Ongoing     Problem: Gas Exchange - Impaired  Goal: Absence of hypoxia  9/19/2021 0002 by Magalie Casey RN  Outcome: Ongoing     Problem: Breathing Pattern - Ineffective  Goal: Ability to achieve and maintain a regular respiratory rate  9/19/2021 0002 by Magalie Casey RN  Outcome: Ongoing     Problem: Isolation Precautions - Risk of Spread of Infection  Goal: Prevent transmission of infection  Outcome: Ongoing

## 2021-09-20 VITALS
SYSTOLIC BLOOD PRESSURE: 137 MMHG | BODY MASS INDEX: 30.46 KG/M2 | RESPIRATION RATE: 16 BRPM | HEIGHT: 70 IN | WEIGHT: 212.8 LBS | OXYGEN SATURATION: 97 % | TEMPERATURE: 97.7 F | HEART RATE: 106 BPM | DIASTOLIC BLOOD PRESSURE: 79 MMHG

## 2021-09-20 LAB
ANION GAP SERPL CALCULATED.3IONS-SCNC: 16 MMOL/L (ref 9–17)
BUN BLDV-MCNC: 57 MG/DL (ref 8–23)
BUN/CREAT BLD: 7 (ref 9–20)
CALCIUM SERPL-MCNC: 7.4 MG/DL (ref 8.6–10.4)
CHLORIDE BLD-SCNC: 96 MMOL/L (ref 98–107)
CO2: 21 MMOL/L (ref 20–31)
CREAT SERPL-MCNC: 8.02 MG/DL (ref 0.7–1.2)
GFR AFRICAN AMERICAN: 8 ML/MIN
GFR NON-AFRICAN AMERICAN: 7 ML/MIN
GFR SERPL CREATININE-BSD FRML MDRD: ABNORMAL ML/MIN/{1.73_M2}
GFR SERPL CREATININE-BSD FRML MDRD: ABNORMAL ML/MIN/{1.73_M2}
GLUCOSE BLD-MCNC: 223 MG/DL (ref 75–110)
GLUCOSE BLD-MCNC: 307 MG/DL (ref 75–110)
GLUCOSE BLD-MCNC: 317 MG/DL (ref 75–110)
GLUCOSE BLD-MCNC: 353 MG/DL (ref 70–99)
HCT VFR BLD CALC: 27.7 % (ref 40.7–50.3)
HEMOGLOBIN: 9.2 G/DL (ref 13–17)
MCH RBC QN AUTO: 30.4 PG (ref 25.2–33.5)
MCHC RBC AUTO-ENTMCNC: 33.2 G/DL (ref 28.4–34.8)
MCV RBC AUTO: 91.4 FL (ref 82.6–102.9)
NRBC AUTOMATED: 0 PER 100 WBC
PDW BLD-RTO: 13.5 % (ref 11.8–14.4)
PLATELET # BLD: 195 K/UL (ref 138–453)
PMV BLD AUTO: 11 FL (ref 8.1–13.5)
POTASSIUM SERPL-SCNC: 4.2 MMOL/L (ref 3.7–5.3)
RBC # BLD: 3.03 M/UL (ref 4.21–5.77)
SODIUM BLD-SCNC: 133 MMOL/L (ref 135–144)
WBC # BLD: 4.9 K/UL (ref 3.5–11.3)

## 2021-09-20 PROCEDURE — 2580000003 HC RX 258: Performed by: NURSE PRACTITIONER

## 2021-09-20 PROCEDURE — 6370000000 HC RX 637 (ALT 250 FOR IP): Performed by: EMERGENCY MEDICINE

## 2021-09-20 PROCEDURE — 96372 THER/PROPH/DIAG INJ SC/IM: CPT

## 2021-09-20 PROCEDURE — 82947 ASSAY GLUCOSE BLOOD QUANT: CPT

## 2021-09-20 PROCEDURE — 6370000000 HC RX 637 (ALT 250 FOR IP): Performed by: NURSE PRACTITIONER

## 2021-09-20 PROCEDURE — G0378 HOSPITAL OBSERVATION PER HR: HCPCS

## 2021-09-20 PROCEDURE — 80048 BASIC METABOLIC PNL TOTAL CA: CPT

## 2021-09-20 PROCEDURE — 97535 SELF CARE MNGMENT TRAINING: CPT

## 2021-09-20 PROCEDURE — 6360000002 HC RX W HCPCS: Performed by: INTERNAL MEDICINE

## 2021-09-20 PROCEDURE — 6370000000 HC RX 637 (ALT 250 FOR IP): Performed by: INTERNAL MEDICINE

## 2021-09-20 PROCEDURE — 99239 HOSP IP/OBS DSCHRG MGMT >30: CPT | Performed by: INTERNAL MEDICINE

## 2021-09-20 PROCEDURE — 6360000002 HC RX W HCPCS: Performed by: NURSE PRACTITIONER

## 2021-09-20 PROCEDURE — 85027 COMPLETE CBC AUTOMATED: CPT

## 2021-09-20 PROCEDURE — 97112 NEUROMUSCULAR REEDUCATION: CPT

## 2021-09-20 PROCEDURE — 36415 COLL VENOUS BLD VENIPUNCTURE: CPT

## 2021-09-20 RX ORDER — DEXAMETHASONE 6 MG/1
6 TABLET ORAL DAILY
Qty: 6 TABLET | Refills: 0 | DISCHARGE
Start: 2021-09-21 | End: 2021-09-27

## 2021-09-20 RX ORDER — LISINOPRIL 10 MG/1
TABLET ORAL
Qty: 30 TABLET | Refills: 0 | OUTPATIENT
Start: 2021-09-20

## 2021-09-20 RX ORDER — MIDODRINE HYDROCHLORIDE 5 MG/1
5 TABLET ORAL
Qty: 90 TABLET | Refills: 3 | DISCHARGE
Start: 2021-09-20 | End: 2021-11-18 | Stop reason: SDUPTHER

## 2021-09-20 RX ORDER — GUAIFENESIN 100 MG/5ML
200 SOLUTION ORAL EVERY 4 HOURS PRN
Qty: 1200 ML | Refills: 0 | DISCHARGE
Start: 2021-09-20 | End: 2022-03-24

## 2021-09-20 RX ORDER — HEPARIN SODIUM 5000 [USP'U]/ML
5000 INJECTION, SOLUTION INTRAVENOUS; SUBCUTANEOUS EVERY 8 HOURS SCHEDULED
DISCHARGE
Start: 2021-09-20 | End: 2021-10-04

## 2021-09-20 RX ADMIN — INSULIN LISPRO 4 UNITS: 100 INJECTION, SOLUTION INTRAVENOUS; SUBCUTANEOUS at 13:31

## 2021-09-20 RX ADMIN — GABAPENTIN 100 MG: 100 CAPSULE ORAL at 13:34

## 2021-09-20 RX ADMIN — HEPARIN SODIUM 5000 UNITS: 5000 INJECTION INTRAVENOUS; SUBCUTANEOUS at 05:57

## 2021-09-20 RX ADMIN — INSULIN LISPRO 8 UNITS: 100 INJECTION, SOLUTION INTRAVENOUS; SUBCUTANEOUS at 09:12

## 2021-09-20 RX ADMIN — HYDRALAZINE HYDROCHLORIDE 12.5 MG: 25 TABLET, FILM COATED ORAL at 06:04

## 2021-09-20 RX ADMIN — SODIUM CHLORIDE, PRESERVATIVE FREE 10 ML: 5 INJECTION INTRAVENOUS at 09:08

## 2021-09-20 RX ADMIN — ASPIRIN 81 MG: 81 TABLET, CHEWABLE ORAL at 09:07

## 2021-09-20 RX ADMIN — INSULIN LISPRO 8 UNITS: 100 INJECTION, SOLUTION INTRAVENOUS; SUBCUTANEOUS at 17:44

## 2021-09-20 RX ADMIN — GABAPENTIN 100 MG: 100 CAPSULE ORAL at 09:07

## 2021-09-20 RX ADMIN — ISOSORBIDE MONONITRATE 30 MG: 30 TABLET, EXTENDED RELEASE ORAL at 09:07

## 2021-09-20 RX ADMIN — GUAIFENESIN 200 MG: 200 SOLUTION ORAL at 09:27

## 2021-09-20 RX ADMIN — DEXAMETHASONE 6 MG: 4 TABLET ORAL at 09:06

## 2021-09-20 RX ADMIN — METOPROLOL TARTRATE 50 MG: 50 TABLET, FILM COATED ORAL at 09:07

## 2021-09-20 RX ADMIN — HEPARIN SODIUM 5000 UNITS: 5000 INJECTION INTRAVENOUS; SUBCUTANEOUS at 13:32

## 2021-09-20 RX ADMIN — GUAIFENESIN 200 MG: 200 SOLUTION ORAL at 14:01

## 2021-09-20 RX ADMIN — HYDRALAZINE HYDROCHLORIDE 12.5 MG: 25 TABLET, FILM COATED ORAL at 13:34

## 2021-09-20 RX ADMIN — PANTOPRAZOLE SODIUM 40 MG: 40 TABLET, DELAYED RELEASE ORAL at 17:38

## 2021-09-20 RX ADMIN — PANTOPRAZOLE SODIUM 40 MG: 40 TABLET, DELAYED RELEASE ORAL at 06:04

## 2021-09-20 NOTE — PLAN OF CARE
Problem: Gas Exchange - Impaired  Goal: Absence of hypoxia  Outcome: Ongoing  Goal: Promote optimal lung function  Outcome: Ongoing     Problem: Breathing Pattern - Ineffective  Goal: Ability to achieve and maintain a regular respiratory rate  Outcome: Ongoing

## 2021-09-20 NOTE — PROGRESS NOTES
Writer attempted to call report Salud at [phone]  No one answered the phone. A message with my name and number was left requesting a return phone call for report.

## 2021-09-20 NOTE — FLOWSHEET NOTE
Pt in [facility] isolation. Writer unable to observe pt. Writer left a prayer card clipped to the outside of door and requested nurse to take it in with them when they round on the pt next. No needs presented from nurse for pt at this time. Writer says a prayer from the door. Chaplains will remain available to offer spiritual and emotional support as needed.        09/20/21 1643   Encounter Summary   Services provided to: Patient   Referral/Consult From: Rounding   Continue Visiting   (9/20/21 COVID)   Complexity of Encounter Low   Routine   Type Initial   Intervention Provided reading materials/devotional materials     Electronically signed by Sharl Schaumann, on 9/20/2021 at 4:43 PM.  Volodymyr  [phone]

## 2021-09-20 NOTE — PROGRESS NOTES
Physical Therapy  Facility/Department: [facility] PROGRESSIVE CARE  Daily Treatment Note  NAME: Tres Bolden  : 1960  MRN: [id number]    Date of Service: 2021    Discharge Recommendations:    Pt currently functioning below baseline. Would suggest additional therapy at time of discharge to maximize long term outcomes and prevent re-admission. Please refer to AM-PAC score for current level of function. PT Equipment Recommendations  Equipment Needed: Yes    Assessment   Body structures, Functions, Activity limitations: Decreased functional mobility ; Decreased strength;Decreased safe awareness;Decreased balance;Decreased endurance  Assessment: Patient is able to progress toward STG and returning to PLOF. Tinetti Balance Assessement performed and Single leg stance assessment with patient impulsivity, he continues to be a HIGH fall risk and HIGH readmission risk. Patient would benefit from continud skilled PT servicse to address balance, strength, coordination and moblity deficts. Prognosis: Good  PT Education: Goals;PT Role;Plan of Care;General Safety  Patient Education: Pt educated on purpose of acute PT eval and importance of continued mobility throughout admission w/ poor return demo. Pt would benefit from continued reinforcement of education. Activity Tolerance  Activity Tolerance: Patient limited by fatigue  Activity Tolerance: Impulsive     Patient Diagnosis(es): The primary encounter diagnosis was COVID-19. Diagnoses of Pneumonia due to infectious organism, unspecified laterality, unspecified part of lung, Septicemia ([facility] [address] ESRD (end stage renal disease) ([facility] [address] and NSTEMI (non-ST elevated myocardial infarction) [facility]) were also pertinent to this visit.      has a past medical history of ADHD (attention deficit hyperactivity disorder), COVID-19, COVID-19, Depression, DM2 (diabetes mellitus, type 2) ([facility] [address] Erectile dysfunction, Gastroesophageal reflux disease, Hyperlipidemia, Hypertension, Kidney stone, Low back pain of thoracolumbar region with sciatica, and Neuropathy. has a past surgical history that includes Tonsillectomy; Cystoscopy (07/20/2018); pr cystoscopy,insert ureteral stent (Bilateral, 7/20/2018); Cystocopy (08/03/2018); pr cysto/uretero/pyeloscopy, calculus tx (Bilateral, 8/3/2018); Upper gastrointestinal endoscopy (N/A, 12/7/2020); Colonoscopy (N/A, 12/7/2020); and IR TUNNELED CVC PLACE WO SQ PORT/PUMP > 5 YEARS (6/28/2021). Restrictions  Restrictions/Precautions  Restrictions/Precautions: Fall Risk, General Precautions  Required Braces or Orthoses?: No  Position Activity Restriction  Other position/activity restrictions: drop plus iso due to COVID, RUE IV, alarms, up with assist, port R side for HD(M, W, F) per RN  Subjective   General  Chart Reviewed: Yes  Response To Previous Treatment: Patient with no complaints from previous session. Family / Caregiver Present: No  Subjective  Subjective: Patient reported he was getting up in the room with staff present. Just recently showered and shaved. Currently on room air resting in bed, SpO2 99%. General Comment  Comments: RN reported patient medically stable and pt agreeable to therapy with encouragement. Orientation  Orientation  Overall Orientation Status: Impaired  Orientation Level: Oriented to person;Oriented to place; Disoriented to time;Disoriented to situation  Cognition   Cognition  Overall Cognitive Status: Exceptions  Arousal/Alertness: Delayed responses to stimuli  Following Commands: Follows one step commands with repetition; Follows one step commands with increased time  Attention Span: Attends with cues to redirect; Difficulty attending to directions; Difficulty dividing attention  Memory: Decreased short term memory;Decreased recall of recent events  Safety Judgement: Decreased awareness of need for assistance;Decreased awareness of need for safety  Problem Solving: Assistance required to implement solutions;Assistance required to correct errors made;Assistance required to generate solutions;Assistance required to identify errors made;Decreased awareness of errors  Insights: Not aware of deficits  Initiation: Requires cues for all  Sequencing: Requires cues for all  Objective   Bed mobility  Supine to Sit: Stand by assistance  Sit to Supine: Stand by assistance  Scooting: Stand by assistance  Comment: HOB raised and good use of bed rails. VCs to slow down and self pace as patient appears impulsive. Transfers  Sit to Stand: Contact guard assistance  Stand to sit: Contact guard assistance  Bed to Chair: Contact guard assistance  Stand Pivot Transfers: Contact guard assistance  Lateral Transfers: Contact guard assistance  Comment: Patient impulsive required VCs to slow down, Slightly unsteady during turns and very fast moving during transfers and ambulation. Ambulation  Ambulation?: Yes  More Ambulation?: No  Ambulation 1  Surface: level tile  Device: Rolling Walker  Other Apparatus:  (Room air, SpO2 98%)  Quality of Gait: Slightly unsteady, impulsive, minor LOB, narrow SHEILA, patient reported some dizziness but SpO2 WNL. Gait Deviations: Increased SHEILA; Slow Paola; Shuffles;Decreased step length;Decreased step height;Decreased head and trunk rotation  Distance: 15' x 2  Comments: Patient not agreeable for further ambulation, slightly annoyed that writer wants to assess gait and activity tolerance, Reports \" Im doing fine\".   Stairs/Curb  Stairs?: No     Balance  Posture: Fair  Sitting - Static: Good  Sitting - Dynamic: Good  Standing - Static: Fair;+  Standing - Dynamic: Fair;+  Single Leg Stance R Le  Single Leg Stance L Le  Comments: No device for standing balance, Unable to perform single Leg stance  Strength RLE  Strength RLE: WFL  Comment: Grossly 4-/5  Strength LLE  Strength LLE: WFL  Comment: Grossly 4-/5  OutComes Score  Balance Score: 4 (21 1313)  Gait Score: 5 (21 1313)        Tinetti Total Score: 9 (21 1313)    AM-PAC Score  AM-PAC Inpatient Mobility Raw Score : 17 (09/20/21 1314)  AM-PAC Inpatient T-Scale Score : 42.13 (09/20/21 1314)  Mobility Inpatient CMS 0-100% Score: 50.57 (09/20/21 1314)  Mobility Inpatient CMS G-Code Modifier : CK (09/20/21 1314)          Goals  Short term goals  Time Frame for Short term goals: 14 visits  Short term goal 1: Pt to demonstrate bed mobility mod I  Short term goal 2: Pt to peform STS transfers w/ RW Belle  Short term goal 3: Pt to ambulate at least 150ft w/ RW Belle  Short term goal 4: Pt to ascend/descend 10 stairs w/ handrails Jessenia  Short term goal 5: Pt to actively participate in at least 30 minutes of physical therapy for ther ex, ther act, balance, gait, transfers, and endurance training  Patient Goals   Patient goals : To feel better, to go home    Plan    Plan  Times per week: 1-2x/day, 5-6x/week  Current Treatment Recommendations: Strengthening, ROM, Balance Training, Functional Mobility Training, Stair training, Gait Training, Transfer Training, Endurance Training, Neuromuscular Re-education, Home Exercise Program, Safety Education & Training, Equipment Evaluation, Education, & procurement, Patient/Caregiver Education & Training  Safety Devices  Type of devices: Bed alarm in place, Call light within reach, Gait belt, Nurse notified, Left in bed  Restraints  Initially in place: No     Therapy Time   Individual Concurrent Group Co-treatment   Time In 1030         Time Out 1057         Minutes 27             Time split with OT staff.      Chetna Cortes, PTA

## 2021-09-20 NOTE — CARE COORDINATION
[facility] at [facility] will admit today. [facility] to transport at 630. HENS completed. Orders faxed. Nurse to call report to [phone]. Patient is agreeable with dc plans. Natasha Conway was notified and faxed them [address].  Arminda Rape

## 2021-09-20 NOTE — PROGRESS NOTES
Subjective: patient evaluated . Pt received dialysis Saturday. No Access problems reported . No new issues overnite. Stable hemodynamics. He typically was dialyzing  however given his Covid positive status, he will be going to  shift at [facility] for now. Services note reviewed. Still is on nasal O2. Blood pressure stable    Objective:  CURRENT TEMPERATURE:  Temp: 97.5 °F (36.4 °C)  MAXIMUM TEMPERATURE OVER 24HRS:  Temp (24hrs), Av.6 °F (36.4 °C), Min:97.3 °F (36.3 °C), Max:98.1 °F (36.7 °C)    CURRENT RESPIRATORY RATE:  Resp: 16  CURRENT PULSE:  Pulse: 73  CURRENT BLOOD PRESSURE:  BP: (!) 145/83  24HR BLOOD PRESSURE RANGE:  Systolic (13HRG), AMBER:582 , Min:102 , IAZ:846   ; Diastolic (96NBX), TKK:72, Min:68, Max:85    24HR INTAKE/OUTPUT:  No intake or output data in the 24 hours ending 21 1055  Patient Vitals for the past 96 hrs (Last 3 readings):   Weight   21 0536 212 lb 12.8 oz (96.5 kg)   21 0540 202 lb 6 oz (91.8 kg)   21 1231 197 lb 12 oz (89.7 kg)         Physical Exam:  General appearance:Awake, alert, in no acute distress  Skin: warm and dry, no rash or erythema  Eyes: conjunctivae normal and sclera anicteric  ENT:no thrush no pharyngeal congestion   Neck:  No JVD, No Thyromegaly  Pulmonary: clear to auscultation and no wheezing or rhonchi   Cardiovascular: normal S1 and S2. NO rubs and NO murmur.  No S3 OR S4   Abdomen: soft nontender, bowel sounds present, no organomegaly,  no ascites   Extremities: no cyanosis, clubbing or edema     Access:  previous permacath    Current Medications:    hydrALAZINE (APRESOLINE) tablet 12.5 mg, 3 times per day  aspirin chewable tablet 81 mg, Daily  atorvastatin (LIPITOR) tablet 20 mg, Daily  gabapentin (NEURONTIN) capsule 100 mg, TID  insulin glargine (LANTUS) injection vial 10 Units, Nightly  isosorbide mononitrate (IMDUR) extended release tablet 30 mg, Daily  metoprolol tartrate (LOPRESSOR) tablet 50 mg, BID  pantoprazole (PROTONIX) tablet 40 mg, BID AC  sodium chloride flush 0.9 % injection 5-40 mL, 2 times per day  sodium chloride flush 0.9 % injection 10 mL, PRN  0.9 % sodium chloride infusion, PRN  heparin (porcine) injection 5,000 Units, 3 times per day  ondansetron (ZOFRAN-ODT) disintegrating tablet 4 mg, Q8H PRN   Or  ondansetron (ZOFRAN) injection 4 mg, Q6H PRN  polyethylene glycol (GLYCOLAX) packet 17 g, Daily PRN  acetaminophen (TYLENOL) tablet 650 mg, Q6H PRN   Or  acetaminophen (TYLENOL) suppository 650 mg, Q6H PRN  insulin lispro (HUMALOG) injection vial 0-12 Units, TID WC  insulin lispro (HUMALOG) injection vial 0-6 Units, Nightly  glucose (GLUTOSE) 40 % oral gel 15 g, PRN  dextrose 50 % IV solution, PRN  glucagon (rDNA) injection 1 mg, PRN  dextrose 5 % solution, PRN  dexamethasone (DECADRON) tablet 6 mg, Daily  guaiFENesin (ROBITUSSIN) 100 MG/5ML oral solution 200 mg, Q4H PRN  midodrine (PROAMATINE) tablet 5 mg, TID       Labs:   CBC:   Recent Labs     09/18/21  0533 09/19/21  0524 09/20/21  0542   WBC 2.7* 4.1 4.9   RBC 3.18* 3.09* 3.03*   HGB 9.7* 9.4* 9.2*   HCT 29.0* 28.4* 27.7*    170 195   MPV 11.5 11.3 11.0      BMP:   Recent Labs     09/18/21  0533 09/19/21  0524 09/20/21  0542   * 135 133*   K 4.4 3.8 4.2   CL 91* 96* 96*   CO2 19* 22 21   BUN 76* 37* 57*   CREATININE 9.69* 6.29* 8.02*   GLUCOSE 257* 300* 353*   CALCIUM 7.4* 7.8* 7.4*        Phosphorus:    Recent Labs     09/18/21  0533   PHOS 7.2*     Magnesium:   Recent Labs     09/18/21  0533   MG 2.0     Albumin: No results for input(s): LABALBU in the last 72 hours. Radiology:  Reviewed as available. Assessment:  1 ESRD:dialysis Vzxopzs-Ntggyrnu-Kvrldbft. 2.HTN: Stable blood pressures  3 DM:  4 EDEMA : Does not have any edema  5. COVID-19 pneumonia:    Plan:  1. Continue current BP meds. 2.  Okay to transfer to home or ECF once outpatient arrangements secured by .   3. Follow up labs ordered. 4.  Dialyzing Tuesday Thursday Saturday at [facility] given his Covid positive status. Please do not hesitate to call with questions.     Electronically signed by Jose Melara MD on 9/20/2021 at 10:55 AM

## 2021-09-20 NOTE — PROGRESS NOTES
Occupational Therapy  Facility/Department: [facility] PROGRESSIVE CARE  Daily Treatment Note  NAME: Brandon Mckeon  : 1960  MRN: [id number]    Date of Service: 2021    Discharge Recommendations:  Patient would benefit from continued therapy after discharge   d/t recent hospitalization and medical conditions, pt would benefit from additional therapy at time of discharge to ensure safety. Please refer to AM-PAC score for current functional status. NAOMIE Osborne reports patient is medically stable for therapy treatment this date. Chart reviewed prior to treatment and patient is agreeable for therapy. All lines intact and patient positioned comfortably at end of treatment. All patient needs addressed prior to ending therapy session. Assessment   Performance deficits / Impairments: Decreased functional mobility ; Decreased safe awareness;Decreased balance;Decreased coordination;Decreased ADL status; Decreased cognition;Decreased vision/visual deficit; Decreased posture;Decreased endurance;Decreased high-level IADLs;Decreased strength;Decreased sensation;Decreased fine motor control  Assessment: Pt is progressing, but is impulsive and unsafe and requires staff assist for functional mob and self-care tasks. Skilled OT is indicated to increase I and safety with ADL and functional performance to return home with assist as needed. Prognosis: Good  OT Education: OT Role;Energy Conservation;Plan of Care;Transfer Training  Patient Education: safety in function, call light use/fall prevention, benefits of being up OOB as able  REQUIRES OT FOLLOW UP: Yes  Activity Tolerance  Activity Tolerance: Patient Tolerated treatment well;Patient limited by fatigue  Activity Tolerance: max encouragement needed  Safety Devices  Safety Devices in place: Yes  Type of devices: Bed alarm in place;Call light within reach; Left in bed;Patient at risk for falls;Gait belt;Nurse notified         Patient Diagnosis(es): The primary encounter diagnosis was COVID-19. Diagnoses of Pneumonia due to infectious organism, unspecified laterality, unspecified part of lung, Septicemia ([facility] [address] ESRD (end stage renal disease) ([facility] [address] and NSTEMI (non-ST elevated myocardial infarction) [facility]) were also pertinent to this visit. has a past medical history of ADHD (attention deficit hyperactivity disorder), COVID-19, COVID-19, Depression, DM2 (diabetes mellitus, type 2) ([facility] [address] Erectile dysfunction, Gastroesophageal reflux disease, Hyperlipidemia, Hypertension, Kidney stone, Low back pain of thoracolumbar region with sciatica, and Neuropathy. has a past surgical history that includes Tonsillectomy; Cystoscopy (07/20/2018); pr cystoscopy,insert ureteral stent (Bilateral, 7/20/2018); Cystocopy (08/03/2018); pr cysto/uretero/pyeloscopy, calculus tx (Bilateral, 8/3/2018); Upper gastrointestinal endoscopy (N/A, 12/7/2020); Colonoscopy (N/A, 12/7/2020); and IR TUNNELED CVC PLACE WO SQ PORT/PUMP > 5 YEARS (6/28/2021). Restrictions  Restrictions/Precautions  Restrictions/Precautions: Fall Risk, General Precautions  Required Braces or Orthoses?: No  Position Activity Restriction  Other position/activity restrictions: drop plus iso due to COVID, RUE IV, alarms, up with assist, port R side for HD(M, W, F) per RN  Subjective   General  Chart Reviewed: Yes  Patient assessed for rehabilitation services?: Yes  Family / Caregiver Present: No           Objective    ADL  LE Dressing: Supervision (Pt donned R sock)  Additional Comments: Pt reported he completed all self-care this AM without assistance. Pt declined to demo/complete self-care with OT this date.         Balance  Sitting Balance: Stand by assistance  Standing Balance: Contact guard assistance  Standing Balance  Time: stand hugo ~1-2 min  Activity: standing EOB, functional mob in room  Comment: Pt required MAX encouragment to participate  Functional Mobility  Functional - Mobility Device: No device  Activity: Score        -[facility] Inpatient Daily Activity Raw Score: 19 (09/20/21 1218)  AM-PAC Inpatient ADL T-Scale Score : 40.22 (09/20/21 1218)  ADL Inpatient CMS 0-100% Score: 42.8 (09/20/21 1218)  ADL Inpatient CMS G-Code Modifier : CK (09/20/21 1218)    Goals  Short term goals  Time Frame for Short term goals: by discharge, pt to demo  Short term goal 1: bed mob tasks with use of rail as needed to I/MI (updated)  Short term goal 2: increase BUE strength by a 1/2 grade to assist with self care and complete BUE HEP with use of handouts and SUP. Short term goal 3: UB ADL to set up and LB ADL to SUP and use of AD/AE (Updated)  Short term goal 4: toileting tasks with use of AD/BSC and grab bar as needed to SUP (updated)  Short term goal 5: ADL transfers and functional mob with AD as needed to SUP (updated)  Long term goals  Long term goal 1: Pt to stand with SUP and ADl hugo > 8 mins as able to reduce fall with ADL's. (updated)  Long term goal 2: Pt/caregivers to be I with pressure relief, COVID edu, EC/WS and fall prevention tech, BUE HEP, DME/AE and AD recommendations with use of handouts as needed. Patient Goals   Patient goals : Pt stated he just wants to sleep! Therapy Time   Individual Concurrent Group Co-treatment   Time In 1031         Time Out 1056         Minutes 25                 Upon writer exit, call light within reach, pt retired to bed. All lines intact and patient positioned comfortably. All patient needs addressed prior to ending therapy session. Chart reviewed prior to treatment and patient is agreeable for therapy. RN reports patient is medically stable for therapy treatment this date.     Padmini Rivera OTR/L

## 2021-09-20 NOTE — PROGRESS NOTES
[facility]  Office: [address], DO, Corrie Press, DO, Shahida Cushing, DO, Alissa Quintanilla Blood, DO, Jones Martinez MD, Yana Mayo MD, Francine Hannah MD, Janet Garcia MD, Kralos Jacobs MD, Ana Ferrari MD, Camila Conway MD, Guy Swan, DO, Maria Luisa Lim, DO, Héctor Azevedo MD,  Gena Siemens, DO, Tree Branch MD, Jay Lopez MD, Thais Cast MD, Diane Ashby MD, , Faahd Mendoza MD, Enrique Shea MD, Tessa Young MD, Jabier Wiseman, [facility], [facility], CNP, Laurie Stoddard, CNP, Itz Rucker, CNS, Dayanara Shah, [facility], Marcelle Johnson, CNP, May Christopher, CNP, Wily Cotto, CNP, Ana Da Silva, CNP, Roylene Goodell, PA-C, Manfred Gong, [facility], Oliverio Barboza, CNP, Christal Glover, CNP, Maegan Faulkner, CNP, Echo Ashby, CNP, Carolyn Hanna, CNP, Natasha Tadeo, CNP, Jess Powell, [facility], Lisa Ballard, [facility]    Progress Note    9/20/2021    10:17 AM    Name:   Jadiel Waller  MRN:     [id number]     [place]:      [de-identified]   Room:   [facility] Day:  4  Admit Date:  9/16/2021  2:17 PM    PCP:   Javid Ford MD  Code Status:  Full Code    Subjective:     C/C:   Chief Complaint   Patient presents with    Fever     went to dialysis and was refused services because covid positive    Shortness of Breath     Interval History Status: improved. Patient is resting comfortably, denies any shortness of breath, nausea vomiting, fevers or chills. He is up to the restroom at the time of evaluation with his oxygen off and satting 99 to 100% on room air. Brief History:      This is a 57-year-old male that is dialysis dependent that presents with fever and shortness of breath.  He was recently diagnosed with COVID-19 and was unable to attend his dialysis appointment Wednesday due to new diagnosis.  Subsequently had increasing shortness of breath and recurrent fever and chills and presented to the emergency room for evaluation. Charleen Golden was found to have elevated BUN and findings concerning for Covid pneumonia and associated encephalopathy. Charleen Golden was admitted to the hospital for urgent dialysis which was completed last evening.  Has had improvement in his shortness of breath and improve mentation after his dialysis treatment. Review of Systems:     Constitutional:  negative for chills, fevers, sweats  Respiratory:  negative for cough, dyspnea on exertion, shortness of breath, wheezing  Cardiovascular:  negative for chest pain, chest pressure/discomfort, lower extremity edema, palpitations  Gastrointestinal:  negative for abdominal pain, constipation, diarrhea, nausea, vomiting  Neurological:  negative for dizziness, headache    Medications:      Allergies:  No Known Allergies    Current Meds:   Scheduled Meds:    hydrALAZINE  12.5 mg Oral 3 times per day    aspirin  81 mg Oral Daily    atorvastatin  20 mg Oral Daily    gabapentin  100 mg Oral TID    insulin glargine  10 Units SubCUTAneous Nightly    isosorbide mononitrate  30 mg Oral Daily    metoprolol tartrate  50 mg Oral BID    pantoprazole  40 mg Oral BID AC    sodium chloride flush  5-40 mL IntraVENous 2 times per day    heparin (porcine)  5,000 Units SubCUTAneous 3 times per day    insulin lispro  0-12 Units SubCUTAneous TID WC    insulin lispro  0-6 Units SubCUTAneous Nightly    dexamethasone  6 mg Oral Daily    midodrine  5 mg Oral TID WC     Continuous Infusions:    sodium chloride      dextrose       PRN Meds: sodium chloride flush, sodium chloride, ondansetron **OR** ondansetron, polyethylene glycol, acetaminophen **OR** acetaminophen, glucose, dextrose, glucagon (rDNA), dextrose, guaiFENesin    Data:     Past Medical History:   has a past medical history of ADHD (attention deficit hyperactivity disorder), COVID-19, COVID-19, Depression, DM2 (diabetes mellitus, type 2) ([facility] [address] Erectile dysfunction, Gastroesophageal reflux disease, Hyperlipidemia, Hypertension, Kidney stone, Low back pain of thoracolumbar region with sciatica, and Neuropathy. Social History:   reports that he quit smoking about 2 years ago. His smoking use included cigarettes. He has a 15.00 pack-year smoking history. He has never used smokeless tobacco. He reports current alcohol use of about 8.0 standard drinks of alcohol per week. He reports that he does not use drugs. Family History:   Family History   Problem Relation Age of Onset    Diabetes Mother     High Blood Pressure Father        Vitals:  BP (!) 145/83   Pulse 73   Temp 97.5 °F (36.4 °C) (Oral)   Resp 16   Ht 5' 10\" (1.778 m)   Wt 212 lb 12.8 oz (96.5 kg)   SpO2 97%   BMI 30.53 kg/m²   Temp (24hrs), Av.6 °F (36.4 °C), Min:97.3 °F (36.3 °C), Max:98.1 °F (36.7 °C)    Recent Labs     21  1120 21  1642 21  2107 21  0722   POCGLU 182* 279* 316* 307*       I/O (24Hr):   No intake or output data in the 24 hours ending 21 1017    Labs:  Hematology:  Recent Labs     21  0521  0542   WBC 2.7* 4.1 4.9   RBC 3.18* 3.09* 3.03*   HGB 9.7* 9.4* 9.2*   HCT 29.0* 28.4* 27.7*   MCV 91.2 91.9 91.4   MCH 30.5 30.4 30.4   MCHC 33.4 33.1 33.2   RDW 13.6 13.8 13.5    170 195   MPV 11.5 11.3 11.0   CRP 26.3*  --   --    INR 0.9  --   --      Chemistry:  Recent Labs     21  0521  0521  0542   * 135 133*   K 4.4 3.8 4.2   CL 91* 96* 96*   CO2 *    GLUCOSE 257* 300* 353*   BUN 76* 37* 57*   CREATININE 9.69* 6.29* 8.02*   MG 2.0  --   --    ANIONGAP 20* 17 16   LABGLOM 6* 9* 7*   GFRAA 7* 11* 8*   CALCIUM 7.4* 7.8* 7.4*   PHOS 7.2*  --   --      Recent Labs     21  2030 21  0835 21  1120 21  1642 21  2107 21  0722   POCGLU 236* 287* 182* 279* 316* 307*     ABG:No results found for: POCPH, PHART, PH, POCPCO2, SVT9XLU, PCO2, POCPO2, PO2ART, PO2, POCHCO3, BMF6AQR, HCO3, NBEA, PBEA, BEART, BE, THGBART, protocol  5. Continue present antihypertensives  6. Insulin as ordered, monitor and adjust as needed  7.  GI and DVT prophylaxis   8. discharge to skilled facility when bed available    Vinny Gonzalez DO  9/20/2021  10:17 AM

## 2021-09-20 NOTE — PROGRESS NOTES
The pt was discharged to [address]. He was transported per [facility] per wheelchair in stable condition.

## 2021-09-20 NOTE — PLAN OF CARE
Problem: Airway Clearance - Ineffective  Goal: Achieve or maintain patent airway  Outcome: Ongoing     Problem: Gas Exchange - Impaired  Goal: Absence of hypoxia  9/19/2021 2159 by Reuben Ibrahim RN  Outcome: Ongoing     Problem: Breathing Pattern - Ineffective  Goal: Ability to achieve and maintain a regular respiratory rate  Outcome: Ongoing     Problem: Isolation Precautions - Risk of Spread of Infection  Goal: Prevent transmission of infection  Outcome: Ongoing     Problem: Falls - Risk of:  Goal: Will remain free from falls  Description: Will remain free from falls  Outcome: Ongoing     Problem: Pain:  Goal: Pain level will decrease  Description: Pain level will decrease  Outcome: Ongoing

## 2021-09-20 NOTE — DISCHARGE SUMMARY
[facility]  Office: [address], DO, Ashley Astudillo, DO, Kalli Torrez, DO, José Mcclain, DO, Lilliana Stein MD, Toro Marr MD, Prerna Bryan MD, Charlotte Brown MD, Sofiya Harris MD, Arnaud Menjivar MD, Tomás Clayton MD, Margareth Martin, DO, Drake Patel, DO, Tessa Scott MD,  Flower Rodríguez, DO, Magy Caceres MD, Ivelisse Clancy MD, Giovanna Root MD, Amando Shah MD, , Jose Rose MD, Jermaine Storey MD, Mira Liu MD, Randell Lundberg [facility], [facility], CNP, [facility], CNP, Missy Lu, CNS, Jr Pastrana, CNP, Fei Watson, CNP, Kalen Menchaca, CNP, Teja Soler, CNP, James Christopher, CNP, Ryan Gutierrez PA-C, Alisson Osborne, [facility], Kamille Lipoma, CNP, Shiv Drake, CNP, Phylkalya Copeland, CNP, Indigo Constantino, CNP, Urbano Helms, CNP, Nacho Caceres, CNP, Elisabeth Gruber, CNP, Jody Natarajan, [facility]    Discharge Summary     Patient ID: Jones Bardales  :  1960   MRN: [id number]     ACCOUNT:  [de-identified]   Patient's PCP: Kayy Davis MD  Admit Date: 2021   Discharge Date: 2021     Length of Stay: 4  Code Status:  Full Code  Admitting Physician: Harsha Laird DO  Discharge Physician: Harsha Laird DO     Active Discharge Diagnoses:     Hospital Problem Lists:  Principal Problem:    ESRD needing dialysis [facility])  Active Problems:    Type 2 diabetes mellitus with chronic kidney disease on chronic dialysis, with long-term current use of insulin ([facility]    Gastroesophageal reflux disease    Essential hypertension    Pneumonia due to COVID-19 virus    Acute metabolic encephalopathy    Hypoxia    Thrombocytopenia ([facility]  Resolved Problems:    * No resolved hospital problems.  *      Admission Condition:  fair     Discharged Condition: stable    Hospital Stay:     Hospital Course:  Jones Bardales is a 64 y.o. male who was admitted for the management of  ESRD needing dialysis ([facility] [address] , presented to ER with Fever (went to dialysis and was refused services because covid positive) and Shortness of Breath    This is a 70-year-old male with end-stage renal disease is dialysis dependent that presents with fever and shortness of breath. He was recently diagnosed with COVID-19 and was unable to attend dialysis treatment on Wednesday to presentation due to Covid diagnosis, was unable to arrange dialysis treatment at [facility] [place]. Subsequently presented with increasing shortness of breath and was found to have pulmonary edema which improved with dialysis treatment. He was placed on supplemental oxygen for hypoxia at that time was able to be weaned off over several days. He was treated with Decadron for his Covid but did not require more aggressive management with Actemra etc.    As he has a tunneled catheter in place he was transiently treated for line sepsis with vancomycin pending culture data. Cultures were negative and antibiotics were discontinued. Significant therapeutic interventions: Dialysis for fluid removal, Decadron for Covid    Significant Diagnostic Studies:   Labs / Micro:  CBC:   Lab Results   Component Value Date    WBC 4.9 09/20/2021    RBC 3.03 09/20/2021    HGB 9.2 09/20/2021    HCT 27.7 09/20/2021    MCV 91.4 09/20/2021    MCH 30.4 09/20/2021    MCHC 33.2 09/20/2021    RDW 13.5 09/20/2021     09/20/2021     BMP:    Lab Results   Component Value Date    GLUCOSE 353 09/20/2021     09/20/2021    K 4.2 09/20/2021    CL 96 09/20/2021    CO2 21 09/20/2021    ANIONGAP 16 09/20/2021    BUN 57 09/20/2021    CREATININE 8.02 09/20/2021    BUNCRER 7 09/20/2021    CALCIUM 7.4 09/20/2021    LABGLOM 7 09/20/2021    GFRAA 8 09/20/2021    GFR      09/20/2021    GFR NOT REPORTED 09/20/2021        Radiology:  NM LUNG SCAN PERFUSION ONLY    Result Date: 9/16/2021  Low Probability for Pulmonary Embolus.      XR CHEST PORTABLE    Result Date: 9/19/2021  Increased patchy bibasilar airspace disease, most likely related to pneumonia. XR CHEST PORTABLE    Result Date: 9/16/2021  New, nonspecific pulmonary infiltrates, presumably atypical/viral pneumonia (insert COVID-19 pneumonia). XR CHEST PORTABLE    Result Date: 9/13/2021  Satisfactorily positioned support line, no pneumothorax No acute cardiopulmonary findings       Consultations:    Consults:     Final Specialist Recommendations/Findings:   IP CONSULT TO NEPHROLOGY  IP CONSULT TO INTERNAL MEDICINE  IP CONSULT TO NEPHROLOGY      The patient was seen and examined on day of discharge and this discharge summary is in conjunction with any daily progress note from day of discharge. Discharge plan:     Disposition: Skilled facility    Physician Follow Up:   PCP 1 week  No follow-up provider specified. Requiring Further Evaluation/Follow Up POST HOSPITALIZATION/Incidental Findings: Follow-up labs at discretion of PCP    Diet: diabetic diet and renal diet    Activity: As tolerated    Instructions to Patient: Take medication as prescribed.   Isolate per COVID-19 protocol    Discharge Medications:      Medication List      START taking these medications    dexamethasone 6 MG tablet  Commonly known as: DECADRON  Take 1 tablet by mouth daily for 6 days  Start taking on: September 21, 2021     guaiFENesin 100 MG/5ML Soln oral solution  Commonly known as: ROBITUSSIN  Take 10 mLs by mouth every 4 hours as needed for Cough     heparin (porcine) 5000 UNIT/ML injection  Inject 1 mL into the skin every 8 hours for 14 days     * insulin lispro 100 UNIT/ML injection vial  Commonly known as: HUMALOG  Inject 0-12 Units into the skin 3 times daily (with meals)     * insulin lispro 100 UNIT/ML injection vial  Commonly known as: HUMALOG  Inject 0-6 Units into the skin nightly     midodrine 5 MG tablet  Commonly known as: PROAMATINE  Take 1 tablet by mouth 3 times daily (with meals)         * This list has 2 medication(s) that are the same as other medications prescribed for you. Read the directions carefully, and ask your doctor or other care provider to review them with you. CONTINUE taking these medications    acetaminophen 325 MG tablet  Commonly known as: TYLENOL  TAKE 2 TABLET BY MOUTH EVERY 6 HOURS AS NEEDED FOR PAIN     aspirin 81 MG chewable tablet  Take 1 tablet by mouth daily     atorvastatin 20 MG tablet  Commonly known as: LIPITOR  Take 1 tablet by mouth daily     blood glucose monitor kit and supplies  Test 2 times a day & as needed for symptoms of irregular blood glucose. blood glucose test strips  Test 2 times a day & as needed for symptoms of irregular blood glucose. docusate sodium 100 MG capsule  Commonly known as: Colace  Take 1 capsule by mouth 2 times daily as needed for Constipation     gabapentin 100 MG capsule  Commonly known as: NEURONTIN  TAKE 1 CAPSULE BY MOUTH 3 TIMES A DAY     hydrALAZINE 25 MG tablet  Commonly known as: APRESOLINE  Take 1 tablet by mouth every 8 hours     * Insulin Pen Needle 32G X 4 MM Misc  1 each by Does not apply route daily     * [employer] Pen Needles 31G X 6 MM Misc  Generic drug: Insulin Pen Needle  1 each by Does not apply route daily     isosorbide mononitrate 30 MG extended release tablet  Commonly known as: IMDUR  Take 1 tablet by mouth daily     Lancets Misc  1 each by Does not apply route 2 times daily     Lantus SoloStar 100 UNIT/ML injection pen  Generic drug: insulin glargine  Inject 10 Units into the skin nightly     metoprolol tartrate 50 MG tablet  Commonly known as: LOPRESSOR  Take 1 tablet by mouth 2 times daily     pantoprazole 40 MG tablet  Commonly known as: PROTONIX  Take 1 tablet by mouth 2 times daily (before meals)     Senna-Plus 8.6-50 MG per tablet  Generic drug: senna-docusate  Take 1 tablet by mouth daily         * This list has 2 medication(s) that are the same as other medications prescribed for you.  Read the directions carefully, and ask your doctor or other care provider to review them with you. STOP taking these medications    amLODIPine 10 MG tablet  Commonly known as: NORVASC           Where to Get Your Medications      Information about where to get these medications is not yet available    Ask your nurse or doctor about these medications  · dexamethasone 6 MG tablet  · guaiFENesin 100 MG/5ML Soln oral solution  · heparin (porcine) 5000 UNIT/ML injection  · insulin lispro 100 UNIT/ML injection vial  · insulin lispro 100 UNIT/ML injection vial  · midodrine 5 MG tablet         No discharge procedures on file. Time Spent on discharge is  36 mins in patient examination, evaluation, counseling as well as medication reconciliation, prescriptions for required medications, discharge plan and follow up. Electronically signed by   Windy Gaming DO  9/20/2021  10:20 AM      Thank you Dr. Kassie Mai MD for the opportunity to be involved in this patient's care.

## 2021-09-20 NOTE — CARE COORDINATION
Discharge planning     Patient chart reviewed. Patient on 2 liters still. Patient is COVID +. Awaiting bed availability at [facility]. If has to go home he is concerned that his niece who he lives with will not take him back. Will need to have discussion about alternate plans. Patient may have to go home with home care ( would need to find available agency) and home 02 evaluation. He may possibly need walker     Patient is HD at [facility] on MWF but will need to transfer to his covid unit at [facility] on [address] at [address]. He uses medicaid cab for transport.

## 2021-09-22 LAB
CULTURE: NORMAL
CULTURE: NORMAL
Lab: NORMAL
Lab: NORMAL
SPECIMEN DESCRIPTION: NORMAL
SPECIMEN DESCRIPTION: NORMAL

## 2021-10-19 DIAGNOSIS — M54.50 CHRONIC BILATERAL LOW BACK PAIN: ICD-10-CM

## 2021-10-19 DIAGNOSIS — G89.29 CHRONIC BILATERAL LOW BACK PAIN: ICD-10-CM

## 2021-10-19 RX ORDER — ACETAMINOPHEN 325 MG/1
TABLET ORAL
Qty: 120 TABLET | Refills: 0 | OUTPATIENT
Start: 2021-10-19

## 2021-10-19 NOTE — TELEPHONE ENCOUNTER
Tylenol pending for refill     Health Maintenance   Topic Date Due    Diabetic retinal exam  Never done    COVID-19 Vaccine (1) Never done    Shingles Vaccine (1 of 2) Never done    Flu vaccine (1) 09/01/2021    Diabetic foot exam  12/14/2021    Lipid screen  12/14/2021    A1C test (Diabetic or Prediabetic)  08/10/2022    Potassium monitoring  09/20/2022    Creatinine monitoring  09/20/2022    Pneumococcal 0-64 years Vaccine (2 of 4 - PPSV23) 08/23/2024    DTaP/Tdap/Td vaccine (2 - Td or Tdap) 08/23/2029    Colon cancer screen colonoscopy  12/07/2030    Hepatitis C screen  Completed    HIV screen  Completed    Hepatitis A vaccine  Aged Out    Hib vaccine  Aged Out    Meningococcal (ACWY) vaccine  Aged Out             (applicable per patient's age: Cancer Screenings, Depression Screening, Fall Risk Screening, Immunizations)    Hemoglobin A1C (%)   Date Value   08/10/2021 8.0   01/18/2021 8.5 (H)   12/04/2020 10.4 (H)     Microalb/Crt.  Ratio (mcg/mg creat)   Date Value   12/14/2020 3,134 (H)     LDL Cholesterol (mg/dL)   Date Value   12/14/2020 33     AST (U/L)   Date Value   09/16/2021 69 (H)     ALT (U/L)   Date Value   09/16/2021 33     BUN (mg/dL)   Date Value   09/20/2021 57 (H)      (goal A1C is < 7)   (goal LDL is <100) need 30-50% reduction from baseline     BP Readings from Last 3 Encounters:   09/20/21 137/79   09/15/21 126/76   09/15/21 (!) 103/58    (goal /80)      All Future Testing planned in [facility]:  Lab Frequency Next Occurrence   Basic Metabolic Panel Once 02/72/3245   Hemoglobin and Hematocrit, Blood, Post Transfusion POST TRANSFUSION    Hemoglobin and Hematocrit, Blood, Post Transfusion POST TRANSFUSION        Next Visit Date:  Future Appointments   Date Time Provider Armin Rocha   10/22/2021  9:00 AM [facility]            Patient Active Problem List:     Type 2 diabetes mellitus with chronic kidney disease on chronic dialysis, with long-term current use of insulin (HCC)     Sciatica of right side     Atypical chest pain     Low back pain of thoracolumbar region with sciatica     Kidney stone     Chest pain     Need for prophylactic vaccination against diphtheria-tetanus-pertussis (DTP)     Gastroesophageal reflux disease     Tachycardia     Essential hypertension     Stage 3 chronic kidney disease     BMI 31.0-31.9,adult     Acute kidney injury superimposed on CKD (HCC)     Type 2 MI (myocardial infarction) ([address]     Gastrointestinal hemorrhage     Acute posthemorrhagic anemia     Neuropathy     NSAID long-term use     Family history of malignant neoplasm of colon     Positive colorectal cancer screening using Cologuard test     JEFFREY (acute kidney injury) ([facility] [address]     Pneumonia due to COVID-19 virus     ESRD needing dialysis ([address]     Acute metabolic encephalopathy     Hypoxia     Thrombocytopenia ([address]

## 2021-10-19 NOTE — TELEPHONE ENCOUNTER
E-scribe request for med refills. Please review and e-scribe if applicable. Last Visit Date:  08/10/2021  Next Visit Date:  Visit date not found    Hemoglobin A1C (%)   Date Value   08/10/2021 8.0   01/18/2021 8.5 (H)   12/04/2020 10.4 (H)             ( goal A1C is < 7)   Microalb/Crt.  Ratio (mcg/mg creat)   Date Value   12/14/2020 3,134 (H)     LDL Cholesterol (mg/dL)   Date Value   12/14/2020 33       (goal LDL is <100)   AST (U/L)   Date Value   09/16/2021 69 (H)     ALT (U/L)   Date Value   09/16/2021 33     BUN (mg/dL)   Date Value   09/20/2021 57 (H)     BP Readings from Last 3 Encounters:   09/20/21 137/79   09/15/21 126/76   09/15/21 (!) 103/58          (goal 120/80)        Patient Active Problem List:     Type 2 diabetes mellitus with chronic kidney disease on chronic dialysis, with long-term current use of insulin (HCC)     Sciatica of right side     Atypical chest pain     Low back pain of thoracolumbar region with sciatica     Kidney stone     Chest pain     Need for prophylactic vaccination against diphtheria-tetanus-pertussis (DTP)     Gastroesophageal reflux disease     Tachycardia     Essential hypertension     Stage 3 chronic kidney disease     BMI 31.0-31.9,adult     Acute kidney injury superimposed on CKD ([address]     Type 2 MI (myocardial infarction) ([address]     Gastrointestinal hemorrhage     Acute posthemorrhagic anemia     Neuropathy     NSAID long-term use     Family history of malignant neoplasm of colon     Positive colorectal cancer screening using Cologuard test     JEFFREY (acute kidney injury) ([address]     Pneumonia due to COVID-19 virus     ESRD needing dialysis ([address]     Acute metabolic encephalopathy     Hypoxia     Thrombocytopenia ([address]      ----Luis Elizabeth

## 2021-11-18 RX ORDER — ATORVASTATIN CALCIUM 20 MG/1
20 TABLET, FILM COATED ORAL DAILY
Qty: 30 TABLET | Refills: 0 | Status: SHIPPED | OUTPATIENT
Start: 2021-11-18 | End: 2022-03-24 | Stop reason: SDUPTHER

## 2021-11-18 RX ORDER — HYDRALAZINE HYDROCHLORIDE 25 MG/1
25 TABLET, FILM COATED ORAL EVERY 8 HOURS SCHEDULED
Qty: 90 TABLET | Refills: 3 | Status: SHIPPED | OUTPATIENT
Start: 2021-11-18 | End: 2022-03-24

## 2021-11-18 RX ORDER — PANTOPRAZOLE SODIUM 40 MG/1
40 TABLET, DELAYED RELEASE ORAL
Qty: 30 TABLET | Refills: 3 | Status: SHIPPED | OUTPATIENT
Start: 2021-11-18 | End: 2022-03-24 | Stop reason: SDUPTHER

## 2021-11-18 RX ORDER — ISOSORBIDE MONONITRATE 30 MG/1
30 TABLET, EXTENDED RELEASE ORAL DAILY
Qty: 30 TABLET | Refills: 3 | Status: SHIPPED | OUTPATIENT
Start: 2021-11-18 | End: 2022-03-24

## 2021-11-18 RX ORDER — MIDODRINE HYDROCHLORIDE 5 MG/1
5 TABLET ORAL
Qty: 90 TABLET | Refills: 3 | Status: SHIPPED | OUTPATIENT
Start: 2021-11-18 | End: 2022-03-24

## 2021-12-10 RX ORDER — METOPROLOL TARTRATE 50 MG/1
TABLET, FILM COATED ORAL
Qty: 60 TABLET | Refills: 0 | Status: SHIPPED | OUTPATIENT
Start: 2021-12-10 | End: 2022-03-24

## 2021-12-10 NOTE — TELEPHONE ENCOUNTER
E-scribe request for med refill. Please review and e-scribe if applicable. Last Visit Date:  08/10/2021  Next Visit Date:  Visit date not found    Hemoglobin A1C (%)   Date Value   08/10/2021 8.0   01/18/2021 8.5 (H)   12/04/2020 10.4 (H)             ( goal A1C is < 7)   Microalb/Crt.  Ratio (mcg/mg creat)   Date Value   12/14/2020 3,134 (H)     LDL Cholesterol (mg/dL)   Date Value   12/14/2020 33       (goal LDL is <100)   AST (U/L)   Date Value   09/16/2021 69 (H)     ALT (U/L)   Date Value   09/16/2021 33     BUN (mg/dL)   Date Value   09/20/2021 57 (H)     BP Readings from Last 3 Encounters:   09/20/21 137/79   09/15/21 126/76   09/15/21 (!) 103/58          (goal 120/80)        Patient Active Problem List:     Type 2 diabetes mellitus with chronic kidney disease on chronic dialysis, with long-term current use of insulin (HCC)     Sciatica of right side     Atypical chest pain     Low back pain of thoracolumbar region with sciatica     Kidney stone     Chest pain     Need for prophylactic vaccination against diphtheria-tetanus-pertussis (DTP)     Gastroesophageal reflux disease     Tachycardia     Essential hypertension     Stage 3 chronic kidney disease     BMI 31.0-31.9,adult     Acute kidney injury superimposed on CKD ([address]     Type 2 MI (myocardial infarction) ([address]     Gastrointestinal hemorrhage     Acute posthemorrhagic anemia     Neuropathy     NSAID long-term use     Family history of malignant neoplasm of colon     Positive colorectal cancer screening using Cologuard test     JEFFREY (acute kidney injury) ([address]     Pneumonia due to COVID-19 virus     ESRD needing dialysis ([address]     Acute metabolic encephalopathy     Hypoxia     Thrombocytopenia ([address]      ----Gaby Oquendo

## 2022-01-13 ENCOUNTER — INITIAL CONSULT (OUTPATIENT)
Dept: VASCULAR SURGERY | Age: 62
End: 2022-01-13
Payer: MEDICARE

## 2022-01-13 VITALS
RESPIRATION RATE: 18 BRPM | DIASTOLIC BLOOD PRESSURE: 75 MMHG | SYSTOLIC BLOOD PRESSURE: 113 MMHG | BODY MASS INDEX: 31.21 KG/M2 | OXYGEN SATURATION: 97 % | WEIGHT: 218 LBS | HEART RATE: 103 BPM | HEIGHT: 70 IN | TEMPERATURE: 97.3 F

## 2022-01-13 DIAGNOSIS — N18.6 ENCOUNTER REGARDING VASCULAR ACCESS FOR DIALYSIS FOR ESRD (HCC): Primary | ICD-10-CM

## 2022-01-13 DIAGNOSIS — Z99.2 ENCOUNTER REGARDING VASCULAR ACCESS FOR DIALYSIS FOR ESRD (HCC): Primary | ICD-10-CM

## 2022-01-13 PROCEDURE — 99204 OFFICE O/P NEW MOD 45 MIN: CPT | Performed by: SURGERY

## 2022-01-13 ASSESSMENT — ENCOUNTER SYMPTOMS
ABDOMINAL PAIN: 0
SHORTNESS OF BREATH: 0
CHEST TIGHTNESS: 0
ALLERGIC/IMMUNOLOGIC NEGATIVE: 1
COLOR CHANGE: 0

## 2022-01-13 NOTE — PROGRESS NOTES
Division of Vascular Surgery        New Consult      Physician Requesting Consult:  Dr. Chilango Sharma    Reason for Consult:   Dialysis access    Chief Complaint:      Dialysis access    History of Present Illness:      Adi Curran is a 64 y. o.left handed gentleman who presents with end stage renal disease on hemodialysis since being diagnosed with COVID pneumonia about 8 months ago. Dialysis has been going well (MWF) through a tunneled catheter in his chest.  Denies any prior DVT or swelling in his arms. He is a plasma donor and has track priya from his frequent blood draws from his left arm.      Medical History:     Past Medical History:   Diagnosis Date    ADHD (attention deficit hyperactivity disorder)     COVID-19 9/15/2021    COVID-19 9/15/2021    Depression     DM2 [facility] mellitus, [facility] 2) [facility] 10/15/2013    Erectile dysfunction     Gastroesophageal reflux disease 8/23/2019    Hyperlipidemia     Hypertension     Kidney stone 7/19/2018    Low back pain of thoracolumbar region with sciatica     Neuropathy        Surgical History:     Past Surgical History:   Procedure Laterality Date    COLONOSCOPY N/A 12/7/2020    COLONOSCOPY DIAGNOSTIC performed by Kaitlynn Nunez MD at [facility]  07/20/2018    bilat stent replacement    CYSTOSCOPY  08/03/2018    b/l ureteroscopy, b/l stent, HLL    IR TUNNELED CATHETER PLACEMENT GREATER THAN 5 YEARS  6/28/2021    IR TUNNELED CATHETER PLACEMENT GREATER THAN 5 YEARS 6/28/2021 Lila Pereira MD [facility]ED[facility]    [place] CYSTO/URETERO/PYELOSCOPY, CALCULUS TX Bilateral 8/3/2018    FORTEC HOLMIUM - CYSTO, URETEROSCOPY LITHOTRIPSY, STENT EXCHANGE [facility] #[id number] - [facility]) performed by Alfred Pulido MD at [address],8 Bilateral 7/20/2018    CYSTOSCOPY URETERAL STENT INSERTION BILATERAL performed by Alfred Pulido MD at [address] [facility]      as a child   [address] [place] [address] Drive N/A 12/7/2020    EGD BIOPSY performed by Melissa Palomo MD at [facility]       Family History:     Family History   Problem Relation Age of Onset    Diabetes Mother     High Blood Pressure Father        Allergies:       Patient has no known allergies.     Medications:      Current Outpatient Medications   Medication Sig Dispense Refill    midodrine (PROAMATINE) 5 MG tablet Take 1 tablet by mouth 3 times daily (with meals) 90 tablet 3    hydrALAZINE (APRESOLINE) 25 MG tablet Take 1 tablet by mouth every 8 hours 90 tablet 3    pantoprazole (PROTONIX) 40 MG tablet Take 1 tablet by mouth 2 times daily (before meals) 30 tablet 3    guaiFENesin (ROBITUSSIN) 100 MG/5ML SOLN oral solution Take 10 mLs by mouth every 4 hours as needed for Cough 1200 mL 0    acetaminophen (TYLENOL) 325 MG tablet TAKE 2 TABLET BY MOUTH EVERY 6 HOURS AS NEEDED FOR PAIN 120 tablet 0    metoprolol tartrate (LOPRESSOR) 50 MG tablet TAKE 1 TABLET BY MOUTH 2 TIMES A DAY (Patient not taking: Reported on 1/13/2022) 60 tablet 0    isosorbide mononitrate (IMDUR) 30 MG extended release tablet Take 1 tablet by mouth daily (Patient not taking: Reported on 1/13/2022) 30 tablet 3    atorvastatin (LIPITOR) 20 MG tablet Take 1 tablet by mouth daily (Patient not taking: Reported on 1/13/2022) 30 tablet 0    insulin lispro (HUMALOG) 100 UNIT/ML injection vial Inject 0-12 Units into the skin 3 times daily (with meals) (Patient not taking: Reported on 1/13/2022) 10 mL 3    insulin lispro (HUMALOG) 100 UNIT/ML injection vial Inject 0-6 Units into the skin nightly (Patient not taking: Reported on 1/13/2022) 10 mL 3    Insulin Pen Needle ([employer] PEN NEEDLES) 31G X 6 MM MISC 1 each by Does not apply route daily (Patient not taking: Reported on 1/13/2022) 100 each 3    insulin glargine (LANTUS SOLOSTAR) 100 UNIT/ML injection pen Inject 10 Units into the skin nightly (Patient not taking: Reported on 1/13/2022) 27 mL 0    blood glucose monitor strips Test 2 times a day & as needed for symptoms of irregular blood glucose. (Patient not taking: Reported on 1/13/2022) 100 strip 3    Lancets MISC 1 each by Does not apply route 2 times daily (Patient not taking: Reported on 1/13/2022) 100 each 5    gabapentin (NEURONTIN) 100 MG capsule TAKE 1 CAPSULE BY MOUTH 3 TIMES A DAY 90 capsule 2    Insulin Pen Needle 32G X 4 MM MISC 1 each by Does not apply route daily (Patient not taking: Reported on 1/13/2022) 100 each 3    SENNA-PLUS 8.6-50 MG per tablet Take 1 tablet by mouth daily (Patient not taking: Reported on 1/13/2022) 30 tablet 0    aspirin 81 MG chewable tablet Take 1 tablet by mouth daily (Patient not taking: Reported on 1/13/2022) 30 tablet 0    docusate sodium (COLACE) 100 MG capsule Take 1 capsule by mouth 2 times daily as needed for Constipation (Patient not taking: Reported on 1/13/2022) 30 capsule 0    blood glucose monitor kit and supplies Test 2 times a day & as needed for symptoms of irregular blood glucose. (Patient not taking: Reported on 1/13/2022) 1 kit 0     No current facility-administered medications for this visit. Social History:     Tobacco:    reports that he quit smoking about 3 years ago. His smoking use included cigarettes. He has a 15.00 pack-year smoking history. He has never used smokeless tobacco.  Alcohol:      reports current alcohol use of about 8.0 standard drinks of alcohol per week. Drug Use:  reports no history of drug use. 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 HENT: Negative for congestion. Eyes: Negative for visual disturbance. Respiratory: Negative for chest tightness and shortness of breath. Cardiovascular: Negative for chest pain and leg swelling. Gastrointestinal: Negative for abdominal pain. Endocrine: Negative. Genitourinary: Negative. Musculoskeletal: Negative.     Skin: Negative for color change and wound. Allergic/Immunologic: Negative. Neurological: Negative for facial asymmetry, speech difficulty, weakness and numbness. Hematological: Negative. Psychiatric/Behavioral: Negative. Physical Exam:     Vitals:  /75 (Site: Right Upper Arm, Position: Sitting, Cuff Size: Large Adult)   Pulse 103   Temp 97.3 °F (36.3 °C) (Temporal)   Resp 18   Ht 5' 10\" (1.778 m)   Wt 218 lb (98.9 kg)   SpO2 97%   BMI 31.28 kg/m²     Physical Exam  Constitutional:       Appearance: He is well-developed and well-groomed. Eyes:      Extraocular Movements: Extraocular movements intact. Conjunctiva/sclera: Conjunctivae normal.   Neck:      Vascular: No carotid bruit. Cardiovascular:      Rate and Rhythm: Normal rate and regular rhythm. Pulses:           Radial pulses are 2+ on the right side and 2+ on the left side. Pulmonary:      Effort: Pulmonary effort is normal. No respiratory distress. Chest:      Comments: Tunneled hemodialysis catheter in place  Abdominal:      Palpations: Abdomen is soft. Tenderness: There is no abdominal tenderness. Musculoskeletal:      Right upper arm: No swelling or tenderness. Left upper arm: No swelling or tenderness. Right hand: No swelling. Normal strength. Normal sensation. Normal capillary refill. Left hand: No swelling. Normal strength. Normal sensation. Normal capillary refill. Cervical back: Full passive range of motion without pain. Right lower leg: No swelling or tenderness. No edema. Left lower leg: No swelling or tenderness. No edema. Right foot: Normal capillary refill. No swelling or tenderness. Left foot: Normal capillary refill. No swelling or tenderness. Feet:      Right foot:      Skin integrity: No ulcer or skin breakdown. Left foot:      Skin integrity: No ulcer or skin breakdown. Skin:     General: Skin is warm. Capillary Refill: Capillary refill takes less than 2 seconds.

## 2022-02-08 ENCOUNTER — TELEPHONE (OUTPATIENT)
Dept: VASCULAR SURGERY | Age: 62
End: 2022-02-08

## 2022-02-08 NOTE — TELEPHONE ENCOUNTER
----- Message from Lulu Infante MD sent at 2/7/2022  4:55 PM EST -----  2/22 or 3/1  ----- Message -----  From: Janie Bey MA  Sent: 2/7/2022  10:51 AM EST  To: Lulu Infante MD, #    This patient was scheduled to have a AV FISTULA CREATION UPPER EXTREMITY on 1/25 but cancelled the appt, when would you like it rescheduled for? Please Advise.

## 2022-02-08 NOTE — TELEPHONE ENCOUNTER
Patient has been scheduled, Information was sent over to dialysis to inform patient and go over surgery information

## 2022-02-21 ENCOUNTER — TELEPHONE (OUTPATIENT)
Dept: VASCULAR SURGERY | Age: 62
End: 2022-02-21

## 2022-02-21 NOTE — TELEPHONE ENCOUNTER
Spoke to patient in regards to surgery tomorrow. Procedure is now on 2/23 at 10 am with an arrival time of 8:30am. Pt states that this will run into his Dialysis time. Explained to him that we will call Dialysis in regards to this. He verbalized understanding. Spoke to RN Zac at Dialysis in regards to surgery.  He states that he will run patients treatment later in the day and inform patient today at his treatment

## 2022-02-23 ENCOUNTER — TELEPHONE (OUTPATIENT)
Dept: VASCULAR SURGERY | Age: 62
End: 2022-02-23

## 2022-02-24 ENCOUNTER — TELEPHONE (OUTPATIENT)
Dept: VASCULAR SURGERY | Age: 62
End: 2022-02-24

## 2022-02-24 NOTE — TELEPHONE ENCOUNTER
----- Message from Roma Saenz MA sent at 2/24/2022  7:58 AM EST -----  Regarding: RE: Paco arroyo  Spoke with Wesley Luna at dialysis and she will review all pre-op instructions with [facility] and she verbalized understanding.   ----- Message -----  From: Subhash Uribe MD  Sent: 2/24/2022   7:43 AM EST  To: Kimberlyn Bains LPN, #  Subject: RE: Paco arroyo                            3/8 9 am  ----- Message -----  From: Kimberlyn Bains LPN  Sent: 8/51/8586   9:04 AM EST  To: Subhash Uribe MD, #  Subject: Paco arroyo                                I spoke with the patient and he does not have anyone to bring him in. Surgery will need to be rescheduled.  Please advise with day and time

## 2022-02-28 ENCOUNTER — TELEPHONE (OUTPATIENT)
Dept: VASCULAR SURGERY | Age: 62
End: 2022-02-28

## 2022-02-28 NOTE — TELEPHONE ENCOUNTER
Spoke with PT informed him his procedure was moved up, and that he needed to be here at 9 AM, he verbalized understanding.

## 2022-03-08 ENCOUNTER — TELEPHONE (OUTPATIENT)
Dept: VASCULAR SURGERY | Age: 62
End: 2022-03-08

## 2022-03-14 NOTE — TELEPHONE ENCOUNTER
Pt has no call no showed to multiple surgeries and multiple appointments. Spoke to pts Dialysis regarding this and she states that she is going to try to refer patient to another hospital to get access places d/t his noncompliance with us.

## 2022-03-21 DIAGNOSIS — M54.50 CHRONIC BILATERAL LOW BACK PAIN: ICD-10-CM

## 2022-03-21 DIAGNOSIS — G89.29 CHRONIC BILATERAL LOW BACK PAIN: ICD-10-CM

## 2022-03-21 RX ORDER — PANTOPRAZOLE SODIUM 40 MG/1
40 TABLET, DELAYED RELEASE ORAL
Qty: 30 TABLET | Refills: 3 | OUTPATIENT
Start: 2022-03-21

## 2022-03-21 RX ORDER — GABAPENTIN 100 MG/1
CAPSULE ORAL
Qty: 90 CAPSULE | Refills: 2 | OUTPATIENT
Start: 2022-03-21 | End: 2022-04-21

## 2022-03-21 NOTE — TELEPHONE ENCOUNTER
Writer left vm for patient to contact office to [facility] appt. Please address the medication refill and close the encounter. If I can be of assistance, please route to the applicable pool. Thank you. Last visit: 8-  Last Med refill: 10/17/2021 gabapentin    9-29-21 pantoprazole  Does patient have enough medication for 72 hours: No:     Next Visit Date:  No future appointments. Health Maintenance   Topic Date Due    Depression Screen  Never done    Diabetic retinal exam  Never done    Shingles Vaccine (1 of 2) Never done    Flu vaccine (1) 09/01/2021    Diabetic foot exam  12/14/2021    Lipid screen  12/14/2021    COVID-19 Vaccine (2 - Booster for Autopilot series) 01/11/2022    Annual Wellness Visit (AWV)  Never done    A1C test (Diabetic or Prediabetic)  08/10/2022    Potassium monitoring  09/20/2022    Creatinine monitoring  09/20/2022    Pneumococcal 0-64 years Vaccine (2 of 4 - PPSV23) 08/23/2024    DTaP/Tdap/Td vaccine (2 - Td or Tdap) 08/23/2029    Colorectal Cancer Screen  12/07/2030    Hepatitis C screen  Completed    HIV screen  Completed    Hepatitis A vaccine  Aged Out    Hib vaccine  Aged Out    Meningococcal (ACWY) vaccine  Aged Out       Hemoglobin A1C (%)   Date Value   08/10/2021 8.0   01/18/2021 8.5 (H)   12/04/2020 10.4 (H)             ( goal A1C is < 7)   Microalb/Crt.  Ratio (mcg/mg creat)   Date Value   12/14/2020 3,134 (H)     LDL Cholesterol (mg/dL)   Date Value   12/14/2020 33   08/23/2019 46       (goal LDL is <100)   AST (U/L)   Date Value   09/16/2021 69 (H)     ALT (U/L)   Date Value   09/16/2021 33     BUN (mg/dL)   Date Value   09/20/2021 57 (H)     BP Readings from Last 3 Encounters:   01/13/22 113/75   09/20/21 137/79   09/15/21 126/76          (goal 120/80)    All Future Testing planned in [facility]  Lab Frequency Next Occurrence   Basic Metabolic Panel Once 40/34/3118   Hemoglobin and Hematocrit, Blood, Post Transfusion POST TRANSFUSION

## 2022-03-23 ENCOUNTER — TELEPHONE (OUTPATIENT)
Dept: FAMILY MEDICINE CLINIC | Age: 62
End: 2022-03-23

## 2022-03-23 NOTE — TELEPHONE ENCOUNTER
Pt came in to request refills, pt has not been seen in a year, asking for refills of Atorvastation, Pantoprazole, and Gabapentin, I scheduled pt for an appt tomorrow, 3-24, pt told that Gabapentin will not be refilled until he comes in for an appt.;    Amelia  Last Med refill:   Does patient have enough medication for 72 hours: No:     Next Visit Date:  Future Appointments   Date Time Provider Armin Rocha   3/24/2022 11:15  [address], MD [address] [place] [address] Maintenance   Topic Date Due    Depression Screen  Never done    Diabetic retinal exam  Never done    Shingles Vaccine (1 of 2) Never done    Flu vaccine (1) 09/01/2021    Diabetic foot exam  12/14/2021    Lipid screen  12/14/2021    COVID-19 Vaccine (2 - Booster for Vivocha series) 01/11/2022    Annual Wellness Visit (AWV)  Never done    A1C test (Diabetic or Prediabetic)  08/10/2022    Potassium monitoring  09/20/2022    Creatinine monitoring  09/20/2022    Pneumococcal 0-64 years Vaccine (2 of 4 - PPSV23) 08/23/2024    DTaP/Tdap/Td vaccine (2 - Td or Tdap) 08/23/2029    Colorectal Cancer Screen  12/07/2030    Hepatitis C screen  Completed    HIV screen  Completed    Hepatitis A vaccine  Aged Out    Hib vaccine  Aged Out    Meningococcal (ACWY) vaccine  Aged Out       Hemoglobin A1C (%)   Date Value   08/10/2021 8.0   01/18/2021 8.5 (H)   12/04/2020 10.4 (H)             ( goal A1C is < 7)   Microalb/Crt.  Ratio (mcg/mg creat)   Date Value   12/14/2020 3,134 (H)     LDL Cholesterol (mg/dL)   Date Value   12/14/2020 33   08/23/2019 46       (goal LDL is <100)   AST (U/L)   Date Value   09/16/2021 69 (H)     ALT (U/L)   Date Value   09/16/2021 33     BUN (mg/dL)   Date Value   09/20/2021 57 (H)     BP Readings from Last 3 Encounters:   01/13/22 113/75   09/20/21 137/79   09/15/21 126/76          (goal 120/80)    All Future Testing planned in [facility]  Lab Frequency Next Occurrence   Basic Metabolic Panel Once 07/06/2021   Hemoglobin and Hematocrit, Blood, Post Transfusion POST TRANSFUSION    Hemoglobin and Hematocrit, Blood, Post Transfusion POST TRANSFUSION                Patient Active Problem List:     Type 2 diabetes mellitus with chronic kidney disease on chronic dialysis, with long-term current use of insulin (HCC)     Sciatica of right side     Atypical chest pain     Low back pain of thoracolumbar region with sciatica     Kidney stone     Chest pain     Need for prophylactic vaccination against diphtheria-tetanus-pertussis (DTP)     Gastroesophageal reflux disease     Tachycardia     Essential hypertension     Stage 3 chronic kidney disease     BMI 31.0-31.9,adult     Acute kidney injury superimposed on CKD ([facility]     Type 2 MI (myocardial infarction) ([facility]     Gastrointestinal hemorrhage     Acute posthemorrhagic anemia     Neuropathy     NSAID long-term use     Family history of malignant neoplasm of colon     Positive colorectal cancer screening using Cologuard test     JEFFREY (acute kidney injury) ([facility]     Pneumonia due to COVID-19 virus     ESRD needing dialysis ([facility]     Acute metabolic encephalopathy     Hypoxia     Thrombocytopenia ([facility]

## 2022-03-24 ENCOUNTER — OFFICE VISIT (OUTPATIENT)
Dept: FAMILY MEDICINE CLINIC | Age: 62
End: 2022-03-24
Payer: MEDICARE

## 2022-03-24 VITALS
BODY MASS INDEX: 30.81 KG/M2 | HEIGHT: 70 IN | SYSTOLIC BLOOD PRESSURE: 118 MMHG | DIASTOLIC BLOOD PRESSURE: 66 MMHG | WEIGHT: 215.2 LBS | HEART RATE: 119 BPM | TEMPERATURE: 97 F

## 2022-03-24 DIAGNOSIS — G89.29 CHRONIC BILATERAL LOW BACK PAIN: ICD-10-CM

## 2022-03-24 DIAGNOSIS — K21.9 GASTROESOPHAGEAL REFLUX DISEASE, UNSPECIFIED WHETHER ESOPHAGITIS PRESENT: ICD-10-CM

## 2022-03-24 DIAGNOSIS — Z79.4 TYPE 2 DIABETES MELLITUS WITH CHRONIC KIDNEY DISEASE ON CHRONIC DIALYSIS, WITH LONG-TERM CURRENT USE OF INSULIN (HCC): Primary | ICD-10-CM

## 2022-03-24 DIAGNOSIS — Z99.2 TYPE 2 DIABETES MELLITUS WITH CHRONIC KIDNEY DISEASE ON CHRONIC DIALYSIS, WITH LONG-TERM CURRENT USE OF INSULIN (HCC): Primary | ICD-10-CM

## 2022-03-24 DIAGNOSIS — N18.6 TYPE 2 DIABETES MELLITUS WITH CHRONIC KIDNEY DISEASE ON CHRONIC DIALYSIS, WITH LONG-TERM CURRENT USE OF INSULIN (HCC): Primary | ICD-10-CM

## 2022-03-24 DIAGNOSIS — I10 ESSENTIAL HYPERTENSION: ICD-10-CM

## 2022-03-24 DIAGNOSIS — N18.6 ESRD (END STAGE RENAL DISEASE) ON DIALYSIS (HCC): ICD-10-CM

## 2022-03-24 DIAGNOSIS — Z99.2 ESRD (END STAGE RENAL DISEASE) ON DIALYSIS (HCC): ICD-10-CM

## 2022-03-24 DIAGNOSIS — M54.50 CHRONIC BILATERAL LOW BACK PAIN: ICD-10-CM

## 2022-03-24 DIAGNOSIS — E11.22 TYPE 2 DIABETES MELLITUS WITH CHRONIC KIDNEY DISEASE ON CHRONIC DIALYSIS, WITH LONG-TERM CURRENT USE OF INSULIN (HCC): Primary | ICD-10-CM

## 2022-03-24 LAB — HBA1C MFR BLD: 6.9 %

## 2022-03-24 PROCEDURE — 2022F DILAT RTA XM EVC RTNOPTHY: CPT | Performed by: STUDENT IN AN ORGANIZED HEALTH CARE EDUCATION/TRAINING PROGRAM

## 2022-03-24 PROCEDURE — 1036F TOBACCO NON-USER: CPT | Performed by: STUDENT IN AN ORGANIZED HEALTH CARE EDUCATION/TRAINING PROGRAM

## 2022-03-24 PROCEDURE — G8427 DOCREV CUR MEDS BY ELIG CLIN: HCPCS | Performed by: STUDENT IN AN ORGANIZED HEALTH CARE EDUCATION/TRAINING PROGRAM

## 2022-03-24 PROCEDURE — G8484 FLU IMMUNIZE NO ADMIN: HCPCS | Performed by: STUDENT IN AN ORGANIZED HEALTH CARE EDUCATION/TRAINING PROGRAM

## 2022-03-24 PROCEDURE — 3017F COLORECTAL CA SCREEN DOC REV: CPT | Performed by: STUDENT IN AN ORGANIZED HEALTH CARE EDUCATION/TRAINING PROGRAM

## 2022-03-24 PROCEDURE — 99213 OFFICE O/P EST LOW 20 MIN: CPT | Performed by: STUDENT IN AN ORGANIZED HEALTH CARE EDUCATION/TRAINING PROGRAM

## 2022-03-24 PROCEDURE — 83036 HEMOGLOBIN GLYCOSYLATED A1C: CPT | Performed by: STUDENT IN AN ORGANIZED HEALTH CARE EDUCATION/TRAINING PROGRAM

## 2022-03-24 PROCEDURE — G8417 CALC BMI ABV UP PARAM F/U: HCPCS | Performed by: STUDENT IN AN ORGANIZED HEALTH CARE EDUCATION/TRAINING PROGRAM

## 2022-03-24 PROCEDURE — 3044F HG A1C LEVEL LT 7.0%: CPT | Performed by: STUDENT IN AN ORGANIZED HEALTH CARE EDUCATION/TRAINING PROGRAM

## 2022-03-24 RX ORDER — GABAPENTIN 100 MG/1
100 CAPSULE ORAL 3 TIMES DAILY
Qty: 90 CAPSULE | Refills: 2 | Status: SHIPPED | OUTPATIENT
Start: 2022-03-24 | End: 2022-08-02

## 2022-03-24 RX ORDER — PANTOPRAZOLE SODIUM 40 MG/1
40 TABLET, DELAYED RELEASE ORAL
Qty: 30 TABLET | Refills: 3 | Status: SHIPPED | OUTPATIENT
Start: 2022-03-24 | End: 2022-06-06

## 2022-03-24 RX ORDER — PANTOPRAZOLE SODIUM 40 MG/1
40 TABLET, DELAYED RELEASE ORAL
Qty: 30 TABLET | Refills: 3 | Status: CANCELLED | OUTPATIENT
Start: 2022-03-24

## 2022-03-24 RX ORDER — ASPIRIN 81 MG/1
81 TABLET, CHEWABLE ORAL DAILY
Qty: 30 TABLET | Refills: 0 | Status: SHIPPED | OUTPATIENT
Start: 2022-03-24 | End: 2022-09-27 | Stop reason: SDUPTHER

## 2022-03-24 RX ORDER — ATORVASTATIN CALCIUM 20 MG/1
20 TABLET, FILM COATED ORAL DAILY
Qty: 30 TABLET | Refills: 0 | Status: SHIPPED | OUTPATIENT
Start: 2022-03-24 | End: 2022-05-10

## 2022-03-24 RX ORDER — GABAPENTIN 100 MG/1
CAPSULE ORAL
Qty: 90 CAPSULE | Refills: 2 | OUTPATIENT
Start: 2022-03-24 | End: 2022-04-24

## 2022-03-24 RX ORDER — B,C/FOLIC/ZINC/SELENOMETH/D3/E 0.8-12.5MG
1 TABLET ORAL DAILY
COMMUNITY
Start: 2022-03-23

## 2022-03-24 RX ORDER — ATORVASTATIN CALCIUM 20 MG/1
20 TABLET, FILM COATED ORAL DAILY
Qty: 30 TABLET | Refills: 0 | Status: CANCELLED | OUTPATIENT
Start: 2022-03-24

## 2022-03-24 RX ORDER — ACETAMINOPHEN 325 MG/1
TABLET ORAL
Qty: 120 TABLET | Refills: 0 | Status: SHIPPED | OUTPATIENT
Start: 2022-03-24 | End: 2022-09-27 | Stop reason: SDUPTHER

## 2022-03-24 ASSESSMENT — ENCOUNTER SYMPTOMS
SHORTNESS OF BREATH: 0
WHEEZING: 0
ABDOMINAL PAIN: 0
CONSTIPATION: 0
DIARRHEA: 0

## 2022-03-24 ASSESSMENT — PATIENT HEALTH QUESTIONNAIRE - PHQ9
9. THOUGHTS THAT YOU WOULD BE BETTER OFF DEAD, OR OF HURTING YOURSELF: 0
10. IF YOU CHECKED OFF ANY PROBLEMS, HOW DIFFICULT HAVE THESE PROBLEMS MADE IT FOR YOU TO DO YOUR WORK, TAKE CARE OF THINGS AT HOME, OR GET ALONG WITH OTHER PEOPLE: 1
5. POOR APPETITE OR OVEREATING: 0
DEPRESSION UNABLE TO ASSESS: PT REFUSES
SUM OF ALL RESPONSES TO PHQ QUESTIONS 1-9: 7
3. TROUBLE FALLING OR STAYING ASLEEP: 1
8. MOVING OR SPEAKING SO SLOWLY THAT OTHER PEOPLE COULD HAVE NOTICED. OR THE OPPOSITE, BEING SO FIGETY OR RESTLESS THAT YOU HAVE BEEN MOVING AROUND A LOT MORE THAN USUAL: 0
SUM OF ALL RESPONSES TO PHQ QUESTIONS 1-9: 7
7. TROUBLE CONCENTRATING ON THINGS, SUCH AS READING THE NEWSPAPER OR WATCHING TELEVISION: 0
2. FEELING DOWN, DEPRESSED OR HOPELESS: 2
4. FEELING TIRED OR HAVING LITTLE ENERGY: 1
1. LITTLE INTEREST OR PLEASURE IN DOING THINGS: 2
6. FEELING BAD ABOUT YOURSELF - OR THAT YOU ARE A FAILURE OR HAVE LET YOURSELF OR YOUR FAMILY DOWN: 1
SUM OF ALL RESPONSES TO PHQ9 QUESTIONS 1 & 2: 4
SUM OF ALL RESPONSES TO PHQ QUESTIONS 1-9: 7
SUM OF ALL RESPONSES TO PHQ QUESTIONS 1-9: 7

## 2022-03-24 NOTE — PROGRESS NOTES
Visit Information    Have you changed or started any medications since your last visit including any over-the-counter medicines, vitamins, or herbal medicines? no   Have you stopped taking any of your medications? Is so, why? -  no  Are you having any side effects from any of your medications? - no    Have you seen any other physician or provider since your last visit? Yes- Oncology [facility], Care Coordination,   Have you had any other diagnostic tests since your last visit? yes - Labs, XR, EKG   Have you been seen in the emergency room and/or had an admission in a hospital since we last saw you?  yes - [facility]   Have you had your routine dental cleaning in the past 6 months?  no     Do you have an active MyChart account? If no, what is the barrier?   No: Declines    Patient Care Team:  Doron Henriquez MD as PCP - General (Family Medicine)  Gaudencio Braxton DO as Consulting Physician (General Surgery)    Medical History Review  Past Medical, Family, and Social History reviewed and does not contribute to the patient presenting condition    Health Maintenance   Topic Date Due    Diabetic retinal exam  Never done    Shingles Vaccine (1 of 2) Never done    Flu vaccine (1) 09/01/2021    Diabetic foot exam  12/14/2021    Lipid screen  12/14/2021    COVID-19 Vaccine (2 - Booster for Dana series) 01/11/2022    Depression Screen  01/18/2022    Annual Wellness Visit (AWV)  Never done    A1C test (Diabetic or Prediabetic)  08/10/2022    Potassium monitoring  09/20/2022    Creatinine monitoring  09/20/2022    Pneumococcal 0-64 years Vaccine (2 of 4 - PPSV23) 08/23/2024    DTaP/Tdap/Td vaccine (2 - Td or Tdap) 08/23/2029    Colorectal Cancer Screen  12/07/2030    Hepatitis C screen  Completed    HIV screen  Completed    Hepatitis A vaccine  Aged Out    Hib vaccine  Aged Out    Meningococcal (ACWY) vaccine  Aged Out

## 2022-03-24 NOTE — PROGRESS NOTES
Subjective:    Anselmo Nazario is a 64 y.o. male with  has a past medical history of ADHD (attention deficit hyperactivity disorder), COVID-19, COVID-19, Depression, DM2 (diabetes mellitus, type 2) ([address] Erectile dysfunction, Gastroesophageal reflux disease, Hyperlipidemia, Hypertension, Kidney stone, Low back pain of thoracolumbar region with sciatica, and Neuropathy. Presented to the office today for:  Chief Complaint   Patient presents with    Medication Refill     needs med refills    Diarrhea     for a couple of months       HPI     Patient is a 51-year-old male here today for follow-up. Patient was last seen in our clinic 8 months ago    Patient was hospitalized in August and started on dialysis for end-stage renal disease. Patient at that time was also treated for COVID-19 pneumonia. Patient has not seen a PCP since being discharged August 2021. Patient unsure of what medication he is currently on. Type 2 diabetes  Patient states he is not been taking any of his diabetes medication or insulin for the last 5-6 months  Patient states he feels great and that is why he has not been taking his medication  Does not check his blood glucose levels at home  A1c 6.8 today     Hypertension  Unsure what medication he is currently taking. Chronic diarrhea: Patient states he has been having loose stools ever since he started dialysis back in August.  Patient has multiple stool softeners on his medication list unsure if he is taking them. Patient states he takes Imodium daily to stop the loose stools. Patient states he has 1-2 loose stools daily. Patient denies any abdominal pain or blood in the stools. Review of Systems   Constitutional: Negative for activity change, appetite change, chills and fever. Respiratory: Negative for shortness of breath and wheezing. Cardiovascular: Negative for chest pain, palpitations and leg swelling.    Gastrointestinal: Negative for abdominal pain, constipation and diarrhea. Genitourinary: Negative for difficulty urinating. Neurological: Negative for dizziness, weakness, numbness and headaches. The patient has a   Family History   Problem Relation Age of Onset    Diabetes Mother     High Blood Pressure Father        Objective:    /66 (Site: Left Upper Arm, Position: Sitting, Cuff Size: Medium Adult)   Pulse 119   Temp 97 °F (36.1 °C) (Temporal)   Ht 5' 10\" (1.778 m)   Wt 215 lb 3.2 oz (97.6 kg)   BMI 30.88 kg/m²    BP Readings from Last 3 Encounters:   03/24/22 118/66   01/13/22 113/75   09/20/21 137/79       Physical Exam  Vitals reviewed. Constitutional:       Appearance: Normal appearance. Cardiovascular:      Rate and Rhythm: Normal rate and regular rhythm. Pulses: Normal pulses. Heart sounds: Normal heart sounds. Pulmonary:      Effort: Pulmonary effort is normal.      Breath sounds: Normal breath sounds. No wheezing. Musculoskeletal:      Right lower leg: No edema. Left lower leg: No edema. Skin:     General: Skin is warm. Neurological:      Mental Status: He is alert. Lab Results   Component Value Date    WBC 4.9 09/20/2021    HGB 9.2 (L) 09/20/2021    HCT 27.7 (L) 09/20/2021     09/20/2021    CHOL 116 12/14/2020    TRIG 126 12/14/2020    HDL 58 12/14/2020    ALT 33 09/16/2021    AST 69 (H) 09/16/2021     (L) 09/20/2021    K 4.2 09/20/2021    CL 96 (L) 09/20/2021    CREATININE 8.02 (HH) 09/20/2021    BUN 57 (H) 09/20/2021    CO2 21 09/20/2021    TSH 1.08 01/14/2020    INR 0.9 09/18/2021    LABA1C 6.9 03/24/2022    LABMICR 3,134 (H) 12/14/2020     Lab Results   Component Value Date    CALCIUM 7.4 (L) 09/20/2021    PHOS 7.2 (H) 09/18/2021     Lab Results   Component Value Date    LDLCHOLESTEROL 33 12/14/2020       Assessment and Plan:    1. Type 2 diabetes mellitus with chronic kidney disease on chronic dialysis, with long-term current use of insulin (HCC)  Hemoglobin A1c 6.8 today. Patient has not been taking his insulin for the last 6 months. Will hold off on resuming today. Patient educated on diet and exercise. We will continue to follow his A1c in 3 months to make sure patient does not need to resume medication and  Patient educated schedule a diabetic eye exam this  - [facility]) Primary Care Pharmacist    2. Essential hypertension  Patient has not been on any of his blood pressure medication for the last 6 months. Blood pressure well controlled. Patient does have a history of MI 2 years ago and tachycardic we will resume beta-blocker  Patient educated on the [address] [place] [address] Pharmacist  - CBC with Auto Differential; Future  - Basic Metabolic Panel; Future  - Lipid Panel; Future  - TSH With Reflex Ft4; Future    3. ESRD (end stage renal disease) on dialysis [facility])  Follows up with nephrology on dialysis 3 times a week  - [facility] Pharmacist    4. Chronic bilateral low back pain  Well controlled with gabapentin and Tylenol as needed. Patient given lower back exercises stretches to do at home  - gabapentin (NEURONTIN) 100 MG capsule; Take 1 capsule by mouth 3 times daily for 30 days. Dispense: 90 capsule; Refill: 2  - acetaminophen (TYLENOL) 325 MG tablet; TAKE 2 TABLET BY MOUTH EVERY 6 HOURS AS NEEDED FOR PAIN  Dispense: 120 tablet; Refill: 0          Requested Prescriptions     Signed Prescriptions Disp Refills    pantoprazole (PROTONIX) 40 MG tablet 30 tablet 3     Sig: Take 1 tablet by mouth 2 times daily (before meals)    metoprolol tartrate (LOPRESSOR) 25 MG tablet 60 tablet 0     Sig: Take 1 tablet by mouth 2 times daily    gabapentin (NEURONTIN) 100 MG capsule 90 capsule 2     Sig: Take 1 capsule by mouth 3 times daily for 30 days.     atorvastatin (LIPITOR) 20 MG tablet 30 tablet 0     Sig: Take 1 tablet by mouth daily    aspirin 81 MG chewable tablet 30 tablet 0     Sig: Take 1 tablet by mouth daily    acetaminophen (TYLENOL) 325 note.

## 2022-03-24 NOTE — PROGRESS NOTES
Attending Physician Statement  I  have discussed the care of Melanie Coello including pertinent history and exam findings with the resident. I agree with the assessment, plan and orders as documented by the resident. /66 (Site: Left Upper Arm, Position: Sitting, Cuff Size: Medium Adult)   Pulse 119   Temp 97 °F (36.1 °C) (Temporal)   Ht 5' 10\" (1.778 m)   Wt 215 lb 3.2 oz (97.6 kg)   BMI 30.88 kg/m²    BP Readings from Last 3 Encounters:   03/24/22 118/66   01/13/22 113/75   09/20/21 137/79     Wt Readings from Last 3 Encounters:   03/24/22 215 lb 3.2 oz (97.6 kg)   01/13/22 218 lb (98.9 kg)   09/20/21 212 lb 12.8 oz (96.5 kg)          Diagnosis Orders   1. Type 2 diabetes mellitus with chronic kidney disease on chronic dialysis, with long-term current use of insulin [facility])  [facility]) Primary Care Pharmacist   2. Essential hypertension  [facility]) Primary Care Pharmacist    CBC with Auto Differential    Basic Metabolic Panel    Lipid Panel    TSH With Reflex Ft4   3.  ESRD (end stage renal disease) on dialysis [facility])  [address], [address], DO 3/24/2022 11:16 AM

## 2022-03-24 NOTE — TELEPHONE ENCOUNTER
Last visit: 8/10/2021  Last Med refill: 8/27/2021  Does patient have enough medication for 72 hours: No: message left for patient to schedule an appointment. Next Visit Date:  Future Appointments   Date Time Provider Armin Rocha   3/24/2022 11:15 AM Noemy Shaw MD [facility] Maintenance   Topic Date Due    Depression Screen  Never done    Diabetic retinal exam  Never done    Shingles Vaccine (1 of 2) Never done    Flu vaccine (1) 09/01/2021    Diabetic foot exam  12/14/2021    Lipid screen  12/14/2021    COVID-19 Vaccine (2 - Booster for Leadformance series) 01/11/2022    Annual Wellness Visit (AWV)  Never done    A1C test (Diabetic or Prediabetic)  08/10/2022    Potassium monitoring  09/20/2022    Creatinine monitoring  09/20/2022    Pneumococcal 0-64 years Vaccine (2 of 4 - PPSV23) 08/23/2024    DTaP/Tdap/Td vaccine (2 - Td or Tdap) 08/23/2029    Colorectal Cancer Screen  12/07/2030    Hepatitis C screen  Completed    HIV screen  Completed    Hepatitis A vaccine  Aged Out    Hib vaccine  Aged Out    Meningococcal (ACWY) vaccine  Aged Out       Hemoglobin A1C (%)   Date Value   08/10/2021 8.0   01/18/2021 8.5 (H)   12/04/2020 10.4 (H)             ( goal A1C is < 7)   Microalb/Crt.  Ratio (mcg/mg creat)   Date Value   12/14/2020 3,134 (H)     LDL Cholesterol (mg/dL)   Date Value   12/14/2020 33   08/23/2019 46       (goal LDL is <100)   AST (U/L)   Date Value   09/16/2021 69 (H)     ALT (U/L)   Date Value   09/16/2021 33     BUN (mg/dL)   Date Value   09/20/2021 57 (H)     BP Readings from Last 3 Encounters:   01/13/22 113/75   09/20/21 137/79   09/15/21 126/76          (goal 120/80)    All Future Testing planned in [facility]  Lab Frequency Next Occurrence   Basic Metabolic Panel Once 42/46/9674   Hemoglobin and Hematocrit, Blood, Post Transfusion POST TRANSFUSION    Hemoglobin and Hematocrit, Blood, Post Transfusion POST TRANSFUSION                Patient Active Problem List:     Type 2 diabetes mellitus with chronic kidney disease on chronic dialysis, with long-term current use of insulin (HCC)     Sciatica of right side     Atypical chest pain     Low back pain of thoracolumbar region with sciatica     Kidney stone     Chest pain     Need for prophylactic vaccination against diphtheria-tetanus-pertussis (DTP)     Gastroesophageal reflux disease     Tachycardia     Essential hypertension     Stage 3 chronic kidney disease     BMI 31.0-31.9,adult     Acute kidney injury superimposed on CKD (HCC)     Type 2 MI (myocardial infarction) ([address]     Gastrointestinal hemorrhage     Acute posthemorrhagic anemia     Neuropathy     NSAID long-term use     Family history of malignant neoplasm of colon     Positive colorectal cancer screening using Cologuard test     JEFFREY (acute kidney injury) ([address]     Pneumonia due to COVID-19 virus     ESRD needing dialysis ([address]     Acute metabolic encephalopathy     Hypoxia     Thrombocytopenia ([address]

## 2022-03-24 NOTE — PROGRESS NOTES
Medication Management Service  [facility]  [phone]     CLINICAL PHARMACY NOTE: Comprehensive Medication Review      SUBJECTIVE/OBJECTIVE:    Didi Gallegos is a 64 y.o. male who was referred to [facility] for Comprehensive Medication Review by Dr. Moi Golden MD.     Patient currently prescribed the following medications:    Medication Orders    Current Outpatient Medications   Medication Sig Dispense Refill    midodrine (PROAMATINE) 5 MG tablet Take 1 tablet by mouth 3 times daily (with meals) 90 tablet 3    hydrALAZINE (APRESOLINE) 25 MG tablet Take 1 tablet by mouth every 8 hours 90 tablet 3    pantoprazole (PROTONIX) 40 MG tablet Take 1 tablet by mouth 2 times daily (before meals) 30 tablet 3    guaiFENesin (ROBITUSSIN) 100 MG/5ML SOLN oral solution Take 10 mLs by mouth every 4 hours as needed for Cough 1200 mL 0    acetaminophen (TYLENOL) 325 MG tablet TAKE 2 TABLET BY MOUTH EVERY 6 HOURS AS NEEDED FOR PAIN 120 tablet 0    metoprolol tartrate (LOPRESSOR) 50 MG tablet TAKE 1 TABLET BY MOUTH 2 TIMES A DAY (Patient not taking: Reported on 1/13/2022) 60 tablet 0    isosorbide mononitrate (IMDUR) 30 MG extended release tablet Take 1 tablet by mouth daily (Patient not taking: Reported on 1/13/2022) 30 tablet 3    atorvastatin (LIPITOR) 20 MG tablet Take 1 tablet by mouth daily (Patient not taking: Reported on 1/13/2022) 30 tablet 0    insulin lispro (HUMALOG) 100 UNIT/ML injection vial Inject 0-12 Units into the skin 3 times daily (with meals) (Patient not taking: Reported on 1/13/2022) 10 mL 3    insulin lispro (HUMALOG) 100 UNIT/ML injection vial Inject 0-6 Units into the skin nightly (Patient not taking: Reported on 1/13/2022) 10 mL 3    Insulin Pen Needle ([employer] PEN NEEDLES) 31G X 6 MM MISC 1 each by Does not apply route daily (Patient not taking: Reported on 1/13/2022) 100 each 3    insulin glargine (LANTUS SOLOSTAR) 100 UNIT/ML injection pen Inject 10 Units into the skin nightly (Patient not taking: Reported on 1/13/2022) 27 mL 0    blood glucose monitor strips Test 2 times a day & as needed for symptoms of irregular blood glucose. (Patient not taking: Reported on 1/13/2022) 100 strip 3    Lancets MISC 1 each by Does not apply route 2 times daily (Patient not taking: Reported on 1/13/2022) 100 each 5    gabapentin (NEURONTIN) 100 MG capsule TAKE 1 CAPSULE BY MOUTH 3 TIMES A DAY 90 capsule 2    Insulin Pen Needle 32G X 4 MM MISC 1 each by Does not apply route daily (Patient not taking: Reported on 1/13/2022) 100 each 3    aspirin 81 MG chewable tablet Take 1 tablet by mouth daily (Patient not taking: Reported on 1/13/2022) 30 tablet 0    blood glucose monitor kit and supplies Test 2 times a day & as needed for symptoms of irregular blood glucose. (Patient not taking: Reported on 1/13/2022) 1 kit 0     No current facility-administered medications for this visit. Additional Medications:      ASSESSMENT/PLAN:  · Adherence: Patient has not been filling medications since 10/2021 other than a renal multivitamin. Medication reconciliation completed.      Identified medication discrepancies/issues:    Medication(s) Issue Action     Renal multivitamin Not on medication list Medication added to the list   Protonix  Gabapentin  Blood glucose testing supplies   Patient requests to have a supply of these specific medications & testing supplies Recommend to submit orders    Guaifenesin  Patient no longer takes medication Removed from the list   Midodrine Prescribed along with anti-hypertensive agents Recommend not to continue medication and adjust anti-hypertensive agents as required for blood pressure control        Ileana Carlson, PharmD   PGY-1 Pharmacy Resident  Medication Management Service - [facility]  [phone]    03/24/22 11:46 AM

## 2022-03-24 NOTE — PATIENT INSTRUCTIONS
Thank you for letting us take care of you today. We hope all your questions were addressed. If a question was overlooked or something else comes to mind after you return home, please contact a member of your Care Team listed below. Your Care Team at [place] [address] is Team #2  Liya Dallas DO (Faculty)  Michael Ovalle (Faculty)  Melody Jackman MD (Resident)  Leah Carter MD (Resident)  Carol Ann Fay MD (Resident)  Rao Trevino MD (Resident)  Subhash Magana., DAMARIS Layton.,  BALAJI Serrato., [facility] office)  Tanya Carreon, [address] (Clinical Practice Manager)  Compa Alejandre, [address] [place] [address] (Clinical Pharmacist)     Office phone number: [phone]    If you need to get in right away due to illness, please be advised we have \"Same Day\" appointments available Monday-Friday. Please call us at [phone] option #3 to schedule your \"Same Day\" appointment.

## 2022-04-12 ENCOUNTER — TELEPHONE (OUTPATIENT)
Dept: FAMILY MEDICINE CLINIC | Age: 62
End: 2022-04-12

## 2022-04-12 NOTE — TELEPHONE ENCOUNTER
Medication Management Service  [facility]  [phone]     CLINICAL PHARMACY NOTE: Comprehensive Medication Review      SUBJECTIVE/OBJECTIVE:    Lupe Spangler is a 64 y.o. male who was schedule for Comprehensive Medication Review at the request of Dr. Jazmin Garcias MD. Additionally, provider requests education on diabetes, hypertension, and medication adherence. Patient did not present for appointment or call to cancel/reschedule.  Appointment rescheduled for 04/26/2022 @ 10:00 AM.    Eric WashingtonD  PGY2 Ambulatory Care Pharmacy Resident    4/12/2022 11:27 AM    ====================================================================    For Pharmacy Admin Tracking Only     CPA in place:  No   Recommendation Provided To: Patient/Caregiver: 1 via Telephone   Intervention Detail: Scheduled Appointment   Gap Closed?: No    Intervention Accepted By: Patient/Caregiver: 1   Time Spent (min): 15

## 2022-04-14 ENCOUNTER — TELEPHONE (OUTPATIENT)
Dept: FAMILY MEDICINE CLINIC | Age: 62
End: 2022-04-14

## 2022-04-14 ENCOUNTER — HOSPITAL ENCOUNTER (OUTPATIENT)
Age: 62
Setting detail: SPECIMEN
Discharge: HOME | End: 2022-04-14
Payer: MEDICARE

## 2022-04-14 DIAGNOSIS — I10 ESSENTIAL HYPERTENSION: ICD-10-CM

## 2022-04-14 LAB
ABSOLUTE EOS #: 0.18 K/UL (ref 0–0.44)
ABSOLUTE IMMATURE GRANULOCYTE: <0.03 K/UL (ref 0–0.3)
ABSOLUTE LYMPH #: 1.77 K/UL (ref 1.1–3.7)
ABSOLUTE MONO #: 0.76 K/UL (ref 0.1–1.2)
ANION GAP SERPL CALCULATED.3IONS-SCNC: 13 MMOL/L (ref 9–17)
BASOPHILS # BLD: 1 % (ref 0–2)
BASOPHILS ABSOLUTE: 0.06 K/UL (ref 0–0.2)
BUN BLDV-MCNC: 34 MG/DL (ref 8–23)
CALCIUM SERPL-MCNC: 9.4 MG/DL (ref 8.6–10.4)
CHLORIDE BLD-SCNC: 98 MMOL/L (ref 98–107)
CHOLESTEROL/HDL RATIO: 2.4
CHOLESTEROL: 188 MG/DL
CO2: 28 MMOL/L (ref 20–31)
CREAT SERPL-MCNC: 5.73 MG/DL (ref 0.7–1.2)
EOSINOPHILS RELATIVE PERCENT: 3 % (ref 1–4)
GFR AFRICAN AMERICAN: 12 ML/MIN
GFR NON-AFRICAN AMERICAN: 10 ML/MIN
GFR SERPL CREATININE-BSD FRML MDRD: ABNORMAL ML/MIN/{1.73_M2}
GLUCOSE BLD-MCNC: 338 MG/DL (ref 70–99)
HCT VFR BLD CALC: 33.1 % (ref 40.7–50.3)
HDLC SERPL-MCNC: 80 MG/DL
HEMOGLOBIN: 10.9 G/DL (ref 13–17)
IMMATURE GRANULOCYTES: 0 %
LDL CHOLESTEROL: 73 MG/DL (ref 0–130)
LYMPHOCYTES # BLD: 29 % (ref 24–43)
MCH RBC QN AUTO: 31.7 PG (ref 25.2–33.5)
MCHC RBC AUTO-ENTMCNC: 32.9 G/DL (ref 28.4–34.8)
MCV RBC AUTO: 96.2 FL (ref 82.6–102.9)
MONOCYTES # BLD: 12 % (ref 3–12)
NRBC AUTOMATED: 0 PER 100 WBC
PDW BLD-RTO: 14.4 % (ref 11.8–14.4)
PLATELET # BLD: 240 K/UL (ref 138–453)
PMV BLD AUTO: 10.5 FL (ref 8.1–13.5)
POTASSIUM SERPL-SCNC: 4.3 MMOL/L (ref 3.7–5.3)
RBC # BLD: 3.44 M/UL (ref 4.21–5.77)
SEG NEUTROPHILS: 55 % (ref 36–65)
SEGMENTED NEUTROPHILS ABSOLUTE COUNT: 3.37 K/UL (ref 1.5–8.1)
SODIUM BLD-SCNC: 139 MMOL/L (ref 135–144)
TRIGL SERPL-MCNC: 177 MG/DL
TSH SERPL DL<=0.05 MIU/L-ACNC: 1.08 UIU/ML (ref 0.3–5)
WBC # BLD: 6.2 K/UL (ref 3.5–11.3)

## 2022-04-14 PROCEDURE — 84443 ASSAY THYROID STIM HORMONE: CPT

## 2022-04-14 PROCEDURE — 36415 COLL VENOUS BLD VENIPUNCTURE: CPT

## 2022-04-14 PROCEDURE — 80061 LIPID PANEL: CPT

## 2022-04-14 PROCEDURE — 85025 COMPLETE CBC W/AUTO DIFF WBC: CPT

## 2022-04-14 PROCEDURE — 80048 BASIC METABOLIC PNL TOTAL CA: CPT

## 2022-04-26 ENCOUNTER — TELEPHONE (OUTPATIENT)
Dept: FAMILY MEDICINE CLINIC | Age: 62
End: 2022-04-26

## 2022-04-26 NOTE — TELEPHONE ENCOUNTER
Medication Management Service  [place] Schilder is a 64 y.o. male was schedule for Comprehensive Medication Review with Medication Management Service per referral from Dr. Sarita Diaz MD     Patient did not present for appointment or call to reschedule. Writer called patient to reschedule appointment. Called telephone number documented in Epic twice. Wrong telephone number.        Mariam Verduzco PharmD  PGY2 Ambulatory Care Pharmacy Resident    4/26/2022 10:22 AM    =========================================================    For Pharmacy Admin Tracking Only     CPA in place:  No   Recommendation Provided To: Patient/Caregiver: 2 via Telephone   Intervention Detail: Scheduled Appointment   Gap Closed?: No    Intervention Accepted By: Patient/Caregiver: 0   Time Spent (min): 20

## 2022-05-10 DIAGNOSIS — E11.22 TYPE 2 DIABETES MELLITUS WITH CHRONIC KIDNEY DISEASE ON CHRONIC DIALYSIS, WITH LONG-TERM CURRENT USE OF INSULIN (HCC): ICD-10-CM

## 2022-05-10 DIAGNOSIS — Z99.2 TYPE 2 DIABETES MELLITUS WITH CHRONIC KIDNEY DISEASE ON CHRONIC DIALYSIS, WITH LONG-TERM CURRENT USE OF INSULIN (HCC): ICD-10-CM

## 2022-05-10 DIAGNOSIS — Z79.4 TYPE 2 DIABETES MELLITUS WITH CHRONIC KIDNEY DISEASE ON CHRONIC DIALYSIS, WITH LONG-TERM CURRENT USE OF INSULIN (HCC): ICD-10-CM

## 2022-05-10 DIAGNOSIS — N18.6 TYPE 2 DIABETES MELLITUS WITH CHRONIC KIDNEY DISEASE ON CHRONIC DIALYSIS, WITH LONG-TERM CURRENT USE OF INSULIN (HCC): ICD-10-CM

## 2022-05-10 RX ORDER — ATORVASTATIN CALCIUM 20 MG/1
TABLET, FILM COATED ORAL
Qty: 30 TABLET | Refills: 0 | Status: SHIPPED | OUTPATIENT
Start: 2022-05-10 | End: 2022-06-06

## 2022-05-10 NOTE — TELEPHONE ENCOUNTER
E-scribe request for med refill. Please review and e-scribe if applicable. Last Visit Date:  03/24/2022  Next Visit Date:  6/7/2022    Hemoglobin A1C (%)   Date Value   03/24/2022 6.9   08/10/2021 8.0   01/18/2021 8.5 (H)             ( goal A1C is < 7)   Microalb/Crt.  Ratio (mcg/mg creat)   Date Value   12/14/2020 3,134 (H)     LDL Cholesterol (mg/dL)   Date Value   04/14/2022 73       (goal LDL is <100)   AST (U/L)   Date Value   09/16/2021 69 (H)     ALT (U/L)   Date Value   09/16/2021 33     BUN (mg/dL)   Date Value   04/14/2022 34 (H)     BP Readings from Last 3 Encounters:   03/24/22 118/66   01/13/22 113/75   09/20/21 137/79          (goal 120/80)        Patient Active Problem List:     Type 2 diabetes mellitus with chronic kidney disease on chronic dialysis, with long-term current use of insulin (HCC)     Sciatica of right side     Atypical chest pain     Low back pain of thoracolumbar region with sciatica     Kidney stone     Chest pain     Need for prophylactic vaccination against diphtheria-tetanus-pertussis (DTP)     Gastroesophageal reflux disease     Tachycardia     Essential hypertension     Stage 3 chronic kidney disease     BMI 31.0-31.9,adult     Acute kidney injury superimposed on CKD ([address]     Type 2 MI (myocardial infarction) ([address]     Gastrointestinal hemorrhage     Acute posthemorrhagic anemia     Neuropathy     NSAID long-term use     Family history of malignant neoplasm of colon     Positive colorectal cancer screening using Cologuard test     JEFFREY (acute kidney injury) ([address]     Pneumonia due to COVID-19 virus     ESRD needing dialysis ([address]     Acute metabolic encephalopathy     Hypoxia     Thrombocytopenia ([address]      ----True Lower

## 2022-05-13 ENCOUNTER — HOSPITAL ENCOUNTER (OUTPATIENT)
Dept: DIALYSIS | Age: 62
Setting detail: DIALYSIS SERIES
Discharge: HOME | End: 2022-05-13
Payer: COMMERCIAL

## 2022-05-13 ENCOUNTER — HOSPITAL ENCOUNTER (EMERGENCY)
Age: 62
Discharge: HOME HEALTH | End: 2022-05-13
Attending: EMERGENCY MEDICINE
Payer: COMMERCIAL

## 2022-05-13 VITALS
HEART RATE: 100 BPM | RESPIRATION RATE: 16 BRPM | SYSTOLIC BLOOD PRESSURE: 141 MMHG | TEMPERATURE: 97.4 F | DIASTOLIC BLOOD PRESSURE: 78 MMHG | OXYGEN SATURATION: 96 %

## 2022-05-13 VITALS
DIASTOLIC BLOOD PRESSURE: 106 MMHG | SYSTOLIC BLOOD PRESSURE: 168 MMHG | HEART RATE: 105 BPM | TEMPERATURE: 97.4 F | BODY MASS INDEX: 30.68 KG/M2 | WEIGHT: 213.85 LBS | RESPIRATION RATE: 18 BRPM

## 2022-05-13 DIAGNOSIS — N18.6 ESRD (END STAGE RENAL DISEASE) (HCC): Primary | ICD-10-CM

## 2022-05-13 DIAGNOSIS — Z48.00 DRESSING CHANGE: ICD-10-CM

## 2022-05-13 PROCEDURE — 99282 EMERGENCY DEPT VISIT SF MDM: CPT

## 2022-05-13 PROCEDURE — 90935 HEMODIALYSIS ONE EVALUATION: CPT

## 2022-05-13 ASSESSMENT — ENCOUNTER SYMPTOMS
BACK PAIN: 0
ABDOMINAL PAIN: 0
SHORTNESS OF BREATH: 0
VOMITING: 0
NAUSEA: 0

## 2022-05-13 NOTE — ED PROVIDER NOTES
[facility]     Emergency Department     Faculty Attestation    I performed a history and physical examination of the patient and discussed management with the resident. I reviewed the residents note and agree with the documented findings including all diagnostic interpretations and plan of care. Any areas of disagreement are noted on the chart. I was personally present for the key portions of any procedures. I have documented in the chart those procedures where I was not present during the key portions. I have reviewed the emergency nurses triage note. I agree with the chief complaint, past medical history, past surgical history, allergies, medications, social and family history as documented unless otherwise noted below. Documentation of the HPI, Physical Exam and Medical Decision Making performed by scribes is based on my personal performance of the HPI, PE and MDM. For Physician Assistant/ Nurse Practitioner cases/documentation I have personally evaluated this patient and have completed at least one if not all key elements of the E/M (history, physical exam, and MDM). Additional findings are as noted. This patient was evaluated in the Emergency Department for symptoms described in the history of present illness. He/she was evaluated in the context of the global COVID-19 pandemic, which necessitated consideration that the patient might be at risk for infection with the SARS-CoV-2 virus that causes COVID-19. Institutional protocols and algorithms that pertain to the evaluation of patients at risk for COVID-19 are in a state of rapid change based on information released by regulatory bodies including the CDC and federal and state organizations. These policies and algorithms were followed during the patient's care in the ED. Primary Care Physician: Cydney Alas MD    History:  This is a 64 y.o. male who presents to the Emergency Department with complaint of need for dressing change as well as dialysis. Has a tunneled catheter on the right chest reports the dressing came off. Reports that he needs dialysis for his routine session today. Has not missed any sessions. Physical:     oral temperature is 97.4 °F (36.3 °C). His blood pressure is 141/78 (abnormal) and his pulse is 100. His respiration is 16 and oxygen saturation is 96%.    64 y.o. male no acute distress, right chest wall shows dialysis catheter with no dressing, there also does not appear to be any suture anchoring it. No swelling or tenderness around the dialysis site    Impression: Need for dressing change, dialysis    Plan: We will place dressing. We did discuss with patient about suture to anchor the catheter however he has refused on multiple attempts to ask him he reports he does not want it \"messed with\" and that he is having a fistula placed soon he simply wants a dressing over it.   We did discuss with him that he is at risk have been dislodged and if so for fistula is matured he would likely need repeat procedure    Hanna Espana MD, [facility]  Attending Emergency Physician         Melvi Hernandez MD  05/13/22 [id number]

## 2022-05-13 NOTE — ED PROVIDER NOTES
Social History     Socioeconomic History    Marital status: Single     Spouse name: Not on file    Number of children: Not on file    Years of education: Not on file    Highest education level: Not on file   Occupational History    Not on file   Tobacco Use    Smoking status: Former Smoker     Packs/day: 0.50     Years: 30.00     Pack years: 15.00     Types: Cigarettes     Quit date: 11/6/2018     Years since quitting: 3.5    Smokeless tobacco: Never Used   Substance and Sexual Activity    Alcohol use: Yes     Alcohol/week: 8.0 standard drinks     Types: 8 Cans of beer per week     Comment: drinks \"a few\" beers a day    Drug use: No     Types: Marijuana (Weed)     Comment:  last use one month    Sexual activity: Never     Partners: Female   Other Topics Concern    Not on file   Social History Narrative    Not on file     Social Determinants of Health     Financial Resource Strain:     Difficulty of Paying Living Expenses: Not on file   Food Insecurity:     Worried About Running Out of Food in the Last Year: Not on file    Jesus of Food in the Last Year: Not on file   Transportation Needs:     Lack of Transportation (Medical): Not on file    Lack of Transportation (Non-Medical):  Not on file   Physical Activity:     Days of Exercise per Week: Not on file    Minutes of Exercise per Session: Not on file   Stress:     Feeling of Stress : Not on file   Social Connections:     Frequency of Communication with Friends and Family: Not on file    Frequency of Social Gatherings with Friends and Family: Not on file    Attends [employer] Services: Not on file    Active Member of Clubs or Organizations: Not on file    Attends Club or Organization Meetings: Not on file    Marital Status: Not on file   Intimate Partner Violence:     Fear of Current or Ex-Partner: Not on file    Emotionally Abused: Not on file    Physically Abused: Not on file    Sexually Abused: Not on file   Housing Stability:  Unable to Pay for Housing in the Last Year: Not on file    Number of Places Lived in the Last Year: Not on file    Unstable Housing in the Last Year: Not on file       Family History   Problem Relation Age of Onset    Diabetes Mother     High Blood Pressure Father         Allergies:  Patient has no known allergies. Home Medications:  Prior to Admission medications    Medication Sig Start Date End Date Taking? Authorizing Provider   atorvastatin (LIPITOR) 20 MG tablet TAKE 1 TABLET BY MOUTH ONE TIME A DAY 5/10/22   Efren Anaya MD   Multiple Vitamins-Minerals (RENAPLEX-D) TABS Take 1 tablet by mouth daily 3/23/22   Historical Provider, MD   pantoprazole (PROTONIX) 40 MG tablet Take 1 tablet by mouth 2 times daily (before meals) 3/24/22   Anam Enciso MD   metoprolol tartrate (LOPRESSOR) 25 MG tablet Take 1 tablet by mouth 2 times daily 3/24/22   Anam Enciso MD   gabapentin (NEURONTIN) 100 MG capsule Take 1 capsule by mouth 3 times daily for 30 days. 3/24/22 4/23/22  Anam Enciso MD   aspirin 81 MG chewable tablet Take 1 tablet by mouth daily 3/24/22   Anam Enciso MD   acetaminophen (TYLENOL) 325 MG tablet TAKE 2 TABLET BY MOUTH EVERY 6 HOURS AS NEEDED FOR PAIN 3/24/22   Anam Enciso MD       REVIEW OFSYSTEMS    (2-9 systems for level 4, 10 or more for level 5)      Review of Systems   Constitutional: Negative for diaphoresis and fever. HENT: Negative for congestion. Eyes: Negative for visual disturbance. Respiratory: Negative for shortness of breath. Cardiovascular: Negative for chest pain. Gastrointestinal: Negative for abdominal pain, nausea and vomiting. Endocrine: Negative for polyuria. Genitourinary: Negative for dysuria. Musculoskeletal: Negative for back pain. Skin: Negative for wound. Neurological: Negative for headaches. Psychiatric/Behavioral: Negative for confusion.        PHYSICAL EXAM   (up to 7 for level 4, 8 or more forlevel 5)      ED TRIAGE VITALS BP: (!) 141/78, Temp: 97.4 °F (36.3 °C), Pulse: 100, Resp: 16, SpO2: 96 %    Vitals:    05/13/22 1618   BP: (!) 141/78   Pulse: 100   Resp: 16   Temp: 97.4 °F (36.3 °C)   TempSrc: Oral   SpO2: 96%       Physical Exam  HENT:      Head: Normocephalic. Nose: Nose normal.   Cardiovascular:      Rate and Rhythm: Normal rate. Pulmonary:      Comments: There is a dialysis catheter on the right anterior chest without sutures appears clean without signs of infection  Chest:      Chest wall: No tenderness. Abdominal:      Palpations: Abdomen is soft. Musculoskeletal:         General: Normal range of motion. Cervical back: Normal range of motion. Skin:     General: Skin is warm. Capillary Refill: Capillary refill takes less than 2 seconds. Neurological:      Mental Status: He is alert. DIFFERENTIAL  DIAGNOSIS     PLAN (LABS / IMAGING / EKG):  Orders Placed This Encounter   Procedures    Inpatient consult to Nephrology       MEDICATIONS ORDERED:  No orders of the defined types were placed in this encounter. DDX:     Dressing change, dialysis    Initial MDM/Plan: 64 y.o. male who presents with dressing change, need for dialysis     Patient here secondary to need for dressing change to the dialysis catheter on the right anterior chest  On physical exam there is a catheter in the right anterior chest that is without suture without anchor appears without infection  Area cleaned with iodine, adherent/adhesive dressing applied  Discussed case with nephrology as patient has missed his last dialysis session was was earlier today  Gets dialyzed Mondays Wednesdays Fridays  They will accept him for dialysis today  Plan for discharge    Disposition:. Discharged with outpatient follow-up, nephrology follow-up        DIAGNOSTIC RESULTS / [address] / MDM     LABS:  No results found for this visit on 05/13/22.     RADIOLOGY:  No orders to display         EMERGENCY DEPARTMENT COURSE:  ED Course as of 05/13/22 1719   Fri May 13, 2022   1632 Patient seen and assessed the emergency department no acute respiratory cardiovascular distress. Patient here with need for dressing change to the right anterior chest catheter for dialysis, has have history of end-stage renal disease gets dialyzed Mondays Wednesdays Fridays. States he missed today's session and was wondering if he could also get dialyzed today. Has no dressing or suture at the catheter site, states that he is supposed to get revision with a more permanent catheter put in either in his arm or in his chest.  States that he still is able to follow-up with vascular surgery for the procedure. Denies fevers or chills or traumatic injuries, chest pain shortness of breath. There is no signs of infection at the site. [PS]   3401 Area was cleaned with iodine, disinfected, adhesive bandage applied to the area as there was no sutures holding the catheter in place. [PS]   [facility] nephrology for dialysis today if possible. [PS]   7134 D/w nephro ok for dialysis [PS]      ED Course User Index  [PS] Jeff Haider MD          PROCEDURES:  None    CONSULTS:  IP CONSULT TO NEPHROLOGY    CRITICAL CARE:  Please see attending note    FINAL IMPRESSION      1. ESRD (end stage renal disease) [address]    2. Dressing change          DISPOSITION / PLAN     DISPOSITION Decision To Discharge 05/13/2022 05:17:12 PM       PATIENT REFERRED TO:  No follow-up provider specified.     DISCHARGE MEDICATIONS:  New Prescriptions    No medications on file       Jeff Haider MD  Emergency Medicine Resident    (Please note that portions of this note were completed with a voice recognition program.Efforts were made to edit the dictations but occasionally words are mis-transcribed.)       Jeff Haider MD  Resident  05/13/22 [phone]

## 2022-05-13 NOTE — PROGRESS NOTES
Dialysis Time Out  To be done by RN and tech or 2 RNs  Staff Names Ed RN, Erich Angelucci RN    [x]  Identity of the patient using 2 patient identifiers  [x]  Consent for treatment  [x]  Equipment-proper machine and dialyzer  [x]  B-Hep B status  [x]  Orders- to include bath, blood flow, dialyzer, time and fluid removal  [x]  Access-Correct site and in working order  [x]  Time for patient to ask questions.

## 2022-05-13 NOTE — ED NOTES
Pt. To ER 26 in renee bed from triage, ambulatory with steady gait  Pt. Presents with request for his dialysis port dressing to be changed and is also requesting a dialysis treatment   Pt.  Denies all other medical concerns  Vitals stable  Awaiting orders  Will continue to monitor      Princess Jennings RN  05/13/22 [id number]

## 2022-05-14 NOTE — PROGRESS NOTES
Dialysis Post Treatment Note  Vitals:    05/13/22 2000   BP: (!) 168/106   Pulse: 105   Resp: 18   Temp: 97.4 °F (36.3 °C)     Pre-Weight = 97kg  Post-weight = Weight: 213 lb 13.5 oz (97 kg)  Total Liters Processed = Total Liters Processed (l/min): 44.5 l/min  Rinseback Volume (mL) = Rinseback Volume (ml): 300 ml  Net Removal (mL) = NET Removed (ml): 2000 ml  Patient's dry weight=96kg  Type of access used=cvc  Length of treatment=2 hrs      Pt tolerated tx well and 2 liters off.

## 2022-06-06 DIAGNOSIS — Z99.2 TYPE 2 DIABETES MELLITUS WITH CHRONIC KIDNEY DISEASE ON CHRONIC DIALYSIS, WITH LONG-TERM CURRENT USE OF INSULIN (HCC): ICD-10-CM

## 2022-06-06 DIAGNOSIS — N18.6 TYPE 2 DIABETES MELLITUS WITH CHRONIC KIDNEY DISEASE ON CHRONIC DIALYSIS, WITH LONG-TERM CURRENT USE OF INSULIN (HCC): ICD-10-CM

## 2022-06-06 DIAGNOSIS — E11.22 TYPE 2 DIABETES MELLITUS WITH CHRONIC KIDNEY DISEASE ON CHRONIC DIALYSIS, WITH LONG-TERM CURRENT USE OF INSULIN (HCC): ICD-10-CM

## 2022-06-06 DIAGNOSIS — Z79.4 TYPE 2 DIABETES MELLITUS WITH CHRONIC KIDNEY DISEASE ON CHRONIC DIALYSIS, WITH LONG-TERM CURRENT USE OF INSULIN (HCC): ICD-10-CM

## 2022-06-06 DIAGNOSIS — K21.9 GASTROESOPHAGEAL REFLUX DISEASE, UNSPECIFIED WHETHER ESOPHAGITIS PRESENT: ICD-10-CM

## 2022-06-06 RX ORDER — PANTOPRAZOLE SODIUM 40 MG/1
TABLET, DELAYED RELEASE ORAL
Qty: 30 TABLET | Refills: 3 | Status: SHIPPED | OUTPATIENT
Start: 2022-06-06 | End: 2022-08-02

## 2022-06-06 RX ORDER — ATORVASTATIN CALCIUM 20 MG/1
TABLET, FILM COATED ORAL
Qty: 30 TABLET | Refills: 0 | Status: SHIPPED | OUTPATIENT
Start: 2022-06-06 | End: 2022-08-02

## 2022-06-06 NOTE — TELEPHONE ENCOUNTER
E-scribe request for med refills. Please review and e-scribe if applicable. Last Visit Date:  3/24/22  Next Visit Date:  6/7/2022    Hemoglobin A1C (%)   Date Value   03/24/2022 6.9   08/10/2021 8.0   01/18/2021 8.5 (H)             ( goal A1C is < 7)   Microalb/Crt.  Ratio (mcg/mg creat)   Date Value   12/14/2020 3,134 (H)     LDL Cholesterol (mg/dL)   Date Value   04/14/2022 73       (goal LDL is <100)   AST (U/L)   Date Value   09/16/2021 69 (H)     ALT (U/L)   Date Value   09/16/2021 33     BUN (mg/dL)   Date Value   04/14/2022 34 (H)     BP Readings from Last 3 Encounters:   05/13/22 (!) 141/78   05/13/22 (!) 168/106   03/24/22 118/66          (goal 120/80)        Patient Active Problem List:     Type 2 diabetes mellitus with chronic kidney disease on chronic dialysis, with long-term current use of insulin (HCC)     Sciatica of right side     Atypical chest pain     Low back pain of thoracolumbar region with sciatica     Kidney stone     Chest pain     Need for prophylactic vaccination against diphtheria-tetanus-pertussis (DTP)     Gastroesophageal reflux disease     Tachycardia     Essential hypertension     Stage 3 chronic kidney disease     BMI 31.0-31.9,adult     Acute kidney injury superimposed on CKD ([address]     Type 2 MI (myocardial infarction) ([address]     Gastrointestinal hemorrhage     Acute posthemorrhagic anemia     Neuropathy     NSAID long-term use     Family history of malignant neoplasm of colon     Positive colorectal cancer screening using Cologuard test     JEFFREY (acute kidney injury) ([address]     Pneumonia due to COVID-19 virus     ESRD needing dialysis ([address]     Acute metabolic encephalopathy     Hypoxia     Thrombocytopenia ([address]      ----Dedrick Jean-Baptiste

## 2022-06-06 NOTE — TELEPHONE ENCOUNTER
E-scribe request for med refills. Please review and e-scribe if applicable. Last Visit Date:  3/24/22  Next Visit Date:  6/6/2022    Hemoglobin A1C (%)   Date Value   03/24/2022 6.9   08/10/2021 8.0   01/18/2021 8.5 (H)             ( goal A1C is < 7)   Microalb/Crt.  Ratio (mcg/mg creat)   Date Value   12/14/2020 3,134 (H)     LDL Cholesterol (mg/dL)   Date Value   04/14/2022 73       (goal LDL is <100)   AST (U/L)   Date Value   09/16/2021 69 (H)     ALT (U/L)   Date Value   09/16/2021 33     BUN (mg/dL)   Date Value   04/14/2022 34 (H)     BP Readings from Last 3 Encounters:   05/13/22 (!) 141/78   05/13/22 (!) 168/106   03/24/22 118/66          (goal 120/80)        Patient Active Problem List:     Type 2 diabetes mellitus with chronic kidney disease on chronic dialysis, with long-term current use of insulin (HCC)     Sciatica of right side     Atypical chest pain     Low back pain of thoracolumbar region with sciatica     Kidney stone     Chest pain     Need for prophylactic vaccination against diphtheria-tetanus-pertussis (DTP)     Gastroesophageal reflux disease     Tachycardia     Essential hypertension     Stage 3 chronic kidney disease     BMI 31.0-31.9,adult     Acute kidney injury superimposed on CKD ([address]     Type 2 MI (myocardial infarction) ([address]     Gastrointestinal hemorrhage     Acute posthemorrhagic anemia     Neuropathy     NSAID long-term use     Family history of malignant neoplasm of colon     Positive colorectal cancer screening using Cologuard test     JEFFREY (acute kidney injury) ([address]     Pneumonia due to COVID-19 virus     ESRD needing dialysis ([address]     Acute metabolic encephalopathy     Hypoxia     Thrombocytopenia ([address]      ----Tammi Prieto

## 2022-07-01 ENCOUNTER — TELEPHONE (OUTPATIENT)
Dept: FAMILY MEDICINE CLINIC | Age: 62
End: 2022-07-01

## 2022-07-01 NOTE — TELEPHONE ENCOUNTER
CLINICAL PHARMACY NOTE: Comprehensive Medication Review      SUBJECTIVE/OBJECTIVE:    Jadiel Waller is a 64 y.o. male who was referred to [facility] for Comprehensive Medication Review by Dr. Maria Victoria Henriquez MD. Patient with end-stage renal disease with dialysis on MWF. Uses [facility] for prescriptions. Patient have chronic medications placed into adherence packaging. Patient's insurance is [address] [place] [address] [employer] for education regarding HTN and DM. Lat HgbA1c was 6.9% from 03/24/2022. Patient scheduled for 07/01/2022. Appointment was canceled and rescheduled to 07/26/2022. Patient is scheduled to see PCP on 07/12/2022. Pre-chart work up completed with findings below. Patient currently prescribed the following medications:    Medication Orders    Current Outpatient Medications   Medication Sig Dispense Commentsl    atorvastatin (LIPITOR) 20 MG tablet TAKE 1 TABLET BY MOUTH ONE TIME A DAY Last filled on 06/10/2022 #28  In adherence packaging    pantoprazole (PROTONIX) 40 MG tablet TAKE 1 TABLET BY MOUTH 2 TIMES A DAY BEFORE MEALS Last filled on 06/10/2022 #56 as a 28 day suppy In adherence packaging    Multiple Vitamins-Minerals (RENAPLEX-D) TABS Take 1 tablet by mouth daily RenaPlex D Oral Tablet-Last filled 04/26/2022 #30 Need to confirm if patient still taking? No vitamin in adherence packaging.  metoprolol tartrate (LOPRESSOR) 25 MG tablet Take 1 tablet by mouth 2 times daily Metoprolol 50mg tablets last filled on 06/10/2022 #56 as a 28 day supply Metoprolol tartrate 50mg tablets in adherence packaging. Medication list will need updating to reflect this dose.  gabapentin (NEURONTIN) 100 MG capsule Take 1 capsule by mouth 3 times daily for 30 days. Filled 06/10/2022 #84 as a 28 day supply  In adherence packaging.  aspirin 81 MG chewable tablet Take 1 tablet by mouth daily Last filled on 01/14/2020 #30 May be filling as an OTC or not taking?  Not in adherence packaging.  acetaminophen (TYLENOL) 325 MG tablet TAKE 2 TABLET BY MOUTH EVERY 6 HOURS AS NEEDED FOR PAIN Last filled 8/18/2021 #120   Confirm that patient is still taking? Additional Medications:  True Metrix Kit Meter-Filled on 01/14/2020-Test strips, lancets last filled 08/10/2021  Lisinopril 10mg #28-Howard Felixi-Last filled 06/10/2022-In adherence packaging  Insulin glargine (Lantus) last filled 09/29/2021 #15ml- 30 day supply  Insulin Lispro (Humalog)-last filled 09/29/2021  Pen Needles last fill 08/10/2021 #100  Sevelamer Carb Tab 800mg  #180 as 30 day supply. Last filled on 04/6/2022      ASSESSMENT/PLAN:  Current pharmacy/pharmacies: [facility]/Blister packed patient. Patient was not seen on 07/01/2022. Appointment rescheduled to 07/26/2022 which is after next PCP appointment. Will route above findings to PCP so that it can be reviewed at next PCP appointment on 7/12/2022. Identified medication discrepancies/issues:    Medication(s) Issue Action   DM Mangement   No current therapy??? Will let PCP know to confirm with patient at next appointment. Bhavna Edmonds, Pharm. D., [address] Medication Management Service  [phone]  7/6/2022  5:19 PM    --------------------------------------------------------------------------------------  For Pharmacy Admin Tracking Only     CPA in place:  No   Time Spent (min): 60

## 2022-07-11 DIAGNOSIS — Z99.2 TYPE 2 DIABETES MELLITUS WITH CHRONIC KIDNEY DISEASE ON CHRONIC DIALYSIS, WITH LONG-TERM CURRENT USE OF INSULIN (HCC): ICD-10-CM

## 2022-07-11 DIAGNOSIS — Z79.4 TYPE 2 DIABETES MELLITUS WITH CHRONIC KIDNEY DISEASE ON CHRONIC DIALYSIS, WITH LONG-TERM CURRENT USE OF INSULIN (HCC): ICD-10-CM

## 2022-07-11 DIAGNOSIS — N18.6 TYPE 2 DIABETES MELLITUS WITH CHRONIC KIDNEY DISEASE ON CHRONIC DIALYSIS, WITH LONG-TERM CURRENT USE OF INSULIN (HCC): ICD-10-CM

## 2022-07-11 DIAGNOSIS — E11.22 TYPE 2 DIABETES MELLITUS WITH CHRONIC KIDNEY DISEASE ON CHRONIC DIALYSIS, WITH LONG-TERM CURRENT USE OF INSULIN (HCC): ICD-10-CM

## 2022-07-11 RX ORDER — GABAPENTIN 100 MG/1
CAPSULE ORAL
Qty: 90 CAPSULE | Refills: 2 | OUTPATIENT
Start: 2022-07-11

## 2022-07-11 RX ORDER — ATORVASTATIN CALCIUM 20 MG/1
TABLET, FILM COATED ORAL
Qty: 30 TABLET | Refills: 0 | OUTPATIENT
Start: 2022-07-11

## 2022-07-11 NOTE — TELEPHONE ENCOUNTER
E-scribe request for med refills. Please review and e-scribe if applicable. Last Visit Date:  03/24/2022  Next Visit Date:  7/12/2022    Hemoglobin A1C (%)   Date Value   03/24/2022 6.9   08/10/2021 8.0   01/18/2021 8.5 (H)             ( goal A1C is < 7)   Microalb/Crt.  Ratio (mcg/mg creat)   Date Value   12/14/2020 3,134 (H)     LDL Cholesterol (mg/dL)   Date Value   04/14/2022 73       (goal LDL is <100)   AST (U/L)   Date Value   09/16/2021 69 (H)     ALT (U/L)   Date Value   09/16/2021 33     BUN (mg/dL)   Date Value   04/14/2022 34 (H)     BP Readings from Last 3 Encounters:   05/13/22 (!) 141/78   05/13/22 (!) 168/106   03/24/22 118/66          (goal 120/80)        Patient Active Problem List:     Type 2 diabetes mellitus with chronic kidney disease on chronic dialysis, with long-term current use of insulin (HCC)     Sciatica of right side     Atypical chest pain     Low back pain of thoracolumbar region with sciatica     Kidney stone     Chest pain     Need for prophylactic vaccination against diphtheria-tetanus-pertussis (DTP)     Gastroesophageal reflux disease     Tachycardia     Essential hypertension     Stage 3 chronic kidney disease     BMI 31.0-31.9,adult     Acute kidney injury superimposed on CKD ([address]     Type 2 MI (myocardial infarction) ([address]     Gastrointestinal hemorrhage     Acute posthemorrhagic anemia     Neuropathy     NSAID long-term use     Family history of malignant neoplasm of colon     Positive colorectal cancer screening using Cologuard test     JEFFREY (acute kidney injury) ([address]     Pneumonia due to COVID-19 virus     ESRD needing dialysis ([address]     Acute metabolic encephalopathy     Hypoxia     Thrombocytopenia ([address]      ----Luh Chowdary

## 2022-07-13 ENCOUNTER — HOSPITAL ENCOUNTER (EMERGENCY)
Age: 62
Discharge: HOME | End: 2022-07-13
Attending: EMERGENCY MEDICINE
Payer: COMMERCIAL

## 2022-07-13 ENCOUNTER — APPOINTMENT (OUTPATIENT)
Dept: GENERAL RADIOLOGY | Age: 62
End: 2022-07-13
Payer: COMMERCIAL

## 2022-07-13 VITALS
OXYGEN SATURATION: 98 % | SYSTOLIC BLOOD PRESSURE: 116 MMHG | RESPIRATION RATE: 16 BRPM | HEART RATE: 90 BPM | DIASTOLIC BLOOD PRESSURE: 61 MMHG | TEMPERATURE: 97.3 F

## 2022-07-13 DIAGNOSIS — M25.461 KNEE EFFUSION, RIGHT: Primary | ICD-10-CM

## 2022-07-13 PROCEDURE — 6370000000 HC RX 637 (ALT 250 FOR IP): Performed by: STUDENT IN AN ORGANIZED HEALTH CARE EDUCATION/TRAINING PROGRAM

## 2022-07-13 PROCEDURE — 99283 EMERGENCY DEPT VISIT LOW MDM: CPT

## 2022-07-13 PROCEDURE — 73562 X-RAY EXAM OF KNEE 3: CPT

## 2022-07-13 RX ORDER — ACETAMINOPHEN 500 MG
1000 TABLET ORAL ONCE
Status: COMPLETED | OUTPATIENT
Start: 2022-07-13 | End: 2022-07-13

## 2022-07-13 RX ADMIN — ACETAMINOPHEN 1000 MG: 500 TABLET ORAL at 15:16

## 2022-07-13 ASSESSMENT — ENCOUNTER SYMPTOMS
ABDOMINAL PAIN: 0
SHORTNESS OF BREATH: 0
EYE PAIN: 0
EYE ITCHING: 0
SINUS PRESSURE: 0
CONSTIPATION: 0
SINUS PAIN: 0
SORE THROAT: 0
ABDOMINAL DISTENTION: 0
COUGH: 0
DIARRHEA: 0
NAUSEA: 0

## 2022-07-13 ASSESSMENT — PAIN SCALES - GENERAL: PAINLEVEL_OUTOF10: 10

## 2022-07-13 ASSESSMENT — PAIN - FUNCTIONAL ASSESSMENT: PAIN_FUNCTIONAL_ASSESSMENT: 0-10

## 2022-07-13 NOTE — ED PROVIDER NOTES
Spouse name: Not on file    Number of children: Not on file    Years of education: Not on file    Highest education level: Not on file   Occupational History    Not on file   Tobacco Use    Smoking status: Former Smoker     Packs/day: 0.50     Years: 30.00     Pack years: 15.00     Types: Cigarettes     Quit date: 11/6/2018     Years since quitting: 3.6    Smokeless tobacco: Never Used   Substance and Sexual Activity    Alcohol use: Yes     Alcohol/week: 8.0 standard drinks     Types: 8 Cans of beer per week     Comment: drinks \"a few\" beers a day    Drug use: No     Types: Marijuana (Weed)     Comment:  last use one month    Sexual activity: Never     Partners: Female   Other Topics Concern    Not on file   Social History Narrative    Not on file     Social Determinants of Health     Financial Resource Strain:     Difficulty of Paying Living Expenses: Not on file   Food Insecurity:     Worried About Running Out of Food in the Last Year: Not on file    Jesus of Food in the Last Year: Not on file   Transportation Needs:     Lack of Transportation (Medical): Not on file    Lack of Transportation (Non-Medical):  Not on file   Physical Activity:     Days of Exercise per Week: Not on file    Minutes of Exercise per Session: Not on file   Stress:     Feeling of Stress : Not on file   Social Connections:     Frequency of Communication with Friends and Family: Not on file    Frequency of Social Gatherings with Friends and Family: Not on file    Attends Mosque Services: Not on file    Active Member of Clubs or Organizations: Not on file    Attends Club or Organization Meetings: Not on file    Marital Status: Not on file   Intimate Partner Violence:     Fear of Current or Ex-Partner: Not on file    Emotionally Abused: Not on file    Physically Abused: Not on file    Sexually Abused: Not on file   Housing Stability:     Unable to Pay for Housing in the Last Year: Not on file    Number of Places Lived in the Last Year: Not on file    Unstable Housing in the Last Year: Not on file       Family History   Problem Relation Age of Onset    Diabetes Mother     High Blood Pressure Father        Allergies:  Patient has no known allergies. Home Medications:  Prior to Admission medications    Medication Sig Start Date End Date Taking? Authorizing Provider   atorvastatin (LIPITOR) 20 MG tablet TAKE 1 TABLET BY MOUTH ONE TIME A DAY 6/6/22   Rebeca Roth MD   pantoprazole (PROTONIX) 40 MG tablet TAKE 1 TABLET BY MOUTH 2 TIMES A DAY BEFORE MEALS 6/6/22   Rebeca Roth MD   Multiple Vitamins-Minerals (RENAPLEX-D) TABS Take 1 tablet by mouth daily 3/23/22   Historical Provider, MD   metoprolol tartrate (LOPRESSOR) 25 MG tablet Take 1 tablet by mouth 2 times daily 3/24/22   Rebeca Roth MD   gabapentin (NEURONTIN) 100 MG capsule Take 1 capsule by mouth 3 times daily for 30 days. 3/24/22 4/23/22  Rebeca Roth MD   aspirin 81 MG chewable tablet Take 1 tablet by mouth daily 3/24/22   Rebeca Roth MD   acetaminophen (TYLENOL) 325 MG tablet TAKE 2 TABLET BY MOUTH EVERY 6 HOURS AS NEEDED FOR PAIN 3/24/22   Rebeca Roth MD       REVIEW OF SYSTEMS    (2-9 systems for level 4, 10 or more for level 5)      Review of Systems   Constitutional: Negative for activity change, chills and fever. HENT: Negative for congestion, sinus pressure, sinus pain and sore throat. Eyes: Negative for pain and itching. Respiratory: Negative for cough and shortness of breath. Cardiovascular: Negative for chest pain. Gastrointestinal: Negative for abdominal distention, abdominal pain, constipation, diarrhea and nausea. Endocrine: Negative for polyuria. Genitourinary: Negative for dysuria and frequency. Musculoskeletal: Negative for arthralgias. Right knee pain   Skin: Negative for rash. Neurological: Negative for light-headedness and headaches.        PHYSICAL EXAM   (up to 7 for level 4, 8 or more for level 5)      INITIAL VITALS:   /61   Pulse 90   Temp 97.3 °F (36.3 °C) (Oral)   Resp 16   SpO2 98%     Physical Exam  Vitals reviewed. Constitutional:       General: He is not in acute distress. HENT:      Head: Normocephalic and atraumatic. Ears:      Comments: Hearing grossly normal     Nose: Nose normal.      Mouth/Throat:      Mouth: Mucous membranes are moist.      Pharynx: Oropharynx is clear. Eyes:      General: No scleral icterus. Conjunctiva/sclera: Conjunctivae normal.      Pupils: Pupils are equal, round, and reactive to light. Cardiovascular:      Rate and Rhythm: Normal rate and regular rhythm. Pulses: Normal pulses. Pulmonary:      Effort: Pulmonary effort is normal. No respiratory distress. Breath sounds: Normal breath sounds. Abdominal:      General: There is no distension. Tenderness: There is no abdominal tenderness. There is no guarding. Musculoskeletal:      Cervical back: No muscular tenderness. Right lower leg: No edema. Left lower leg: No edema. Comments: Right knee: No gross deformity, + joint swelling w/effusion. Skin intact w/o laceration. Distal sensation intact, DP pulses palpable, <2s cap refill. ROM limited by pain    Compartments soft     Knee is stable to varus valgus forces and anterior drawer test   Skin:     General: Skin is warm and dry. Capillary Refill: Capillary refill takes less than 2 seconds. Neurological:      General: No focal deficit present. Mental Status: He is alert and oriented to person, place, and time. Mental status is at baseline.          DIFFERENTIAL  DIAGNOSIS     PLAN (LABS / IMAGING / EKG):  Orders Placed This Encounter   Procedures    XR KNEE RIGHT (3 VIEWS)    Ice to affected area    [address] [place] [address]; Pair, Right Side Injury; Med (5'2\"-5'10\")       MEDICATIONS ORDERED:  Orders Placed This Encounter   Medications    acetaminophen (TYLENOL) tablet 1,000 mg       DIAGNOSTIC RESULTS / EMERGENCY DEPARTMENT COURSE / [facility]   LAB RESULTS:  No results found for this visit on 07/13/22. IMPRESSION: Dalila Kramer is a 64 y.o. man with history of ESRD presenting for traumatic knee pain. He does have a positive effusion on exam. Doubt septic or gouty joint based on mechanism. Neurovascularly intact distally. Considered ligamentous injury however the knee is stable. Will obtain x-ray imaging to assess for fx or malalignment and offer analgesia. RADIOLOGY:  XR KNEE RIGHT (3 VIEWS)   Final Result   Severe osteoarthrosis, joint effusion, and probable  myositis ossificans. EKG  none    All EKG's are interpreted by the Emergency Department Physician who either signs or Co-signs this chart in the absence of a cardiologist.    EMERGENCY DEPARTMENT COURSE:  Patient seen and evaluated, VSS and nontoxic in appearance. ED Course as of 07/13/22 1729   Wed Jul 13, 2022   1647 IMPRESSION:  Severe osteoarthrosis, joint effusion, and probable  myositis ossificans. [LR]      ED Course User Index  [LR] Esther Schultz DO   discussed findings w/patient. Provided crutches for comfort. He agrees to f/u w/orthopedics if no improvement. Advised to report for his regular HD. Patient understands to return to the emergency department for any new or worsening symptoms and to see their PCP regarding hospital follow up. No notes of  Admission Criteria type on file. PROCEDURES:  none    CONSULTS:  None    CRITICAL CARE:  See attending note        FINAL IMPRESSION      1. Knee effusion, right          DISPOSITION / PLAN     DISPOSITION Decision To Discharge 07/13/2022 04:48:41 PM      PATIENT REFERRED TO:  Martha Burk [facility], [facility]   [facility] [address] [phone]      As needed, If symptoms worsen    [facility]  [employer]  [phone]    As needed, If symptoms worsen    Estevan Erickson [address]  [address] [facility] [address]  [phone]    orthopedics for traumatic knee pain      DISCHARGE MEDICATIONS:  Discharge Medication List as of 7/13/2022  4:50 PM          Enrrique Ferrari DO  Emergency Medicine Resident    (Please note that portions of thisnote were completed with a voice recognition program.  Efforts were made to edit the dictations but occasionally words are mis-transcribed.)       Enrrique Ferrari DO  Resident  07/13/22 [id number]

## 2022-07-13 NOTE — ED NOTES
Report given [facility]      Blanca Valentine, RN  07/13/22 [address] Kip, RN  07/13/22 7451

## 2022-07-13 NOTE — ED PROVIDER NOTES
[facility] [address] [address] [facility]     Emergency Department     Faculty Attestation        I performed a history and physical examination of the patient and discussed management with the resident. I reviewed the residents note and agree with the documented findings and plan of care. Any areas of disagreement are noted on the chart. I was personally present for the key portions of any procedures. I have documented in the chart those procedures where I was not present during the key portions. I have reviewed the emergency nurses triage note. I agree with the chief complaint, past medical history, past surgical history, allergies, medications, social and family history as documented unless otherwise noted below. For mid-level providers such as nurse practitioners as well as physicians assistants:    I have personally seen and evaluated the patient. I find the patient's history and physical exam are consistent with NP/PA documentation. I agree with the care provided, treatment rendered, disposition, & follow-up plan. Additional findings are as noted. Vital Signs: /61   Pulse 90   Temp 97.3 °F (36.3 °C) (Oral)   Resp 16   SpO2 98%   PCP:  Horacio Chacko,     Pertinent Comments:     Patient with right knee pain he was walking when he \"hyperextended\" he complains of diffuse right knee pain. Nothing in the history to suggest dislocation. His right knee is diffusely tender but there is no gross deformity. He has +2 popliteal, DP PT pulses versus resting comfortably no acute distress.       Critical Care  None          Martin Zarco MD    Attending Emergency Medicine Physician              David Kim MD  07/13/22 2324

## 2022-07-18 DIAGNOSIS — N18.6 TYPE 2 DIABETES MELLITUS WITH CHRONIC KIDNEY DISEASE ON CHRONIC DIALYSIS, WITH LONG-TERM CURRENT USE OF INSULIN (HCC): ICD-10-CM

## 2022-07-18 DIAGNOSIS — E11.22 TYPE 2 DIABETES MELLITUS WITH CHRONIC KIDNEY DISEASE ON CHRONIC DIALYSIS, WITH LONG-TERM CURRENT USE OF INSULIN (HCC): ICD-10-CM

## 2022-07-18 DIAGNOSIS — Z79.4 TYPE 2 DIABETES MELLITUS WITH CHRONIC KIDNEY DISEASE ON CHRONIC DIALYSIS, WITH LONG-TERM CURRENT USE OF INSULIN (HCC): ICD-10-CM

## 2022-07-18 DIAGNOSIS — Z99.2 TYPE 2 DIABETES MELLITUS WITH CHRONIC KIDNEY DISEASE ON CHRONIC DIALYSIS, WITH LONG-TERM CURRENT USE OF INSULIN (HCC): ICD-10-CM

## 2022-07-18 RX ORDER — GABAPENTIN 100 MG/1
100 CAPSULE ORAL 3 TIMES DAILY
Qty: 90 CAPSULE | Refills: 2 | OUTPATIENT
Start: 2022-07-18 | End: 2022-08-17

## 2022-07-18 RX ORDER — ATORVASTATIN CALCIUM 20 MG/1
TABLET, FILM COATED ORAL
Qty: 30 TABLET | Refills: 0 | OUTPATIENT
Start: 2022-07-18

## 2022-07-18 NOTE — TELEPHONE ENCOUNTER
E-scribe request for med refills. Please review and e-scribe if applicable. Last Visit Date:  3/24/22  Next Visit Date:  7/19/2022    Hemoglobin A1C (%)   Date Value   03/24/2022 6.9   08/10/2021 8.0   01/18/2021 8.5 (H)             ( goal A1C is < 7)   Microalb/Crt.  Ratio (mcg/mg creat)   Date Value   12/14/2020 3,134 (H)     LDL Cholesterol (mg/dL)   Date Value   04/14/2022 73       (goal LDL is <100)   AST (U/L)   Date Value   09/16/2021 69 (H)     ALT (U/L)   Date Value   09/16/2021 33     BUN (mg/dL)   Date Value   04/14/2022 34 (H)     BP Readings from Last 3 Encounters:   07/13/22 116/61   05/13/22 (!) 141/78   05/13/22 (!) 168/106          (goal 120/80)        Patient Active Problem List:     Type 2 diabetes mellitus with chronic kidney disease on chronic dialysis, with long-term current use of insulin (HCC)     Sciatica of right side     Atypical chest pain     Low back pain of thoracolumbar region with sciatica     Kidney stone     Chest pain     Need for prophylactic vaccination against diphtheria-tetanus-pertussis (DTP)     Gastroesophageal reflux disease     Tachycardia     Essential hypertension     Stage 3 chronic kidney disease     BMI 31.0-31.9,adult     Acute kidney injury superimposed on CKD ([address]     Type 2 MI (myocardial infarction) ([address]     Gastrointestinal hemorrhage     Acute posthemorrhagic anemia     Neuropathy     NSAID long-term use     Family history of malignant neoplasm of colon     Positive colorectal cancer screening using Cologuard test     JEFFREY (acute kidney injury) ([address]     Pneumonia due to COVID-19 virus     ESRD needing dialysis ([address]     Acute metabolic encephalopathy     Hypoxia     Thrombocytopenia ([address]      ----Carmelina Barker

## 2022-07-19 ENCOUNTER — TELEPHONE (OUTPATIENT)
Dept: VASCULAR SURGERY | Age: 62
End: 2022-07-19

## 2022-07-19 DIAGNOSIS — N18.6 ESRD (END STAGE RENAL DISEASE) (HCC): Primary | ICD-10-CM

## 2022-07-19 NOTE — TELEPHONE ENCOUNTER
----- Message from Tre Redmond MA sent at 7/19/2022  9:51 AM EDT -----  Spoke with Jelena Torres and he verbalized understanding.  ----- Message -----  From: Dee Dee Means MD  Sent: 7/18/2022   4:36 PM EDT  To: Tre Redmond MA, #    Yes    ----- Message -----  From: Tre Redmond MA  Sent: 7/18/2022   3:34 PM EDT  To: Dee Dee Means MD, #    [facility] [phone]  PT states he is ready for his access creation now. He has no showed a couple times and no showed a couple surgeries, ok to put him in your schedule for a follow up?

## 2022-07-26 ENCOUNTER — OFFICE VISIT (OUTPATIENT)
Dept: FAMILY MEDICINE CLINIC | Age: 62
End: 2022-07-26

## 2022-07-26 DIAGNOSIS — Z79.899 MEDICATION MANAGEMENT: Primary | ICD-10-CM

## 2022-07-26 PROCEDURE — 99999 PR OFFICE/OUTPT VISIT,PROCEDURE ONLY: CPT | Performed by: PHARMACIST

## 2022-07-26 PROCEDURE — APPNB60 APP NON BILLABLE TIME 46-60 MINS: Performed by: PHARMACIST

## 2022-07-26 NOTE — PATIENT INSTRUCTIONS
Remember to bring in all your medications to the doctor appointment here at [facility].

## 2022-07-26 NOTE — Clinical Note
Hi Dr Tona Canales,  Met with patient on Tuesday. Comprehensive medication review completed with some discrepancies noted. Please review progress note for details of these discrepancies. Patient is scheduled to see you on Thursday, 7/28. Pharmacy is going to give him 1 week of medications so that if prescriptions are sent they can be added to blister packs instead of waiting 30 days for next packaging. Overall, patient had a good understanding of why he was prescribed his medications. If you have questions, give me a call ([phone]). On Thursdays, I am at [address]. Yesica Colón, Pharm. D., [address] Medication Management Service [phone] 7/28/2022 9:27 AM

## 2022-07-26 NOTE — PROGRESS NOTES
Medication Management Service  [facility]  [phone]     CLINICAL PHARMACY NOTE: Comprehensive Medication Review      SUBJECTIVE/OBJECTIVE:    Carmelita Lucio is a 58 y.o. male who was referred to [address] [place] [address] for Comprehensive Medication Review by Dr. Sukumar Malone MD.     Patient currently fills his medications at [address] [place] [address]. Medications are placed into adherence packaging. Patient arrived at appointment today without his medications. Phone call to [address] [place] [address] to confirm which medications are included into adherence packaging. Next set of adherence packaging is ready for patient to . There are 4 medications in these packs and are noted below. Lisinopril 10mg QAM  Lisinopril 5mg QPM  Metoprolol tartrate 50mg twice daily  Pantoprazole 40mg twice daily. Patient reports taking medications this morning. Patient also shares the couple of blisters he has with with him in his pocket. Writer noted that he had 2 yellow blister and a white blister. Meaning the yellow would be for morning which contains the lisinopril 10mg tablet. The \"white\" blister is for lunchtime which had a gabapentin capsule. Patient thought that he may have taken an \"orange\" blister for this morning medications. This would be for the evening and contained lisinopril 5mg tablet. Patient currently prescribed the following medications:    Medication Orders    Current Outpatient Medications   Medication Sig Dispense Refill    atorvastatin (LIPITOR) 20 MG tablet TAKE 1 TABLET BY MOUTH ONE TIME A DAY 30 tablet 0    pantoprazole (PROTONIX) 40 MG tablet TAKE 1 TABLET BY MOUTH 2 TIMES A DAY BEFORE MEALS 30 tablet 3    Multiple Vitamins-Minerals (RENAPLEX-D) TABS Take 1 tablet by mouth daily      metoprolol tartrate (LOPRESSOR) 25 MG tablet Take 1 tablet by mouth 2 times daily 60 tablet 0    gabapentin (NEURONTIN) 100 MG capsule Take 1 capsule by mouth 3 times daily for 30 days.  90 capsule 2    aspirin 81 MG chewable tablet interactions: No clinically significant interactions identified via Continuent Interaction Analysis as category D or higher. Renal dosing: No renal adjustments necessary. Medication monitoring: Patient reminded to bring medications to PCP appointment on Thursday, 7/28/2022. Other: Reviewed with patient the indication for each medication. Overall patient expressed a good understanding regarding the reason why he was prescribed each of the medication. Patient reminded to bring adherence packs to PCP on 07/28/2022. Medication reconciliation completed. Identified medication discrepancies/issues:    Medication(s) Issue Action     ASA 81mg  Has not been taking. Not included in adherence package. Will follow up with PCP to see if daily ASA is still indicated for patient. If so, then request will be made that a prescription be sent to the pharmacy so that it can be included into adherence packaging. Atorvastatin  Gabapentin   Not included in next month of adherence packaging due to no refills remaining on current prescriptions. Patient scheduled to see PCP on 07/28/2022. Patient will  1 week of medication today. Plan will be for pharmacy to update remaining packets after PCP appointment on 07/28/2022 when new prescriptions have been received. Renaplex-D Not included in adherence packs. If Renaplex-D is to be added to adherence packs, the pharmacy will need a prescription to do so. Sreekanth Young, Pharm. D., [address] [facility] Medication Management Service  [phone]  7/28/2022  9:23 AM      =========================================================    For Pharmacy Admin Tracking Only    CPA in place:  No  Recommendation Provided To: Patient/Caregiver: 1 via In person  Intervention Detail: CMR/TIP Completed  Intervention Accepted By: Patient/Caregiver: 1  Time Spent (min):  90

## 2022-08-02 DIAGNOSIS — N18.6 TYPE 2 DIABETES MELLITUS WITH CHRONIC KIDNEY DISEASE ON CHRONIC DIALYSIS, WITH LONG-TERM CURRENT USE OF INSULIN (HCC): ICD-10-CM

## 2022-08-02 DIAGNOSIS — Z79.4 TYPE 2 DIABETES MELLITUS WITH CHRONIC KIDNEY DISEASE ON CHRONIC DIALYSIS, WITH LONG-TERM CURRENT USE OF INSULIN (HCC): ICD-10-CM

## 2022-08-02 DIAGNOSIS — E11.22 TYPE 2 DIABETES MELLITUS WITH CHRONIC KIDNEY DISEASE ON CHRONIC DIALYSIS, WITH LONG-TERM CURRENT USE OF INSULIN (HCC): ICD-10-CM

## 2022-08-02 DIAGNOSIS — K21.9 GASTROESOPHAGEAL REFLUX DISEASE, UNSPECIFIED WHETHER ESOPHAGITIS PRESENT: ICD-10-CM

## 2022-08-02 DIAGNOSIS — Z99.2 TYPE 2 DIABETES MELLITUS WITH CHRONIC KIDNEY DISEASE ON CHRONIC DIALYSIS, WITH LONG-TERM CURRENT USE OF INSULIN (HCC): ICD-10-CM

## 2022-08-02 RX ORDER — GABAPENTIN 100 MG/1
CAPSULE ORAL
Qty: 90 CAPSULE | Refills: 0 | Status: SHIPPED | OUTPATIENT
Start: 2022-08-02 | End: 2022-09-27 | Stop reason: SDUPTHER

## 2022-08-02 RX ORDER — PANTOPRAZOLE SODIUM 40 MG/1
TABLET, DELAYED RELEASE ORAL
Qty: 30 TABLET | Refills: 3 | Status: SHIPPED | OUTPATIENT
Start: 2022-08-02 | End: 2022-10-17

## 2022-08-02 RX ORDER — ATORVASTATIN CALCIUM 20 MG/1
TABLET, FILM COATED ORAL
Qty: 30 TABLET | Refills: 0 | Status: SHIPPED | OUTPATIENT
Start: 2022-08-02 | End: 2022-08-16

## 2022-08-02 NOTE — TELEPHONE ENCOUNTER
Last visit: 7/26/22  Last Med refill:   Does patient have enough medication for 72 hours: No: . Next Visit Date:  Future Appointments   Date Time Provider Armin Rocha   8/18/2022  9:45 AM Yany Becker MD [facility] [address] Maintenance   Topic Date Due    Diabetic retinal exam  Never done    Shingles vaccine (1 of 2) Never done    Prostate Specific Antigen (PSA) Screening or Monitoring  Never done    Pneumococcal 0-64 years Vaccine (2 - PCV) 08/23/2020    Diabetic foot exam  12/14/2021    COVID-19 Vaccine (2 - Booster for Dana series) 01/11/2022    Flu vaccine (1) 09/01/2022    A1C test (Diabetic or Prediabetic)  03/24/2023    Depression Screen  03/24/2023    Lipids  04/14/2023    DTaP/Tdap/Td vaccine (2 - Td or Tdap) 08/23/2029    Colorectal Cancer Screen  12/07/2030    Hepatitis C screen  Completed    HIV screen  Completed    Hepatitis A vaccine  Aged Out    Hib vaccine  Aged Out    Meningococcal (ACWY) vaccine  Aged Out       Hemoglobin A1C (%)   Date Value   03/24/2022 6.9   08/10/2021 8.0   01/18/2021 8.5 (H)             ( goal A1C is < 7)   Microalb/Crt.  Ratio (mcg/mg creat)   Date Value   12/14/2020 3,134 (H)     LDL Cholesterol (mg/dL)   Date Value   04/14/2022 73   12/14/2020 33       (goal LDL is <100)   AST (U/L)   Date Value   09/16/2021 69 (H)     ALT (U/L)   Date Value   09/16/2021 33     BUN (mg/dL)   Date Value   04/14/2022 34 (H)     BP Readings from Last 3 Encounters:   07/13/22 116/61   05/13/22 (!) 141/78   05/13/22 (!) 168/106          (goal 120/80)    All Future Testing planned in [facility]  Lab Frequency Next Occurrence   VL Vein Mapping Lower Bilateral Once 07/26/2022   Hemoglobin and Hematocrit, Blood, Post Transfusion POST TRANSFUSION    Hemoglobin and Hematocrit, Blood, Post Transfusion POST TRANSFUSION                Patient Active Problem List:     Type 2 diabetes mellitus with chronic kidney disease on chronic dialysis, with long-term current use of insulin (HCC)     Sciatica of right side     Atypical chest pain     Low back pain of thoracolumbar region with sciatica     Kidney stone     Chest pain     Need for prophylactic vaccination against diphtheria-tetanus-pertussis (DTP)     Gastroesophageal reflux disease     Tachycardia     Essential hypertension     Stage 3 chronic kidney disease     BMI 31.0-31.9,adult     Acute kidney injury superimposed on CKD ([facility]     Type 2 MI (myocardial infarction) ([facility]     Gastrointestinal hemorrhage     Acute posthemorrhagic anemia     Neuropathy     NSAID long-term use     Family history of malignant neoplasm of colon     Positive colorectal cancer screening using Cologuard test     JEFFREY (acute kidney injury) ([facility]     Pneumonia due to COVID-19 virus     ESRD needing dialysis ([facility]     Acute metabolic encephalopathy     Hypoxia     Thrombocytopenia ([facility]

## 2022-08-09 ENCOUNTER — HOSPITAL ENCOUNTER (OUTPATIENT)
Dept: VASCULAR LAB | Age: 62
Discharge: HOME | End: 2022-08-09
Payer: COMMERCIAL

## 2022-08-09 DIAGNOSIS — N18.6 ESRD (END STAGE RENAL DISEASE) (HCC): Primary | ICD-10-CM

## 2022-08-09 DIAGNOSIS — N18.6 ESRD (END STAGE RENAL DISEASE) (HCC): ICD-10-CM

## 2022-08-09 PROCEDURE — 93985 DUP-SCAN HEMO COMPL BI STD: CPT

## 2022-08-15 DIAGNOSIS — N18.6 TYPE 2 DIABETES MELLITUS WITH CHRONIC KIDNEY DISEASE ON CHRONIC DIALYSIS, WITH LONG-TERM CURRENT USE OF INSULIN (HCC): ICD-10-CM

## 2022-08-15 DIAGNOSIS — Z99.2 TYPE 2 DIABETES MELLITUS WITH CHRONIC KIDNEY DISEASE ON CHRONIC DIALYSIS, WITH LONG-TERM CURRENT USE OF INSULIN (HCC): ICD-10-CM

## 2022-08-15 DIAGNOSIS — Z79.4 TYPE 2 DIABETES MELLITUS WITH CHRONIC KIDNEY DISEASE ON CHRONIC DIALYSIS, WITH LONG-TERM CURRENT USE OF INSULIN (HCC): ICD-10-CM

## 2022-08-15 DIAGNOSIS — E11.22 TYPE 2 DIABETES MELLITUS WITH CHRONIC KIDNEY DISEASE ON CHRONIC DIALYSIS, WITH LONG-TERM CURRENT USE OF INSULIN (HCC): ICD-10-CM

## 2022-08-15 RX ORDER — GABAPENTIN 100 MG/1
CAPSULE ORAL
Qty: 90 CAPSULE | Refills: 0 | OUTPATIENT
Start: 2022-08-15 | End: 2022-09-14

## 2022-08-15 NOTE — TELEPHONE ENCOUNTER
Gabapentin pending for refill     Health Maintenance   Topic Date Due    Annual Wellness Visit (AWV)  Never done    Diabetic retinal exam  Never done    Shingles vaccine (1 of 2) Never done    Pneumococcal 0-64 years Vaccine (2 - PCV) 08/23/2020    Diabetic foot exam  12/14/2021    COVID-19 Vaccine (2 - Booster for Dana series) 01/11/2022    Flu vaccine (1) 09/01/2022    A1C test (Diabetic or Prediabetic)  03/24/2023    Depression Screen  03/24/2023    Lipids  04/14/2023    DTaP/Tdap/Td vaccine (2 - Td or Tdap) 08/23/2029    Colorectal Cancer Screen  12/07/2030    Hepatitis C screen  Completed    HIV screen  Completed    Hepatitis A vaccine  Aged Out    Hib vaccine  Aged Out    Meningococcal (ACWY) vaccine  Aged Out             (applicable per patient's age: Cancer Screenings, Depression Screening, Fall Risk Screening, Immunizations)    Hemoglobin A1C (%)   Date Value   03/24/2022 6.9   08/10/2021 8.0   01/18/2021 8.5 (H)     Microalb/Crt.  Ratio (mcg/mg creat)   Date Value   12/14/2020 3,134 (H)     LDL Cholesterol (mg/dL)   Date Value   04/14/2022 73     AST (U/L)   Date Value   09/16/2021 69 (H)     ALT (U/L)   Date Value   09/16/2021 33     BUN (mg/dL)   Date Value   04/14/2022 34 (H)      (goal A1C is < 7)   (goal LDL is <100) need 30-50% reduction from baseline     BP Readings from Last 3 Encounters:   07/13/22 116/61   05/13/22 (!) 141/78   05/13/22 (!) 168/106    (goal /80)      All Future Testing planned in [facility]:  Lab Frequency Next Occurrence   VL UPPER EXTREMITY BILATERAL VEIN MAPPING Once 08/09/2022   Hemoglobin and Hematocrit, Blood, Post Transfusion POST TRANSFUSION    Hemoglobin and Hematocrit, Blood, Post Transfusion POST TRANSFUSION        Next Visit Date:  Future Appointments   Date Time Provider Armin Rocha   8/18/2022  9:45 AM [facility]            Patient Active Problem List:     Type 2 diabetes mellitus with chronic kidney disease on chronic dialysis, with long-term current use of insulin (HCC)     Sciatica of right side     Atypical chest pain     Low back pain of thoracolumbar region with sciatica     Kidney stone     Chest pain     Need for prophylactic vaccination against diphtheria-tetanus-pertussis (DTP)     Gastroesophageal reflux disease     Tachycardia     Essential hypertension     Stage 3 chronic kidney disease     BMI 31.0-31.9,adult     Acute kidney injury superimposed on CKD (HCC)     Type 2 MI (myocardial infarction) ([facility]     Gastrointestinal hemorrhage     Acute posthemorrhagic anemia     Neuropathy     NSAID long-term use     Family history of malignant neoplasm of colon     Positive colorectal cancer screening using Cologuard test     JEFFREY (acute kidney injury) ([facility]     Pneumonia due to COVID-19 virus     ESRD needing dialysis ([facility]     Acute metabolic encephalopathy     Hypoxia     Thrombocytopenia ([facility]

## 2022-08-15 NOTE — TELEPHONE ENCOUNTER
Lipitor pending for refill     Health Maintenance   Topic Date Due    Annual Wellness Visit (AWV)  Never done    Diabetic retinal exam  Never done    Shingles vaccine (1 of 2) Never done    Pneumococcal 0-64 years Vaccine (2 - PCV) 08/23/2020    Diabetic foot exam  12/14/2021    COVID-19 Vaccine (2 - Booster for Dana series) 01/11/2022    Flu vaccine (1) 09/01/2022    A1C test (Diabetic or Prediabetic)  03/24/2023    Depression Screen  03/24/2023    Lipids  04/14/2023    DTaP/Tdap/Td vaccine (2 - Td or Tdap) 08/23/2029    Colorectal Cancer Screen  12/07/2030    Hepatitis C screen  Completed    HIV screen  Completed    Hepatitis A vaccine  Aged Out    Hib vaccine  Aged Out    Meningococcal (ACWY) vaccine  Aged Out             (applicable per patient's age: Cancer Screenings, Depression Screening, Fall Risk Screening, Immunizations)    Hemoglobin A1C (%)   Date Value   03/24/2022 6.9   08/10/2021 8.0   01/18/2021 8.5 (H)     Microalb/Crt.  Ratio (mcg/mg creat)   Date Value   12/14/2020 3,134 (H)     LDL Cholesterol (mg/dL)   Date Value   04/14/2022 73     AST (U/L)   Date Value   09/16/2021 69 (H)     ALT (U/L)   Date Value   09/16/2021 33     BUN (mg/dL)   Date Value   04/14/2022 34 (H)      (goal A1C is < 7)   (goal LDL is <100) need 30-50% reduction from baseline     BP Readings from Last 3 Encounters:   07/13/22 116/61   05/13/22 (!) 141/78   05/13/22 (!) 168/106    (goal /80)      All Future Testing planned in [facility]:  Lab Frequency Next Occurrence   VL UPPER EXTREMITY BILATERAL VEIN MAPPING Once 08/09/2022   Hemoglobin and Hematocrit, Blood, Post Transfusion POST TRANSFUSION    Hemoglobin and Hematocrit, Blood, Post Transfusion POST TRANSFUSION        Next Visit Date:  Future Appointments   Date Time Provider Armin Rocha   8/18/2022  9:45 AM [facility]            Patient Active Problem List:     Type 2 diabetes mellitus with chronic kidney disease on chronic dialysis, with long-term current use of insulin (HCC)     Sciatica of right side     Atypical chest pain     Low back pain of thoracolumbar region with sciatica     Kidney stone     Chest pain     Need for prophylactic vaccination against diphtheria-tetanus-pertussis (DTP)     Gastroesophageal reflux disease     Tachycardia     Essential hypertension     Stage 3 chronic kidney disease     BMI 31.0-31.9,adult     Acute kidney injury superimposed on CKD (HCC)     Type 2 MI (myocardial infarction) ([facility] [address] [address]     Gastrointestinal hemorrhage     Acute posthemorrhagic anemia     Neuropathy     NSAID long-term use     Family history of malignant neoplasm of colon     Positive colorectal cancer screening using Cologuard test     JEFFREY (acute kidney injury) ([facility] [address]     Pneumonia due to COVID-19 virus     ESRD needing dialysis ([facility]     Acute metabolic encephalopathy     Hypoxia     Thrombocytopenia ([facility]

## 2022-08-16 RX ORDER — ATORVASTATIN CALCIUM 20 MG/1
TABLET, FILM COATED ORAL
Qty: 60 TABLET | Refills: 2 | Status: SHIPPED | OUTPATIENT
Start: 2022-08-16

## 2022-08-18 ENCOUNTER — INITIAL CONSULT (OUTPATIENT)
Dept: VASCULAR SURGERY | Age: 62
End: 2022-08-18
Payer: COMMERCIAL

## 2022-08-18 VITALS
HEIGHT: 70 IN | TEMPERATURE: 97.6 F | DIASTOLIC BLOOD PRESSURE: 71 MMHG | SYSTOLIC BLOOD PRESSURE: 114 MMHG | OXYGEN SATURATION: 97 % | HEART RATE: 75 BPM | WEIGHT: 214 LBS | BODY MASS INDEX: 30.64 KG/M2

## 2022-08-18 DIAGNOSIS — N18.6 ENCOUNTER REGARDING VASCULAR ACCESS FOR DIALYSIS FOR ESRD (HCC): Primary | ICD-10-CM

## 2022-08-18 DIAGNOSIS — Z79.4 TYPE 2 DIABETES MELLITUS WITH CHRONIC KIDNEY DISEASE ON CHRONIC DIALYSIS, WITH LONG-TERM CURRENT USE OF INSULIN (HCC): ICD-10-CM

## 2022-08-18 DIAGNOSIS — E11.22 TYPE 2 DIABETES MELLITUS WITH CHRONIC KIDNEY DISEASE ON CHRONIC DIALYSIS, WITH LONG-TERM CURRENT USE OF INSULIN (HCC): ICD-10-CM

## 2022-08-18 DIAGNOSIS — N18.6 TYPE 2 DIABETES MELLITUS WITH CHRONIC KIDNEY DISEASE ON CHRONIC DIALYSIS, WITH LONG-TERM CURRENT USE OF INSULIN (HCC): ICD-10-CM

## 2022-08-18 DIAGNOSIS — Z99.2 TYPE 2 DIABETES MELLITUS WITH CHRONIC KIDNEY DISEASE ON CHRONIC DIALYSIS, WITH LONG-TERM CURRENT USE OF INSULIN (HCC): ICD-10-CM

## 2022-08-18 DIAGNOSIS — Z99.2 ENCOUNTER REGARDING VASCULAR ACCESS FOR DIALYSIS FOR ESRD (HCC): Primary | ICD-10-CM

## 2022-08-18 PROCEDURE — 99214 OFFICE O/P EST MOD 30 MIN: CPT | Performed by: SURGERY

## 2022-08-18 PROCEDURE — G8417 CALC BMI ABV UP PARAM F/U: HCPCS | Performed by: SURGERY

## 2022-08-18 PROCEDURE — G8427 DOCREV CUR MEDS BY ELIG CLIN: HCPCS | Performed by: SURGERY

## 2022-08-18 PROCEDURE — 1036F TOBACCO NON-USER: CPT | Performed by: SURGERY

## 2022-08-18 PROCEDURE — 3017F COLORECTAL CA SCREEN DOC REV: CPT | Performed by: SURGERY

## 2022-08-18 RX ORDER — GABAPENTIN 100 MG/1
CAPSULE ORAL
Qty: 90 CAPSULE | Refills: 0 | OUTPATIENT
Start: 2022-08-18 | End: 2022-09-17

## 2022-08-18 ASSESSMENT — ENCOUNTER SYMPTOMS
SHORTNESS OF BREATH: 0
COLOR CHANGE: 0
CHEST TIGHTNESS: 0
COUGH: 1
ALLERGIC/IMMUNOLOGIC NEGATIVE: 1
ABDOMINAL PAIN: 0

## 2022-08-18 NOTE — PROGRESS NOTES
Division of Vascular Surgery        Follow Up    Chief Complaint:      Dialysis access    History of Present Illness:      Davie Carter is a 58 y. o.left handed gentleman who presents with end stage renal disease on hemodialysis through a tunneled catheter on MWF for over 1 year now. He denies any issues with his catheter but has been advised multiple times to get access created. He was seen in January of this year and scheduled for access creation at that time, but he cancelled and did not want to reschedule. He denies any pain, weakness or swelling in his arms. Does have some numbness and tingling in the tips of both his hands.     Medical History:     Past Medical History:   Diagnosis Date    ADHD (attention deficit hyperactivity disorder)     COVID-19 9/15/2021    COVID-19 9/15/2021    Depression     DM2 (diabetes mellitus, type 2) [facility] 10/15/2013    Erectile dysfunction     Gastroesophageal reflux disease 8/23/2019    Hyperlipidemia     Hypertension     Kidney stone 7/19/2018    Low back pain of thoracolumbar region with sciatica     Neuropathy        Surgical History:     Past Surgical History:   Procedure Laterality Date    COLONOSCOPY N/A 12/7/2020    COLONOSCOPY DIAGNOSTIC performed by Abner Olvera MD at [facility] [address]  07/20/2018    bilat stent replacement    CYSTOSCOPY  08/03/2018    b/l ureteroscopy, b/l stent, HLL    IR TUNNELED CATHETER PLACEMENT GREATER THAN 5 YEARS  6/28/2021    IR TUNNELED CATHETER PLACEMENT GREATER THAN 5 YEARS 6/28/2021 Maurizio Cruz MD [facility]    IN CYSTO/URETERO/PYELOSCOPY, CALCULUS TX Bilateral 8/3/2018    FORTEC HOLMIUM - CYSTO, URETEROSCOPY LITHOTRIPSY, STENT EXCHANGE [place] [facility] #[id number] - ESPERANZA) performed by Kellen Gomez MD at [address] [facility] [address] Bilateral 7/20/2018    CYSTOSCOPY URETERAL STENT INSERTION BILATERAL performed by Kellen Gomez MD at [address] [place] [address]      as a child    UPPER GASTROINTESTINAL ENDOSCOPY N/A 12/7/2020    EGD BIOPSY performed by Gris Pagan MD at [facility]       Family History:     Family History   Problem Relation Age of Onset    Diabetes Mother     High Blood Pressure Father        Allergies:       Patient has no known allergies. Medications:      Current Outpatient Medications   Medication Sig Dispense Refill    atorvastatin (LIPITOR) 20 MG tablet TAKE 1 TABLET BY MOUTH ONCE DAILY. 60 tablet 2    pantoprazole (PROTONIX) 40 MG tablet TAKE 1 TABLET BY MOUTH 2 TIMES A DAY BEFORE MEALS 30 tablet 3    gabapentin (NEURONTIN) 100 MG capsule TAKE 1 CAPSULE BY MOUTH 3 TIMES A DAY 90 capsule 0    Multiple Vitamins-Minerals (RENAPLEX-D) TABS Take 1 tablet by mouth daily      metoprolol tartrate (LOPRESSOR) 25 MG tablet Take 1 tablet by mouth 2 times daily 60 tablet 0    acetaminophen (TYLENOL) 325 MG tablet TAKE 2 TABLET BY MOUTH EVERY 6 HOURS AS NEEDED FOR PAIN 120 tablet 0    aspirin 81 MG chewable tablet Take 1 tablet by mouth daily (Patient not taking: No sig reported) 30 tablet 0     No current facility-administered medications for this visit. Social History:     Tobacco:    reports that he quit smoking about 3 years ago. His smoking use included cigarettes. He has a 15.00 pack-year smoking history. He has never used smokeless tobacco.  Alcohol:      reports current alcohol use of about 8.0 standard drinks per week. Drug Use:  reports no history of drug use. Occupation:  [employer] jobs    Review of Systems:     Review of Systems   Constitutional:  Negative for chills and fever. HENT:  Negative for congestion. Eyes:  Negative for visual disturbance. Respiratory:  Positive for cough. Negative for chest tightness and shortness of breath. Cardiovascular:  Negative for chest pain and leg swelling. Gastrointestinal:  Negative for abdominal pain. Endocrine: Negative. Genitourinary: Negative. Musculoskeletal: Negative.     Skin:  Negative for color change and wound. Allergic/Immunologic: Negative. Neurological:  Negative for facial asymmetry, speech difficulty, weakness and numbness. Hematological: Negative. Psychiatric/Behavioral: Negative. Physical Exam:     Vitals:  /71 (Site: Right Upper Arm, Position: Sitting, Cuff Size: Large Adult)   Pulse 75   Temp 97.6 °F (36.4 °C) (Temporal)   Ht 5' 10\" (1.778 m)   Wt 214 lb (97.1 kg)   SpO2 97%   BMI 30.71 kg/m²     Physical Exam  Constitutional:       Appearance: He is well-developed and well-groomed. Eyes:      Extraocular Movements: Extraocular movements intact. Conjunctiva/sclera: Conjunctivae normal.   Neck:      Vascular: No carotid bruit. Cardiovascular:      Rate and Rhythm: Normal rate and regular rhythm. Pulses:           Radial pulses are 2+ on the right side and 2+ on the left side. Pulmonary:      Effort: Pulmonary effort is normal. No respiratory distress. Chest:      Comments: Tunneled hemodialysis catheter in place  Abdominal:      Palpations: Abdomen is soft. Tenderness: There is no abdominal tenderness. Musculoskeletal:      Right upper arm: No swelling or tenderness. Left upper arm: No swelling or tenderness. Right hand: No swelling. Normal strength. Normal sensation. Normal capillary refill. Left hand: No swelling. Normal strength. Normal sensation. Normal capillary refill. Cervical back: Full passive range of motion without pain. Right lower leg: No swelling or tenderness. No edema. Left lower leg: No swelling or tenderness. No edema. Right foot: Normal capillary refill. No swelling or tenderness. Left foot: Normal capillary refill. No swelling or tenderness. Feet:      Right foot:      Skin integrity: No ulcer or skin breakdown. Left foot:      Skin integrity: No ulcer or skin breakdown. Skin:     General: Skin is warm. Capillary Refill: Capillary refill takes less than 2 seconds. Neurological:      Mental Status: He is alert and oriented to person, place, and time. GCS: GCS eye subscore is 4. GCS verbal subscore is 5. GCS motor subscore is 6. Sensory: Sensation is intact. Motor: Motor function is intact. Psychiatric:         Mood and Affect: Mood normal.         Speech: Speech normal.         Behavior: Behavior normal.     Imaging/Labs:     Vein mapping 8/9/2022      Poor caliber cephalic and basilic veins in both upper extrmeities, confirmed with ultrasound performed in the office    Assessment and Plan:     End stage renal disease on hemodialysis  Risks and benefits of surgically created dialysis access discussed  Non-maturation, need for surgical revision/maintenance and ischemic steal (decreased blood flow to hand) discussed  Consent obtained in the office  Will plan on using his right arm to place AV graft due to poor superficial venous conduits on recent vein mapping  Ok to use tunneled catheter for IV access during surgery    Electronically signed by Cassie Roberts MD on 8/18/22 at 9:25 AM EDT      [address]  Office: [phone]  Cell: [phone]  Email: [email or web address]

## 2022-08-23 RX ORDER — GABAPENTIN 100 MG/1
CAPSULE ORAL
Qty: 90 CAPSULE | Refills: 0 | Status: CANCELLED | OUTPATIENT
Start: 2022-08-23 | End: 2022-09-22

## 2022-09-16 DIAGNOSIS — E11.22 TYPE 2 DIABETES MELLITUS WITH CHRONIC KIDNEY DISEASE ON CHRONIC DIALYSIS, WITH LONG-TERM CURRENT USE OF INSULIN (HCC): ICD-10-CM

## 2022-09-16 DIAGNOSIS — Z99.2 TYPE 2 DIABETES MELLITUS WITH CHRONIC KIDNEY DISEASE ON CHRONIC DIALYSIS, WITH LONG-TERM CURRENT USE OF INSULIN (HCC): ICD-10-CM

## 2022-09-16 DIAGNOSIS — Z79.4 TYPE 2 DIABETES MELLITUS WITH CHRONIC KIDNEY DISEASE ON CHRONIC DIALYSIS, WITH LONG-TERM CURRENT USE OF INSULIN (HCC): ICD-10-CM

## 2022-09-16 DIAGNOSIS — N18.6 TYPE 2 DIABETES MELLITUS WITH CHRONIC KIDNEY DISEASE ON CHRONIC DIALYSIS, WITH LONG-TERM CURRENT USE OF INSULIN (HCC): ICD-10-CM

## 2022-09-16 NOTE — TELEPHONE ENCOUNTER
Last visit: 7/26/22  Last Med ref/ill: 8/2/22  Does patient have enough medication for 72 hours: No:     Next Visit Date:  No future appointments. Health Maintenance   Topic Date Due    Diabetic retinal exam  Never done    Shingles vaccine (1 of 2) Never done    Pneumococcal 0-64 years Vaccine (2 - PCV) 08/23/2020    Diabetic foot exam  12/14/2021    COVID-19 Vaccine (2 - Booster for Pledge51 series) 01/11/2022    Annual Wellness Visit (AWV)  Never done    Flu vaccine (1) 09/01/2022    A1C test (Diabetic or Prediabetic)  03/24/2023    Depression Screen  03/24/2023    Lipids  04/14/2023    DTaP/Tdap/Td vaccine (2 - Td or Tdap) 08/23/2029    Colorectal Cancer Screen  12/07/2030    Hepatitis C screen  Completed    HIV screen  Completed    Hepatitis A vaccine  Aged Out    Hib vaccine  Aged Out    Meningococcal (ACWY) vaccine  Aged Out       Hemoglobin A1C (%)   Date Value   03/24/2022 6.9   08/10/2021 8.0   01/18/2021 8.5 (H)             ( goal A1C is < 7)   Microalb/Crt.  Ratio (mcg/mg creat)   Date Value   12/14/2020 3,134 (H)     LDL Cholesterol (mg/dL)   Date Value   04/14/2022 73   12/14/2020 33       (goal LDL is <100)   AST (U/L)   Date Value   09/16/2021 69 (H)     ALT (U/L)   Date Value   09/16/2021 33     BUN (mg/dL)   Date Value   04/14/2022 34 (H)     BP Readings from Last 3 Encounters:   08/18/22 114/71   07/13/22 116/61   05/13/22 (!) 141/78          (goal 120/80)    All Future Testing planned in [facility]  Lab Frequency Next Occurrence   VL UPPER EXTREMITY BILATERAL VEIN MAPPING Once 08/09/2022   Hemoglobin and Hematocrit, Blood, Post Transfusion POST TRANSFUSION    Hemoglobin and Hematocrit, Blood, Post Transfusion POST TRANSFUSION                Patient Active Problem List:     Type 2 diabetes mellitus with chronic kidney disease on chronic dialysis, with long-term current use of insulin (HCC)     Sciatica of right side     Atypical chest pain     Low back pain of thoracolumbar region with sciatica Kidney stone     Chest pain     Need for prophylactic vaccination against diphtheria-tetanus-pertussis (DTP)     Gastroesophageal reflux disease     Tachycardia     Essential hypertension     Stage 3 chronic kidney disease     BMI 31.0-31.9,adult     Acute kidney injury superimposed on CKD (HCC)     Type 2 MI (myocardial infarction) (HCC)     Gastrointestinal hemorrhage     Acute posthemorrhagic anemia     Neuropathy     NSAID long-term use     Family history of malignant neoplasm of colon     Positive colorectal cancer screening using Cologuard test     JEFFREY (acute kidney injury) ([facility]     Pneumonia due to COVID-19 virus     ESRD needing dialysis ([facility]     Acute metabolic encephalopathy     Hypoxia     Thrombocytopenia ([facility]     Encounter regarding vascular access for dialysis for ESRD ([facility]

## 2022-09-20 DIAGNOSIS — K21.9 GASTROESOPHAGEAL REFLUX DISEASE, UNSPECIFIED WHETHER ESOPHAGITIS PRESENT: ICD-10-CM

## 2022-09-21 DIAGNOSIS — K21.9 GASTROESOPHAGEAL REFLUX DISEASE, UNSPECIFIED WHETHER ESOPHAGITIS PRESENT: ICD-10-CM

## 2022-09-21 RX ORDER — PANTOPRAZOLE SODIUM 40 MG/1
TABLET, DELAYED RELEASE ORAL
Qty: 30 TABLET | Refills: 3 | OUTPATIENT
Start: 2022-09-21

## 2022-09-26 DIAGNOSIS — N18.6 TYPE 2 DIABETES MELLITUS WITH CHRONIC KIDNEY DISEASE ON CHRONIC DIALYSIS, WITH LONG-TERM CURRENT USE OF INSULIN (HCC): ICD-10-CM

## 2022-09-26 DIAGNOSIS — E11.22 TYPE 2 DIABETES MELLITUS WITH CHRONIC KIDNEY DISEASE ON CHRONIC DIALYSIS, WITH LONG-TERM CURRENT USE OF INSULIN (HCC): ICD-10-CM

## 2022-09-26 DIAGNOSIS — Z79.4 TYPE 2 DIABETES MELLITUS WITH CHRONIC KIDNEY DISEASE ON CHRONIC DIALYSIS, WITH LONG-TERM CURRENT USE OF INSULIN (HCC): ICD-10-CM

## 2022-09-26 DIAGNOSIS — Z99.2 TYPE 2 DIABETES MELLITUS WITH CHRONIC KIDNEY DISEASE ON CHRONIC DIALYSIS, WITH LONG-TERM CURRENT USE OF INSULIN (HCC): ICD-10-CM

## 2022-09-26 NOTE — TELEPHONE ENCOUNTER
Please address the medication refill and close the encounter. If I can be of assistance, please route to the applicable pool. Thank you. Last visit: 3-24-22  Last Med refill: 9-1-22  Does patient have enough medication for 72 hours: Yes      Next Visit Date:  Future Appointments   Date Time Provider Armin Rocha   9/27/2022  9:45 AM Bard Jos MD [address] [place] [address] Maintenance   Topic Date Due    Diabetic retinal exam  Never done    Shingles vaccine (1 of 2) Never done    Diabetic foot exam  12/14/2021    COVID-19 Vaccine (2 - Booster for [employer] series) 01/11/2022    Flu vaccine (1) 08/01/2022    Annual Wellness Visit (AWV)  Never done    A1C test (Diabetic or Prediabetic)  03/24/2023    Depression Screen  03/24/2023    Lipids  04/14/2023    DTaP/Tdap/Td vaccine (2 - Td or Tdap) 08/23/2029    Colorectal Cancer Screen  12/07/2030    Pneumococcal 0-64 years Vaccine  Completed    Hepatitis C screen  Completed    HIV screen  Completed    Hepatitis A vaccine  Aged Out    Hib vaccine  Aged Out    Meningococcal (ACWY) vaccine  Aged Out       Hemoglobin A1C (%)   Date Value   03/24/2022 6.9   08/10/2021 8.0   01/18/2021 8.5 (H)             ( goal A1C is < 7)   Microalb/Crt.  Ratio (mcg/mg creat)   Date Value   12/14/2020 3,134 (H)     LDL Cholesterol (mg/dL)   Date Value   04/14/2022 73   12/14/2020 33       (goal LDL is <100)   AST (U/L)   Date Value   09/16/2021 69 (H)     ALT (U/L)   Date Value   09/16/2021 33     BUN (mg/dL)   Date Value   04/14/2022 34 (H)     BP Readings from Last 3 Encounters:   08/18/22 114/71   07/13/22 116/61   05/13/22 (!) 141/78          (goal 120/80)    All Future Testing planned in [facility]  Lab Frequency Next Occurrence   VL UPPER EXTREMITY BILATERAL VEIN MAPPING Once 08/09/2022   Hemoglobin and Hematocrit, Blood, Post Transfusion POST TRANSFUSION    Hemoglobin and Hematocrit, Blood, Post Transfusion POST TRANSFUSION                Patient Active Problem List: Type 2 diabetes mellitus with chronic kidney disease on chronic dialysis, with long-term current use of insulin (HCC)     Sciatica of right side     Atypical chest pain     Low back pain of thoracolumbar region with sciatica     Kidney stone     Chest pain     Need for prophylactic vaccination against diphtheria-tetanus-pertussis (DTP)     Gastroesophageal reflux disease     Tachycardia     Essential hypertension     Stage 3 chronic kidney disease     BMI 31.0-31.9,adult     Acute kidney injury superimposed on CKD (HCC)     Type 2 MI (myocardial infarction) ([address]     Gastrointestinal hemorrhage     Acute posthemorrhagic anemia     Neuropathy     NSAID long-term use     Family history of malignant neoplasm of colon     Positive colorectal cancer screening using Cologuard test     JEFFREY (acute kidney injury) ([address]     Pneumonia due to COVID-19 virus     ESRD needing dialysis ([address]     Acute metabolic encephalopathy     Hypoxia     Thrombocytopenia ([address]     Encounter regarding vascular access for dialysis for ESRD ([facility]

## 2022-09-27 ENCOUNTER — OFFICE VISIT (OUTPATIENT)
Dept: FAMILY MEDICINE CLINIC | Age: 62
End: 2022-09-27
Payer: COMMERCIAL

## 2022-09-27 VITALS
HEART RATE: 69 BPM | DIASTOLIC BLOOD PRESSURE: 68 MMHG | SYSTOLIC BLOOD PRESSURE: 133 MMHG | BODY MASS INDEX: 31.71 KG/M2 | WEIGHT: 221 LBS

## 2022-09-27 DIAGNOSIS — I10 ESSENTIAL HYPERTENSION: ICD-10-CM

## 2022-09-27 DIAGNOSIS — N18.6 TYPE 2 DIABETES MELLITUS WITH CHRONIC KIDNEY DISEASE ON CHRONIC DIALYSIS, WITH LONG-TERM CURRENT USE OF INSULIN (HCC): Primary | ICD-10-CM

## 2022-09-27 DIAGNOSIS — Z99.2 TYPE 2 DIABETES MELLITUS WITH CHRONIC KIDNEY DISEASE ON CHRONIC DIALYSIS, WITH LONG-TERM CURRENT USE OF INSULIN (HCC): Primary | ICD-10-CM

## 2022-09-27 DIAGNOSIS — N18.6 ESRD (END STAGE RENAL DISEASE) ON DIALYSIS (HCC): ICD-10-CM

## 2022-09-27 DIAGNOSIS — Z79.4 TYPE 2 DIABETES MELLITUS WITH CHRONIC KIDNEY DISEASE ON CHRONIC DIALYSIS, WITH LONG-TERM CURRENT USE OF INSULIN (HCC): Primary | ICD-10-CM

## 2022-09-27 DIAGNOSIS — Z99.2 ESRD (END STAGE RENAL DISEASE) ON DIALYSIS (HCC): ICD-10-CM

## 2022-09-27 DIAGNOSIS — N52.9 ERECTILE DYSFUNCTION, UNSPECIFIED ERECTILE DYSFUNCTION TYPE: ICD-10-CM

## 2022-09-27 DIAGNOSIS — E11.22 TYPE 2 DIABETES MELLITUS WITH CHRONIC KIDNEY DISEASE ON CHRONIC DIALYSIS, WITH LONG-TERM CURRENT USE OF INSULIN (HCC): Primary | ICD-10-CM

## 2022-09-27 LAB — HBA1C MFR BLD: 5.7 %

## 2022-09-27 PROCEDURE — G8417 CALC BMI ABV UP PARAM F/U: HCPCS | Performed by: STUDENT IN AN ORGANIZED HEALTH CARE EDUCATION/TRAINING PROGRAM

## 2022-09-27 PROCEDURE — 3017F COLORECTAL CA SCREEN DOC REV: CPT | Performed by: STUDENT IN AN ORGANIZED HEALTH CARE EDUCATION/TRAINING PROGRAM

## 2022-09-27 PROCEDURE — 3044F HG A1C LEVEL LT 7.0%: CPT | Performed by: STUDENT IN AN ORGANIZED HEALTH CARE EDUCATION/TRAINING PROGRAM

## 2022-09-27 PROCEDURE — G8427 DOCREV CUR MEDS BY ELIG CLIN: HCPCS | Performed by: STUDENT IN AN ORGANIZED HEALTH CARE EDUCATION/TRAINING PROGRAM

## 2022-09-27 PROCEDURE — 2022F DILAT RTA XM EVC RTNOPTHY: CPT | Performed by: STUDENT IN AN ORGANIZED HEALTH CARE EDUCATION/TRAINING PROGRAM

## 2022-09-27 PROCEDURE — 83036 HEMOGLOBIN GLYCOSYLATED A1C: CPT | Performed by: STUDENT IN AN ORGANIZED HEALTH CARE EDUCATION/TRAINING PROGRAM

## 2022-09-27 PROCEDURE — 1036F TOBACCO NON-USER: CPT | Performed by: STUDENT IN AN ORGANIZED HEALTH CARE EDUCATION/TRAINING PROGRAM

## 2022-09-27 PROCEDURE — 99213 OFFICE O/P EST LOW 20 MIN: CPT | Performed by: STUDENT IN AN ORGANIZED HEALTH CARE EDUCATION/TRAINING PROGRAM

## 2022-09-27 RX ORDER — GABAPENTIN 100 MG/1
100 CAPSULE ORAL 3 TIMES DAILY
Qty: 90 CAPSULE | Refills: 0 | Status: SHIPPED | OUTPATIENT
Start: 2022-09-27 | End: 2022-10-24 | Stop reason: SDUPTHER

## 2022-09-27 RX ORDER — ACETAMINOPHEN 325 MG/1
TABLET ORAL
Qty: 120 TABLET | Refills: 0 | Status: SHIPPED | OUTPATIENT
Start: 2022-09-27 | End: 2022-10-19 | Stop reason: SDUPTHER

## 2022-09-27 RX ORDER — ASPIRIN 81 MG/1
81 TABLET, CHEWABLE ORAL DAILY
Qty: 30 TABLET | Refills: 0 | Status: SHIPPED | OUTPATIENT
Start: 2022-09-27 | End: 2022-10-21 | Stop reason: SDUPTHER

## 2022-09-27 RX ORDER — SILDENAFIL CITRATE 20 MG/1
20 TABLET ORAL DAILY PRN
Qty: 10 TABLET | Refills: 0 | Status: SHIPPED | OUTPATIENT
Start: 2022-09-27

## 2022-09-27 ASSESSMENT — ENCOUNTER SYMPTOMS
BACK PAIN: 0
COUGH: 0
VOMITING: 0
SHORTNESS OF BREATH: 0
COLOR CHANGE: 0
CHEST TIGHTNESS: 0
CONSTIPATION: 0
NAUSEA: 0
DIARRHEA: 0
ABDOMINAL PAIN: 0

## 2022-09-27 NOTE — PROGRESS NOTES
Diabetic visit information    BP Readings from Last 3 Encounters:   08/18/22 114/71   07/13/22 116/61   05/13/22 (!) 141/78       Hemoglobin A1C (%)   Date Value   03/24/2022 6.9   08/10/2021 8.0   01/18/2021 8.5 (H)     Microalb/Crt. Ratio (mcg/mg creat)   Date Value   12/14/2020 3,134 (H)     LDL Cholesterol (mg/dL)   Date Value   04/14/2022 73               Have you changed or started any medications since your last visit including any over-the-counter medicines, vitamins, or herbal medicines? no   Have you stopped taking any of your medications? Is so, why? -  no  Are you having any side effects from any of your medications? - no    Have you seen any other physician or provider since your last visit?  no   Have you had any other diagnostic tests since your last visit?  no   Have you been seen in the emergency room and/or had an admission in a hospital since we last saw you?  no     Have you had your annual diabetic retinal (eye) exam? No   (ensure copy of exam is in the chart)    Have you had your routine dental cleaning in the past 6 months? no    Do you have an active MyChart account? If not, what are your barriers? Yes    Patient Care Team:  Holden Miranda DO as PCP - General (Family Medicine)  Allison Helms DO as Consulting Physician (General Surgery)    Medical history Review  Past Medical, Family, and Social History reviewed and does not contribute to the patient presenting condition.     Health Maintenance   Topic Date Due    Diabetic retinal exam  Never done    Shingles vaccine (1 of 2) Never done    Diabetic foot exam  12/14/2021    COVID-19 Vaccine (2 - Booster for [employer] series) 01/11/2022    Annual Wellness Visit (AWV)  Never done    A1C test (Diabetic or Prediabetic)  03/24/2023    Depression Screen  03/24/2023    Lipids  04/14/2023    DTaP/Tdap/Td vaccine (2 - Td or Tdap) 08/23/2029    Colorectal Cancer Screen  12/07/2030    Flu vaccine  Completed    Pneumococcal 0-64 years Vaccine Completed    Hepatitis C screen  Completed    HIV screen  Completed    Hepatitis A vaccine  Aged Out    Hib vaccine  Aged Out    Meningococcal (ACWY) vaccine  Aged Out

## 2022-09-27 NOTE — PROGRESS NOTES
Attending Physician Statement  I have discussed the care of Hernesto Cotto, 58 y.o. male,including pertinent history and exam findings,  with the resident Dr. Keesha Cabrera MD.  History:  Chief Complaint   Patient presents with    Diabetes     Follow up    Medication Refill     pending     Patient is here for follow up on type II DM and Hypertension. He is ESRD on dialysis 3 days per week. I have reviewed the key elements of the encounter with the resident. Examination was done by resident as documented in residents note. BP Readings from Last 3 Encounters:   09/27/22 133/68   08/18/22 114/71   07/13/22 116/61     /68 (Site: Left Upper Arm, Position: Sitting, Cuff Size: Large Adult)   Pulse 69   Wt 221 lb (100.2 kg)   BMI 31.71 kg/m²   Lab Results   Component Value Date    WBC 6.2 04/14/2022    HGB 10.9 (L) 04/14/2022    HCT 33.1 (L) 04/14/2022     04/14/2022    CHOL 188 04/14/2022    TRIG 177 (H) 04/14/2022    HDL 80 04/14/2022    ALT 33 09/16/2021    AST 69 (H) 09/16/2021     04/14/2022    K 4.3 04/14/2022    CL 98 04/14/2022    CREATININE 5.73 (HH) 04/14/2022    BUN 34 (H) 04/14/2022    CO2 28 04/14/2022    TSH 1.08 04/14/2022    INR 0.9 09/18/2021    LABA1C 5.7 09/27/2022    LABMICR 3,134 (H) 12/14/2020     Lab Results   Component Value Date    CALCIUM 9.4 04/14/2022    PHOS 7.2 (H) 09/18/2021     Lab Results   Component Value Date    LDLCHOLESTEROL 73 04/14/2022     I agree with the assessment, plan and diagnosis of    Diagnosis Orders   1. Type 2 diabetes mellitus with chronic kidney disease on chronic dialysis, with long-term current use of insulin (HCC)  gabapentin (NEURONTIN) 100 MG capsule    aspirin 81 MG chewable tablet    POCT glycosylated hemoglobin (Hb A1C)    RAISSA Nash MD, Ophthalmology, [facility]      2. Essential hypertension  metoprolol tartrate (LOPRESSOR) 25 MG tablet      3.  ESRD (end stage renal disease) on dialysis (HCC)  metoprolol tartrate (LOPRESSOR) 25 MG tablet      4. Erectile dysfunction, unspecified erectile dysfunction type  sildenafil (REVATIO) 20 MG tablet        I agree with  orders as documented by the resident. Recommendations:   Patient was counseled, regimen reviewed and refills provided. Labs and health maintenance are up to date. Return in about 3 months (around 12/27/2022) for HTN.    (Alec Durand ) Dr. Leon Matthews MD

## 2022-09-29 RX ORDER — GABAPENTIN 100 MG/1
CAPSULE ORAL
Qty: 90 CAPSULE | Refills: 0 | OUTPATIENT
Start: 2022-09-29 | End: 2022-10-29

## 2022-10-17 DIAGNOSIS — K21.9 GASTROESOPHAGEAL REFLUX DISEASE, UNSPECIFIED WHETHER ESOPHAGITIS PRESENT: ICD-10-CM

## 2022-10-17 RX ORDER — PANTOPRAZOLE SODIUM 40 MG/1
TABLET, DELAYED RELEASE ORAL
Qty: 30 TABLET | Refills: 3 | Status: SHIPPED | OUTPATIENT
Start: 2022-10-17

## 2022-10-17 NOTE — TELEPHONE ENCOUNTER
201 and intake faxed to Crisis @ [phone]  Confirmation obtained       [address] Viviane RN  09/11/22 0382 E-scribe request for med refill. Please review and e-scribe if applicable. Last Visit Date:  09/27/2022  Next Visit Date:  Visit date not found    Hemoglobin A1C (%)   Date Value   09/27/2022 5.7   03/24/2022 6.9   08/10/2021 8.0             ( goal A1C is < 7)   Microalb/Crt.  Ratio (mcg/mg creat)   Date Value   12/14/2020 3,134 (H)     LDL Cholesterol (mg/dL)   Date Value   04/14/2022 73       (goal LDL is <100)   AST (U/L)   Date Value   09/16/2021 69 (H)     ALT (U/L)   Date Value   09/16/2021 33     BUN (mg/dL)   Date Value   04/14/2022 34 (H)     BP Readings from Last 3 Encounters:   09/27/22 133/68   08/18/22 114/71   07/13/22 116/61          (goal 120/80)        Patient Active Problem List:     Type 2 diabetes mellitus with chronic kidney disease on chronic dialysis, with long-term current use of insulin (HCC)     Sciatica of right side     Atypical chest pain     Low back pain of thoracolumbar region with sciatica     Kidney stone     Chest pain     Need for prophylactic vaccination against diphtheria-tetanus-pertussis (DTP)     Gastroesophageal reflux disease     Tachycardia     Essential hypertension     Stage 3 chronic kidney disease     BMI 31.0-31.9,adult     Acute kidney injury superimposed on CKD ([address]     Type 2 MI (myocardial infarction) ([address]     Gastrointestinal hemorrhage     Acute posthemorrhagic anemia     Neuropathy     NSAID long-term use     Family history of malignant neoplasm of colon     Positive colorectal cancer screening using Cologuard test     JEFFREY (acute kidney injury) ([address]     Pneumonia due to COVID-19 virus     ESRD needing dialysis ([address]     Acute metabolic encephalopathy     Hypoxia     Thrombocytopenia ([address]     ESRD (end stage renal disease) on dialysis ([address]     Erectile dysfunction      ----Jesus Rosario

## 2022-10-18 RX ORDER — GABAPENTIN 100 MG/1
CAPSULE ORAL
Qty: 90 CAPSULE | Refills: 0 | OUTPATIENT
Start: 2022-10-18 | End: 2022-11-17

## 2022-10-19 RX ORDER — ACETAMINOPHEN 325 MG/1
TABLET ORAL
Qty: 120 TABLET | Refills: 0 | Status: SHIPPED | OUTPATIENT
Start: 2022-10-19

## 2022-10-19 NOTE — TELEPHONE ENCOUNTER
Last visit: 9/27/22  Last Med refill: 9/27/22  Does patient have enough medication for 72 hours: Yes    Next Visit Date:  No future appointments. Health Maintenance   Topic Date Due    Diabetic retinal exam  Never done    Shingles vaccine (1 of 2) Never done    Hepatitis B vaccine (1 of 3 - Risk Dialysis 4-dose series) Never done    Diabetic foot exam  12/14/2021    COVID-19 Vaccine (2 - Booster for [employer] series) 01/11/2022    Annual Wellness Visit (AWV)  Never done    Depression Screen  03/24/2023    Lipids  04/14/2023    A1C test (Diabetic or Prediabetic)  09/27/2023    DTaP/Tdap/Td vaccine (2 - Td or Tdap) 08/23/2029    Colorectal Cancer Screen  12/07/2030    Flu vaccine  Completed    Pneumococcal 0-64 years Vaccine  Completed    Hepatitis C screen  Completed    HIV screen  Completed    Hepatitis A vaccine  Aged Out    Hib vaccine  Aged Out    Meningococcal (ACWY) vaccine  Aged Out       Hemoglobin A1C (%)   Date Value   09/27/2022 5.7   03/24/2022 6.9   08/10/2021 8.0             ( goal A1C is < 7)   Microalb/Crt.  Ratio (mcg/mg creat)   Date Value   12/14/2020 3,134 (H)     LDL Cholesterol (mg/dL)   Date Value   04/14/2022 73   12/14/2020 33       (goal LDL is <100)   AST (U/L)   Date Value   09/16/2021 69 (H)     ALT (U/L)   Date Value   09/16/2021 33     BUN (mg/dL)   Date Value   04/14/2022 34 (H)     BP Readings from Last 3 Encounters:   09/27/22 133/68   08/18/22 114/71   07/13/22 116/61          (goal 120/80)    All Future Testing planned in [facility]  Lab Frequency Next Occurrence   VL UPPER EXTREMITY BILATERAL VEIN MAPPING Once 08/09/2022   Hemoglobin and Hematocrit, Blood, Post Transfusion POST TRANSFUSION    Hemoglobin and Hematocrit, Blood, Post Transfusion POST TRANSFUSION                Patient Active Problem List:     Type 2 diabetes mellitus with chronic kidney disease on chronic dialysis, with long-term current use of insulin (HCC)     Sciatica of right side     Atypical chest pain     Low back pain of thoracolumbar region with sciatica     Kidney stone     Chest pain     Need for prophylactic vaccination against diphtheria-tetanus-pertussis (DTP)     Gastroesophageal reflux disease     Tachycardia     Essential hypertension     Stage 3 chronic kidney disease     BMI 31.0-31.9,adult     Acute kidney injury superimposed on CKD (HCC)     Type 2 MI (myocardial infarction) (HCC)     Gastrointestinal hemorrhage     Acute posthemorrhagic anemia     Neuropathy     NSAID long-term use     Family history of malignant neoplasm of colon     Positive colorectal cancer screening using Cologuard test     JEFFREY (acute kidney injury) ([facility]     Pneumonia due to COVID-19 virus     ESRD needing dialysis ([facility]     Acute metabolic encephalopathy     Hypoxia     Thrombocytopenia ([facility]     ESRD (end stage renal disease) on dialysis [facility])     Erectile dysfunction

## 2022-10-20 DIAGNOSIS — Z79.4 TYPE 2 DIABETES MELLITUS WITH CHRONIC KIDNEY DISEASE ON CHRONIC DIALYSIS, WITH LONG-TERM CURRENT USE OF INSULIN (HCC): ICD-10-CM

## 2022-10-20 DIAGNOSIS — Z99.2 TYPE 2 DIABETES MELLITUS WITH CHRONIC KIDNEY DISEASE ON CHRONIC DIALYSIS, WITH LONG-TERM CURRENT USE OF INSULIN (HCC): ICD-10-CM

## 2022-10-20 DIAGNOSIS — N18.6 TYPE 2 DIABETES MELLITUS WITH CHRONIC KIDNEY DISEASE ON CHRONIC DIALYSIS, WITH LONG-TERM CURRENT USE OF INSULIN (HCC): ICD-10-CM

## 2022-10-20 DIAGNOSIS — E11.22 TYPE 2 DIABETES MELLITUS WITH CHRONIC KIDNEY DISEASE ON CHRONIC DIALYSIS, WITH LONG-TERM CURRENT USE OF INSULIN (HCC): ICD-10-CM

## 2022-10-20 RX ORDER — ASPIRIN 81 MG/1
81 TABLET, CHEWABLE ORAL DAILY
Qty: 30 TABLET | Refills: 0 | OUTPATIENT
Start: 2022-10-20

## 2022-10-20 RX ORDER — GABAPENTIN 100 MG/1
100 CAPSULE ORAL 3 TIMES DAILY
Qty: 90 CAPSULE | Refills: 0 | OUTPATIENT
Start: 2022-10-20 | End: 2022-12-04

## 2022-10-20 NOTE — TELEPHONE ENCOUNTER
E-scribe request for med refills. Please review and e-scribe if applicable. Last Visit Date:  9/27/22  Next Visit Date:  Visit date not found    Hemoglobin A1C (%)   Date Value   09/27/2022 5.7   03/24/2022 6.9   08/10/2021 8.0             ( goal A1C is < 7)   Microalb/Crt.  Ratio (mcg/mg creat)   Date Value   12/14/2020 3,134 (H)     LDL Cholesterol (mg/dL)   Date Value   04/14/2022 73       (goal LDL is <100)   AST (U/L)   Date Value   09/16/2021 69 (H)     ALT (U/L)   Date Value   09/16/2021 33     BUN (mg/dL)   Date Value   04/14/2022 34 (H)     BP Readings from Last 3 Encounters:   09/27/22 133/68   08/18/22 114/71   07/13/22 116/61          (goal 120/80)        Patient Active Problem List:     Type 2 diabetes mellitus with chronic kidney disease on chronic dialysis, with long-term current use of insulin (HCC)     Sciatica of right side     Atypical chest pain     Low back pain of thoracolumbar region with sciatica     Kidney stone     Chest pain     Need for prophylactic vaccination against diphtheria-tetanus-pertussis (DTP)     Gastroesophageal reflux disease     Tachycardia     Essential hypertension     Stage 3 chronic kidney disease     BMI 31.0-31.9,adult     Acute kidney injury superimposed on CKD ([address]     Type 2 MI (myocardial infarction) ([address]     Gastrointestinal hemorrhage     Acute posthemorrhagic anemia     Neuropathy     NSAID long-term use     Family history of malignant neoplasm of colon     Positive colorectal cancer screening using Cologuard test     JEFFREY (acute kidney injury) ([address]     Pneumonia due to COVID-19 virus     ESRD needing dialysis ([address]     Acute metabolic encephalopathy     Hypoxia     Thrombocytopenia ([address]     ESRD (end stage renal disease) on dialysis ([address]     Erectile dysfunction      ----Mary Flora

## 2022-10-21 DIAGNOSIS — Z79.4 TYPE 2 DIABETES MELLITUS WITH CHRONIC KIDNEY DISEASE ON CHRONIC DIALYSIS, WITH LONG-TERM CURRENT USE OF INSULIN (HCC): ICD-10-CM

## 2022-10-21 DIAGNOSIS — Z99.2 TYPE 2 DIABETES MELLITUS WITH CHRONIC KIDNEY DISEASE ON CHRONIC DIALYSIS, WITH LONG-TERM CURRENT USE OF INSULIN (HCC): ICD-10-CM

## 2022-10-21 DIAGNOSIS — N18.6 TYPE 2 DIABETES MELLITUS WITH CHRONIC KIDNEY DISEASE ON CHRONIC DIALYSIS, WITH LONG-TERM CURRENT USE OF INSULIN (HCC): ICD-10-CM

## 2022-10-21 DIAGNOSIS — E11.22 TYPE 2 DIABETES MELLITUS WITH CHRONIC KIDNEY DISEASE ON CHRONIC DIALYSIS, WITH LONG-TERM CURRENT USE OF INSULIN (HCC): ICD-10-CM

## 2022-10-21 RX ORDER — ASPIRIN 81 MG/1
81 TABLET, CHEWABLE ORAL DAILY
Qty: 30 TABLET | Refills: 0 | Status: SHIPPED | OUTPATIENT
Start: 2022-10-21

## 2022-10-21 NOTE — TELEPHONE ENCOUNTER
Please address the medication refill and close the encounter. If I can be of assistance, please route to the applicable pool. Thank you. Last visit: 9-27-22  Last Med refill: 9-27-22  Does patient have enough medication for 72 hours: Yes    Next Visit Date:  No future appointments. Health Maintenance   Topic Date Due    Diabetic retinal exam  Never done    Shingles vaccine (1 of 2) Never done    Hepatitis B vaccine (1 of 3 - Risk Dialysis 4-dose series) Never done    Diabetic foot exam  12/14/2021    COVID-19 Vaccine (2 - Booster for [employer] series) 01/11/2022    Annual Wellness Visit (AWV)  Never done    Depression Screen  03/24/2023    Lipids  04/14/2023    A1C test (Diabetic or Prediabetic)  09/27/2023    DTaP/Tdap/Td vaccine (2 - Td or Tdap) 08/23/2029    Colorectal Cancer Screen  12/07/2030    Flu vaccine  Completed    Pneumococcal 0-64 years Vaccine  Completed    Hepatitis C screen  Completed    HIV screen  Completed    Hepatitis A vaccine  Aged Out    Hib vaccine  Aged Out    Meningococcal (ACWY) vaccine  Aged Out       Hemoglobin A1C (%)   Date Value   09/27/2022 5.7   03/24/2022 6.9   08/10/2021 8.0             ( goal A1C is < 7)   Microalb/Crt.  Ratio (mcg/mg creat)   Date Value   12/14/2020 3,134 (H)     LDL Cholesterol (mg/dL)   Date Value   04/14/2022 73   12/14/2020 33       (goal LDL is <100)   AST (U/L)   Date Value   09/16/2021 69 (H)     ALT (U/L)   Date Value   09/16/2021 33     BUN (mg/dL)   Date Value   04/14/2022 34 (H)     BP Readings from Last 3 Encounters:   09/27/22 133/68   08/18/22 114/71   07/13/22 116/61          (goal 120/80)    All Future Testing planned in [facility]  Lab Frequency Next Occurrence   VL UPPER EXTREMITY BILATERAL VEIN MAPPING Once 08/09/2022   Hemoglobin and Hematocrit, Blood, Post Transfusion POST TRANSFUSION    Hemoglobin and Hematocrit, Blood, Post Transfusion POST TRANSFUSION                Patient Active Problem List:     Type 2 diabetes mellitus with chronic kidney disease on chronic dialysis, with long-term current use of insulin (HCC)     Sciatica of right side     Atypical chest pain     Low back pain of thoracolumbar region with sciatica     Kidney stone     Chest pain     Need for prophylactic vaccination against diphtheria-tetanus-pertussis (DTP)     Gastroesophageal reflux disease     Tachycardia     Essential hypertension     Stage 3 chronic kidney disease     BMI 31.0-31.9,adult     Acute kidney injury superimposed on CKD (HCC)     Type 2 MI (myocardial infarction) ([address]     Gastrointestinal hemorrhage     Acute posthemorrhagic anemia     Neuropathy     NSAID long-term use     Family history of malignant neoplasm of colon     Positive colorectal cancer screening using Cologuard test     JEFFREY (acute kidney injury) ([address]     Pneumonia due to COVID-19 virus     ESRD needing dialysis ([address]     Acute metabolic encephalopathy     Hypoxia     Thrombocytopenia ([address]     ESRD (end stage renal disease) on dialysis [facility])     Erectile dysfunction

## 2022-10-21 NOTE — TELEPHONE ENCOUNTER
Please address the medication refill and close the encounter. If I can be of assistance, please route to the applicable pool. Thank you. Last visit: 9-27-22  Last Med refill: 9-27-22  Does patient have enough medication for 72 hours: No:     Next Visit Date:  No future appointments. Health Maintenance   Topic Date Due    Diabetic retinal exam  Never done    Shingles vaccine (1 of 2) Never done    Hepatitis B vaccine (1 of 3 - Risk Dialysis 4-dose series) Never done    Diabetic foot exam  12/14/2021    COVID-19 Vaccine (2 - Booster for [employer] series) 01/11/2022    Annual Wellness Visit (AWV)  Never done    Depression Screen  03/24/2023    Lipids  04/14/2023    A1C test (Diabetic or Prediabetic)  09/27/2023    DTaP/Tdap/Td vaccine (2 - Td or Tdap) 08/23/2029    Colorectal Cancer Screen  12/07/2030    Flu vaccine  Completed    Pneumococcal 0-64 years Vaccine  Completed    Hepatitis C screen  Completed    HIV screen  Completed    Hepatitis A vaccine  Aged Out    Hib vaccine  Aged Out    Meningococcal (ACWY) vaccine  Aged Out       Hemoglobin A1C (%)   Date Value   09/27/2022 5.7   03/24/2022 6.9   08/10/2021 8.0             ( goal A1C is < 7)   Microalb/Crt.  Ratio (mcg/mg creat)   Date Value   12/14/2020 3,134 (H)     LDL Cholesterol (mg/dL)   Date Value   04/14/2022 73   12/14/2020 33       (goal LDL is <100)   AST (U/L)   Date Value   09/16/2021 69 (H)     ALT (U/L)   Date Value   09/16/2021 33     BUN (mg/dL)   Date Value   04/14/2022 34 (H)     BP Readings from Last 3 Encounters:   09/27/22 133/68   08/18/22 114/71   07/13/22 116/61          (goal 120/80)    All Future Testing planned in [facility]  Lab Frequency Next Occurrence   VL UPPER EXTREMITY BILATERAL VEIN MAPPING Once 08/09/2022   Hemoglobin and Hematocrit, Blood, Post Transfusion POST TRANSFUSION    Hemoglobin and Hematocrit, Blood, Post Transfusion POST TRANSFUSION                Patient Active Problem List:     Type 2 diabetes mellitus with

## 2022-10-24 RX ORDER — GABAPENTIN 100 MG/1
100 CAPSULE ORAL 3 TIMES DAILY
Qty: 90 CAPSULE | Refills: 0 | Status: SHIPPED | OUTPATIENT
Start: 2022-10-24 | End: 2022-11-16

## 2022-11-15 DIAGNOSIS — N18.6 TYPE 2 DIABETES MELLITUS WITH CHRONIC KIDNEY DISEASE ON CHRONIC DIALYSIS, WITH LONG-TERM CURRENT USE OF INSULIN (HCC): ICD-10-CM

## 2022-11-15 DIAGNOSIS — Z79.4 TYPE 2 DIABETES MELLITUS WITH CHRONIC KIDNEY DISEASE ON CHRONIC DIALYSIS, WITH LONG-TERM CURRENT USE OF INSULIN (HCC): ICD-10-CM

## 2022-11-15 DIAGNOSIS — E11.22 TYPE 2 DIABETES MELLITUS WITH CHRONIC KIDNEY DISEASE ON CHRONIC DIALYSIS, WITH LONG-TERM CURRENT USE OF INSULIN (HCC): ICD-10-CM

## 2022-11-15 DIAGNOSIS — Z99.2 TYPE 2 DIABETES MELLITUS WITH CHRONIC KIDNEY DISEASE ON CHRONIC DIALYSIS, WITH LONG-TERM CURRENT USE OF INSULIN (HCC): ICD-10-CM

## 2022-11-15 NOTE — TELEPHONE ENCOUNTER
Last visit:   Last Med refill:   Does patient have enough medication for 72 hours: No:     Next Visit Date:  No future appointments. Health Maintenance   Topic Date Due    Diabetic retinal exam  Never done    Shingles vaccine (1 of 2) Never done    Hepatitis B vaccine (1 of 3 - Risk Dialysis 4-dose series) Never done    Diabetic foot exam  12/14/2021    COVID-19 Vaccine (2 - Booster for [employer] series) 01/11/2022    Annual Wellness Visit (AWV)  Never done    Depression Screen  03/24/2023    Lipids  04/14/2023    A1C test (Diabetic or Prediabetic)  09/27/2023    DTaP/Tdap/Td vaccine (2 - Td or Tdap) 08/23/2029    Colorectal Cancer Screen  12/07/2030    Flu vaccine  Completed    Pneumococcal 0-64 years Vaccine  Completed    Hepatitis C screen  Completed    HIV screen  Completed    Hepatitis A vaccine  Aged Out    Hib vaccine  Aged Out    Meningococcal (ACWY) vaccine  Aged Out       Hemoglobin A1C (%)   Date Value   09/27/2022 5.7   03/24/2022 6.9   08/10/2021 8.0             ( goal A1C is < 7)   Microalb/Crt.  Ratio (mcg/mg creat)   Date Value   12/14/2020 3,134 (H)     LDL Cholesterol (mg/dL)   Date Value   04/14/2022 73   12/14/2020 33       (goal LDL is <100)   AST (U/L)   Date Value   09/16/2021 69 (H)     ALT (U/L)   Date Value   09/16/2021 33     BUN (mg/dL)   Date Value   04/14/2022 34 (H)     BP Readings from Last 3 Encounters:   09/27/22 133/68   08/18/22 114/71   07/13/22 116/61          (goal 120/80)    All Future Testing planned in [facility]  Lab Frequency Next Occurrence   VL UPPER EXTREMITY BILATERAL VEIN MAPPING Once 08/09/2022   Hemoglobin and Hematocrit, Blood, Post Transfusion POST TRANSFUSION    Hemoglobin and Hematocrit, Blood, Post Transfusion POST TRANSFUSION                Patient Active Problem List:     Type 2 diabetes mellitus with chronic kidney disease on chronic dialysis, with long-term current use of insulin (HCC)     Sciatica of right side     Atypical chest pain     Low back pain of thoracolumbar region with sciatica     Kidney stone     Chest pain     Need for prophylactic vaccination against diphtheria-tetanus-pertussis (DTP)     Gastroesophageal reflux disease     Tachycardia     Essential hypertension     Stage 3 chronic kidney disease     BMI 31.0-31.9,adult     Acute kidney injury superimposed on CKD (HCC)     Type 2 MI (myocardial infarction) (HCC)     Gastrointestinal hemorrhage     Acute posthemorrhagic anemia     Neuropathy     NSAID long-term use     Family history of malignant neoplasm of colon     Positive colorectal cancer screening using Cologuard test     JEFFREY (acute kidney injury) ([facility]     Pneumonia due to COVID-19 virus     ESRD needing dialysis ([facility]     Acute metabolic encephalopathy     Hypoxia     Thrombocytopenia ([facility]     ESRD (end stage renal disease) on dialysis [facility])     Erectile dysfunction           Please address the medication refill and close the encounter. If I can be of assistance, please route to the applicable pool. Thank you.

## 2022-11-16 RX ORDER — ASPIRIN 81 MG
TABLET,CHEWABLE ORAL
Qty: 30 TABLET | Refills: 0 | Status: SHIPPED | OUTPATIENT
Start: 2022-11-16

## 2022-11-16 RX ORDER — GABAPENTIN 100 MG/1
100 CAPSULE ORAL 3 TIMES DAILY
Qty: 90 CAPSULE | Refills: 0 | Status: SHIPPED | OUTPATIENT
Start: 2022-11-16 | End: 2022-12-16

## 2022-11-17 RX ORDER — ACETAMINOPHEN 325 MG/1
TABLET ORAL
Qty: 120 TABLET | Refills: 0 | Status: SHIPPED | OUTPATIENT
Start: 2022-11-17

## 2022-11-17 NOTE — TELEPHONE ENCOUNTER
E-scribe request for med refills. Please review and e-scribe if applicable. Last Visit Date:  9/27/22  Next Visit Date:  Visit date not found    Hemoglobin A1C (%)   Date Value   09/27/2022 5.7   03/24/2022 6.9   08/10/2021 8.0             ( goal A1C is < 7)   Microalb/Crt.  Ratio (mcg/mg creat)   Date Value   12/14/2020 3,134 (H)     LDL Cholesterol (mg/dL)   Date Value   04/14/2022 73       (goal LDL is <100)   AST (U/L)   Date Value   09/16/2021 69 (H)     ALT (U/L)   Date Value   09/16/2021 33     BUN (mg/dL)   Date Value   04/14/2022 34 (H)     BP Readings from Last 3 Encounters:   09/27/22 133/68   08/18/22 114/71   07/13/22 116/61          (goal 120/80)        Patient Active Problem List:     Type 2 diabetes mellitus with chronic kidney disease on chronic dialysis, with long-term current use of insulin (HCC)     Sciatica of right side     Atypical chest pain     Low back pain of thoracolumbar region with sciatica     Kidney stone     Chest pain     Need for prophylactic vaccination against diphtheria-tetanus-pertussis (DTP)     Gastroesophageal reflux disease     Tachycardia     Essential hypertension     Stage 3 chronic kidney disease     BMI 31.0-31.9,adult     Acute kidney injury superimposed on CKD ([address]     Type 2 MI (myocardial infarction) ([address]     Gastrointestinal hemorrhage     Acute posthemorrhagic anemia     Neuropathy     NSAID long-term use     Family history of malignant neoplasm of colon     Positive colorectal cancer screening using Cologuard test     JEFFREY (acute kidney injury) ([address]     Pneumonia due to COVID-19 virus     ESRD needing dialysis ([address]     Acute metabolic encephalopathy     Hypoxia     Thrombocytopenia ([address]     ESRD (end stage renal disease) on dialysis ([address]     Erectile dysfunction      ----Lupillo Delarosa

## 2022-12-16 DIAGNOSIS — K21.9 GASTROESOPHAGEAL REFLUX DISEASE, UNSPECIFIED WHETHER ESOPHAGITIS PRESENT: ICD-10-CM

## 2022-12-16 RX ORDER — PANTOPRAZOLE SODIUM 40 MG/1
TABLET, DELAYED RELEASE ORAL
Qty: 30 TABLET | Refills: 3 | Status: SHIPPED | OUTPATIENT
Start: 2022-12-16

## 2022-12-16 NOTE — TELEPHONE ENCOUNTER
E-scribe request for med refill. Please review and e-scribe if applicable. Last Visit Date:  09/27/2022  Next Visit Date:  1/10/2023    Hemoglobin A1C (%)   Date Value   09/27/2022 5.7   03/24/2022 6.9   08/10/2021 8.0             ( goal A1C is < 7)   Microalb/Crt.  Ratio (mcg/mg creat)   Date Value   12/14/2020 3,134 (H)     LDL Cholesterol (mg/dL)   Date Value   04/14/2022 73       (goal LDL is <100)   AST (U/L)   Date Value   09/16/2021 69 (H)     ALT (U/L)   Date Value   09/16/2021 33     BUN (mg/dL)   Date Value   04/14/2022 34 (H)     BP Readings from Last 3 Encounters:   09/27/22 133/68   08/18/22 114/71   07/13/22 116/61          (goal 120/80)        Patient Active Problem List:     Type 2 diabetes mellitus with chronic kidney disease on chronic dialysis, with long-term current use of insulin (HCC)     Sciatica of right side     Atypical chest pain     Low back pain of thoracolumbar region with sciatica     Kidney stone     Chest pain     Need for prophylactic vaccination against diphtheria-tetanus-pertussis (DTP)     Gastroesophageal reflux disease     Tachycardia     Essential hypertension     Stage 3 chronic kidney disease     BMI 31.0-31.9,adult     Acute kidney injury superimposed on CKD ([address]     Type 2 MI (myocardial infarction) ([address]     Gastrointestinal hemorrhage     Acute posthemorrhagic anemia     Neuropathy     NSAID long-term use     Family history of malignant neoplasm of colon     Positive colorectal cancer screening using Cologuard test     JEFFREY (acute kidney injury) ([address]     Pneumonia due to COVID-19 virus     ESRD needing dialysis ([address]     Acute metabolic encephalopathy     Hypoxia     Thrombocytopenia ([address]     ESRD (end stage renal disease) on dialysis ([address]     Erectile dysfunction      ----Mary Flora

## 2022-12-21 DIAGNOSIS — N18.6 TYPE 2 DIABETES MELLITUS WITH CHRONIC KIDNEY DISEASE ON CHRONIC DIALYSIS, WITH LONG-TERM CURRENT USE OF INSULIN (HCC): ICD-10-CM

## 2022-12-21 DIAGNOSIS — Z99.2 TYPE 2 DIABETES MELLITUS WITH CHRONIC KIDNEY DISEASE ON CHRONIC DIALYSIS, WITH LONG-TERM CURRENT USE OF INSULIN (HCC): ICD-10-CM

## 2022-12-21 DIAGNOSIS — E11.22 TYPE 2 DIABETES MELLITUS WITH CHRONIC KIDNEY DISEASE ON CHRONIC DIALYSIS, WITH LONG-TERM CURRENT USE OF INSULIN (HCC): ICD-10-CM

## 2022-12-21 DIAGNOSIS — Z79.4 TYPE 2 DIABETES MELLITUS WITH CHRONIC KIDNEY DISEASE ON CHRONIC DIALYSIS, WITH LONG-TERM CURRENT USE OF INSULIN (HCC): ICD-10-CM

## 2022-12-21 RX ORDER — ASPIRIN 81 MG/1
TABLET, CHEWABLE ORAL
Qty: 30 TABLET | Refills: 5 | Status: SHIPPED | OUTPATIENT
Start: 2022-12-21

## 2022-12-21 RX ORDER — GABAPENTIN 100 MG/1
100 CAPSULE ORAL 3 TIMES DAILY
Qty: 90 CAPSULE | Refills: 0 | Status: CANCELLED | OUTPATIENT
Start: 2022-12-21 | End: 2023-01-20

## 2022-12-21 NOTE — TELEPHONE ENCOUNTER
Last visit:   Last Med refill:   Does patient have enough medication for 72 hours: No:     Next Visit Date:  Future Appointments   Date Time Provider Armin Irahetai   1/10/2023 10:45 AM Clarice Cantu MD [address] [place] [address] Maintenance   Topic Date Due    Diabetic retinal exam  Never done    Shingles vaccine (1 of 2) Never done    Hepatitis B vaccine (1 of 3 - Risk Dialysis 4-dose series) Never done    Diabetic foot exam  12/14/2021    COVID-19 Vaccine (2 - Booster for [employer] series) 01/11/2022    Annual Wellness Visit (AWV)  Never done    Depression Screen  03/24/2023    Lipids  04/14/2023    A1C test (Diabetic or Prediabetic)  09/27/2023    DTaP/Tdap/Td vaccine (2 - Td or Tdap) 08/23/2029    Colorectal Cancer Screen  12/07/2030    Flu vaccine  Completed    Pneumococcal 0-64 years Vaccine  Completed    Hepatitis C screen  Completed    HIV screen  Completed    Hepatitis A vaccine  Aged Out    Hib vaccine  Aged Out    Meningococcal (ACWY) vaccine  Aged Out       Hemoglobin A1C (%)   Date Value   09/27/2022 5.7   03/24/2022 6.9   08/10/2021 8.0             ( goal A1C is < 7)   Microalb/Crt.  Ratio (mcg/mg creat)   Date Value   12/14/2020 3,134 (H)     LDL Cholesterol (mg/dL)   Date Value   04/14/2022 73   12/14/2020 33       (goal LDL is <100)   AST (U/L)   Date Value   09/16/2021 69 (H)     ALT (U/L)   Date Value   09/16/2021 33     BUN (mg/dL)   Date Value   04/14/2022 34 (H)     BP Readings from Last 3 Encounters:   09/27/22 133/68   08/18/22 114/71   07/13/22 116/61          (goal 120/80)    All Future Testing planned in [facility]  Lab Frequency Next Occurrence   VL UPPER EXTREMITY BILATERAL VEIN MAPPING Once 08/09/2022   Hemoglobin and Hematocrit, Blood, Post Transfusion POST TRANSFUSION    Hemoglobin and Hematocrit, Blood, Post Transfusion POST TRANSFUSION                Patient Active Problem List:     Type 2 diabetes mellitus with chronic kidney disease on chronic dialysis, with long-term current use of insulin (HCC)     Sciatica of right side     Atypical chest pain     Low back pain of thoracolumbar region with sciatica     Kidney stone     Chest pain     Need for prophylactic vaccination against diphtheria-tetanus-pertussis (DTP)     Gastroesophageal reflux disease     Tachycardia     Essential hypertension     Stage 3 chronic kidney disease     BMI 31.0-31.9,adult     Acute kidney injury superimposed on CKD (HCC)     Type 2 MI (myocardial infarction) [facility]     Gastrointestinal hemorrhage     Acute posthemorrhagic anemia     Neuropathy     NSAID long-term use     Family history of malignant neoplasm of colon     Positive colorectal cancer screening using Cologuard test     JEFFREY (acute kidney injury) ([facility]     Pneumonia due to COVID-19 virus     ESRD needing dialysis ([facility]     Acute metabolic encephalopathy     Hypoxia     Thrombocytopenia ([facility]     ESRD (end stage renal disease) on dialysis [facility])     Erectile dysfunction           Please address the medication refill and close the encounter. If I can be of assistance, please route to the applicable pool. Thank you.

## 2023-01-03 DIAGNOSIS — N18.6 TYPE 2 DIABETES MELLITUS WITH CHRONIC KIDNEY DISEASE ON CHRONIC DIALYSIS, WITH LONG-TERM CURRENT USE OF INSULIN (HCC): ICD-10-CM

## 2023-01-03 DIAGNOSIS — Z99.2 TYPE 2 DIABETES MELLITUS WITH CHRONIC KIDNEY DISEASE ON CHRONIC DIALYSIS, WITH LONG-TERM CURRENT USE OF INSULIN (HCC): ICD-10-CM

## 2023-01-03 DIAGNOSIS — Z79.4 TYPE 2 DIABETES MELLITUS WITH CHRONIC KIDNEY DISEASE ON CHRONIC DIALYSIS, WITH LONG-TERM CURRENT USE OF INSULIN (HCC): ICD-10-CM

## 2023-01-03 DIAGNOSIS — E11.22 TYPE 2 DIABETES MELLITUS WITH CHRONIC KIDNEY DISEASE ON CHRONIC DIALYSIS, WITH LONG-TERM CURRENT USE OF INSULIN (HCC): ICD-10-CM

## 2023-01-03 NOTE — TELEPHONE ENCOUNTER
Last visit: 9/27/22  Last Med refill: 11/16/22  Does patient have enough medication for 72 hours: No:     Next Visit Date:  Future Appointments   Date Time Provider Armin Rocha   1/10/2023 10:45 AM Yrn Espinoza MD [address] [place] [address] Maintenance   Topic Date Due    Diabetic retinal exam  Never done    Shingles vaccine (1 of 2) Never done    Hepatitis B vaccine (1 of 3 - Risk Dialysis 4-dose series) Never done    Diabetic foot exam  12/14/2021    COVID-19 Vaccine (2 - Booster for [employer] series) 01/11/2022    Annual Wellness Visit (AWV)  Never done    Depression Screen  03/24/2023    Lipids  04/14/2023    A1C test (Diabetic or Prediabetic)  09/27/2023    DTaP/Tdap/Td vaccine (2 - Td or Tdap) 08/23/2029    Colorectal Cancer Screen  12/07/2030    Flu vaccine  Completed    Pneumococcal 0-64 years Vaccine  Completed    Hepatitis C screen  Completed    HIV screen  Completed    Hepatitis A vaccine  Aged Out    Hib vaccine  Aged Out    Meningococcal (ACWY) vaccine  Aged Out       Hemoglobin A1C (%)   Date Value   09/27/2022 5.7   03/24/2022 6.9   08/10/2021 8.0             ( goal A1C is < 7)   Microalb/Crt.  Ratio (mcg/mg creat)   Date Value   12/14/2020 3,134 (H)     LDL Cholesterol (mg/dL)   Date Value   04/14/2022 73   12/14/2020 33       (goal LDL is <100)   AST (U/L)   Date Value   09/16/2021 69 (H)     ALT (U/L)   Date Value   09/16/2021 33     BUN (mg/dL)   Date Value   04/14/2022 34 (H)     BP Readings from Last 3 Encounters:   09/27/22 133/68   08/18/22 114/71   07/13/22 116/61          (goal 120/80)    All Future Testing planned in [facility]  Lab Frequency Next Occurrence   VL UPPER EXTREMITY BILATERAL VEIN MAPPING Once 08/09/2022   Hemoglobin and Hematocrit, Blood, Post Transfusion POST TRANSFUSION    Hemoglobin and Hematocrit, Blood, Post Transfusion POST TRANSFUSION                Patient Active Problem List:     Type 2 diabetes mellitus with chronic kidney disease on chronic dialysis, with long-term current use of insulin (HCC)     Sciatica of right side     Atypical chest pain     Low back pain of thoracolumbar region with sciatica     Kidney stone     Chest pain     Need for prophylactic vaccination against diphtheria-tetanus-pertussis (DTP)     Gastroesophageal reflux disease     Tachycardia     Essential hypertension     Stage 3 chronic kidney disease     BMI 31.0-31.9,adult     Acute kidney injury superimposed on CKD (HCC)     Type 2 MI (myocardial infarction) ([facility]     Gastrointestinal hemorrhage     Acute posthemorrhagic anemia     Neuropathy     NSAID long-term use     Family history of malignant neoplasm of colon     Positive colorectal cancer screening using Cologuard test     JEFFREY (acute kidney injury) ([facility]     Pneumonia due to COVID-19 virus     ESRD needing dialysis ([facility]     Acute metabolic encephalopathy     Hypoxia     Thrombocytopenia ([facility]     ESRD (end stage renal disease) on dialysis [facility])     Erectile dysfunction

## 2023-01-10 ENCOUNTER — OFFICE VISIT (OUTPATIENT)
Dept: FAMILY MEDICINE CLINIC | Age: 63
End: 2023-01-10
Payer: COMMERCIAL

## 2023-01-10 ENCOUNTER — HOSPITAL ENCOUNTER (OUTPATIENT)
Age: 63
Discharge: HOME | End: 2023-01-12
Payer: COMMERCIAL

## 2023-01-10 ENCOUNTER — HOSPITAL ENCOUNTER (OUTPATIENT)
Dept: GENERAL RADIOLOGY | Age: 63
Discharge: HOME | End: 2023-01-12
Payer: COMMERCIAL

## 2023-01-10 VITALS
DIASTOLIC BLOOD PRESSURE: 60 MMHG | TEMPERATURE: 97.8 F | HEIGHT: 70 IN | WEIGHT: 221 LBS | BODY MASS INDEX: 31.64 KG/M2 | SYSTOLIC BLOOD PRESSURE: 122 MMHG | HEART RATE: 103 BPM

## 2023-01-10 DIAGNOSIS — Z79.4 TYPE 2 DIABETES MELLITUS WITH CHRONIC KIDNEY DISEASE ON CHRONIC DIALYSIS, WITH LONG-TERM CURRENT USE OF INSULIN (HCC): ICD-10-CM

## 2023-01-10 DIAGNOSIS — M25.561 ACUTE PAIN OF RIGHT KNEE: Primary | ICD-10-CM

## 2023-01-10 DIAGNOSIS — M25.561 ACUTE PAIN OF RIGHT KNEE: ICD-10-CM

## 2023-01-10 DIAGNOSIS — I10 ESSENTIAL HYPERTENSION: ICD-10-CM

## 2023-01-10 DIAGNOSIS — N18.6 ESRD (END STAGE RENAL DISEASE) ON DIALYSIS (HCC): ICD-10-CM

## 2023-01-10 DIAGNOSIS — E11.22 TYPE 2 DIABETES MELLITUS WITH CHRONIC KIDNEY DISEASE ON CHRONIC DIALYSIS, WITH LONG-TERM CURRENT USE OF INSULIN (HCC): ICD-10-CM

## 2023-01-10 DIAGNOSIS — N18.6 TYPE 2 DIABETES MELLITUS WITH CHRONIC KIDNEY DISEASE ON CHRONIC DIALYSIS, WITH LONG-TERM CURRENT USE OF INSULIN (HCC): ICD-10-CM

## 2023-01-10 DIAGNOSIS — Z99.2 TYPE 2 DIABETES MELLITUS WITH CHRONIC KIDNEY DISEASE ON CHRONIC DIALYSIS, WITH LONG-TERM CURRENT USE OF INSULIN (HCC): ICD-10-CM

## 2023-01-10 DIAGNOSIS — Z99.2 ESRD (END STAGE RENAL DISEASE) ON DIALYSIS (HCC): ICD-10-CM

## 2023-01-10 LAB — HBA1C MFR BLD: 5.9 %

## 2023-01-10 PROCEDURE — G8484 FLU IMMUNIZE NO ADMIN: HCPCS | Performed by: STUDENT IN AN ORGANIZED HEALTH CARE EDUCATION/TRAINING PROGRAM

## 2023-01-10 PROCEDURE — G8427 DOCREV CUR MEDS BY ELIG CLIN: HCPCS | Performed by: STUDENT IN AN ORGANIZED HEALTH CARE EDUCATION/TRAINING PROGRAM

## 2023-01-10 PROCEDURE — 1036F TOBACCO NON-USER: CPT | Performed by: STUDENT IN AN ORGANIZED HEALTH CARE EDUCATION/TRAINING PROGRAM

## 2023-01-10 PROCEDURE — 3074F SYST BP LT 130 MM HG: CPT | Performed by: STUDENT IN AN ORGANIZED HEALTH CARE EDUCATION/TRAINING PROGRAM

## 2023-01-10 PROCEDURE — 83036 HEMOGLOBIN GLYCOSYLATED A1C: CPT | Performed by: STUDENT IN AN ORGANIZED HEALTH CARE EDUCATION/TRAINING PROGRAM

## 2023-01-10 PROCEDURE — 3044F HG A1C LEVEL LT 7.0%: CPT | Performed by: STUDENT IN AN ORGANIZED HEALTH CARE EDUCATION/TRAINING PROGRAM

## 2023-01-10 PROCEDURE — 3078F DIAST BP <80 MM HG: CPT | Performed by: STUDENT IN AN ORGANIZED HEALTH CARE EDUCATION/TRAINING PROGRAM

## 2023-01-10 PROCEDURE — 3017F COLORECTAL CA SCREEN DOC REV: CPT | Performed by: STUDENT IN AN ORGANIZED HEALTH CARE EDUCATION/TRAINING PROGRAM

## 2023-01-10 PROCEDURE — G8417 CALC BMI ABV UP PARAM F/U: HCPCS | Performed by: STUDENT IN AN ORGANIZED HEALTH CARE EDUCATION/TRAINING PROGRAM

## 2023-01-10 PROCEDURE — 99213 OFFICE O/P EST LOW 20 MIN: CPT | Performed by: STUDENT IN AN ORGANIZED HEALTH CARE EDUCATION/TRAINING PROGRAM

## 2023-01-10 PROCEDURE — 73562 X-RAY EXAM OF KNEE 3: CPT

## 2023-01-10 PROCEDURE — 99211 OFF/OP EST MAY X REQ PHY/QHP: CPT | Performed by: STUDENT IN AN ORGANIZED HEALTH CARE EDUCATION/TRAINING PROGRAM

## 2023-01-10 PROCEDURE — 2022F DILAT RTA XM EVC RTNOPTHY: CPT | Performed by: STUDENT IN AN ORGANIZED HEALTH CARE EDUCATION/TRAINING PROGRAM

## 2023-01-10 RX ORDER — ACETAMINOPHEN 500 MG
500 TABLET ORAL 4 TIMES DAILY PRN
Qty: 120 TABLET | Refills: 0 | Status: SHIPPED | OUTPATIENT
Start: 2023-01-10

## 2023-01-10 RX ORDER — SEVELAMER CARBONATE 800 MG/1
TABLET, FILM COATED ORAL
COMMUNITY
Start: 2022-12-24

## 2023-01-10 RX ORDER — LIDOCAINE 50 MG/G
1 PATCH TOPICAL DAILY
Qty: 30 PATCH | Refills: 0 | Status: SHIPPED | OUTPATIENT
Start: 2023-01-10 | End: 2023-02-09

## 2023-01-10 RX ORDER — LISINOPRIL 10 MG/1
TABLET ORAL
COMMUNITY
Start: 2022-12-16

## 2023-01-10 RX ORDER — LISINOPRIL 5 MG/1
TABLET ORAL
COMMUNITY
Start: 2022-12-16

## 2023-01-10 RX ORDER — GABAPENTIN 100 MG/1
100 CAPSULE ORAL 3 TIMES DAILY
Qty: 90 CAPSULE | Refills: 0 | Status: SHIPPED | OUTPATIENT
Start: 2023-01-10 | End: 2023-02-09

## 2023-01-10 RX ORDER — GABAPENTIN 100 MG/1
100 CAPSULE ORAL 3 TIMES DAILY
Qty: 90 CAPSULE | Refills: 0 | OUTPATIENT
Start: 2023-01-10 | End: 2023-02-09

## 2023-01-10 SDOH — ECONOMIC STABILITY: FOOD INSECURITY: WITHIN THE PAST 12 MONTHS, THE FOOD YOU BOUGHT JUST DIDN'T LAST AND YOU DIDN'T HAVE MONEY TO GET MORE.: NEVER TRUE

## 2023-01-10 SDOH — ECONOMIC STABILITY: FOOD INSECURITY: WITHIN THE PAST 12 MONTHS, YOU WORRIED THAT YOUR FOOD WOULD RUN OUT BEFORE YOU GOT MONEY TO BUY MORE.: SOMETIMES TRUE

## 2023-01-10 ASSESSMENT — PATIENT HEALTH QUESTIONNAIRE - PHQ9
SUM OF ALL RESPONSES TO PHQ QUESTIONS 1-9: 2
SUM OF ALL RESPONSES TO PHQ QUESTIONS 1-9: 2
1. LITTLE INTEREST OR PLEASURE IN DOING THINGS: 1
2. FEELING DOWN, DEPRESSED OR HOPELESS: 1
SUM OF ALL RESPONSES TO PHQ QUESTIONS 1-9: 2
SUM OF ALL RESPONSES TO PHQ QUESTIONS 1-9: 2
SUM OF ALL RESPONSES TO PHQ9 QUESTIONS 1 & 2: 2

## 2023-01-10 ASSESSMENT — ENCOUNTER SYMPTOMS
COUGH: 0
SHORTNESS OF BREATH: 0
NAUSEA: 0
CHEST TIGHTNESS: 0
COLOR CHANGE: 0
DIARRHEA: 0
VOMITING: 0
BACK PAIN: 0
ABDOMINAL PAIN: 0
CONSTIPATION: 0

## 2023-01-10 ASSESSMENT — SOCIAL DETERMINANTS OF HEALTH (SDOH): HOW HARD IS IT FOR YOU TO PAY FOR THE VERY BASICS LIKE FOOD, HOUSING, MEDICAL CARE, AND HEATING?: SOMEWHAT HARD

## 2023-01-10 NOTE — PROGRESS NOTES
Mishel Pittsjective:    Raymond Mullins is a 58 y.o. male with  has a past medical history of ADHD (attention deficit hyperactivity disorder), COVID-19, COVID-19, Depression, DM2 (diabetes mellitus, type 2) ([address] Erectile dysfunction, Gastroesophageal reflux disease, Hyperlipidemia, Hypertension, Kidney stone, Low back pain of thoracolumbar region with sciatica, and Neuropathy. Presented to the office today for:  Chief Complaint   Patient presents with    Diabetes       HPI    44-year-old male with past medical history of DM, hypertension, ESRD on HD MWF presents for diabetes follow-up. A1c today is 5.9, last A1c was 5.7 in September. Patient is managed with lifestyle modifications. BP today is 122/60  On Lopressor 25 mg twice daily and lisinopril 10 mg daily. He might forget taking the med sometimes. He reports right knee pain for months, has been worsening recently, knee has been giving up on him. Trouble walking upstairs. Does not recall any injury other than a fall 3 months ago. Reviewed previous knee xray from last year showing severe OA. He requests refill of gabapentin and increasing the dose of tylenol. Denies smoking, drinks 2 beers every night, sometimes drinks more. Denies drug use. Review of Systems   Constitutional:  Negative for activity change, appetite change, chills, fatigue and fever. HENT: Negative. Respiratory:  Negative for cough, chest tightness and shortness of breath. Cardiovascular:  Negative for chest pain, palpitations and leg swelling. Gastrointestinal:  Negative for abdominal pain, constipation, diarrhea, nausea and vomiting. Genitourinary:  Negative for decreased urine volume, difficulty urinating, dysuria, frequency and hematuria. Musculoskeletal:  Negative for back pain and neck pain. Right knee pain   Skin:  Negative for color change. Neurological:  Negative for dizziness, weakness, light-headedness, numbness and headaches.        The patient has a   Family History   Problem Relation Age of Onset    Diabetes Mother     High Blood Pressure Father        Objective:    /60 (Site: Left Upper Arm, Position: Sitting, Cuff Size: Large Adult)   Pulse (!) 103   Temp 97.8 °F (36.6 °C) (Oral)   Ht 5' 10\" (1.778 m)   Wt 221 lb (100.2 kg)   BMI 31.71 kg/m²    BP Readings from Last 3 Encounters:   01/10/23 122/60   09/27/22 133/68   08/18/22 114/71       Physical Exam  Vitals and nursing note reviewed. Constitutional:       General: He is not in acute distress. Appearance: Normal appearance. He is not ill-appearing. HENT:      Head: Normocephalic and atraumatic. Mouth/Throat:      Pharynx: Oropharynx is clear. Cardiovascular:      Rate and Rhythm: Normal rate and regular rhythm. Pulses: Normal pulses. Heart sounds: No murmur heard. Pulmonary:      Effort: Pulmonary effort is normal.      Breath sounds: Normal breath sounds. Abdominal:      Palpations: Abdomen is soft. There is no mass. Tenderness: There is no abdominal tenderness. Musculoskeletal:         General: No swelling or tenderness. Normal range of motion. Cervical back: Normal range of motion and neck supple. Neurological:      General: No focal deficit present. Mental Status: He is alert and oriented to person, place, and time.        Lab Results   Component Value Date    WBC 6.2 04/14/2022    HGB 10.9 (L) 04/14/2022    HCT 33.1 (L) 04/14/2022     04/14/2022    CHOL 188 04/14/2022    TRIG 177 (H) 04/14/2022    HDL 80 04/14/2022    ALT 33 09/16/2021    AST 69 (H) 09/16/2021     04/14/2022    K 4.3 04/14/2022    CL 98 04/14/2022    CREATININE 5.73 (HH) 04/14/2022    BUN 34 (H) 04/14/2022    CO2 28 04/14/2022    TSH 1.08 04/14/2022    INR 0.9 09/18/2021    LABA1C 5.9 01/10/2023    LABMICR 3,134 (H) 12/14/2020     Lab Results   Component Value Date    CALCIUM 9.4 04/14/2022    PHOS 7.2 (H) 09/18/2021     Lab Results   Component Value Date    LDLCHOLESTEROL 73 04/14/2022       Assessment and Plan:    1. Acute on chronic pain of right knee  Xray from last year showed severe OA, will get another xray and consider referral to ortho and PT next visit. - XR KNEE RIGHT (3 VIEWS); Future  - lidocaine (LIDODERM) 5 %; Place 1 patch onto the skin daily 12 hours on, 12 hours off. Dispense: 30 patch; Refill: 0    2. Type 2 diabetes mellitus with chronic kidney disease on chronic dialysis, with long-term current use of insulin ([facility])  A1c today is 5.9. controlled with diet. Refilled gabapentin for diabetic neuropathy,  Referral to ophthalmo for diabetic retinal exam    - POCT glycosylated hemoglobin (Hb A1C)  - gabapentin (NEURONTIN) 100 MG capsule; Take 1 capsule by mouth 3 times daily for 30 days. Dispense: 90 capsule; Refill: 0  - AFL - Ivy Stein MD, Ophthalmology, [facility]    3. Essential hypertension  Well controlled. Questionable compliance with meds as some meds has not been refilled for a while. Asked patient to bring meds to next visit. 4. ESRD (end stage renal disease) on dialysis ([facility]  On HD MWF          Requested Prescriptions     Signed Prescriptions Disp Refills    gabapentin (NEURONTIN) 100 MG capsule 90 capsule 0     Sig: Take 1 capsule by mouth 3 times daily for 30 days. acetaminophen (TYLENOL) 500 MG tablet 120 tablet 0     Sig: Take 1 tablet by mouth 4 times daily as needed for Pain    lidocaine (LIDODERM) 5 % 30 patch 0     Sig: Place 1 patch onto the skin daily 12 hours on, 12 hours off.     Elastic Bandages & Supports (KNEE STABILIZER WRAP LARGE) MISC 1 each 0     Sig: Use to right knee       Medications Discontinued During This Encounter   Medication Reason    acetaminophen (TYLENOL) 325 MG tablet DOSE ADJUSTMENT    gabapentin (NEURONTIN) 100 MG capsule REORDER       Marques received counseling on the following healthy behaviors: nutrition, exercise and medication adherence    Discussed use,benefit, and side effects of prescribed medications. Barriers to medication compliance addressed. All patient questions answered. Pt voiced understanding. Return in about 10 days (around 1/20/2023) for follow up xray. Disclaimer: Some orall of this note was transcribed using voice-recognition software. This may cause typographical errors occasionally. Although all effort is made to fix these errors, please do not hesitate to contact our office if there Funes Mantle concern with the understanding of this note.

## 2023-01-10 NOTE — PROGRESS NOTES
Diabetic visit information    BP Readings from Last 3 Encounters:   09/27/22 133/68   08/18/22 114/71   07/13/22 116/61       Hemoglobin A1C (%)   Date Value   09/27/2022 5.7   03/24/2022 6.9   08/10/2021 8.0     Microalb/Crt. Ratio (mcg/mg creat)   Date Value   12/14/2020 3,134 (H)     LDL Cholesterol (mg/dL)   Date Value   04/14/2022 73               Have you changed or started any medications since your last visit including any over-the-counter medicines, vitamins, or herbal medicines? no   Have you stopped taking any of your medications? Is so, why? -  no  Are you having any side effects from any of your medications? - no    Have you seen any other physician or provider since your last visit? yes - Dialysis    Have you had any other diagnostic tests since your last visit?  no   Have you been seen in the emergency room and/or had an admission in a hospital since we last saw you?  no     Have you had your annual diabetic retinal (eye) exam? No   (ensure copy of exam is in the chart)    Have you had your routine dental cleaning in the past 6 months? no    Do you have an active [employer] account? If not, what are your barriers? No: pending    Patient Care Team:  Laury Szymanski MD as PCP - General (Family Medicine)  [facility], DO as Consulting Physician (General Surgery)    Medical history Review  Past Medical, Family, and Social History reviewed and does not contribute to the patient presenting condition.     Health Maintenance   Topic Date Due    Diabetic retinal exam  Never done    Shingles vaccine (1 of 2) Never done    Hepatitis B vaccine (1 of 3 - Risk Dialysis 4-dose series) Never done    Diabetic foot exam  12/14/2021    COVID-19 Vaccine (2 - Booster for [employer] series) 01/11/2022    Annual Wellness Visit (AWV)  Never done    Depression Screen  03/24/2023    Lipids  04/14/2023    A1C test (Diabetic or Prediabetic)  09/27/2023    DTaP/Tdap/Td vaccine (2 - Td or Tdap) 08/23/2029    Colorectal Cancer Screen  12/07/2030    Flu vaccine  Completed    Pneumococcal 0-64 years Vaccine  Completed    Hepatitis C screen  Completed    HIV screen  Completed    Hepatitis A vaccine  Aged Out    Hib vaccine  Aged Out    Meningococcal (ACWY) vaccine  Aged Out

## 2023-01-10 NOTE — PROGRESS NOTES
Attending Physician Statement  I have discussed the care of Cipriano Solis 58 y.o. male, including pertinent history and exam findings, with the resident Dr. Bruno Vazquez MD.    History and Exam:   Chief Complaint   Patient presents with    Diabetes       Past Medical History:   Diagnosis Date    ADHD (attention deficit hyperactivity disorder)     COVID-19 9/15/2021    COVID-19 9/15/2021    Depression     DM2 (diabetes mellitus, type 2) ([facility] 10/15/2013    Erectile dysfunction     Gastroesophageal reflux disease 8/23/2019    Hyperlipidemia     Hypertension     Kidney stone 7/19/2018    Low back pain of thoracolumbar region with sciatica     Neuropathy      No Known Allergies   I have seen and examined the patient and the key elements of the encounter have been performed by me. BP Readings from Last 3 Encounters:   01/10/23 122/60   09/27/22 133/68   08/18/22 114/71     /60 (Site: Left Upper Arm, Position: Sitting, Cuff Size: Large Adult)   Pulse (!) 103   Temp 97.8 °F (36.6 °C) (Oral)   Ht 5' 10\" (1.778 m)   Wt 221 lb (100.2 kg)   BMI 31.71 kg/m²   Lab Results   Component Value Date    WBC 6.2 04/14/2022    HGB 10.9 (L) 04/14/2022    HCT 33.1 (L) 04/14/2022     04/14/2022    CHOL 188 04/14/2022    TRIG 177 (H) 04/14/2022    HDL 80 04/14/2022    ALT 33 09/16/2021    AST 69 (H) 09/16/2021     04/14/2022    K 4.3 04/14/2022    CL 98 04/14/2022    CREATININE 5.73 (HH) 04/14/2022    BUN 34 (H) 04/14/2022    CO2 28 04/14/2022    TSH 1.08 04/14/2022    INR 0.9 09/18/2021    LABA1C 5.9 01/10/2023    LABMICR 3,134 (H) 12/14/2020     Lab Results   Component Value Date    LABALBU 3.6 09/16/2021     Lab Results   Component Value Date    IRON 39 (L) 06/28/2021    TIBC 170 (L) 06/28/2021    FERRITIN 3,391 (H) 09/16/2021     Lab Results   Component Value Date    LDLCHOLESTEROL 73 04/14/2022     I agree with the assessment, plan and the diagnosis of    Diagnosis Orders   1.  Acute pain of right knee  XR KNEE RIGHT (3 VIEWS)    lidocaine (LIDODERM) 5 %      2. Type 2 diabetes mellitus with chronic kidney disease on chronic dialysis, with long-term current use of insulin (HCC)  POCT glycosylated hemoglobin (Hb A1C)    gabapentin (NEURONTIN) 100 MG capsule    AFL - Lindsay Haji MD, Ophthalmology, [place]      3. Essential hypertension        4. ESRD (end stage renal disease) on dialysis ([facility]         . I agree with orders as documented by the resident. More than 25 minutes spent  in face to face encounter with the patient and more than half in counseling. Patient's questions were answered. Patient Voiced understanding to the counseling. Return in about 10 days (around 1/20/2023) for follow up xray.    (GC Modifier)-Dr. Yordy Henriquez MD

## 2023-01-13 ENCOUNTER — TELEPHONE (OUTPATIENT)
Dept: FAMILY MEDICINE CLINIC | Age: 63
End: 2023-01-13

## 2023-01-16 NOTE — TELEPHONE ENCOUNTER
Per [facility] [facility] [facility]- Medicare Part D rules states the drug must be used for a medically-accepted indication.  Please Advise

## 2023-01-20 ENCOUNTER — OFFICE VISIT (OUTPATIENT)
Dept: FAMILY MEDICINE CLINIC | Age: 63
End: 2023-01-20

## 2023-01-20 VITALS
HEART RATE: 77 BPM | DIASTOLIC BLOOD PRESSURE: 81 MMHG | HEIGHT: 70 IN | WEIGHT: 220.8 LBS | SYSTOLIC BLOOD PRESSURE: 154 MMHG | BODY MASS INDEX: 31.61 KG/M2

## 2023-01-20 DIAGNOSIS — G89.29 CHRONIC PAIN OF RIGHT KNEE: Primary | ICD-10-CM

## 2023-01-20 DIAGNOSIS — M25.561 CHRONIC PAIN OF RIGHT KNEE: Primary | ICD-10-CM

## 2023-01-20 ASSESSMENT — PATIENT HEALTH QUESTIONNAIRE - PHQ9
2. FEELING DOWN, DEPRESSED OR HOPELESS: 0
SUM OF ALL RESPONSES TO PHQ QUESTIONS 1-9: 0
SUM OF ALL RESPONSES TO PHQ9 QUESTIONS 1 & 2: 0
SUM OF ALL RESPONSES TO PHQ QUESTIONS 1-9: 0
SUM OF ALL RESPONSES TO PHQ QUESTIONS 1-9: 0
1. LITTLE INTEREST OR PLEASURE IN DOING THINGS: 0
SUM OF ALL RESPONSES TO PHQ QUESTIONS 1-9: 0

## 2023-01-20 ASSESSMENT — ENCOUNTER SYMPTOMS
NAUSEA: 0
COUGH: 0
CONSTIPATION: 0
SHORTNESS OF BREATH: 0
CHEST TIGHTNESS: 0
DIARRHEA: 0
COLOR CHANGE: 0
VOMITING: 0
BACK PAIN: 0
ABDOMINAL PAIN: 0

## 2023-01-20 NOTE — PATIENT INSTRUCTIONS
Thank you for letting us take care of you today. We hope all your questions were addressed. If a question was overlooked or something else comes to mind after you return home, please contact a member of your Care Team listed below. Your Care Team at [place] [address] is Team #1  Aretha Johnson MD (Faculty)  Queen Kulwinder MD(Resident)  Madina Verdugo MD (Resident)  Trini uDggan DO (Resident)  Lala Liu, DAMARIS Hanley., DAMARIS Campbell., POONAM Burleson., Malachi Zimmer, Jorden MarroquinSt. [facility] office)  Briseida Gomez, [address] (Clinical Practice Manager)  Rony Groves [facility] (Clinical Pharmacist)     Office phone number: [phone]    If you need to get in right away due to illness, please be advised we have \"Same Day\" appointments available Monday-Friday. Please call us at [phone] option #3 to schedule your \"Same Day\" appointment.

## 2023-01-20 NOTE — PROGRESS NOTES
Visit Information    Have you changed or started any medications since your last visit including any over-the-counter medicines, vitamins, or herbal medicines? no   Are you having any side effects from any of your medications? -  no  Have you stopped taking any of your medications? Is so, why? -  no    Have you seen any other physician or provider since your last visit? No  Have you had any other diagnostic tests since your last visit? Yes - Records Obtained  Have you been seen in the emergency room and/or had an admission to a hospital since we last saw you? No  Have you had your routine dental cleaning in the past 6 months? no    Have you activated your [facility] account? If not, what are your barriers?  No:      Patient Care Team:  Luke Banda MD as PCP - General (Family Medicine)  Minoo Mccartney DO as Consulting Physician (General Surgery)    Medical History Review  Past Medical, Family, and Social History reviewed and does not contribute to the patient presenting condition    Health Maintenance   Topic Date Due    Diabetic retinal exam  Never done    Shingles vaccine (1 of 2) Never done    Hepatitis B vaccine (1 of 3 - Risk Dialysis 4-dose series) Never done    Diabetic foot exam  12/14/2021    COVID-19 Vaccine (2 - Booster for Apptimate series) 01/11/2022    Annual Wellness Visit (AWV)  Never done    Lipids  04/14/2023    A1C test (Diabetic or Prediabetic)  01/10/2024    Depression Screen  01/10/2024    DTaP/Tdap/Td vaccine (2 - Td or Tdap) 08/23/2029    Colorectal Cancer Screen  12/07/2030    Flu vaccine  Completed    Pneumococcal 0-64 years Vaccine  Completed    Hepatitis C screen  Completed    HIV screen  Completed    Hepatitis A vaccine  Aged Out    Hib vaccine  Aged Out    Meningococcal (ACWY) vaccine  Aged Out

## 2023-01-20 NOTE — PROGRESS NOTES
Subjective:    Gris Barney is a 58 y.o. male with  has a past medical history of ADHD (attention deficit hyperactivity disorder), COVID-19, COVID-19, Depression, DM2 (diabetes mellitus, type 2) ([address] Erectile dysfunction, Gastroesophageal reflux disease, Hyperlipidemia, Hypertension, Kidney stone, Low back pain of thoracolumbar region with sciatica, and Neuropathy. Presented to the office today for:  Chief Complaint   Patient presents with    Knee Pain     X-ray results       HPI    '58-year-old male with past medical history of DM, hypertension, ESRD on HD MWF presents for xray follow-up. Xray shows no acute changes, unchanged joint narrowing, unchanged suprapatellar joint effusion. Reports pain is still the same. Has trouble walking and hears popping sound sometimes. Denies recent injury or trauma. Never saw an orthopedics. Review of Systems   Constitutional:  Negative for activity change, appetite change, chills, fatigue and fever. HENT: Negative. Respiratory:  Negative for cough, chest tightness and shortness of breath. Cardiovascular:  Negative for chest pain, palpitations and leg swelling. Gastrointestinal:  Negative for abdominal pain, constipation, diarrhea, nausea and vomiting. Genitourinary:  Negative for decreased urine volume, difficulty urinating, dysuria, frequency and hematuria. Musculoskeletal:  Negative for back pain and neck pain. Right knee pain and swelling   Skin:  Negative for color change. Neurological:  Negative for dizziness, weakness, light-headedness, numbness and headaches.        The patient has a   Family History   Problem Relation Age of Onset    Diabetes Mother     High Blood Pressure Father        Objective:    BP (!) 154/81 (Site: Left Upper Arm, Position: Sitting, Cuff Size: Medium Adult)   Pulse 77   Ht 5' 10\" (1.778 m)   Wt 220 lb 12.8 oz (100.2 kg)   BMI 31.68 kg/m²    BP Readings from Last 3 Encounters:   01/20/23 (!) 154/81   01/10/23 122/60   09/27/22 133/68       Physical Exam  Vitals and nursing note reviewed. Constitutional:       General: He is not in acute distress. Appearance: Normal appearance. He is not ill-appearing. HENT:      Head: Normocephalic and atraumatic. Mouth/Throat:      Pharynx: Oropharynx is clear. Cardiovascular:      Rate and Rhythm: Normal rate and regular rhythm. Pulses: Normal pulses. Heart sounds: No murmur heard. Pulmonary:      Effort: Pulmonary effort is normal.      Breath sounds: Normal breath sounds. Abdominal:      Palpations: Abdomen is soft. There is no mass. Tenderness: There is no abdominal tenderness. Musculoskeletal:         General: No swelling. Cervical back: Normal range of motion and neck supple. Comments: Right knee tenderness, swelling on the medial aspect. Painful range of motion and clicking sound   Neurological:      General: No focal deficit present. Mental Status: He is alert and oriented to person, place, and time. Lab Results   Component Value Date    WBC 6.2 04/14/2022    HGB 10.9 (L) 04/14/2022    HCT 33.1 (L) 04/14/2022     04/14/2022    CHOL 188 04/14/2022    TRIG 177 (H) 04/14/2022    HDL 80 04/14/2022    ALT 33 09/16/2021    AST 69 (H) 09/16/2021     04/14/2022    K 4.3 04/14/2022    CL 98 04/14/2022    CREATININE 5.73 (HH) 04/14/2022    BUN 34 (H) 04/14/2022    CO2 28 04/14/2022    TSH 1.08 04/14/2022    INR 0.9 09/18/2021    LABA1C 5.9 01/10/2023    LABMICR 3,134 (H) 12/14/2020     Lab Results   Component Value Date    CALCIUM 9.4 04/14/2022    PHOS 7.2 (H) 09/18/2021     Lab Results   Component Value Date    LDLCHOLESTEROL 73 04/14/2022       Assessment and Plan:    1. Chronic pain of right knee  Reviewed xray, will plan for knee injection with aspiration of effusion. Requested Prescriptions      No prescriptions requested or ordered in this encounter       There are no discontinued medications.     Sydnie Bergeron received counseling on the following healthy behaviors: nutrition, exercise and medication adherence    Discussed use,benefit, and side effects of prescribed medications. Barriers to medication compliance addressed. All patient questions answered. Pt voiced understanding. Return in about 1 week (around 1/27/2023) for follow up knee pain, possible knee injection. Disclaimer: Some orall of this note was transcribed using voice-recognition software. This may cause typographical errors occasionally. Although all effort is made to fix these errors, please do not hesitate to contact our office if there Petr Gordillo concern with the understanding of this note.

## 2023-01-20 NOTE — PROGRESS NOTES
Attending Physician Statement  I have discussed the care of CalvinErvingincluding pertinent history and exam findings,  with the resident. I have reviewed the key elements of all parts of the encounter with the resident. I agree with the assessment, plan and orders as documented by the resident.   (GE Modifier)      Chronic L knee pain- schedule for aspiration/Steroid injection

## 2023-01-24 DIAGNOSIS — Z99.2 TYPE 2 DIABETES MELLITUS WITH CHRONIC KIDNEY DISEASE ON CHRONIC DIALYSIS, WITH LONG-TERM CURRENT USE OF INSULIN (HCC): ICD-10-CM

## 2023-01-24 DIAGNOSIS — E11.22 TYPE 2 DIABETES MELLITUS WITH CHRONIC KIDNEY DISEASE ON CHRONIC DIALYSIS, WITH LONG-TERM CURRENT USE OF INSULIN (HCC): ICD-10-CM

## 2023-01-24 DIAGNOSIS — Z79.4 TYPE 2 DIABETES MELLITUS WITH CHRONIC KIDNEY DISEASE ON CHRONIC DIALYSIS, WITH LONG-TERM CURRENT USE OF INSULIN (HCC): ICD-10-CM

## 2023-01-24 DIAGNOSIS — N18.6 TYPE 2 DIABETES MELLITUS WITH CHRONIC KIDNEY DISEASE ON CHRONIC DIALYSIS, WITH LONG-TERM CURRENT USE OF INSULIN (HCC): ICD-10-CM

## 2023-01-24 RX ORDER — GABAPENTIN 100 MG/1
100 CAPSULE ORAL 3 TIMES DAILY
Qty: 90 CAPSULE | Refills: 0 | Status: SHIPPED | OUTPATIENT
Start: 2023-01-24 | End: 2023-02-23

## 2023-01-24 NOTE — TELEPHONE ENCOUNTER
Pharmacy contacting office because thy did not receive medication on 1/10/2023. Please resend pending medication to pharmacy. Patients needs for next bubble package being sent out.

## 2023-02-06 ENCOUNTER — OFFICE VISIT (OUTPATIENT)
Dept: ORTHOPEDIC SURGERY | Age: 63
End: 2023-02-06
Payer: COMMERCIAL

## 2023-02-06 VITALS — WEIGHT: 227 LBS | BODY MASS INDEX: 32.57 KG/M2

## 2023-02-06 DIAGNOSIS — M17.11 ARTHRITIS OF RIGHT KNEE: Primary | ICD-10-CM

## 2023-02-06 DIAGNOSIS — M25.561 RIGHT KNEE PAIN, UNSPECIFIED CHRONICITY: ICD-10-CM

## 2023-02-06 PROCEDURE — 99203 OFFICE O/P NEW LOW 30 MIN: CPT | Performed by: PHYSICIAN ASSISTANT

## 2023-02-06 PROCEDURE — G8427 DOCREV CUR MEDS BY ELIG CLIN: HCPCS | Performed by: PHYSICIAN ASSISTANT

## 2023-02-06 PROCEDURE — G8484 FLU IMMUNIZE NO ADMIN: HCPCS | Performed by: PHYSICIAN ASSISTANT

## 2023-02-06 PROCEDURE — G8417 CALC BMI ABV UP PARAM F/U: HCPCS | Performed by: PHYSICIAN ASSISTANT

## 2023-02-06 NOTE — PROGRESS NOTES
[address] [facility]  [facility] [address]  Dept: [phone]    Ambulatory Orthopedic Consult      CHIEF COMPLAINT:    Chief Complaint   Patient presents with    New Patient     R KNEE pain          HISTORY OF PRESENT ILLNESS:        The patient is a 58 y.o. male who is being seen at the request of  Kavitha Cummings MD for consultation and evaluation of  right knee pain. Marion Oliver  presents for right knee pain that has been present for  2 years but increased over the last 3-4 months. The patient does not recall a specific injury. The pain improves with  rest, tylenol. The pain worsens with  prolonged walking, prolonged standing, stair climbing and arising from a seated position. Instability is  noted. He notes that the most recent episode of instability caused him to fall about 3-4 weeks ago  The patient has had a previous corticosteroid injection that wasa given more than 2 years ago. The patient has not had previous physical therapy for this problem. The patient has not tried oral NSAIDs for this problem previously.       Past Medical History:    Past Medical History:   Diagnosis Date    ADHD (attention deficit hyperactivity disorder)     COVID-19 9/15/2021    COVID-19 9/15/2021    Depression     DM2 (diabetes mellitus, type 2) ([facility] [address] 10/15/2013    Erectile dysfunction     Gastroesophageal reflux disease 8/23/2019    Hyperlipidemia     Hypertension     Kidney stone 7/19/2018    Low back pain of thoracolumbar region with sciatica     Neuropathy        Past Surgical History:    Past Surgical History:   Procedure Laterality Date    COLONOSCOPY N/A 12/7/2020    COLONOSCOPY DIAGNOSTIC performed by Dorian Gallo MD at [facility] [address]  07/20/2018    bilat stent replacement    CYSTOSCOPY  08/03/2018    b/l ureteroscopy, b/l stent, HLL    IR TUNNELED CATHETER PLACEMENT GREATER THAN 5 YEARS  6/28/2021    IR TUNNELED CATHETER PLACEMENT GREATER THAN 5 YEARS 6/28/2021 Lucia Urias MD [facility] SPECIAL PROCEDURES    OK CYSTO W/INSERT URETERAL STENT Bilateral 7/20/2018    CYSTOSCOPY URETERAL STENT INSERTION BILATERAL performed by Ora Monzon MD at [address] Bilateral 8/3/2018    [address] HOLMIUM - CYSTQUINCY, URETEROSCOPY LITHOTRIPSY, STENT EXCHANGE Osito Rivera #[id number] - ESPERANZA) performed by Ora Monzon MD at [address] [facility]      as a child    UPPER GASTROINTESTINAL ENDOSCOPY N/A 12/7/2020    EGD BIOPSY performed by Michael Nowak MD at [facility]       Current Medications:   Current Outpatient Medications   Medication Sig Dispense Refill    gabapentin (NEURONTIN) 100 MG capsule Take 1 capsule by mouth 3 times daily for 30 days. 90 capsule 0    lisinopril (PRINIVIL;ZESTRIL) 10 MG tablet TAKE ONE TABLET BY MOUTH EVERY MORNING 30 tablet 5    lisinopril (PRINIVIL;ZESTRIL) 5 MG tablet TAKE ONE TABLET (5 MG) BY MOUTH EVERY EVENING 30 tablet 5    sevelamer (RENVELA) 800 MG tablet       acetaminophen (TYLENOL) 500 MG tablet Take 1 tablet by mouth 4 times daily as needed for Pain 120 tablet 0    lidocaine (LIDODERM) 5 % Place 1 patch onto the skin daily 12 hours on, 12 hours off. 30 patch 0    Elastic Bandages & Supports (KNEE STABILIZER WRAP LARGE) MISC Use to right knee 1 each 0    aspirin (ASPIRIN LOW DOSE) 81 MG chewable tablet CHEW AND SWALLOW ONE TABLET BY MOUTH ONE TIME A DAY 30 tablet 5    pantoprazole (PROTONIX) 40 MG tablet TAKE ONE TABLET BY MOUTH TWO TIMES A DAY BEFORE MEALS 30 tablet 3    metoprolol tartrate (LOPRESSOR) 25 MG tablet Take 2 tablets by mouth 2 times daily 60 tablet 0    sildenafil (REVATIO) 20 MG tablet Take 1 tablet by mouth daily as needed (for erectile dysfunction) 10 tablet 0    atorvastatin (LIPITOR) 20 MG tablet TAKE 1 TABLET BY MOUTH ONCE DAILY.  60 tablet 2    Multiple Vitamins-Minerals (RENAPLEX-D) TABS Take 1 tablet by mouth daily       No current facility-administered medications for this visit. Allergies:    Patient has no known allergies. Social History:   Social History     Socioeconomic History    Marital status: Single     Spouse name: Not on file    Number of children: Not on file    Years of education: Not on file    Highest education level: Not on file   Occupational History    Not on file   Tobacco Use    Smoking status: Former     Packs/day: 0.50     Years: 30.00     Pack years: 15.00     Types: Cigarettes     Quit date: 2018     Years since quittin.2    Smokeless tobacco: Never   Substance and Sexual Activity    Alcohol use: Yes     Alcohol/week: 8.0 standard drinks     Types: 8 Cans of beer per week     Comment: drinks \"a few\" beers a day    Drug use: No     Types: Marijuana (Weed)     Comment:  last use one month    Sexual activity: Never     Partners: Female   Other Topics Concern    Not on file   Social History Narrative    Not on file     Social Determinants of Health     Financial Resource Strain: Medium Risk    Difficulty of Paying Living Expenses: Somewhat hard   Food Insecurity: Food Insecurity Present    Worried About Running Out of Food in the Last Year: Sometimes true    Ran Out of Food in the Last Year: Never true   Transportation Needs: Not on file   Physical Activity: Not on file   Stress: Not on file   Social Connections: Not on file   Intimate Partner Violence: Not on file   Housing Stability: Not on file       Family History:  Family History   Problem Relation Age of Onset    Diabetes Mother     High Blood Pressure Father          REVIEW OF SYSTEMS:  Review of Systems   Constitutional:  Negative for activity change and fever. HENT:  Negative for sneezing. Respiratory:  Negative for cough and shortness of breath. Cardiovascular:  Negative for chest pain. Gastrointestinal:  Negative for vomiting. Musculoskeletal:  Positive for arthralgias (right knee). Negative for joint swelling and myalgias. Skin:  Negative for color change. Neurological:  Negative for weakness and numbness. Psychiatric/Behavioral:  Negative for sleep disturbance. PHYSICAL EXAM:  Wt 227 lb (103 kg)   BMI 32.57 kg/m²  Body mass index is 32.57 kg/m². Physical Exam  Gen: alert and oriented to person and place. Psych:  Appropriate affect; Appropriate knowledge base; Appropriate mood; No hallucinations; Head: normocephalic, atraumatic   Chest: symmetric chest excursion; nonlabored respiratory effort. Pelvis: stable; no obvious pelvis deformity  Ortho Exam  Extremity: The patient relates independently with a mild limp noted to the right lower extremity. Evaluation of the Right knee reveals no significant outward deformity. There is no erythema, skin warmth, skin lesions, or signs of infection appreciated. There is tenderness over the medial joint line with palpation. There is a mild knee effusion. Range of motion of the Right knee is  3-100. No instability of the knee is appreciated at 0 and 30° of flexion. There is a negative anterior drawer and Lachman's test.  There is increased pain with varus Alexy's testing. No calf tenderness is noted. There is a negative hip log roll and Stinchfield test on the Right. Motor, sensory, vascular examination to the Right lower extremity is intact. Patient has full range of motion of the Right ankle. Radiology:   XR KNEE RIGHT (3 VIEWS)    Result Date: 1/11/2023  EXAMINATION: THREE XRAY VIEWS OF THE RIGHT KNEE 1/10/2023 1:23 pm COMPARISON: 07/13/2022 HISTORY: ORDERING SYSTEM PROVIDED HISTORY: Acute pain of right knee TECHNOLOGIST PROVIDED HISTORY: acute on chronic knee pain Reason for Exam: pain x 3 months FINDINGS: No acute fracture or suspect osseous lesion. Unchanged moderate lateral and patellofemoral and mild medial compartment joint space narrowing with tricompartmental marginal spurring.   Unchanged 3.3 cm chronic ossific fragment in the suprapatellar soft tissues with adjacent moderate suprapatellar joint effusion. 1. No acute osseous abnormality of the right knee. 2. Unchanged moderate lateral and patellofemoral and mild medial compartment joint space narrowing with marginal spurring. 3. Unchanged moderate suprapatellar joint effusion with chronic ossific fragment in the suprapatellar soft tissues, which may reflect chronic topic calcification or intra-articular loose body in the suprapatellar recess. XR KNEE RIGHT (MIN 4 VIEWS)    Result Date: 2/6/2023  History: Chronic Left knee pain. Comparison: 1/10/2023. Findings:   Standing AP/Lateral/Tunnel/Merchant view xrays of the Left knee done in the office today shows severe lateral joint space narrowing, tricompartmental osteophytosis, joint line sclerosis medially. Valgus deformity appreciated. No evidence of fracture, subluxation, dislocation, radioopaque foreign body/tumor is noted. Mild  Lateral subluxation of the tibia is appreciated. Impression:   Right knee severe degenerative changes as described above. Procedure: None    ASSESSMENT:     1. Arthritis of right knee    2. Right knee pain, unspecified chronicity           PLAN:     Savannah Harper is a 58 y.o. male here today for chronic right knee pain at the request of Kajal Gibson MD.  I personally  reviewed the patient's recent x-rays revealing moderate lateral and patellofemoral joint space narrowing with mild medial compartment joint space narrowing indicating moderate degenerative changes within the right knee. The treatment options may include activity modification, oral anti-inflammatories, bracing, injections, advanced imaging, physical therapy and/or surgical intervention. Patient notes that he is not interested in surgical intervention or discussion about injections for his right knee. The patient would like to proceed with:  1. Referral to formal outpatient physical therapy at [facility].     2. The patient was given a economy hinged  knee brace for the right knee. The patient is ambulatory but has weakness and instability of their condition specified in my notes which requires stabilization from the brace to improve their function. 3.  Continue over-the-counter Tylenol and/or ibuprofen for right knee discomfort. The patient will follow up in 6 weeks, or sooner if needed. We discussed that the patient should call us with any questions or concerns. The patient voiced his understanding. Return in about 6 weeks (around 3/20/2023) for re-evaluation. Total Time: 40 min      No orders of the defined types were placed in this encounter. Orders Placed This Encounter   Procedures    XR KNEE RIGHT (MIN 4 VIEWS)     Standing Status:   Future     Number of Occurrences:   1     Standing Expiration Date:   2/6/2024     Order Specific Question:   Reason for exam:     Answer:   monitor    [facility]     Referral Priority:   Routine     Referral Type:   Eval and Treat     Referral Reason:   Specialty Services Required     Requested Specialty:   Physical Therapist     Number of Visits Requested:   1       This note is created with the assistance of a speech recognition program.  While intending to generate a document that actually reflects the content of the visit, the document can still have some errors including those of syntax and sound a like substitutions which may escape proof reading. In such instances, actual meaning can be extrapolated by contextual diversion.      Electronically signed by Maria Fernanda Mercado PA-C on 2/7/2023 at 4:42 PM

## 2023-02-07 ASSESSMENT — ENCOUNTER SYMPTOMS
COUGH: 0
SHORTNESS OF BREATH: 0
COLOR CHANGE: 0
VOMITING: 0

## 2023-02-09 ENCOUNTER — OFFICE VISIT (OUTPATIENT)
Dept: FAMILY MEDICINE CLINIC | Age: 63
End: 2023-02-09
Payer: COMMERCIAL

## 2023-02-09 VITALS
WEIGHT: 222.4 LBS | HEART RATE: 97 BPM | HEIGHT: 71 IN | SYSTOLIC BLOOD PRESSURE: 123 MMHG | DIASTOLIC BLOOD PRESSURE: 64 MMHG | BODY MASS INDEX: 31.14 KG/M2

## 2023-02-09 DIAGNOSIS — Z00.00 INITIAL MEDICARE ANNUAL WELLNESS VISIT: Primary | ICD-10-CM

## 2023-02-09 PROCEDURE — G8484 FLU IMMUNIZE NO ADMIN: HCPCS | Performed by: FAMILY MEDICINE

## 2023-02-09 PROCEDURE — 3078F DIAST BP <80 MM HG: CPT | Performed by: FAMILY MEDICINE

## 2023-02-09 PROCEDURE — 3017F COLORECTAL CA SCREEN DOC REV: CPT | Performed by: FAMILY MEDICINE

## 2023-02-09 PROCEDURE — 3074F SYST BP LT 130 MM HG: CPT | Performed by: FAMILY MEDICINE

## 2023-02-09 PROCEDURE — G0438 PPPS, INITIAL VISIT: HCPCS | Performed by: FAMILY MEDICINE

## 2023-02-09 ASSESSMENT — PATIENT HEALTH QUESTIONNAIRE - PHQ9
2. FEELING DOWN, DEPRESSED OR HOPELESS: 0
SUM OF ALL RESPONSES TO PHQ QUESTIONS 1-9: 0
SUM OF ALL RESPONSES TO PHQ9 QUESTIONS 1 & 2: 0
1. LITTLE INTEREST OR PLEASURE IN DOING THINGS: 0
SUM OF ALL RESPONSES TO PHQ QUESTIONS 1-9: 0

## 2023-02-09 ASSESSMENT — LIFESTYLE VARIABLES
HOW MANY STANDARD DRINKS CONTAINING ALCOHOL DO YOU HAVE ON A TYPICAL DAY: 1 OR 2
HOW OFTEN DO YOU HAVE A DRINK CONTAINING ALCOHOL: MONTHLY OR LESS

## 2023-02-09 NOTE — PATIENT INSTRUCTIONS
Personalized Preventive Plan for Alla Schaumann - 2/9/2023  Medicare offers a range of preventive health benefits. Some of the tests and screenings are paid in full while other may be subject to a deductible, co-insurance, and/or copay. Some of these benefits include a comprehensive review of your medical history including lifestyle, illnesses that may run in your family, and various assessments and screenings as appropriate. After reviewing your medical record and screening and assessments performed today your provider may have ordered immunizations, labs, imaging, and/or referrals for you. A list of these orders (if applicable) as well as your Preventive Care list are included within your After Visit Summary for your review. Other Preventive Recommendations:    A preventive eye exam performed by an eye specialist is recommended every 1-2 years to screen for glaucoma; cataracts, macular degeneration, and other eye disorders. A preventive dental visit is recommended every 6 months. Try to get at least 150 minutes of exercise per week or 10,000 steps per day on a pedometer . Order or download the FREE \"Exercise & Physical Activity: Your Everyday Guide\" from [employer]. Call 3-242.774.6836 or search [employer] online. You need 3636-9377 mg of calcium and 2920-4609 IU of vitamin D per day. It is possible to meet your calcium requirement with diet alone, but a vitamin D supplement is usually necessary to meet this goal.  When exposed to the sun, use a sunscreen that protects against both UVA and UVB radiation with an SPF of 30 or greater. Reapply every 2 to 3 hours or after sweating, drying off with a towel, or swimming. Always wear a seat belt when traveling in a car. Always wear a helmet when riding a bicycle or motorcycle.

## 2023-02-09 NOTE — PROGRESS NOTES
Medicare Annual Wellness Visit    González Diaz is here for [place] AWV (Annual Wellness Visit)    Assessment & Plan   Initial Medicare annual wellness visit      Recommendations for Preventive Services Due: see orders and patient instructions/AVS.  Recommended screening schedule for the next 5-10 years is provided to the patient in written form: see Patient Instructions/AVS.     No follow-ups on file. Subjective   The following acute and/or chronic problems were also addressed today:  hypertension    Patient's complete Health Risk Assessment and screening values have been reviewed and are found in Flowsheets. The following problems were reviewed today and where indicated follow up appointments were made and/or referrals ordered. Positive Risk Factor Screenings with Interventions:    Fall Risk:  Do you feel unsteady or are you worried about falling? : (!) yes (brace to right knee)  2 or more falls in past year?: no  Fall with injury in past year?: no     Interventions:    Patient declines any further evaluation or treatment              Weight and Activity:  Physical Activity: Sufficiently Active    Days of Exercise per Week: 7 days    Minutes of Exercise per Session: 30 min     On average, how many days per week do you engage in moderate to strenuous exercise (like a brisk walk)?: 7 days  Have you lost any weight without trying in the past 3 months?: No  Body mass index: (!) 31.46    Obesity Interventions:  Patient declines any further evaluation or treatment          Dentist Screen:  Have you seen the dentist within the past year?: (!) No    Intervention:  Advised to schedule with their dentist     Vision Screen:  Do you have difficulty driving, watching TV, or doing any of your daily activities because of your eyesight?: (!) Yes (needs glasses)  Have you had an eye exam within the past year?: (!) No  No results found.     Interventions:   Patient encouraged to make appointment with their eye specialist    Safety:  Do you have either shower bars, grab bars, non-slip mats or non-slip surfaces in your shower or bathtub?: (!) No  Interventions:  Patient declined any further interventions or treatment     Advanced Directives:  Do you have a Living Will?: (!) No    Intervention:                         Objective   Vitals:    02/09/23 1024 02/09/23 1049   BP: (!) 141/65 123/64   Site: Left Upper Arm Right Upper Arm   Position: Sitting Sitting   Cuff Size: Large Adult Large Adult   Pulse: (!) 101 97   Weight: 222 lb 6.4 oz (100.9 kg)    Height: 5' 10.5\" (1.791 m)       Body mass index is 31.46 kg/m². No Known Allergies  Prior to Visit Medications    Medication Sig Taking? Authorizing Provider   lisinopril (PRINIVIL;ZESTRIL) 10 MG tablet TAKE ONE TABLET BY MOUTH EVERY MORNING Yes Priyanka Chowdary MD   lisinopril (PRINIVIL;ZESTRIL) 5 MG tablet TAKE ONE TABLET (5 MG) BY MOUTH EVERY EVENING Yes Priyanka Chowdary MD   gabapentin (NEURONTIN) 100 MG capsule Take 1 capsule by mouth 3 times daily for 30 days. Yes Ernestine Deleon MD   sevelamer (RENVELA) 800 MG tablet  Yes Historical Provider, MD   acetaminophen (TYLENOL) 500 MG tablet Take 1 tablet by mouth 4 times daily as needed for Pain Yes Ernestine Deleon MD   lidocaine (LIDODERM) 5 % Place 1 patch onto the skin daily 12 hours on, 12 hours off. Yes Ernestine Deleon MD   Elastic Bandages & Supports (KNEE STABILIZER WRAP LARGE) MISC Use to right knee Yes Ernestine Deleon MD   aspirin (ASPIRIN LOW DOSE) 81 MG chewable tablet CHEW AND SWALLOW ONE TABLET BY MOUTH ONE TIME A DAY Yes Evonne Muijca MD   pantoprazole (PROTONIX) 40 MG tablet TAKE ONE TABLET BY MOUTH TWO TIMES A DAY BEFORE MEALS Yes Kelly Lu MD   metoprolol tartrate (LOPRESSOR) 25 MG tablet Take 2 tablets by mouth 2 times daily Yes Ernestine Deleon MD   atorvastatin (LIPITOR) 20 MG tablet TAKE 1 TABLET BY MOUTH ONCE DAILY.  Yes Meghann Davis MD   Multiple Vitamins-Minerals (RENAPLEX-D) TABS Take 1 tablet by mouth daily Yes Historical Provider, MD   sildenafil (REVATIO) 20 MG tablet Take 1 tablet by mouth daily as needed (for erectile dysfunction)  Patient not taking: Reported on 2/9/2023  Chavo Carnes MD       [facility] (Including outside providers/suppliers regularly involved in providing care):   Patient Care Team:  Chavo Carnes MD as PCP - General (Family Medicine)  Daren Luna DO as Consulting Physician (General Surgery)     Reviewed and updated this visit:  Tobacco  Allergies  Meds  Med Hx  Surg Hx  Soc Hx  Fam Hx        I, Donald Russell LPN, 6/9/8369, performed the documented evaluation under the direct supervision of the attending physician.       Donald Russell LPN

## 2023-02-15 DIAGNOSIS — Z99.2 TYPE 2 DIABETES MELLITUS WITH CHRONIC KIDNEY DISEASE ON CHRONIC DIALYSIS, WITH LONG-TERM CURRENT USE OF INSULIN (HCC): ICD-10-CM

## 2023-02-15 DIAGNOSIS — N18.6 TYPE 2 DIABETES MELLITUS WITH CHRONIC KIDNEY DISEASE ON CHRONIC DIALYSIS, WITH LONG-TERM CURRENT USE OF INSULIN (HCC): ICD-10-CM

## 2023-02-15 DIAGNOSIS — E11.22 TYPE 2 DIABETES MELLITUS WITH CHRONIC KIDNEY DISEASE ON CHRONIC DIALYSIS, WITH LONG-TERM CURRENT USE OF INSULIN (HCC): ICD-10-CM

## 2023-02-15 DIAGNOSIS — Z79.4 TYPE 2 DIABETES MELLITUS WITH CHRONIC KIDNEY DISEASE ON CHRONIC DIALYSIS, WITH LONG-TERM CURRENT USE OF INSULIN (HCC): ICD-10-CM

## 2023-02-15 RX ORDER — ATORVASTATIN CALCIUM 20 MG/1
TABLET, FILM COATED ORAL
Qty: 60 TABLET | Refills: 2 | Status: SHIPPED | OUTPATIENT
Start: 2023-02-15

## 2023-02-15 RX ORDER — ACETAMINOPHEN 500 MG
500 TABLET ORAL 4 TIMES DAILY PRN
Qty: 120 TABLET | Refills: 0 | Status: SHIPPED | OUTPATIENT
Start: 2023-02-15

## 2023-02-15 NOTE — TELEPHONE ENCOUNTER
Atorvastatin pending for refill     Health Maintenance   Topic Date Due    Diabetic retinal exam  Never done    Shingles vaccine (1 of 2) Never done    Hepatitis B vaccine (1 of 3 - Risk Dialysis 4-dose series) Never done    Diabetic foot exam  12/14/2021    COVID-19 Vaccine (2 - Booster for Dana series) 01/11/2022    Lipids  04/14/2023    A1C test (Diabetic or Prediabetic)  01/10/2024    Depression Screen  02/09/2024    Annual Wellness Visit (AWV)  02/10/2024    DTaP/Tdap/Td vaccine (2 - Td or Tdap) 08/23/2029    Colorectal Cancer Screen  12/07/2030    Flu vaccine  Completed    Pneumococcal 0-64 years Vaccine  Completed    Hepatitis C screen  Completed    HIV screen  Completed    Hepatitis A vaccine  Aged Out    Hib vaccine  Aged Out    Meningococcal (ACWY) vaccine  Aged Out             (applicable per patient's age: Cancer Screenings, Depression Screening, Fall Risk Screening, Immunizations)    Hemoglobin A1C (%)   Date Value   01/10/2023 5.9   09/27/2022 5.7   03/24/2022 6.9     Microalb/Crt.  Ratio (mcg/mg creat)   Date Value   12/14/2020 3,134 (H)     LDL Cholesterol (mg/dL)   Date Value   04/14/2022 73     AST (U/L)   Date Value   09/16/2021 69 (H)     ALT (U/L)   Date Value   09/16/2021 33     BUN (mg/dL)   Date Value   04/14/2022 34 (H)      (goal A1C is < 7)   (goal LDL is <100) need 30-50% reduction from baseline     BP Readings from Last 3 Encounters:   02/09/23 123/64   01/20/23 (!) 154/81   01/10/23 122/60    (goal /80)      All Future Testing planned in [facility]:  Lab Frequency Next Occurrence   VL UPPER EXTREMITY BILATERAL VEIN MAPPING Once 08/09/2022   Hemoglobin and Hematocrit, Blood, Post Transfusion POST TRANSFUSION    Hemoglobin and Hematocrit, Blood, Post Transfusion POST TRANSFUSION        Next Visit Date:  Future Appointments   Date Time Provider Armin Rocha   3/30/2023 11:15 AM Aminata Patterson, P[facility]            Patient Active Problem List:     Type 2 diabetes mellitus with chronic kidney disease on chronic dialysis, with long-term current use of insulin (HCC)     Sciatica of right side     Atypical chest pain     Low back pain of thoracolumbar region with sciatica     Kidney stone     Chest pain     Need for prophylactic vaccination against diphtheria-tetanus-pertussis (DTP)     Gastroesophageal reflux disease     Tachycardia     Essential hypertension     Stage 3 chronic kidney disease     BMI 31.0-31.9,adult     Acute kidney injury superimposed on CKD (HCC)     Type 2 MI (myocardial infarction) ([facility] [address]     Gastrointestinal hemorrhage     Acute posthemorrhagic anemia     Neuropathy     NSAID long-term use     Family history of malignant neoplasm of colon     Positive colorectal cancer screening using Cologuard test     JEFFREY (acute kidney injury) ([facility] [address]     Pneumonia due to COVID-19 virus     ESRD needing dialysis ([facility] [address]     Acute metabolic encephalopathy     Hypoxia     Thrombocytopenia ([facility] [address]     ESRD (end stage renal disease) on dialysis [facility])     Erectile dysfunction     Chronic pain of right knee

## 2023-02-17 DIAGNOSIS — E11.22 TYPE 2 DIABETES MELLITUS WITH CHRONIC KIDNEY DISEASE ON CHRONIC DIALYSIS, WITH LONG-TERM CURRENT USE OF INSULIN (HCC): ICD-10-CM

## 2023-02-17 DIAGNOSIS — Z79.4 TYPE 2 DIABETES MELLITUS WITH CHRONIC KIDNEY DISEASE ON CHRONIC DIALYSIS, WITH LONG-TERM CURRENT USE OF INSULIN (HCC): ICD-10-CM

## 2023-02-17 DIAGNOSIS — Z99.2 TYPE 2 DIABETES MELLITUS WITH CHRONIC KIDNEY DISEASE ON CHRONIC DIALYSIS, WITH LONG-TERM CURRENT USE OF INSULIN (HCC): ICD-10-CM

## 2023-02-17 DIAGNOSIS — N18.6 TYPE 2 DIABETES MELLITUS WITH CHRONIC KIDNEY DISEASE ON CHRONIC DIALYSIS, WITH LONG-TERM CURRENT USE OF INSULIN (HCC): ICD-10-CM

## 2023-02-17 NOTE — TELEPHONE ENCOUNTER
Last visit: 1/20/23  Last Med refill: 1/24/23  Does patient have enough medication for 72 hours: Yes    Next Visit Date:  Future Appointments   Date Time Provider Armin Rocha   3/30/2023 11:15 AM Aminata Patterson PA-C [address] [facility] Maintenance   Topic Date Due    Diabetic retinal exam  Never done    Shingles vaccine (1 of 2) Never done    Hepatitis B vaccine (1 of 3 - Risk Dialysis 4-dose series) Never done    Diabetic foot exam  12/14/2021    COVID-19 Vaccine (2 - Booster for Dana series) 01/11/2022    Lipids  04/14/2023    A1C test (Diabetic or Prediabetic)  01/10/2024    Depression Screen  02/09/2024    Annual Wellness Visit (AWV)  02/10/2024    DTaP/Tdap/Td vaccine (2 - Td or Tdap) 08/23/2029    Colorectal Cancer Screen  12/07/2030    Flu vaccine  Completed    Pneumococcal 0-64 years Vaccine  Completed    Hepatitis C screen  Completed    HIV screen  Completed    Hepatitis A vaccine  Aged Out    Hib vaccine  Aged Out    Meningococcal (ACWY) vaccine  Aged Out       Hemoglobin A1C (%)   Date Value   01/10/2023 5.9   09/27/2022 5.7   03/24/2022 6.9             ( goal A1C is < 7)   Microalb/Crt.  Ratio (mcg/mg creat)   Date Value   12/14/2020 3,134 (H)     LDL Cholesterol (mg/dL)   Date Value   04/14/2022 73   12/14/2020 33       (goal LDL is <100)   AST (U/L)   Date Value   09/16/2021 69 (H)     ALT (U/L)   Date Value   09/16/2021 33     BUN (mg/dL)   Date Value   04/14/2022 34 (H)     BP Readings from Last 3 Encounters:   02/09/23 123/64   01/20/23 (!) 154/81   01/10/23 122/60          (goal 120/80)    All Future Testing planned in [facility]  Lab Frequency Next Occurrence   VL UPPER EXTREMITY BILATERAL VEIN MAPPING Once 08/09/2022   Hemoglobin and Hematocrit, Blood, Post Transfusion POST TRANSFUSION    Hemoglobin and Hematocrit, Blood, Post Transfusion POST TRANSFUSION                Patient Active Problem List:     Type 2 diabetes mellitus with chronic kidney disease on chronic dialysis, with long-term current use of insulin (HCC)     Sciatica of right side     Atypical chest pain     Low back pain of thoracolumbar region with sciatica     Kidney stone     Chest pain     Need for prophylactic vaccination against diphtheria-tetanus-pertussis (DTP)     Gastroesophageal reflux disease     Tachycardia     Essential hypertension     Stage 3 chronic kidney disease     BMI 31.0-31.9,adult     Acute kidney injury superimposed on CKD (HCC)     Type 2 MI (myocardial infarction) ([facility] [address]     Gastrointestinal hemorrhage     Acute posthemorrhagic anemia     Neuropathy     NSAID long-term use     Family history of malignant neoplasm of colon     Positive colorectal cancer screening using Cologuard test     JEFFREY (acute kidney injury) ([facility] [address]     Pneumonia due to COVID-19 virus     ESRD needing dialysis ([facility] [address]     Acute metabolic encephalopathy     Hypoxia     Thrombocytopenia ([facility] [address]     ESRD (end stage renal disease) on dialysis [facility])     Erectile dysfunction     Chronic pain of right knee

## 2023-02-20 DIAGNOSIS — N18.6 ESRD (END STAGE RENAL DISEASE) ON DIALYSIS (HCC): ICD-10-CM

## 2023-02-20 DIAGNOSIS — Z99.2 ESRD (END STAGE RENAL DISEASE) ON DIALYSIS (HCC): ICD-10-CM

## 2023-02-20 DIAGNOSIS — I10 ESSENTIAL HYPERTENSION: ICD-10-CM

## 2023-02-20 DIAGNOSIS — N18.6 TYPE 2 DIABETES MELLITUS WITH CHRONIC KIDNEY DISEASE ON CHRONIC DIALYSIS, WITH LONG-TERM CURRENT USE OF INSULIN (HCC): ICD-10-CM

## 2023-02-20 DIAGNOSIS — E11.22 TYPE 2 DIABETES MELLITUS WITH CHRONIC KIDNEY DISEASE ON CHRONIC DIALYSIS, WITH LONG-TERM CURRENT USE OF INSULIN (HCC): ICD-10-CM

## 2023-02-20 DIAGNOSIS — Z99.2 TYPE 2 DIABETES MELLITUS WITH CHRONIC KIDNEY DISEASE ON CHRONIC DIALYSIS, WITH LONG-TERM CURRENT USE OF INSULIN (HCC): ICD-10-CM

## 2023-02-20 DIAGNOSIS — Z79.4 TYPE 2 DIABETES MELLITUS WITH CHRONIC KIDNEY DISEASE ON CHRONIC DIALYSIS, WITH LONG-TERM CURRENT USE OF INSULIN (HCC): ICD-10-CM

## 2023-02-20 RX ORDER — ACETAMINOPHEN 500 MG
500 TABLET ORAL 4 TIMES DAILY PRN
Qty: 120 TABLET | Refills: 0 | Status: SHIPPED | OUTPATIENT
Start: 2023-02-20

## 2023-02-20 NOTE — TELEPHONE ENCOUNTER
E-scribe request for med refills. Please review and e-scribe if applicable. Last Visit Date:  1/20/23  Next Visit Date:  Visit date not found    Hemoglobin A1C (%)   Date Value   01/10/2023 5.9   09/27/2022 5.7   03/24/2022 6.9             ( goal A1C is < 7)   Microalb/Crt.  Ratio (mcg/mg creat)   Date Value   12/14/2020 3,134 (H)     LDL Cholesterol (mg/dL)   Date Value   04/14/2022 73       (goal LDL is <100)   AST (U/L)   Date Value   09/16/2021 69 (H)     ALT (U/L)   Date Value   09/16/2021 33     BUN (mg/dL)   Date Value   04/14/2022 34 (H)     BP Readings from Last 3 Encounters:   02/09/23 123/64   01/20/23 (!) 154/81   01/10/23 122/60          (goal 120/80)        Patient Active Problem List:     Type 2 diabetes mellitus with chronic kidney disease on chronic dialysis, with long-term current use of insulin (HCC)     Sciatica of right side     Atypical chest pain     Low back pain of thoracolumbar region with sciatica     Kidney stone     Chest pain     Need for prophylactic vaccination against diphtheria-tetanus-pertussis (DTP)     Gastroesophageal reflux disease     Tachycardia     Essential hypertension     Stage 3 chronic kidney disease     BMI 31.0-31.9,adult     Acute kidney injury superimposed on CKD ([address]     Type 2 MI (myocardial infarction) ([address]     Gastrointestinal hemorrhage     Acute posthemorrhagic anemia     Neuropathy     NSAID long-term use     Family history of malignant neoplasm of colon     Positive colorectal cancer screening using Cologuard test     JEFFREY (acute kidney injury) ([address]     Pneumonia due to COVID-19 virus     ESRD needing dialysis ([address]     Acute metabolic encephalopathy     Hypoxia     Thrombocytopenia ([address]     ESRD (end stage renal disease) on dialysis [facility])     Erectile dysfunction     Chronic pain of right knee      ----Launervin Slim

## 2023-02-20 NOTE — TELEPHONE ENCOUNTER
E-scribe request for med refills. Please review and e-scribe if applicable. Last Visit Date:  02/09/23  Next Visit Date:  Visit date not found    Hemoglobin A1C (%)   Date Value   01/10/2023 5.9   09/27/2022 5.7   03/24/2022 6.9             ( goal A1C is < 7)   Microalb/Crt.  Ratio (mcg/mg creat)   Date Value   12/14/2020 3,134 (H)     LDL Cholesterol (mg/dL)   Date Value   04/14/2022 73       (goal LDL is <100)   AST (U/L)   Date Value   09/16/2021 69 (H)     ALT (U/L)   Date Value   09/16/2021 33     BUN (mg/dL)   Date Value   04/14/2022 34 (H)     BP Readings from Last 3 Encounters:   02/09/23 123/64   01/20/23 (!) 154/81   01/10/23 122/60          (goal 120/80)        Patient Active Problem List:     Type 2 diabetes mellitus with chronic kidney disease on chronic dialysis, with long-term current use of insulin (HCC)     Sciatica of right side     Atypical chest pain     Low back pain of thoracolumbar region with sciatica     Kidney stone     Chest pain     Need for prophylactic vaccination against diphtheria-tetanus-pertussis (DTP)     Gastroesophageal reflux disease     Tachycardia     Essential hypertension     Stage 3 chronic kidney disease     BMI 31.0-31.9,adult     Acute kidney injury superimposed on CKD ([address]     Type 2 MI (myocardial infarction) ([address]     Gastrointestinal hemorrhage     Acute posthemorrhagic anemia     Neuropathy     NSAID long-term use     Family history of malignant neoplasm of colon     Positive colorectal cancer screening using Cologuard test     JEFFREY (acute kidney injury) ([address]     Pneumonia due to COVID-19 virus     ESRD needing dialysis ([address]     Acute metabolic encephalopathy     Hypoxia     Thrombocytopenia ([address]     ESRD (end stage renal disease) on dialysis [facility])     Erectile dysfunction     Chronic pain of right knee      ----Jesus Rosario

## 2023-02-23 RX ORDER — GABAPENTIN 100 MG/1
100 CAPSULE ORAL 3 TIMES DAILY
Qty: 90 CAPSULE | Refills: 0 | OUTPATIENT
Start: 2023-02-23 | End: 2023-03-25

## 2023-02-23 RX ORDER — GABAPENTIN 100 MG/1
100 CAPSULE ORAL 3 TIMES DAILY
Qty: 90 CAPSULE | Refills: 0 | Status: SHIPPED | OUTPATIENT
Start: 2023-02-23 | End: 2023-03-25

## 2023-04-14 DIAGNOSIS — E11.22 TYPE 2 DIABETES MELLITUS WITH CHRONIC KIDNEY DISEASE ON CHRONIC DIALYSIS, WITH LONG-TERM CURRENT USE OF INSULIN (HCC): ICD-10-CM

## 2023-04-14 DIAGNOSIS — N18.6 TYPE 2 DIABETES MELLITUS WITH CHRONIC KIDNEY DISEASE ON CHRONIC DIALYSIS, WITH LONG-TERM CURRENT USE OF INSULIN (HCC): ICD-10-CM

## 2023-04-14 DIAGNOSIS — Z99.2 TYPE 2 DIABETES MELLITUS WITH CHRONIC KIDNEY DISEASE ON CHRONIC DIALYSIS, WITH LONG-TERM CURRENT USE OF INSULIN (HCC): ICD-10-CM

## 2023-04-14 DIAGNOSIS — Z79.4 TYPE 2 DIABETES MELLITUS WITH CHRONIC KIDNEY DISEASE ON CHRONIC DIALYSIS, WITH LONG-TERM CURRENT USE OF INSULIN (HCC): ICD-10-CM

## 2023-04-14 RX ORDER — GABAPENTIN 100 MG/1
100 CAPSULE ORAL 3 TIMES DAILY
Qty: 90 CAPSULE | Refills: 0 | OUTPATIENT
Start: 2023-04-14 | End: 2023-05-14

## 2023-05-25 DIAGNOSIS — N18.6 ESRD (END STAGE RENAL DISEASE) ON DIALYSIS (HCC): ICD-10-CM

## 2023-05-25 DIAGNOSIS — I10 ESSENTIAL HYPERTENSION: ICD-10-CM

## 2023-05-25 DIAGNOSIS — Z99.2 ESRD (END STAGE RENAL DISEASE) ON DIALYSIS (HCC): ICD-10-CM

## 2023-05-25 NOTE — TELEPHONE ENCOUNTER
E-scribe request for METOPROLOL 50 MG TAB. Please review and e-scribe if applicable. Last Visit Date:  2/9/23  Next Visit Date:  Visit date not found    Hemoglobin A1C (%)   Date Value   01/10/2023 5.9   09/27/2022 5.7   03/24/2022 6.9             ( goal A1C is < 7)   Microalb/Crt.  Ratio (mcg/mg creat)   Date Value   12/14/2020 3,134 (H)     LDL Cholesterol (mg/dL)   Date Value   04/14/2022 73       (goal LDL is <100)   AST (U/L)   Date Value   09/16/2021 69 (H)     ALT (U/L)   Date Value   09/16/2021 33     BUN (mg/dL)   Date Value   04/14/2022 34 (H)     BP Readings from Last 3 Encounters:   02/09/23 123/64   01/20/23 (!) 154/81   01/10/23 122/60          (goal 120/80)        Patient Active Problem List:     Type 2 diabetes mellitus with chronic kidney disease on chronic dialysis, with long-term current use of insulin (HCC)     Sciatica of right side     Atypical chest pain     Low back pain of thoracolumbar region with sciatica     Kidney stone     Chest pain     Need for prophylactic vaccination against diphtheria-tetanus-pertussis (DTP)     Gastroesophageal reflux disease     Tachycardia     Essential hypertension     Stage 3 chronic kidney disease     BMI 31.0-31.9,adult     Acute kidney injury superimposed on CKD (HCC)     Type 2 MI (myocardial infarction) ([facility] [address]     Gastrointestinal hemorrhage     Acute posthemorrhagic anemia     Neuropathy     NSAID long-term use     Family history of malignant neoplasm of colon     Positive colorectal cancer screening using Cologuard test     JEFFREY (acute kidney injury) ([address]     Pneumonia due to COVID-19 virus     ESRD needing dialysis ([address]     Acute metabolic encephalopathy     Hypoxia     Thrombocytopenia ([facility] [address]     ESRD (end stage renal disease) on dialysis [facility])     Erectile dysfunction     Chronic pain of right knee      ----JF

## 2023-05-27 RX ORDER — METOPROLOL TARTRATE 50 MG/1
TABLET, FILM COATED ORAL
Qty: 30 TABLET | Refills: 2 | Status: SHIPPED | OUTPATIENT
Start: 2023-05-27

## 2023-05-30 DIAGNOSIS — E11.22 TYPE 2 DIABETES MELLITUS WITH CHRONIC KIDNEY DISEASE ON CHRONIC DIALYSIS, WITH LONG-TERM CURRENT USE OF INSULIN (HCC): ICD-10-CM

## 2023-05-30 DIAGNOSIS — N18.6 TYPE 2 DIABETES MELLITUS WITH CHRONIC KIDNEY DISEASE ON CHRONIC DIALYSIS, WITH LONG-TERM CURRENT USE OF INSULIN (HCC): ICD-10-CM

## 2023-05-30 DIAGNOSIS — Z99.2 TYPE 2 DIABETES MELLITUS WITH CHRONIC KIDNEY DISEASE ON CHRONIC DIALYSIS, WITH LONG-TERM CURRENT USE OF INSULIN (HCC): ICD-10-CM

## 2023-05-30 DIAGNOSIS — Z79.4 TYPE 2 DIABETES MELLITUS WITH CHRONIC KIDNEY DISEASE ON CHRONIC DIALYSIS, WITH LONG-TERM CURRENT USE OF INSULIN (HCC): ICD-10-CM

## 2023-06-09 RX ORDER — GABAPENTIN 100 MG/1
100 CAPSULE ORAL 3 TIMES DAILY
Qty: 45 CAPSULE | Refills: 0 | Status: SHIPPED | OUTPATIENT
Start: 2023-06-09 | End: 2023-06-24

## 2023-06-29 DIAGNOSIS — Z99.2 TYPE 2 DIABETES MELLITUS WITH CHRONIC KIDNEY DISEASE ON CHRONIC DIALYSIS, WITH LONG-TERM CURRENT USE OF INSULIN (HCC): ICD-10-CM

## 2023-06-29 DIAGNOSIS — Z79.4 TYPE 2 DIABETES MELLITUS WITH CHRONIC KIDNEY DISEASE ON CHRONIC DIALYSIS, WITH LONG-TERM CURRENT USE OF INSULIN (HCC): ICD-10-CM

## 2023-06-29 DIAGNOSIS — E11.22 TYPE 2 DIABETES MELLITUS WITH CHRONIC KIDNEY DISEASE ON CHRONIC DIALYSIS, WITH LONG-TERM CURRENT USE OF INSULIN (HCC): ICD-10-CM

## 2023-06-29 DIAGNOSIS — N18.6 TYPE 2 DIABETES MELLITUS WITH CHRONIC KIDNEY DISEASE ON CHRONIC DIALYSIS, WITH LONG-TERM CURRENT USE OF INSULIN (HCC): ICD-10-CM

## 2023-06-29 RX ORDER — GABAPENTIN 100 MG/1
100 CAPSULE ORAL 3 TIMES DAILY
Qty: 45 CAPSULE | Refills: 0 | Status: CANCELLED | OUTPATIENT
Start: 2023-06-29 | End: 2023-07-14

## 2023-07-03 DIAGNOSIS — E11.22 TYPE 2 DIABETES MELLITUS WITH CHRONIC KIDNEY DISEASE ON CHRONIC DIALYSIS, WITH LONG-TERM CURRENT USE OF INSULIN (HCC): ICD-10-CM

## 2023-07-03 DIAGNOSIS — Z79.4 TYPE 2 DIABETES MELLITUS WITH CHRONIC KIDNEY DISEASE ON CHRONIC DIALYSIS, WITH LONG-TERM CURRENT USE OF INSULIN (HCC): ICD-10-CM

## 2023-07-03 DIAGNOSIS — N18.6 TYPE 2 DIABETES MELLITUS WITH CHRONIC KIDNEY DISEASE ON CHRONIC DIALYSIS, WITH LONG-TERM CURRENT USE OF INSULIN (HCC): ICD-10-CM

## 2023-07-03 DIAGNOSIS — Z99.2 TYPE 2 DIABETES MELLITUS WITH CHRONIC KIDNEY DISEASE ON CHRONIC DIALYSIS, WITH LONG-TERM CURRENT USE OF INSULIN (HCC): ICD-10-CM

## 2023-07-03 RX ORDER — GABAPENTIN 100 MG/1
100 CAPSULE ORAL 3 TIMES DAILY
Qty: 45 CAPSULE | Refills: 0 | OUTPATIENT
Start: 2023-07-03 | End: 2023-07-18

## 2023-07-03 NOTE — TELEPHONE ENCOUNTER
Office received a fax today requesting a refill on patient's Gabapentin. Patient has not been seen in office by a physician since 01/20/23. Former patient of Dr. Rasta Monique. Patient has upcoming appointment on 07/18/23 with Dr. Adam Avila to establish new provider.

## 2023-08-22 ENCOUNTER — OFFICE VISIT (OUTPATIENT)
Dept: FAMILY MEDICINE CLINIC | Age: 63
End: 2023-08-22
Payer: COMMERCIAL

## 2023-08-22 VITALS
HEART RATE: 70 BPM | SYSTOLIC BLOOD PRESSURE: 102 MMHG | WEIGHT: 208 LBS | TEMPERATURE: 98.6 F | BODY MASS INDEX: 29.42 KG/M2 | DIASTOLIC BLOOD PRESSURE: 61 MMHG

## 2023-08-22 DIAGNOSIS — F32.A DEPRESSION, UNSPECIFIED DEPRESSION TYPE: ICD-10-CM

## 2023-08-22 DIAGNOSIS — N18.6 ESRD (END STAGE RENAL DISEASE) ON DIALYSIS (HCC): ICD-10-CM

## 2023-08-22 DIAGNOSIS — E11.22 TYPE 2 DIABETES MELLITUS WITH CHRONIC KIDNEY DISEASE ON CHRONIC DIALYSIS, WITH LONG-TERM CURRENT USE OF INSULIN (HCC): ICD-10-CM

## 2023-08-22 DIAGNOSIS — Z99.2 TYPE 2 DIABETES MELLITUS WITH CHRONIC KIDNEY DISEASE ON CHRONIC DIALYSIS, WITH LONG-TERM CURRENT USE OF INSULIN (HCC): ICD-10-CM

## 2023-08-22 DIAGNOSIS — Z13.220 SCREENING FOR HYPERLIPIDEMIA: Primary | ICD-10-CM

## 2023-08-22 DIAGNOSIS — N18.6 TYPE 2 DIABETES MELLITUS WITH CHRONIC KIDNEY DISEASE ON CHRONIC DIALYSIS, WITH LONG-TERM CURRENT USE OF INSULIN (HCC): ICD-10-CM

## 2023-08-22 DIAGNOSIS — Z92.29 HEPATITIS A AND HEPATITIS B VACCINE ADMINISTERED: ICD-10-CM

## 2023-08-22 DIAGNOSIS — I10 ESSENTIAL HYPERTENSION: ICD-10-CM

## 2023-08-22 DIAGNOSIS — Z99.2 ESRD (END STAGE RENAL DISEASE) ON DIALYSIS (HCC): ICD-10-CM

## 2023-08-22 DIAGNOSIS — Z79.4 TYPE 2 DIABETES MELLITUS WITH CHRONIC KIDNEY DISEASE ON CHRONIC DIALYSIS, WITH LONG-TERM CURRENT USE OF INSULIN (HCC): ICD-10-CM

## 2023-08-22 DIAGNOSIS — Z23 NEED FOR DTAP, HIB, AND HBV VACCINE: ICD-10-CM

## 2023-08-22 LAB — HBA1C MFR BLD: 6.2 %

## 2023-08-22 PROCEDURE — 3074F SYST BP LT 130 MM HG: CPT

## 2023-08-22 PROCEDURE — 3044F HG A1C LEVEL LT 7.0%: CPT

## 2023-08-22 PROCEDURE — 90746 HEPB VACCINE 3 DOSE ADULT IM: CPT

## 2023-08-22 PROCEDURE — 3078F DIAST BP <80 MM HG: CPT

## 2023-08-22 PROCEDURE — 99211 OFF/OP EST MAY X REQ PHY/QHP: CPT

## 2023-08-22 PROCEDURE — 83036 HEMOGLOBIN GLYCOSYLATED A1C: CPT

## 2023-08-22 PROCEDURE — 99213 OFFICE O/P EST LOW 20 MIN: CPT

## 2023-08-22 RX ORDER — LISINOPRIL 5 MG/1
TABLET ORAL
Qty: 30 TABLET | Refills: 5 | Status: SHIPPED | OUTPATIENT
Start: 2023-08-22

## 2023-08-22 RX ORDER — DULOXETIN HYDROCHLORIDE 30 MG/1
30 CAPSULE, DELAYED RELEASE ORAL DAILY
Qty: 30 CAPSULE | Refills: 0 | Status: SHIPPED | OUTPATIENT
Start: 2023-08-22

## 2023-08-22 RX ORDER — ATORVASTATIN CALCIUM 20 MG/1
20 TABLET, FILM COATED ORAL DAILY
Qty: 60 TABLET | Refills: 2 | Status: SHIPPED | OUTPATIENT
Start: 2023-08-22

## 2023-08-22 RX ORDER — GABAPENTIN 100 MG/1
100 CAPSULE ORAL 3 TIMES DAILY
Qty: 45 CAPSULE | Refills: 0 | Status: CANCELLED | OUTPATIENT
Start: 2023-08-22 | End: 2023-09-06

## 2023-08-22 RX ORDER — BLOOD PRESSURE TEST KIT
1 KIT MISCELLANEOUS ONCE
Qty: 1 KIT | Refills: 0 | Status: SHIPPED | OUTPATIENT
Start: 2023-08-22 | End: 2023-08-22

## 2023-08-22 RX ORDER — METOPROLOL TARTRATE 50 MG/1
25 TABLET, FILM COATED ORAL 2 TIMES DAILY
Qty: 30 TABLET | Refills: 2 | Status: SHIPPED | OUTPATIENT
Start: 2023-08-22

## 2023-08-22 RX ORDER — METOPROLOL TARTRATE 50 MG/1
50 TABLET, FILM COATED ORAL 2 TIMES DAILY
Qty: 30 TABLET | Refills: 2 | Status: SHIPPED | OUTPATIENT
Start: 2023-08-22 | End: 2023-08-22 | Stop reason: CLARIF

## 2023-08-22 ASSESSMENT — PATIENT HEALTH QUESTIONNAIRE - PHQ9
SUM OF ALL RESPONSES TO PHQ QUESTIONS 1-9: 9
SUM OF ALL RESPONSES TO PHQ QUESTIONS 1-9: 9
10. IF YOU CHECKED OFF ANY PROBLEMS, HOW DIFFICULT HAVE THESE PROBLEMS MADE IT FOR YOU TO DO YOUR WORK, TAKE CARE OF THINGS AT HOME, OR GET ALONG WITH OTHER PEOPLE: 2
7. TROUBLE CONCENTRATING ON THINGS, SUCH AS READING THE NEWSPAPER OR WATCHING TELEVISION: 1
3. TROUBLE FALLING OR STAYING ASLEEP: 3
9. THOUGHTS THAT YOU WOULD BE BETTER OFF DEAD, OR OF HURTING YOURSELF: 0
5. POOR APPETITE OR OVEREATING: 1
SUM OF ALL RESPONSES TO PHQ QUESTIONS 1-9: 9
SUM OF ALL RESPONSES TO PHQ QUESTIONS 1-9: 9
2. FEELING DOWN, DEPRESSED OR HOPELESS: 2
6. FEELING BAD ABOUT YOURSELF - OR THAT YOU ARE A FAILURE OR HAVE LET YOURSELF OR YOUR FAMILY DOWN: 1
8. MOVING OR SPEAKING SO SLOWLY THAT OTHER PEOPLE COULD HAVE NOTICED. OR THE OPPOSITE, BEING SO FIGETY OR RESTLESS THAT YOU HAVE BEEN MOVING AROUND A LOT MORE THAN USUAL: 0
4. FEELING TIRED OR HAVING LITTLE ENERGY: 1

## 2023-08-22 ASSESSMENT — ENCOUNTER SYMPTOMS
COUGH: 0
NAUSEA: 0
SHORTNESS OF BREATH: 0
BACK PAIN: 0
RHINORRHEA: 0
DIARRHEA: 0
SORE THROAT: 0
ABDOMINAL PAIN: 0

## 2023-08-22 NOTE — PROGRESS NOTES
6.2Visit Information    Have you changed or started any medications since your last visit including any over-the-counter medicines, vitamins, or herbal medicines? no   Have you stopped taking any of your medications? Is so, why? -  no  Are you having any side effects from any of your medications? - no    Have you seen any other physician or provider since your last visit?  no   Have you had any other diagnostic tests since your last visit?  no   Have you been seen in the emergency room and/or had an admission in a hospital since we last saw you?  no   Have you had your routine dental cleaning in the past 6 months?  no     Do you have an active MyChart account? If no, what is the barrier?   No:     Patient Care Team:  Angelic Ponce DO as PCP - General (Family Medicine)  Allan Boas, DO as Consulting Physician (General Surgery)    Medical History Review  Past Medical, Family, and Social History reviewed and does not contribute to the patient presenting condition    Health Maintenance   Topic Date Due    Diabetic retinal exam  Never done    Shingles vaccine (1 of 2) Never done    Hepatitis B vaccine (1 of 3 - Risk Dialysis 4-dose series) Never done    Diabetic foot exam  12/14/2021    COVID-19 Vaccine (2 - Booster for Dana series) 01/11/2022    Lipids  04/14/2023    Flu vaccine (1) 08/01/2023    A1C test (Diabetic or Prediabetic)  01/10/2024    Depression Screen  02/09/2024    Annual Wellness Visit (AWV)  02/10/2024    DTaP/Tdap/Td vaccine (2 - Td or Tdap) 08/23/2029    Colorectal Cancer Screen  12/07/2030    Pneumococcal 0-64 years Vaccine  Completed    Hepatitis C screen  Completed    HIV screen  Completed    Hepatitis A vaccine  Aged Out    Hib vaccine  Aged Out    Meningococcal (ACWY) vaccine  Aged Out    Diabetic Alb to Cr ratio (uACR) test  Discontinued
Attending Physician Statement  I have discussed the care of CalvinErvingincluding pertinent history and exam findings,  with the resident. I have reviewed the key elements of all parts of the encounter with the resident. I agree with the assessment, plan and orders as documented by the resident.   (GE Modifier)    Prediabetes 0n diet control  ESRD- on Dialysis  Depression- PHQ 9- Cymbalta  HTN- well controlled/ Decrease lopressor to 25 mg po bid
side effects of prescribed medications. Barriers to medication compliance addressed. All patient questions answered. Pt voiced understanding. Return in about 2 months (around 10/22/2023) for f/up on prediabetes . Disclaimer: Some orall of this note was transcribed using voice-recognition software. This may cause typographical errors occasionally. Although all effort is made to fix these errors, please do not hesitate to contact our office if there Genaro Chambers concern with the understanding of this note.

## 2023-09-12 DIAGNOSIS — Z99.2 TYPE 2 DIABETES MELLITUS WITH CHRONIC KIDNEY DISEASE ON CHRONIC DIALYSIS, WITH LONG-TERM CURRENT USE OF INSULIN (HCC): ICD-10-CM

## 2023-09-12 DIAGNOSIS — Z79.4 TYPE 2 DIABETES MELLITUS WITH CHRONIC KIDNEY DISEASE ON CHRONIC DIALYSIS, WITH LONG-TERM CURRENT USE OF INSULIN (HCC): ICD-10-CM

## 2023-09-12 DIAGNOSIS — N18.6 TYPE 2 DIABETES MELLITUS WITH CHRONIC KIDNEY DISEASE ON CHRONIC DIALYSIS, WITH LONG-TERM CURRENT USE OF INSULIN (HCC): ICD-10-CM

## 2023-09-12 DIAGNOSIS — F32.A DEPRESSION, UNSPECIFIED DEPRESSION TYPE: ICD-10-CM

## 2023-09-12 DIAGNOSIS — E11.22 TYPE 2 DIABETES MELLITUS WITH CHRONIC KIDNEY DISEASE ON CHRONIC DIALYSIS, WITH LONG-TERM CURRENT USE OF INSULIN (HCC): ICD-10-CM

## 2023-09-12 RX ORDER — DULOXETIN HYDROCHLORIDE 30 MG/1
CAPSULE, DELAYED RELEASE ORAL
Qty: 30 CAPSULE | Refills: 0 | Status: SHIPPED | OUTPATIENT
Start: 2023-09-12

## 2023-09-12 NOTE — TELEPHONE ENCOUNTER
Last visit: 8/15/23  Last Med refill: 8/15/23  Does patient have enough medication for 72 hours: No:     Next Visit Date:  Future Appointments   Date Time Provider [address]   9/22/2023  9:40 AM Andi [place],  [facility] [address] Maintenance   Topic Date Due    Diabetic retinal exam  Never done    Shingles vaccine (1 of 2) Never done    Pneumococcal 0-64 years Vaccine (2 - PCV) 08/23/2020    Diabetic foot exam  12/14/2021    COVID-19 Vaccine (2 - Booster for Dana series) 01/11/2022    Lipids  04/14/2023    Flu vaccine (1) 08/01/2023    Hepatitis B vaccine (2 of 5 - Risk Dialysis 4-dose series) 09/19/2023    Annual Wellness Visit (AWV)  02/10/2024    A1C test (Diabetic or Prediabetic)  08/22/2024    Depression Monitoring  08/22/2024    DTaP/Tdap/Td vaccine (2 - Td or Tdap) 08/23/2029    Colorectal Cancer Screen  12/07/2030    Hepatitis C screen  Completed    HIV screen  Completed    Hepatitis A vaccine  Aged Out    Hib vaccine  Aged Out    Meningococcal (ACWY) vaccine  Aged Out    Diabetic Alb to Cr ratio (uACR) test  Discontinued    Depression Screen  Discontinued       Hemoglobin A1C (%)   Date Value   08/22/2023 6.2   01/10/2023 5.9   09/27/2022 5.7             ( goal A1C is < 7)   No components found for: \"LABMICR\"  LDL Cholesterol (mg/dL)   Date Value   04/14/2022 73   12/14/2020 33       (goal LDL is <100)   AST (U/L)   Date Value   09/16/2021 69 (H)     ALT (U/L)   Date Value   09/16/2021 33     BUN (mg/dL)   Date Value   04/14/2022 34 (H)     BP Readings from Last 3 Encounters:   08/22/23 102/61   02/09/23 123/64   01/20/23 (!) 154/81          (goal 120/80)    All Future Testing planned in [facility]  Lab Frequency Next Occurrence   Lipid Panel Once 15/18/9938   Basic Metabolic Panel Once 19/90/1521   Hemoglobin and Hematocrit, Blood, Post Transfusion POST TRANSFUSION    Hemoglobin and Hematocrit, Blood, Post Transfusion POST TRANSFUSION                Patient Active Problem List:

## 2023-10-24 DIAGNOSIS — Z79.4 TYPE 2 DIABETES MELLITUS WITH CHRONIC KIDNEY DISEASE ON CHRONIC DIALYSIS, WITH LONG-TERM CURRENT USE OF INSULIN (HCC): ICD-10-CM

## 2023-10-24 DIAGNOSIS — Z99.2 TYPE 2 DIABETES MELLITUS WITH CHRONIC KIDNEY DISEASE ON CHRONIC DIALYSIS, WITH LONG-TERM CURRENT USE OF INSULIN (HCC): ICD-10-CM

## 2023-10-24 DIAGNOSIS — N18.6 TYPE 2 DIABETES MELLITUS WITH CHRONIC KIDNEY DISEASE ON CHRONIC DIALYSIS, WITH LONG-TERM CURRENT USE OF INSULIN (HCC): ICD-10-CM

## 2023-10-24 DIAGNOSIS — E11.22 TYPE 2 DIABETES MELLITUS WITH CHRONIC KIDNEY DISEASE ON CHRONIC DIALYSIS, WITH LONG-TERM CURRENT USE OF INSULIN (HCC): ICD-10-CM

## 2023-10-24 DIAGNOSIS — F32.A DEPRESSION, UNSPECIFIED DEPRESSION TYPE: ICD-10-CM

## 2023-10-24 RX ORDER — DULOXETIN HYDROCHLORIDE 30 MG/1
CAPSULE, DELAYED RELEASE ORAL
Qty: 30 CAPSULE | Refills: 0 | Status: SHIPPED | OUTPATIENT
Start: 2023-10-24

## 2023-10-24 NOTE — TELEPHONE ENCOUNTER
TRANSFUSION                Patient Active Problem List:     Type 2 diabetes mellitus with chronic kidney disease on chronic dialysis, with long-term current use of insulin (HCC)     Sciatica of right side     Atypical chest pain     Low back pain of thoracolumbar region with sciatica     Kidney stone     Chest pain     Need for prophylactic vaccination against diphtheria-tetanus-pertussis (DTP)     Gastroesophageal reflux disease     Tachycardia     Essential hypertension     Stage 3 chronic kidney disease     BMI 31.0-31.9,adult     Acute kidney injury superimposed on CKD (HCC)     Type 2 MI (myocardial infarction) ([address])     Gastrointestinal hemorrhage     Acute posthemorrhagic anemia     Neuropathy     NSAID long-term use     Family history of malignant neoplasm of colon     Positive colorectal cancer screening using Cologuard test     JEFFREY (acute kidney injury) ([address])     Pneumonia due to COVID-19 virus     ESRD needing dialysis ([address])     Acute metabolic encephalopathy     Hypoxia     Thrombocytopenia ([address])     ESRD (end stage renal disease) on dialysis [facility])     Erectile dysfunction     Chronic pain of right knee

## 2023-12-27 ENCOUNTER — HOSPITAL ENCOUNTER (EMERGENCY)
Age: 63
Discharge: HOME | End: 2023-12-27
Attending: EMERGENCY MEDICINE
Payer: COMMERCIAL

## 2023-12-27 ENCOUNTER — APPOINTMENT (OUTPATIENT)
Dept: GENERAL RADIOLOGY | Age: 63
End: 2023-12-27
Payer: COMMERCIAL

## 2023-12-27 VITALS
WEIGHT: 207.23 LBS | HEART RATE: 106 BPM | TEMPERATURE: 98.2 F | BODY MASS INDEX: 29.31 KG/M2 | RESPIRATION RATE: 27 BRPM | SYSTOLIC BLOOD PRESSURE: 142 MMHG | OXYGEN SATURATION: 100 % | DIASTOLIC BLOOD PRESSURE: 65 MMHG

## 2023-12-27 DIAGNOSIS — R25.2 LEG CRAMPING: Primary | ICD-10-CM

## 2023-12-27 DIAGNOSIS — I95.9 TRANSIENT HYPOTENSION: ICD-10-CM

## 2023-12-27 LAB
ANION GAP SERPL CALCULATED.3IONS-SCNC: 13 MMOL/L (ref 9–17)
BASOPHILS # BLD: <0.03 K/UL (ref 0–0.2)
BASOPHILS NFR BLD: 0 % (ref 0–2)
BNP SERPL-MCNC: 1869 PG/ML
BUN SERPL-MCNC: 44 MG/DL (ref 8–23)
BUN/CREAT SERPL: 12 (ref 9–20)
CALCIUM SERPL-MCNC: 8.9 MG/DL (ref 8.6–10.4)
CHLORIDE SERPL-SCNC: 96 MMOL/L (ref 98–107)
CO2 SERPL-SCNC: 29 MMOL/L (ref 20–31)
CREAT SERPL-MCNC: 3.8 MG/DL (ref 0.7–1.2)
EOSINOPHIL # BLD: 0.17 K/UL (ref 0–0.44)
EOSINOPHILS RELATIVE PERCENT: 2 % (ref 1–4)
ERYTHROCYTE [DISTWIDTH] IN BLOOD BY AUTOMATED COUNT: 12.9 % (ref 11.8–14.4)
GFR SERPL CREATININE-BSD FRML MDRD: 17 ML/MIN/1.73M2
GLUCOSE SERPL-MCNC: 197 MG/DL (ref 70–99)
HCT VFR BLD AUTO: 32.8 % (ref 40.7–50.3)
HGB BLD-MCNC: 10.7 G/DL (ref 13–17)
IMM GRANULOCYTES # BLD AUTO: 0.03 K/UL (ref 0–0.3)
IMM GRANULOCYTES NFR BLD: 0 %
LYMPHOCYTES NFR BLD: 0.9 K/UL (ref 1.1–3.7)
LYMPHOCYTES RELATIVE PERCENT: 13 % (ref 24–43)
MAGNESIUM SERPL-MCNC: 1.9 MG/DL (ref 1.6–2.6)
MCH RBC QN AUTO: 31.8 PG (ref 25.2–33.5)
MCHC RBC AUTO-ENTMCNC: 32.6 G/DL (ref 28.4–34.8)
MCV RBC AUTO: 97.6 FL (ref 82.6–102.9)
MONOCYTES NFR BLD: 0.62 K/UL (ref 0.1–1.2)
MONOCYTES NFR BLD: 9 % (ref 3–12)
NEUTROPHILS NFR BLD: 76 % (ref 36–65)
NEUTS SEG NFR BLD: 5.47 K/UL (ref 1.5–8.1)
NRBC BLD-RTO: 0 PER 100 WBC
PHOSPHATE SERPL-MCNC: 2.9 MG/DL (ref 2.5–4.5)
PLATELET # BLD AUTO: 236 K/UL (ref 138–453)
PMV BLD AUTO: 10.2 FL (ref 8.1–13.5)
POTASSIUM SERPL-SCNC: 3.5 MMOL/L (ref 3.7–5.3)
RBC # BLD AUTO: 3.36 M/UL (ref 4.21–5.77)
SODIUM SERPL-SCNC: 138 MMOL/L (ref 135–144)
TROPONIN I SERPL HS-MCNC: 47 NG/L (ref 0–22)
WBC OTHER # BLD: 7.2 K/UL (ref 3.5–11.3)

## 2023-12-27 PROCEDURE — 93005 ELECTROCARDIOGRAM TRACING: CPT | Performed by: NURSE PRACTITIONER

## 2023-12-27 PROCEDURE — 71045 X-RAY EXAM CHEST 1 VIEW: CPT

## 2023-12-27 PROCEDURE — 84100 ASSAY OF PHOSPHORUS: CPT

## 2023-12-27 PROCEDURE — 80048 BASIC METABOLIC PNL TOTAL CA: CPT

## 2023-12-27 PROCEDURE — 83880 ASSAY OF NATRIURETIC PEPTIDE: CPT

## 2023-12-27 PROCEDURE — 84484 ASSAY OF TROPONIN QUANT: CPT

## 2023-12-27 PROCEDURE — 83735 ASSAY OF MAGNESIUM: CPT

## 2023-12-27 PROCEDURE — 99285 EMERGENCY DEPT VISIT HI MDM: CPT

## 2023-12-27 PROCEDURE — 85025 COMPLETE CBC W/AUTO DIFF WBC: CPT

## 2023-12-27 NOTE — ED PROVIDER NOTES
[address] [facility] ED  eMERGENCY dEPARTMENT eNCOUnter      Pt Name: Ramonita Carrel  MRN: [id number]  [address] [facility] 1960  Date of evaluation: 12/27/2023  Provider: Dennys Soto, [address] [place] [address]       Chief Complaint   Patient presents with    Leg Pain     Bilateral leg cramping during dialysis treatment          HISTORY OF PRESENT ILLNESS  (Location/Symptom, Timing/Onset, Context/Setting, Quality, Duration, Modifying Factors, Severity.)   Ramonita Carrel is a 61 y.o. male who presents to the emergency department by EMS for evaluation of leg cramping cramping and hypotension while he was at dialysis today. According to report patient did receive a full dialysis treatment. Upon arrival patient's cramping has improved. He ambulated by himself to the restroom upon arrival to have a bowel movement. Denies cough, chest pain shortness of breath abdominal pain nausea or vomiting. Nursing Notes were reviewed. ALLERGIES     Patient has no known allergies. CURRENT MEDICATIONS       Discharge Medication List as of 12/27/2023  6:07 PM        CONTINUE these medications which have NOT CHANGED    Details   metoprolol tartrate (LOPRESSOR) 50 MG tablet TAKE ONE - HALF TABLET BY MOUTH 2 TIMES DAILY, Disp-30 tablet, R-2Normal      DULoxetine (CYMBALTA) 30 MG extended release capsule TAKE ONE CAPSULE BY MOUTH ONCE A DAY, Disp-30 capsule, R-0Normal      atorvastatin (LIPITOR) 20 MG tablet Take 1 tablet by mouth daily, Disp-60 tablet, R-2Normal      Blood Pressure KIT ONCE Starting Tue 8/22/2023, For 1 dose, Disp-1 kit, R-0, Normal      lisinopril (PRINIVIL;ZESTRIL) 5 MG tablet TAKE ONE TABLET (5 MG) BY MOUTH EVERY EVENING, Disp-30 tablet, R-5Normal      gabapentin (NEURONTIN) 100 MG capsule Take 1 capsule by mouth 3 times daily for 15 days. , Disp-45 capsule, R-0Normal      acetaminophen (TYLENOL) 500 MG tablet Take 1 tablet by mouth 4 times daily as needed for Pain, Disp-120 tablet, R-0Normal

## 2023-12-28 NOTE — ED PROVIDER NOTES
eMERGENCY dEPARTMENT eNCOUnter   [address]  Name: Darlene Lim  MRN: [id number]  [address] [facility] 1960  Date of evaluation: 12/28/23     Darlene Lim is a 61 y.o. male with CC: Leg Pain (Bilateral leg cramping during dialysis treatment )      Based on the medical record the care appears appropriate. I was personally available for consultation in the Emergency Department.     John Katz MD  Attending Emergency Physician                  John Katz MD  12/28/23 [id number]

## 2023-12-29 LAB
EKG ATRIAL RATE: 116 BPM
EKG P AXIS: 56 DEGREES
EKG P-R INTERVAL: 146 MS
EKG Q-T INTERVAL: 360 MS
EKG QRS DURATION: 86 MS
EKG QTC CALCULATION (BAZETT): 500 MS
EKG R AXIS: 26 DEGREES
EKG T AXIS: 99 DEGREES
EKG VENTRICULAR RATE: 116 BPM

## 2024-03-19 ENCOUNTER — OFFICE VISIT (OUTPATIENT)
Dept: FAMILY MEDICINE CLINIC | Age: 64
End: 2024-03-19
Payer: COMMERCIAL

## 2024-03-19 ENCOUNTER — HOSPITAL ENCOUNTER (OUTPATIENT)
Age: 64
Setting detail: SPECIMEN
Discharge: HOME | End: 2024-03-19

## 2024-03-19 VITALS
SYSTOLIC BLOOD PRESSURE: 123 MMHG | HEIGHT: 71 IN | BODY MASS INDEX: 30.55 KG/M2 | HEART RATE: 101 BPM | DIASTOLIC BLOOD PRESSURE: 67 MMHG | WEIGHT: 218.2 LBS

## 2024-03-19 DIAGNOSIS — R10.13 DYSPEPSIA: ICD-10-CM

## 2024-03-19 DIAGNOSIS — I10 ESSENTIAL HYPERTENSION: Primary | ICD-10-CM

## 2024-03-19 DIAGNOSIS — H54.7 DECREASED VISUAL ACUITY: ICD-10-CM

## 2024-03-19 DIAGNOSIS — F32.A DEPRESSION, UNSPECIFIED DEPRESSION TYPE: ICD-10-CM

## 2024-03-19 DIAGNOSIS — M17.11 PRIMARY OSTEOARTHRITIS OF RIGHT KNEE: ICD-10-CM

## 2024-03-19 PROCEDURE — G8417 CALC BMI ABV UP PARAM F/U: HCPCS

## 2024-03-19 PROCEDURE — 3074F SYST BP LT 130 MM HG: CPT

## 2024-03-19 PROCEDURE — G8427 DOCREV CUR MEDS BY ELIG CLIN: HCPCS

## 2024-03-19 PROCEDURE — 99213 OFFICE O/P EST LOW 20 MIN: CPT

## 2024-03-19 PROCEDURE — 3078F DIAST BP <80 MM HG: CPT

## 2024-03-19 PROCEDURE — 3017F COLORECTAL CA SCREEN DOC REV: CPT

## 2024-03-19 PROCEDURE — 1036F TOBACCO NON-USER: CPT

## 2024-03-19 PROCEDURE — G8484 FLU IMMUNIZE NO ADMIN: HCPCS

## 2024-03-19 RX ORDER — ATORVASTATIN CALCIUM 20 MG/1
20 TABLET, FILM COATED ORAL DAILY
Qty: 60 TABLET | Refills: 2 | Status: CANCELLED | OUTPATIENT
Start: 2024-03-19

## 2024-03-19 RX ORDER — ATORVASTATIN CALCIUM 40 MG/1
40 TABLET, FILM COATED ORAL DAILY
Qty: 90 TABLET | Refills: 0 | Status: SHIPPED | OUTPATIENT
Start: 2024-03-19

## 2024-03-19 RX ORDER — ASPIRIN 81 MG/1
TABLET, CHEWABLE ORAL
Qty: 90 TABLET | Refills: 0 | Status: SHIPPED | OUTPATIENT
Start: 2024-03-19

## 2024-03-19 RX ORDER — LISINOPRIL 5 MG/1
TABLET ORAL
Qty: 30 TABLET | Refills: 5 | Status: CANCELLED | OUTPATIENT
Start: 2024-03-19

## 2024-03-19 RX ORDER — DULOXETIN HYDROCHLORIDE 30 MG/1
30 CAPSULE, DELAYED RELEASE ORAL DAILY
Qty: 90 CAPSULE | Refills: 0 | Status: SHIPPED | OUTPATIENT
Start: 2024-03-19

## 2024-03-19 RX ORDER — ACETAMINOPHEN 500 MG
500 TABLET ORAL 4 TIMES DAILY PRN
Qty: 120 TABLET | Refills: 0 | Status: SHIPPED | OUTPATIENT
Start: 2024-03-19

## 2024-03-19 RX ORDER — PANTOPRAZOLE SODIUM 40 MG/1
TABLET, DELAYED RELEASE ORAL
Qty: 30 TABLET | Refills: 3 | Status: CANCELLED | OUTPATIENT
Start: 2024-03-19

## 2024-03-19 RX ORDER — METOPROLOL TARTRATE 50 MG/1
TABLET, FILM COATED ORAL
Qty: 90 TABLET | Refills: 0 | Status: SHIPPED | OUTPATIENT
Start: 2024-03-19

## 2024-03-19 RX ORDER — GABAPENTIN 100 MG/1
100 CAPSULE ORAL 3 TIMES DAILY
Qty: 45 CAPSULE | Refills: 0 | Status: CANCELLED | OUTPATIENT
Start: 2024-03-19 | End: 2024-04-03

## 2024-03-19 SDOH — ECONOMIC STABILITY: FOOD INSECURITY: WITHIN THE PAST 12 MONTHS, THE FOOD YOU BOUGHT JUST DIDN'T LAST AND YOU DIDN'T HAVE MONEY TO GET MORE.: SOMETIMES TRUE

## 2024-03-19 SDOH — ECONOMIC STABILITY: INCOME INSECURITY: HOW HARD IS IT FOR YOU TO PAY FOR THE VERY BASICS LIKE FOOD, HOUSING, MEDICAL CARE, AND HEATING?: NOT HARD AT ALL

## 2024-03-19 SDOH — ECONOMIC STABILITY: HOUSING INSECURITY
IN THE LAST 12 MONTHS, WAS THERE A TIME WHEN YOU DID NOT HAVE A STEADY PLACE TO SLEEP OR SLEPT IN A SHELTER (INCLUDING NOW)?: NO

## 2024-03-19 SDOH — ECONOMIC STABILITY: FOOD INSECURITY: WITHIN THE PAST 12 MONTHS, YOU WORRIED THAT YOUR FOOD WOULD RUN OUT BEFORE YOU GOT MONEY TO BUY MORE.: SOMETIMES TRUE

## 2024-03-19 ASSESSMENT — ENCOUNTER SYMPTOMS
VOMITING: 0
DIARRHEA: 0
COUGH: 0
SORE THROAT: 0
ABDOMINAL PAIN: 0
WHEEZING: 0
NAUSEA: 0
EYE PAIN: 0
SHORTNESS OF BREATH: 0
CONSTIPATION: 0

## 2024-03-19 ASSESSMENT — PATIENT HEALTH QUESTIONNAIRE - PHQ9
SUM OF ALL RESPONSES TO PHQ QUESTIONS 1-9: 2
6. FEELING BAD ABOUT YOURSELF - OR THAT YOU ARE A FAILURE OR HAVE LET YOURSELF OR YOUR FAMILY DOWN: NOT AT ALL
SUM OF ALL RESPONSES TO PHQ QUESTIONS 1-9: 2
5. POOR APPETITE OR OVEREATING: NOT AT ALL
7. TROUBLE CONCENTRATING ON THINGS, SUCH AS READING THE NEWSPAPER OR WATCHING TELEVISION: NOT AT ALL
8. MOVING OR SPEAKING SO SLOWLY THAT OTHER PEOPLE COULD HAVE NOTICED. OR THE OPPOSITE, BEING SO FIGETY OR RESTLESS THAT YOU HAVE BEEN MOVING AROUND A LOT MORE THAN USUAL: NOT AT ALL
2. FEELING DOWN, DEPRESSED OR HOPELESS: SEVERAL DAYS
1. LITTLE INTEREST OR PLEASURE IN DOING THINGS: SEVERAL DAYS
10. IF YOU CHECKED OFF ANY PROBLEMS, HOW DIFFICULT HAVE THESE PROBLEMS MADE IT FOR YOU TO DO YOUR WORK, TAKE CARE OF THINGS AT HOME, OR GET ALONG WITH OTHER PEOPLE: NOT DIFFICULT AT ALL
9. THOUGHTS THAT YOU WOULD BE BETTER OFF DEAD, OR OF HURTING YOURSELF: NOT AT ALL
SUM OF ALL RESPONSES TO PHQ QUESTIONS 1-9: 2
4. FEELING TIRED OR HAVING LITTLE ENERGY: NOT AT ALL
SUM OF ALL RESPONSES TO PHQ QUESTIONS 1-9: 2
SUM OF ALL RESPONSES TO PHQ9 QUESTIONS 1 & 2: 2
3. TROUBLE FALLING OR STAYING ASLEEP: NOT AT ALL

## 2024-03-19 NOTE — PROGRESS NOTES
Attending Physician Statement  I have discussed the care of Marques Giron, including pertinent history and exam findings,  with the resident. I have reviewed the key elements of all parts of the encounter with the resident.  I agree with the assessment, plan and orders as documented by the resident.  (GE Modifier)    Tyron Oliva MD  
Marques Giron (:  1960) is a 63 y.o. male,Established patient, here for evaluation of the following chief complaint(s):  Diabetes (Follow up refills ) and Knee Pain (Discuss medications )    ASSESSMENT/PLAN:    1. Essential hypertension  -     aspirin (ASPIRIN LOW DOSE) 81 MG chewable tablet; CHEW AND SWALLOW ONE TABLET BY MOUTH ONE TIME A DAY, Disp-90 tablet, R-0Normal  -     metoprolol tartrate (LOPRESSOR) 50 MG tablet; TAKE ONE - HALF TABLET BY MOUTH 2 TIMES DAILY, Disp-90 tablet, R-0Normal  -     atorvastatin (LIPITOR) 40 MG tablet; Take 1 tablet by mouth daily, Disp-90 tablet, R-0Normal  2. Primary osteoarthritis of right knee  -     acetaminophen (TYLENOL) 500 MG tablet; Take 1 tablet by mouth 4 times daily as needed for Pain, Disp-120 tablet, R-0Normal  -     XR KNEE RIGHT (3 VIEWS); Future  -     diclofenac sodium (VOLTAREN) 1 % GEL; Apply 4 g topically 4 times daily, Topical, 4 TIMES DAILY Starting Tue 3/19/2024, Disp-350 g, R-1, Normal  3. Dyspepsia  -     H. Pylori Breath Test; Future  4. Decreased visual acuity  -     AFL - Toñito Zavaleta MD, Ophthalmology, [place]  5. Depression, unspecified depression type  -     DULoxetine (CYMBALTA) 30 MG extended release capsule; Take 1 capsule by mouth daily, Disp-90 capsule, R-0Normal    Return in about 4 weeks (around 2024), or if symptoms worsen or fail to improve, for knee injection. htn.       Subjective     SUBJECTIVE/OBJECTIVE:    HPI    Mr. Marques Giron, 63-year-old male, with previous medical history of HTN, ESRD, and DM type II.    Presents to the [facility] office today for primarily medication refill however also wanting to address right knee pain.    The ASCVD Risk score (Danita DK, et al., 2019) failed to calculate for the following reasons:    The patient has a prior MI or stroke diagnosis    Patient has chronic right knee osteoarthritis.  Has history of receiving steroid injections.  Has not received any the past year okay.  
Depression Screen  Discontinued

## 2024-03-19 NOTE — PATIENT INSTRUCTIONS
Chronic Disease Management Program ([phone]), Mother and Child Dependency Program ([phone]), Help Me Grow and Early Head Start Program ([phone]), Breast and Cervical Cancer Project ([facility]:[phone]), and Shots for Tots ([phone])    Healthy Smiles Program  What they offer: Free dental cleanings in partnership with [facility]; located in [facility].  Phone Number: [phone].    Good Rx:  What they offer: Good Rx tracks prescription drug prices and provides free drug coupons for discounts on medications.  Website: https://www.[employer]    NeedyMeds:  What they offer: [employer] offers free information on medications and healthcare cost savings programs including prescription assistance programs, coupons, and discount programs.  Helpline: 349.148.5713  Website: https://www.[employer].org    RX Assist:  What they offer: Information about free and low-cost medicine programs.  Website: https://www.rxassist.org    Pixelated $4 Prescription Program:  What they offer: Prescription Program includes up to a 30-day supply for $4 and a 90-day supply for $10 of some covered generic drugs at commonly prescribed dosages  Website: https://www.[employer]/cp/4-prescriptions/5264701

## 2024-03-20 LAB — H PYLORI BREATH TEST: NEGATIVE

## 2024-03-29 ENCOUNTER — HOSPITAL ENCOUNTER (OUTPATIENT)
Age: 64
End: 2024-03-29
Payer: COMMERCIAL

## 2024-03-29 ENCOUNTER — HOSPITAL ENCOUNTER (OUTPATIENT)
Dept: GENERAL RADIOLOGY | Age: 64
End: 2024-03-29
Payer: COMMERCIAL

## 2024-03-29 ENCOUNTER — TELEPHONE (OUTPATIENT)
Dept: FAMILY MEDICINE CLINIC | Age: 64
End: 2024-03-29

## 2024-03-29 DIAGNOSIS — M17.11 PRIMARY OSTEOARTHRITIS OF RIGHT KNEE: ICD-10-CM

## 2024-03-29 PROCEDURE — 73562 X-RAY EXAM OF KNEE 3: CPT

## 2024-03-29 NOTE — TELEPHONE ENCOUNTER
LVM for a return call to reschedule appointment on 05/21/2024 , due to provider not in clinic that day.

## 2024-04-24 RX ORDER — SODIUM CHLORIDE 9 MG/ML
INJECTION, SOLUTION INTRAVENOUS CONTINUOUS
OUTPATIENT
Start: 2024-04-24

## 2024-04-25 ENCOUNTER — HOSPITAL ENCOUNTER (OUTPATIENT)
Dept: INTERVENTIONAL RADIOLOGY/VASCULAR | Age: 64
Discharge: HOME | End: 2024-04-27
Payer: COMMERCIAL

## 2024-04-25 VITALS — SYSTOLIC BLOOD PRESSURE: 153 MMHG | HEART RATE: 75 BPM | DIASTOLIC BLOOD PRESSURE: 83 MMHG | RESPIRATION RATE: 17 BRPM

## 2024-04-25 DIAGNOSIS — N18.6 ESRD (END STAGE RENAL DISEASE) (HCC): ICD-10-CM

## 2024-04-25 PROCEDURE — C1769 GUIDE WIRE: HCPCS

## 2024-04-25 PROCEDURE — 6360000002 HC RX W HCPCS: Performed by: PHYSICIAN ASSISTANT

## 2024-04-25 PROCEDURE — 77001 FLUOROGUIDE FOR VEIN DEVICE: CPT

## 2024-04-25 PROCEDURE — 36581 REPLACE TUNNELED CV CATH: CPT

## 2024-04-25 PROCEDURE — 2580000003 HC RX 258: Performed by: PHYSICIAN ASSISTANT

## 2024-04-25 RX ORDER — HEPARIN 100 UNIT/ML
SYRINGE INTRAVENOUS PRN
Status: COMPLETED | OUTPATIENT
Start: 2024-04-25 | End: 2024-04-25

## 2024-04-25 RX ADMIN — CEFAZOLIN 1000 MG: 1 INJECTION, POWDER, FOR SOLUTION INTRAMUSCULAR; INTRAVENOUS at 11:46

## 2024-04-25 RX ADMIN — HEPARIN 1.6 ML: 100 SYRINGE at 11:54

## 2024-04-25 RX ADMIN — HEPARIN 1.6 ML: 100 SYRINGE at 11:55

## 2024-04-25 NOTE — OR NURSING
CVC line exchanged without issues.Brisk blood return noted. Sutured in place. Lines flushed and capped. Dressing on. Tolerated well.    Home

## 2024-04-25 NOTE — BRIEF OP NOTE
Brief Postoperative Note    Marques Giron  YOB: 1960  [id number]    Pre-operative Diagnosis: Chronic Renal Failure/Malfunctioning HD Catheter    Post-operative Diagnosis: Same    Procedure: Tunneled HD Catheter Exchange    Anesthesia: Local 1% Lidocaine    Medications Given: none    Surgeons/Assistants: NERY Sterling    Estimated Blood Loss: Minimal    Complications: none    Specimens: were not obtained    Tunneled HD catheter exchanged for new 14Fr x 19 cm tip to cuff Palindrome HD catheter.  Dressing applied.  May use HD catheter for dialysis.  Catheter locked with heparin.  Vitals signs were reviewed and were stable after the procedure.      Electronically signed by NERY Sterling on 4/25/2024 at 12:00 PM

## 2024-06-27 DIAGNOSIS — I10 ESSENTIAL HYPERTENSION: ICD-10-CM

## 2024-06-27 RX ORDER — METOPROLOL TARTRATE 50 MG/1
TABLET, FILM COATED ORAL
Qty: 90 TABLET | Refills: 0 | Status: SHIPPED | OUTPATIENT
Start: 2024-06-27

## 2024-06-27 NOTE — TELEPHONE ENCOUNTER
E-scribe request for lopressor. Please review and e-scribe if applicable.     Last Visit Date:  3/19/24  Next Visit Date:  Visit date not found    Hemoglobin A1C (%)   Date Value   08/22/2023 6.2   01/10/2023 5.9   09/27/2022 5.7             ( goal A1C is < 7)   No components found for: \"LABMICR\"  No components found for: \"LDLCHOLESTEROL\", \"LDLCALC\"    (goal LDL is <100)   AST (U/L)   Date Value   09/16/2021 69 (H)     ALT (U/L)   Date Value   09/16/2021 33     BUN (mg/dL)   Date Value   12/27/2023 44 (H)     BP Readings from Last 3 Encounters:   04/25/24 (!) 153/83   03/19/24 123/67   12/27/23 (!) 142/65          (goal 120/80)        Patient Active Problem List:     Type 2 diabetes mellitus with chronic kidney disease on chronic dialysis, with long-term current use of insulin (HCC)     Sciatica of right side     Atypical chest pain     Low back pain of thoracolumbar region with sciatica     Kidney stone     Chest pain     Need for prophylactic vaccination against diphtheria-tetanus-pertussis (DTP)     Gastroesophageal reflux disease     Tachycardia     Essential hypertension     Stage 3 chronic kidney disease     BMI 31.0-31.9,adult     Acute kidney injury superimposed on CKD (HCC)     Type 2 MI (myocardial infarction) (HCC)     Gastrointestinal hemorrhage     Acute posthemorrhagic anemia     Neuropathy     NSAID long-term use     Family history of malignant neoplasm of colon     Positive colorectal cancer screening using Cologuard test     JEFFREY (acute kidney injury) (HCC)     Pneumonia due to COVID-19 virus     ESRD needing dialysis (HCC)     Acute metabolic encephalopathy     Hypoxia     Thrombocytopenia (HCC)     ESRD (end stage renal disease) on dialysis (HCC)     Erectile dysfunction     Chronic pain of right knee     Primary osteoarthritis of right knee     Dyspepsia     Decreased visual acuity     Depression

## 2024-06-28 NOTE — TELEPHONE ENCOUNTER
Pharmacy contacting office regarding script for Metoprolol stating patient has been taking 1 tablet and new script says one half tablet. Please review direction and make corrections as needed, please advise.

## (undated) DEVICE — SINGLE ACTION PUMPING SYSTEM

## (undated) DEVICE — GOWN,AURORA,NONRNF,XL,30/CS: Brand: MEDLINE

## (undated) DEVICE — PACK PROCEDURE SURG CYSTO SVMMC LF

## (undated) DEVICE — DRAPE,REIN 53X77,STERILE: Brand: MEDLINE

## (undated) DEVICE — GUIDEWIRE URO L150CM DIA0.035IN STIFF NIT HYDRPHLC STR TIP

## (undated) DEVICE — STRIP,CLOSURE,WOUND,MEDI-STRIP,1/2X4: Brand: MEDLINE

## (undated) DEVICE — GLOVE ORANGE PI 8   MSG9080

## (undated) DEVICE — FORCEPS BX L240CM JAW DIA22MM ORNG STD CAP W NDL RAD JAW 4

## (undated) DEVICE — GARMENT,MEDLINE,DVT,INT,CALF,MED, GEN2: Brand: MEDLINE

## (undated) DEVICE — GLOVE,EXAM,NITRILE,RESTORE,OAT SENSE,L: Brand: MEDLINE

## (undated) DEVICE — GLOVE SURG SZ 65 THK91MIL LTX FREE SYN POLYISOPRENE

## (undated) DEVICE — GLOVE ORANGE PI 7   MSG9070

## (undated) DEVICE — ADAPTER URO SCP UROLOK LL

## (undated) DEVICE — DUAL LUMEN URETERAL CATHETER

## (undated) DEVICE — 3M™ STERI-STRIP™ COMPOUND BENZOIN TINCTURE 40 BAGS/CARTON 4 CARTONS/CASE C1544: Brand: 3M™ STERI-STRIP™

## (undated) DEVICE — 3M™ STERI-STRIP™ REINFORCED ADHESIVE SKIN CLOSURES, R1547, 1/2 IN X 4 IN (12 MM X 100 MM), 6 STRIPS/ENVELOPE: Brand: 3M™ STERI-STRIP™